# Patient Record
Sex: FEMALE | Race: WHITE | HISPANIC OR LATINO | Employment: OTHER | URBAN - METROPOLITAN AREA
[De-identification: names, ages, dates, MRNs, and addresses within clinical notes are randomized per-mention and may not be internally consistent; named-entity substitution may affect disease eponyms.]

---

## 2017-01-03 ENCOUNTER — GENERIC CONVERSION - ENCOUNTER (OUTPATIENT)
Dept: OTHER | Facility: OTHER | Age: 72
End: 2017-01-03

## 2017-01-05 ENCOUNTER — ALLSCRIPTS OFFICE VISIT (OUTPATIENT)
Dept: OTHER | Facility: OTHER | Age: 72
End: 2017-01-05

## 2017-01-16 ENCOUNTER — GENERIC CONVERSION - ENCOUNTER (OUTPATIENT)
Dept: OTHER | Facility: OTHER | Age: 72
End: 2017-01-16

## 2017-01-26 ENCOUNTER — APPOINTMENT (OUTPATIENT)
Dept: LAB | Facility: CLINIC | Age: 72
End: 2017-01-26
Payer: COMMERCIAL

## 2017-01-26 ENCOUNTER — OFFICE VISIT (OUTPATIENT)
Dept: LAB | Facility: CLINIC | Age: 72
End: 2017-01-26
Payer: COMMERCIAL

## 2017-01-26 ENCOUNTER — APPOINTMENT (OUTPATIENT)
Dept: PREADMISSION TESTING | Facility: HOSPITAL | Age: 72
End: 2017-01-26
Payer: COMMERCIAL

## 2017-01-26 ENCOUNTER — HOSPITAL ENCOUNTER (OUTPATIENT)
Dept: RADIOLOGY | Facility: CLINIC | Age: 72
Discharge: HOME | End: 2017-01-26
Payer: COMMERCIAL

## 2017-01-26 ENCOUNTER — TRANSCRIBE ORDERS (OUTPATIENT)
Dept: LAB | Facility: CLINIC | Age: 72
End: 2017-01-26

## 2017-01-26 VITALS
WEIGHT: 138.01 LBS | HEART RATE: 62 BPM | BODY MASS INDEX: 27.09 KG/M2 | DIASTOLIC BLOOD PRESSURE: 63 MMHG | SYSTOLIC BLOOD PRESSURE: 133 MMHG | TEMPERATURE: 97.6 F | HEIGHT: 60 IN

## 2017-01-26 DIAGNOSIS — C50.212 MALIGNANT NEOPLASM OF UPPER-INNER QUADRANT OF LEFT FEMALE BREAST (HCC): ICD-10-CM

## 2017-01-26 DIAGNOSIS — C50.212 MALIGNANT NEOPLASM OF UPPER-INNER QUADRANT OF LEFT FEMALE BREAST (HCC): Primary | ICD-10-CM

## 2017-01-26 PROBLEM — C50.912 MALIGNANT NEOPLASM OF LEFT FEMALE BREAST (HCC): Status: ACTIVE | Noted: 2017-01-26

## 2017-01-26 LAB
ALBUMIN SERPL BCP-MCNC: 3.6 G/DL (ref 3.5–5)
ALP SERPL-CCNC: 97 U/L (ref 46–116)
ALT SERPL W P-5'-P-CCNC: 30 U/L (ref 12–78)
ANION GAP SERPL CALCULATED.3IONS-SCNC: 6 MMOL/L (ref 4–13)
AST SERPL W P-5'-P-CCNC: 28 U/L (ref 5–45)
ATRIAL RATE: 51 BPM
BASOPHILS # BLD AUTO: 0.02 THOUSANDS/ΜL (ref 0–0.1)
BASOPHILS NFR BLD AUTO: 0 % (ref 0–1)
BILIRUB SERPL-MCNC: 0.5 MG/DL (ref 0.2–1)
BUN SERPL-MCNC: 25 MG/DL (ref 5–25)
CALCIUM SERPL-MCNC: 9.1 MG/DL (ref 8.3–10.1)
CHLORIDE SERPL-SCNC: 106 MMOL/L (ref 100–108)
CO2 SERPL-SCNC: 28 MMOL/L (ref 21–32)
CREAT SERPL-MCNC: 0.72 MG/DL (ref 0.6–1.3)
EOSINOPHIL # BLD AUTO: 0.1 THOUSAND/ΜL (ref 0–0.61)
EOSINOPHIL NFR BLD AUTO: 2 % (ref 0–6)
ERYTHROCYTE [DISTWIDTH] IN BLOOD BY AUTOMATED COUNT: 14.2 % (ref 11.6–15.1)
GFR SERPL CREATININE-BSD FRML MDRD: >60 ML/MIN/1.73SQ M
GLUCOSE SERPL-MCNC: 91 MG/DL (ref 65–140)
HCT VFR BLD AUTO: 40.1 % (ref 34.8–46.1)
HGB BLD-MCNC: 12.8 G/DL (ref 11.5–15.4)
LYMPHOCYTES # BLD AUTO: 1.08 THOUSANDS/ΜL (ref 0.6–4.47)
LYMPHOCYTES NFR BLD AUTO: 19 % (ref 14–44)
MCH RBC QN AUTO: 27.6 PG (ref 26.8–34.3)
MCHC RBC AUTO-ENTMCNC: 31.9 G/DL (ref 31.4–37.4)
MCV RBC AUTO: 87 FL (ref 82–98)
MONOCYTES # BLD AUTO: 0.6 THOUSAND/ΜL (ref 0.17–1.22)
MONOCYTES NFR BLD AUTO: 10 % (ref 4–12)
NEUTROPHILS # BLD AUTO: 4.01 THOUSANDS/ΜL (ref 1.85–7.62)
NEUTS SEG NFR BLD AUTO: 69 % (ref 43–75)
P AXIS: 53 DEGREES
PLATELET # BLD AUTO: 163 THOUSANDS/UL (ref 149–390)
PMV BLD AUTO: 10.4 FL (ref 8.9–12.7)
POTASSIUM SERPL-SCNC: 4.6 MMOL/L (ref 3.5–5.3)
PR INTERVAL: 180 MS
PROT SERPL-MCNC: 7 G/DL (ref 6.4–8.2)
QRS AXIS: 16 DEGREES
QRSD INTERVAL: 108 MS
QT INTERVAL: 464 MS
QTC INTERVAL: 427 MS
RBC # BLD AUTO: 4.63 MILLION/UL (ref 3.81–5.12)
SODIUM SERPL-SCNC: 140 MMOL/L (ref 136–145)
T WAVE AXIS: 195 DEGREES
VENTRICULAR RATE: 51 BPM
WBC # BLD AUTO: 5.81 THOUSAND/UL (ref 4.31–10.16)

## 2017-01-26 PROCEDURE — 36415 COLL VENOUS BLD VENIPUNCTURE: CPT

## 2017-01-26 PROCEDURE — 93005 ELECTROCARDIOGRAM TRACING: CPT

## 2017-01-26 PROCEDURE — 85025 COMPLETE CBC W/AUTO DIFF WBC: CPT

## 2017-01-26 PROCEDURE — 71020 HB CHEST X-RAY 2VW FRONTAL&LATL: CPT

## 2017-01-26 PROCEDURE — 80053 COMPREHEN METABOLIC PANEL: CPT

## 2017-01-26 RX ORDER — MULTIVITAMIN
1 TABLET ORAL DAILY
COMMUNITY
End: 2022-05-24

## 2017-02-08 ENCOUNTER — ANESTHESIA EVENT (OUTPATIENT)
Dept: PERIOP | Facility: HOSPITAL | Age: 72
DRG: 582 | End: 2017-02-08
Payer: COMMERCIAL

## 2017-02-09 ENCOUNTER — HOSPITAL ENCOUNTER (OUTPATIENT)
Dept: NUCLEAR MEDICINE | Facility: HOSPITAL | Age: 72
Discharge: HOME | End: 2017-02-09
Attending: SURGERY
Payer: COMMERCIAL

## 2017-02-09 ENCOUNTER — ANESTHESIA (OUTPATIENT)
Dept: PERIOP | Facility: HOSPITAL | Age: 72
DRG: 582 | End: 2017-02-09
Payer: COMMERCIAL

## 2017-02-09 ENCOUNTER — HOSPITAL ENCOUNTER (INPATIENT)
Facility: HOSPITAL | Age: 72
LOS: 1 days | Discharge: HOME HEALTH | DRG: 582 | End: 2017-02-10
Attending: SURGERY | Admitting: PLASTIC SURGERY
Payer: COMMERCIAL

## 2017-02-09 ENCOUNTER — CONVERSION ENCOUNTER (OUTPATIENT)
Dept: MAMMOGRAPHY | Facility: HOSPITAL | Age: 72
End: 2017-02-09

## 2017-02-09 DIAGNOSIS — C50.211 BILATERAL MALIGNANT NEOPLASM OF UPPER INNER QUADRANT OF BREAST IN FEMALE (HCC): ICD-10-CM

## 2017-02-09 DIAGNOSIS — C50.212 BILATERAL MALIGNANT NEOPLASM OF UPPER INNER QUADRANT OF BREAST IN FEMALE (HCC): ICD-10-CM

## 2017-02-09 DIAGNOSIS — C50.212 MALIGNANT NEOPLASM OF UPPER-INNER QUADRANT OF LEFT FEMALE BREAST (HCC): ICD-10-CM

## 2017-02-09 LAB
ANION GAP SERPL CALCULATED.3IONS-SCNC: 11 MMOL/L (ref 4–13)
BUN SERPL-MCNC: 22 MG/DL (ref 5–25)
CALCIUM SERPL-MCNC: 8.6 MG/DL (ref 8.3–10.1)
CHLORIDE SERPL-SCNC: 102 MMOL/L (ref 100–108)
CO2 SERPL-SCNC: 24 MMOL/L (ref 21–32)
CREAT SERPL-MCNC: 1.03 MG/DL (ref 0.6–1.3)
GFR SERPL CREATININE-BSD FRML MDRD: 52.5 ML/MIN/1.73SQ M
GLUCOSE SERPL-MCNC: 180 MG/DL (ref 65–140)
POTASSIUM SERPL-SCNC: 4.6 MMOL/L (ref 3.5–5.3)
SODIUM SERPL-SCNC: 137 MMOL/L (ref 136–145)

## 2017-02-09 PROCEDURE — 88342 IMHCHEM/IMCYTCHM 1ST ANTB: CPT | Performed by: SURGERY

## 2017-02-09 PROCEDURE — 0HRUXJZ: ICD-10-PCS | Performed by: PLASTIC SURGERY

## 2017-02-09 PROCEDURE — A9270 NON-COVERED ITEM OR SERVICE: HCPCS | Performed by: PLASTIC SURGERY

## 2017-02-09 PROCEDURE — 0HHU0NZ INSERTION OF TISSUE EXPANDER INTO LEFT BREAST, OPEN APPROACH: ICD-10-PCS | Performed by: PLASTIC SURGERY

## 2017-02-09 PROCEDURE — C1781 MESH (IMPLANTABLE): HCPCS | Performed by: SURGERY

## 2017-02-09 PROCEDURE — 88333 PATH CONSLTJ SURG CYTO XM 1: CPT | Performed by: SURGERY

## 2017-02-09 PROCEDURE — A9541 TC99M SULFUR COLLOID: HCPCS

## 2017-02-09 PROCEDURE — 78195 LYMPH SYSTEM IMAGING: CPT

## 2017-02-09 PROCEDURE — C1789 PROSTHESIS, BREAST, IMP: HCPCS | Performed by: SURGERY

## 2017-02-09 PROCEDURE — 07T60ZZ RESECTION OF LEFT AXILLARY LYMPHATIC, OPEN APPROACH: ICD-10-PCS | Performed by: SURGERY

## 2017-02-09 PROCEDURE — 0HTU0ZZ RESECTION OF LEFT BREAST, OPEN APPROACH: ICD-10-PCS | Performed by: SURGERY

## 2017-02-09 PROCEDURE — 80048 BASIC METABOLIC PNL TOTAL CA: CPT | Performed by: PHYSICIAN ASSISTANT

## 2017-02-09 PROCEDURE — 88341 IMHCHEM/IMCYTCHM EA ADD ANTB: CPT | Performed by: SURGERY

## 2017-02-09 PROCEDURE — 88307 TISSUE EXAM BY PATHOLOGIST: CPT | Performed by: SURGERY

## 2017-02-09 DEVICE — TEXTURED, HIGH PROFILE, SUTURE TABS, INTEGRAL INJECTION DOME, 375CC
Type: IMPLANTABLE DEVICE | Site: BREAST | Status: FUNCTIONAL
Brand: ARTOURA BREAST TISSUE EXPANDER

## 2017-02-09 DEVICE — (128 SQ CM) -GRAFT ALLODERM 8 X 16 CM THIN: Type: IMPLANTABLE DEVICE | Site: BREAST | Status: FUNCTIONAL

## 2017-02-09 RX ORDER — PROPOFOL 10 MG/ML
INJECTION, EMULSION INTRAVENOUS AS NEEDED
Status: DISCONTINUED | OUTPATIENT
Start: 2017-02-09 | End: 2017-02-09 | Stop reason: SURG

## 2017-02-09 RX ORDER — LIDOCAINE HYDROCHLORIDE 10 MG/ML
INJECTION, SOLUTION INFILTRATION; PERINEURAL AS NEEDED
Status: DISCONTINUED | OUTPATIENT
Start: 2017-02-09 | End: 2017-02-09 | Stop reason: SURG

## 2017-02-09 RX ORDER — SODIUM CHLORIDE, SODIUM LACTATE, POTASSIUM CHLORIDE, CALCIUM CHLORIDE 600; 310; 30; 20 MG/100ML; MG/100ML; MG/100ML; MG/100ML
125 INJECTION, SOLUTION INTRAVENOUS CONTINUOUS
Status: DISCONTINUED | OUTPATIENT
Start: 2017-02-09 | End: 2017-02-09

## 2017-02-09 RX ORDER — BUPIVACAINE HYDROCHLORIDE AND EPINEPHRINE 2.5; 5 MG/ML; UG/ML
INJECTION, SOLUTION EPIDURAL; INFILTRATION; INTRACAUDAL; PERINEURAL AS NEEDED
Status: DISCONTINUED | OUTPATIENT
Start: 2017-02-09 | End: 2017-02-09 | Stop reason: HOSPADM

## 2017-02-09 RX ORDER — METOCLOPRAMIDE HYDROCHLORIDE 5 MG/ML
10 INJECTION INTRAMUSCULAR; INTRAVENOUS EVERY 6 HOURS PRN
Status: DISCONTINUED | OUTPATIENT
Start: 2017-02-09 | End: 2017-02-09 | Stop reason: HOSPADM

## 2017-02-09 RX ORDER — ROCURONIUM BROMIDE 10 MG/ML
INJECTION, SOLUTION INTRAVENOUS AS NEEDED
Status: DISCONTINUED | OUTPATIENT
Start: 2017-02-09 | End: 2017-02-09 | Stop reason: SURG

## 2017-02-09 RX ORDER — GLYCOPYRROLATE 0.2 MG/ML
INJECTION INTRAMUSCULAR; INTRAVENOUS AS NEEDED
Status: DISCONTINUED | OUTPATIENT
Start: 2017-02-09 | End: 2017-02-09 | Stop reason: SURG

## 2017-02-09 RX ORDER — DOCUSATE SODIUM 100 MG/1
100 CAPSULE, LIQUID FILLED ORAL 2 TIMES DAILY
Status: DISCONTINUED | OUTPATIENT
Start: 2017-02-09 | End: 2017-02-10 | Stop reason: HOSPADM

## 2017-02-09 RX ORDER — LEVOTHYROXINE SODIUM 0.03 MG/1
25 TABLET ORAL DAILY
Status: DISCONTINUED | OUTPATIENT
Start: 2017-02-09 | End: 2017-02-10 | Stop reason: HOSPADM

## 2017-02-09 RX ORDER — ONDANSETRON 2 MG/ML
INJECTION INTRAMUSCULAR; INTRAVENOUS AS NEEDED
Status: DISCONTINUED | OUTPATIENT
Start: 2017-02-09 | End: 2017-02-09 | Stop reason: SURG

## 2017-02-09 RX ORDER — OXYCODONE HYDROCHLORIDE AND ACETAMINOPHEN 5; 325 MG/1; MG/1
2 TABLET ORAL EVERY 4 HOURS PRN
Status: DISCONTINUED | OUTPATIENT
Start: 2017-02-09 | End: 2017-02-10 | Stop reason: HOSPADM

## 2017-02-09 RX ORDER — METHYLENE BLUE 10 MG/ML
INJECTION INTRAVENOUS AS NEEDED
Status: DISCONTINUED | OUTPATIENT
Start: 2017-02-09 | End: 2017-02-09 | Stop reason: HOSPADM

## 2017-02-09 RX ORDER — MORPHINE SULFATE 2 MG/ML
2 INJECTION, SOLUTION INTRAMUSCULAR; INTRAVENOUS
Status: DISCONTINUED | OUTPATIENT
Start: 2017-02-09 | End: 2017-02-10 | Stop reason: HOSPADM

## 2017-02-09 RX ORDER — ONDANSETRON 2 MG/ML
4 INJECTION INTRAMUSCULAR; INTRAVENOUS ONCE AS NEEDED
Status: COMPLETED | OUTPATIENT
Start: 2017-02-09 | End: 2017-02-09

## 2017-02-09 RX ORDER — SODIUM CHLORIDE, SODIUM LACTATE, POTASSIUM CHLORIDE, CALCIUM CHLORIDE 600; 310; 30; 20 MG/100ML; MG/100ML; MG/100ML; MG/100ML
75 INJECTION, SOLUTION INTRAVENOUS CONTINUOUS
Status: DISCONTINUED | OUTPATIENT
Start: 2017-02-09 | End: 2017-02-10 | Stop reason: HOSPADM

## 2017-02-09 RX ORDER — FENTANYL CITRATE 50 UG/ML
INJECTION, SOLUTION INTRAMUSCULAR; INTRAVENOUS AS NEEDED
Status: DISCONTINUED | OUTPATIENT
Start: 2017-02-09 | End: 2017-02-09 | Stop reason: SURG

## 2017-02-09 RX ORDER — DIPHENHYDRAMINE HCL 25 MG
50 TABLET ORAL EVERY 6 HOURS PRN
Status: DISCONTINUED | OUTPATIENT
Start: 2017-02-09 | End: 2017-02-10 | Stop reason: HOSPADM

## 2017-02-09 RX ORDER — ONDANSETRON 2 MG/ML
4 INJECTION INTRAMUSCULAR; INTRAVENOUS EVERY 6 HOURS PRN
Status: DISCONTINUED | OUTPATIENT
Start: 2017-02-09 | End: 2017-02-09 | Stop reason: SDUPTHER

## 2017-02-09 RX ORDER — SODIUM CHLORIDE, SODIUM LACTATE, POTASSIUM CHLORIDE, CALCIUM CHLORIDE 600; 310; 30; 20 MG/100ML; MG/100ML; MG/100ML; MG/100ML
125 INJECTION, SOLUTION INTRAVENOUS CONTINUOUS
Status: DISCONTINUED | OUTPATIENT
Start: 2017-02-09 | End: 2017-02-10 | Stop reason: HOSPADM

## 2017-02-09 RX ORDER — MIDAZOLAM HYDROCHLORIDE 1 MG/ML
INJECTION INTRAMUSCULAR; INTRAVENOUS AS NEEDED
Status: DISCONTINUED | OUTPATIENT
Start: 2017-02-09 | End: 2017-02-09 | Stop reason: SURG

## 2017-02-09 RX ORDER — FENTANYL CITRATE/PF 50 MCG/ML
25 SYRINGE (ML) INJECTION
Status: DISCONTINUED | OUTPATIENT
Start: 2017-02-09 | End: 2017-02-09 | Stop reason: HOSPADM

## 2017-02-09 RX ORDER — ONDANSETRON 2 MG/ML
4 INJECTION INTRAMUSCULAR; INTRAVENOUS EVERY 4 HOURS PRN
Status: DISCONTINUED | OUTPATIENT
Start: 2017-02-09 | End: 2017-02-10 | Stop reason: HOSPADM

## 2017-02-09 RX ORDER — DOCUSATE SODIUM 100 MG/1
100 CAPSULE, LIQUID FILLED ORAL 2 TIMES DAILY
Status: DISCONTINUED | OUTPATIENT
Start: 2017-02-09 | End: 2017-02-09 | Stop reason: SDUPTHER

## 2017-02-09 RX ORDER — OXYCODONE HYDROCHLORIDE AND ACETAMINOPHEN 5; 325 MG/1; MG/1
2 TABLET ORAL EVERY 4 HOURS PRN
Qty: 20 TABLET | Refills: 0 | Status: CANCELLED | OUTPATIENT
Start: 2017-02-09 | End: 2017-02-19

## 2017-02-09 RX ORDER — DOCUSATE SODIUM 100 MG/1
100 CAPSULE, LIQUID FILLED ORAL 2 TIMES DAILY
Qty: 60 CAPSULE | Refills: 0 | Status: CANCELLED | OUTPATIENT
Start: 2017-02-09 | End: 2017-03-11

## 2017-02-09 RX ADMIN — OXYCODONE HYDROCHLORIDE AND ACETAMINOPHEN 2 TABLET: 5; 325 TABLET ORAL at 18:38

## 2017-02-09 RX ADMIN — DEXAMETHASONE SODIUM PHOSPHATE 10 MG: 10 INJECTION INTRAMUSCULAR; INTRAVENOUS at 08:08

## 2017-02-09 RX ADMIN — MIDAZOLAM HYDROCHLORIDE 1 MG: 1 INJECTION, SOLUTION INTRAMUSCULAR; INTRAVENOUS at 08:03

## 2017-02-09 RX ADMIN — ROCURONIUM BROMIDE 40 MG: 10 INJECTION, SOLUTION INTRAVENOUS at 08:08

## 2017-02-09 RX ADMIN — ROCURONIUM BROMIDE 10 MG: 10 INJECTION, SOLUTION INTRAVENOUS at 09:45

## 2017-02-09 RX ADMIN — ROCURONIUM BROMIDE 20 MG: 10 INJECTION, SOLUTION INTRAVENOUS at 09:24

## 2017-02-09 RX ADMIN — ONDANSETRON 4 MG: 2 INJECTION INTRAMUSCULAR; INTRAVENOUS at 10:35

## 2017-02-09 RX ADMIN — GLYCOPYRROLATE 0.2 MG: 0.2 INJECTION INTRAMUSCULAR; INTRAVENOUS at 08:05

## 2017-02-09 RX ADMIN — METOPROLOL TARTRATE 25 MG: 25 TABLET ORAL at 21:04

## 2017-02-09 RX ADMIN — LIDOCAINE HYDROCHLORIDE 50 MG: 10 INJECTION, SOLUTION INFILTRATION; PERINEURAL at 08:08

## 2017-02-09 RX ADMIN — PROPOFOL 20 MG: 10 INJECTION, EMULSION INTRAVENOUS at 11:04

## 2017-02-09 RX ADMIN — OXYCODONE HYDROCHLORIDE AND ACETAMINOPHEN 2 TABLET: 5; 325 TABLET ORAL at 13:32

## 2017-02-09 RX ADMIN — DOCUSATE SODIUM 100 MG: 100 CAPSULE, LIQUID FILLED ORAL at 18:38

## 2017-02-09 RX ADMIN — ROCURONIUM BROMIDE 10 MG: 10 INJECTION, SOLUTION INTRAVENOUS at 09:40

## 2017-02-09 RX ADMIN — GLYCOPYRROLATE 0.2 MG: 0.2 INJECTION INTRAMUSCULAR; INTRAVENOUS at 10:20

## 2017-02-09 RX ADMIN — ENOXAPARIN SODIUM 40 MG: 40 INJECTION SUBCUTANEOUS at 21:04

## 2017-02-09 RX ADMIN — GLYCOPYRROLATE 0.4 MG: 0.2 INJECTION INTRAMUSCULAR; INTRAVENOUS at 11:01

## 2017-02-09 RX ADMIN — PROPOFOL 110 MG: 10 INJECTION, EMULSION INTRAVENOUS at 08:08

## 2017-02-09 RX ADMIN — FENTANYL CITRATE 50 MCG: 50 INJECTION INTRAMUSCULAR; INTRAVENOUS at 09:54

## 2017-02-09 RX ADMIN — CEFAZOLIN SODIUM 1000 MG: 1 SOLUTION INTRAVENOUS at 15:18

## 2017-02-09 RX ADMIN — SODIUM CHLORIDE, SODIUM LACTATE, POTASSIUM CHLORIDE, AND CALCIUM CHLORIDE: .6; .31; .03; .02 INJECTION, SOLUTION INTRAVENOUS at 10:51

## 2017-02-09 RX ADMIN — SODIUM CHLORIDE, SODIUM LACTATE, POTASSIUM CHLORIDE, AND CALCIUM CHLORIDE: .6; .31; .03; .02 INJECTION, SOLUTION INTRAVENOUS at 08:03

## 2017-02-09 RX ADMIN — SODIUM CHLORIDE, SODIUM LACTATE, POTASSIUM CHLORIDE, AND CALCIUM CHLORIDE 125 ML/HR: .6; .31; .03; .02 INJECTION, SOLUTION INTRAVENOUS at 14:57

## 2017-02-09 RX ADMIN — NEOSTIGMINE METHYLSULFATE 3 MG: 1 INJECTION INTRAMUSCULAR; INTRAVENOUS; SUBCUTANEOUS at 11:01

## 2017-02-09 RX ADMIN — ONDANSETRON 4 MG: 2 INJECTION INTRAMUSCULAR; INTRAVENOUS at 12:35

## 2017-02-09 RX ADMIN — CEFAZOLIN SODIUM 1000 MG: 1 SOLUTION INTRAVENOUS at 08:03

## 2017-02-09 RX ADMIN — FENTANYL CITRATE 25 MCG: 50 INJECTION INTRAMUSCULAR; INTRAVENOUS at 11:07

## 2017-02-09 RX ADMIN — MIDAZOLAM HYDROCHLORIDE 1 MG: 1 INJECTION, SOLUTION INTRAMUSCULAR; INTRAVENOUS at 08:05

## 2017-02-09 RX ADMIN — ONDANSETRON 4 MG: 2 INJECTION INTRAMUSCULAR; INTRAVENOUS at 15:38

## 2017-02-09 RX ADMIN — FENTANYL CITRATE 50 MCG: 50 INJECTION INTRAMUSCULAR; INTRAVENOUS at 09:59

## 2017-02-09 RX ADMIN — FENTANYL CITRATE 50 MCG: 50 INJECTION INTRAMUSCULAR; INTRAVENOUS at 08:36

## 2017-02-10 VITALS
OXYGEN SATURATION: 94 % | BODY MASS INDEX: 27.79 KG/M2 | RESPIRATION RATE: 16 BRPM | WEIGHT: 141.54 LBS | SYSTOLIC BLOOD PRESSURE: 116 MMHG | TEMPERATURE: 98 F | HEART RATE: 56 BPM | HEIGHT: 60 IN | DIASTOLIC BLOOD PRESSURE: 56 MMHG

## 2017-02-10 PROCEDURE — A9270 NON-COVERED ITEM OR SERVICE: HCPCS | Performed by: PLASTIC SURGERY

## 2017-02-10 RX ORDER — OXYCODONE HYDROCHLORIDE AND ACETAMINOPHEN 5; 325 MG/1; MG/1
2 TABLET ORAL EVERY 4 HOURS PRN
Qty: 30 TABLET | Refills: 0 | Status: SHIPPED | OUTPATIENT
Start: 2017-02-10 | End: 2017-02-20

## 2017-02-10 RX ADMIN — LEVOTHYROXINE SODIUM 25 MCG: 25 TABLET ORAL at 08:10

## 2017-02-10 RX ADMIN — CEFAZOLIN SODIUM 1000 MG: 1 SOLUTION INTRAVENOUS at 08:09

## 2017-02-10 RX ADMIN — SODIUM CHLORIDE, SODIUM LACTATE, POTASSIUM CHLORIDE, AND CALCIUM CHLORIDE 125 ML/HR: .6; .31; .03; .02 INJECTION, SOLUTION INTRAVENOUS at 01:03

## 2017-02-10 RX ADMIN — METOPROLOL TARTRATE 25 MG: 25 TABLET ORAL at 08:09

## 2017-02-10 RX ADMIN — OXYCODONE HYDROCHLORIDE AND ACETAMINOPHEN 1 TABLET: 5; 325 TABLET ORAL at 05:48

## 2017-02-10 RX ADMIN — CEFAZOLIN SODIUM 1000 MG: 1 SOLUTION INTRAVENOUS at 00:58

## 2017-02-10 RX ADMIN — DOCUSATE SODIUM 100 MG: 100 CAPSULE, LIQUID FILLED ORAL at 08:10

## 2017-02-22 ENCOUNTER — ALLSCRIPTS OFFICE VISIT (OUTPATIENT)
Dept: OTHER | Facility: OTHER | Age: 72
End: 2017-02-22

## 2017-02-23 ENCOUNTER — ALLSCRIPTS OFFICE VISIT (OUTPATIENT)
Dept: OTHER | Facility: OTHER | Age: 72
End: 2017-02-23

## 2017-02-23 ENCOUNTER — APPOINTMENT (OUTPATIENT)
Dept: LAB | Facility: CLINIC | Age: 72
End: 2017-02-23
Payer: COMMERCIAL

## 2017-02-23 DIAGNOSIS — E03.9 HYPOTHYROIDISM: ICD-10-CM

## 2017-02-23 LAB — TSH SERPL DL<=0.05 MIU/L-ACNC: 1.61 UIU/ML (ref 0.36–3.74)

## 2017-02-23 PROCEDURE — 84443 ASSAY THYROID STIM HORMONE: CPT

## 2017-02-23 PROCEDURE — 36415 COLL VENOUS BLD VENIPUNCTURE: CPT

## 2017-02-24 ENCOUNTER — GENERIC CONVERSION - ENCOUNTER (OUTPATIENT)
Dept: OTHER | Facility: OTHER | Age: 72
End: 2017-02-24

## 2017-03-07 LAB — SCAN RESULT: NORMAL

## 2017-03-16 ENCOUNTER — ALLSCRIPTS OFFICE VISIT (OUTPATIENT)
Dept: OTHER | Facility: OTHER | Age: 72
End: 2017-03-16

## 2017-03-16 ENCOUNTER — TRANSCRIBE ORDERS (OUTPATIENT)
Dept: ADMINISTRATIVE | Facility: HOSPITAL | Age: 72
End: 2017-03-16

## 2017-03-16 DIAGNOSIS — C50.212 MALIGNANT NEOPLASM OF UPPER-INNER QUADRANT OF LEFT FEMALE BREAST (HCC): ICD-10-CM

## 2017-03-16 DIAGNOSIS — E28.39 RESISTANT OVARY SYNDROME: Primary | ICD-10-CM

## 2017-04-19 ENCOUNTER — HOSPITAL ENCOUNTER (OUTPATIENT)
Dept: RADIOLOGY | Age: 72
Discharge: HOME | End: 2017-04-19
Payer: COMMERCIAL

## 2017-04-19 DIAGNOSIS — E28.39 OTHER PRIMARY OVARIAN FAILURE: ICD-10-CM

## 2017-04-19 DIAGNOSIS — C50.212 MALIGNANT NEOPLASM OF UPPER-INNER QUADRANT OF LEFT FEMALE BREAST (HCC): ICD-10-CM

## 2017-04-19 PROCEDURE — 77080 DXA BONE DENSITY AXIAL: CPT

## 2017-05-02 ENCOUNTER — GENERIC CONVERSION - ENCOUNTER (OUTPATIENT)
Dept: OTHER | Facility: OTHER | Age: 72
End: 2017-05-02

## 2017-05-10 RX ORDER — OMEPRAZOLE 10 MG/1
10 CAPSULE, DELAYED RELEASE ORAL DAILY
Status: ON HOLD | COMMUNITY
End: 2017-05-18 | Stop reason: ALTCHOICE

## 2017-05-10 RX ORDER — ASPIRIN 81 MG/1
81 TABLET ORAL DAILY
COMMUNITY

## 2017-05-10 RX ORDER — OMEGA-3-ACID ETHYL ESTERS 1 G/1
2 CAPSULE, LIQUID FILLED ORAL 2 TIMES DAILY
COMMUNITY
End: 2017-05-18 | Stop reason: HOSPADM

## 2017-05-18 ENCOUNTER — HOSPITAL ENCOUNTER (OUTPATIENT)
Facility: HOSPITAL | Age: 72
Setting detail: OUTPATIENT SURGERY
Discharge: HOME | End: 2017-05-18
Attending: PLASTIC SURGERY | Admitting: PLASTIC SURGERY
Payer: COMMERCIAL

## 2017-05-18 ENCOUNTER — ANESTHESIA (OUTPATIENT)
Dept: PERIOP | Facility: HOSPITAL | Age: 72
End: 2017-05-18
Payer: COMMERCIAL

## 2017-05-18 ENCOUNTER — ANESTHESIA EVENT (OUTPATIENT)
Dept: PERIOP | Facility: HOSPITAL | Age: 72
End: 2017-05-18
Payer: COMMERCIAL

## 2017-05-18 VITALS
BODY MASS INDEX: 27.29 KG/M2 | RESPIRATION RATE: 18 BRPM | SYSTOLIC BLOOD PRESSURE: 120 MMHG | OXYGEN SATURATION: 94 % | HEART RATE: 58 BPM | TEMPERATURE: 97.9 F | WEIGHT: 139 LBS | DIASTOLIC BLOOD PRESSURE: 58 MMHG | HEIGHT: 60 IN

## 2017-05-18 DIAGNOSIS — Z85.3 PERSONAL HISTORY OF MALIGNANT NEOPLASM OF BREAST: ICD-10-CM

## 2017-05-18 DIAGNOSIS — N65.0 DEFORMITY OF RECONSTRUCTED BREAST: ICD-10-CM

## 2017-05-18 PROCEDURE — 88305 TISSUE EXAM BY PATHOLOGIST: CPT | Performed by: PLASTIC SURGERY

## 2017-05-18 PROCEDURE — C1789 PROSTHESIS, BREAST, IMP: HCPCS | Performed by: PLASTIC SURGERY

## 2017-05-18 DEVICE — LOW HEIGHT, MODERATE PLUS PROFILE SILICONE GEL-FILLED BREAST IMPLANT, 415CC  TEXTURED CONTOUR SILICONE
Type: IMPLANTABLE DEVICE | Site: BREAST | Status: FUNCTIONAL
Brand: MENTOR MEMORYSHAPE BREAST IMPLANT

## 2017-05-18 RX ORDER — ONDANSETRON 2 MG/ML
4 INJECTION INTRAMUSCULAR; INTRAVENOUS EVERY 8 HOURS PRN
Status: DISCONTINUED | OUTPATIENT
Start: 2017-05-18 | End: 2017-05-18 | Stop reason: HOSPADM

## 2017-05-18 RX ORDER — ROCURONIUM BROMIDE 10 MG/ML
INJECTION, SOLUTION INTRAVENOUS AS NEEDED
Status: DISCONTINUED | OUTPATIENT
Start: 2017-05-18 | End: 2017-05-18 | Stop reason: SURG

## 2017-05-18 RX ORDER — BUPIVACAINE HYDROCHLORIDE AND EPINEPHRINE 2.5; 5 MG/ML; UG/ML
INJECTION, SOLUTION EPIDURAL; INFILTRATION; INTRACAUDAL; PERINEURAL AS NEEDED
Status: DISCONTINUED | OUTPATIENT
Start: 2017-05-18 | End: 2017-05-18 | Stop reason: HOSPADM

## 2017-05-18 RX ORDER — SODIUM CHLORIDE, SODIUM LACTATE, POTASSIUM CHLORIDE, CALCIUM CHLORIDE 600; 310; 30; 20 MG/100ML; MG/100ML; MG/100ML; MG/100ML
INJECTION, SOLUTION INTRAVENOUS CONTINUOUS PRN
Status: DISCONTINUED | OUTPATIENT
Start: 2017-05-18 | End: 2017-05-18 | Stop reason: SURG

## 2017-05-18 RX ORDER — ANASTROZOLE 1 MG/1
1 TABLET ORAL DAILY
COMMUNITY
End: 2018-03-15 | Stop reason: SDUPTHER

## 2017-05-18 RX ORDER — SUCCINYLCHOLINE CHLORIDE 20 MG/ML
INJECTION INTRAMUSCULAR; INTRAVENOUS AS NEEDED
Status: DISCONTINUED | OUTPATIENT
Start: 2017-05-18 | End: 2017-05-18 | Stop reason: SURG

## 2017-05-18 RX ORDER — PROPOFOL 10 MG/ML
INJECTION, EMULSION INTRAVENOUS AS NEEDED
Status: DISCONTINUED | OUTPATIENT
Start: 2017-05-18 | End: 2017-05-18 | Stop reason: SURG

## 2017-05-18 RX ORDER — EPHEDRINE SULFATE 50 MG/ML
INJECTION, SOLUTION INTRAVENOUS AS NEEDED
Status: DISCONTINUED | OUTPATIENT
Start: 2017-05-18 | End: 2017-05-18 | Stop reason: SURG

## 2017-05-18 RX ORDER — ONDANSETRON 2 MG/ML
INJECTION INTRAMUSCULAR; INTRAVENOUS AS NEEDED
Status: DISCONTINUED | OUTPATIENT
Start: 2017-05-18 | End: 2017-05-18 | Stop reason: SURG

## 2017-05-18 RX ORDER — GLYCOPYRROLATE 0.2 MG/ML
INJECTION INTRAMUSCULAR; INTRAVENOUS AS NEEDED
Status: DISCONTINUED | OUTPATIENT
Start: 2017-05-18 | End: 2017-05-18 | Stop reason: SURG

## 2017-05-18 RX ORDER — FENTANYL CITRATE 50 UG/ML
INJECTION, SOLUTION INTRAMUSCULAR; INTRAVENOUS AS NEEDED
Status: DISCONTINUED | OUTPATIENT
Start: 2017-05-18 | End: 2017-05-18 | Stop reason: SURG

## 2017-05-18 RX ORDER — OXYCODONE HYDROCHLORIDE AND ACETAMINOPHEN 5; 325 MG/1; MG/1
2 TABLET ORAL EVERY 4 HOURS PRN
Status: DISCONTINUED | OUTPATIENT
Start: 2017-05-18 | End: 2017-05-18 | Stop reason: HOSPADM

## 2017-05-18 RX ORDER — OXYCODONE HYDROCHLORIDE AND ACETAMINOPHEN 5; 325 MG/1; MG/1
1-2 TABLET ORAL EVERY 4 HOURS PRN
Qty: 50 TABLET | Refills: 0 | Status: SHIPPED | OUTPATIENT
Start: 2017-05-18 | End: 2017-05-31 | Stop reason: ALTCHOICE

## 2017-05-18 RX ORDER — FENTANYL CITRATE/PF 50 MCG/ML
25 SYRINGE (ML) INJECTION
Status: DISCONTINUED | OUTPATIENT
Start: 2017-05-18 | End: 2017-05-18 | Stop reason: HOSPADM

## 2017-05-18 RX ORDER — ONDANSETRON 2 MG/ML
4 INJECTION INTRAMUSCULAR; INTRAVENOUS ONCE
Status: DISCONTINUED | OUTPATIENT
Start: 2017-05-18 | End: 2017-05-18 | Stop reason: HOSPADM

## 2017-05-18 RX ORDER — LIDOCAINE HYDROCHLORIDE 10 MG/ML
INJECTION, SOLUTION INFILTRATION; PERINEURAL AS NEEDED
Status: DISCONTINUED | OUTPATIENT
Start: 2017-05-18 | End: 2017-05-18 | Stop reason: SURG

## 2017-05-18 RX ADMIN — GLYCOPYRROLATE 0.2 MG: 0.2 INJECTION INTRAMUSCULAR; INTRAVENOUS at 17:56

## 2017-05-18 RX ADMIN — LIDOCAINE HYDROCHLORIDE 50 MG: 10 INJECTION, SOLUTION INFILTRATION; PERINEURAL at 16:37

## 2017-05-18 RX ADMIN — SODIUM CHLORIDE, SODIUM LACTATE, POTASSIUM CHLORIDE, AND CALCIUM CHLORIDE: .6; .31; .03; .02 INJECTION, SOLUTION INTRAVENOUS at 16:29

## 2017-05-18 RX ADMIN — DEXAMETHASONE SODIUM PHOSPHATE 8 MG: 10 INJECTION INTRAMUSCULAR; INTRAVENOUS at 16:45

## 2017-05-18 RX ADMIN — CEFAZOLIN SODIUM 1000 MG: 1 SOLUTION INTRAVENOUS at 16:34

## 2017-05-18 RX ADMIN — EPHEDRINE SULFATE 10 MG: 50 INJECTION, SOLUTION INTRAMUSCULAR; INTRAVENOUS; SUBCUTANEOUS at 16:54

## 2017-05-18 RX ADMIN — FENTANYL CITRATE 50 MCG: 50 INJECTION INTRAMUSCULAR; INTRAVENOUS at 16:37

## 2017-05-18 RX ADMIN — ONDANSETRON 4 MG: 2 INJECTION INTRAMUSCULAR; INTRAVENOUS at 16:45

## 2017-05-18 RX ADMIN — FENTANYL CITRATE 50 MCG: 50 INJECTION INTRAMUSCULAR; INTRAVENOUS at 17:57

## 2017-05-18 RX ADMIN — SUCCINYLCHOLINE CHLORIDE 100 MG: 20 INJECTION, SOLUTION INTRAMUSCULAR; INTRAVENOUS at 16:37

## 2017-05-18 RX ADMIN — PROPOFOL 150 MG: 10 INJECTION, EMULSION INTRAVENOUS at 16:37

## 2017-05-18 RX ADMIN — ROCURONIUM BROMIDE 5 MG: 10 INJECTION, SOLUTION INTRAVENOUS at 17:17

## 2017-05-18 RX ADMIN — ROCURONIUM BROMIDE 25 MG: 10 INJECTION, SOLUTION INTRAVENOUS at 16:54

## 2017-05-18 RX ADMIN — NEOSTIGMINE METHYLSULFATE 2 MG: 1 INJECTION, SOLUTION INTRAMUSCULAR; INTRAVENOUS; SUBCUTANEOUS at 17:56

## 2017-05-18 RX ADMIN — EPHEDRINE SULFATE 10 MG: 50 INJECTION, SOLUTION INTRAMUSCULAR; INTRAVENOUS; SUBCUTANEOUS at 16:48

## 2017-05-22 ENCOUNTER — ALLSCRIPTS OFFICE VISIT (OUTPATIENT)
Dept: OTHER | Facility: OTHER | Age: 72
End: 2017-05-22

## 2017-05-22 ENCOUNTER — TRANSCRIBE ORDERS (OUTPATIENT)
Dept: ADMINISTRATIVE | Facility: HOSPITAL | Age: 72
End: 2017-05-22

## 2017-05-22 DIAGNOSIS — C50.212 MALIGNANT NEOPLASM OF UPPER-INNER QUADRANT OF LEFT FEMALE BREAST (HCC): Primary | ICD-10-CM

## 2017-05-31 ENCOUNTER — HOSPITAL ENCOUNTER (INPATIENT)
Facility: HOSPITAL | Age: 72
LOS: 4 days | Discharge: HOME | DRG: 600 | End: 2017-06-04
Attending: PLASTIC SURGERY | Admitting: PLASTIC SURGERY
Payer: COMMERCIAL

## 2017-05-31 ENCOUNTER — APPOINTMENT (EMERGENCY)
Dept: CT IMAGING | Facility: HOSPITAL | Age: 72
DRG: 600 | End: 2017-05-31
Payer: COMMERCIAL

## 2017-05-31 DIAGNOSIS — D72.829 LEUKOCYTOSIS: Primary | ICD-10-CM

## 2017-05-31 LAB
ANION GAP SERPL CALCULATED.3IONS-SCNC: 7 MMOL/L (ref 4–13)
ANISOCYTOSIS BLD QL SMEAR: PRESENT
BACTERIA UR QL AUTO: ABNORMAL /HPF
BASOPHILS # BLD AUTO: 0.01 THOUSANDS/ΜL (ref 0–0.1)
BASOPHILS # BLD MANUAL: 0.22 THOUSAND/UL (ref 0–0.1)
BASOPHILS NFR BLD AUTO: 0 % (ref 0–1)
BASOPHILS NFR MAR MANUAL: 1 % (ref 0–1)
BILIRUB UR QL STRIP: NEGATIVE
BUN SERPL-MCNC: 22 MG/DL (ref 5–25)
CALCIUM SERPL-MCNC: 8.8 MG/DL (ref 8.3–10.1)
CHLORIDE SERPL-SCNC: 101 MMOL/L (ref 100–108)
CLARITY UR: CLEAR
CO2 SERPL-SCNC: 26 MMOL/L (ref 21–32)
COLOR UR: YELLOW
CREAT SERPL-MCNC: 0.87 MG/DL (ref 0.6–1.3)
EOSINOPHIL # BLD AUTO: 0.01 THOUSAND/ΜL (ref 0–0.61)
EOSINOPHIL # BLD MANUAL: 0 THOUSAND/UL (ref 0–0.4)
EOSINOPHIL NFR BLD AUTO: 0 % (ref 0–6)
EOSINOPHIL NFR BLD MANUAL: 0 % (ref 0–6)
ERYTHROCYTE [DISTWIDTH] IN BLOOD BY AUTOMATED COUNT: 13.7 % (ref 11.6–15.1)
GFR SERPL CREATININE-BSD FRML MDRD: >60 ML/MIN/1.73SQ M
GLUCOSE SERPL-MCNC: 120 MG/DL (ref 65–140)
GLUCOSE UR STRIP-MCNC: NEGATIVE MG/DL
HCT VFR BLD AUTO: 36.9 % (ref 34.8–46.1)
HGB BLD-MCNC: 12.4 G/DL (ref 11.5–15.4)
HGB UR QL STRIP.AUTO: ABNORMAL
IMM GRANULOCYTES NFR BLD AUTO: 0 % (ref 0–2)
KETONES UR STRIP-MCNC: NEGATIVE MG/DL
LACTATE SERPL-SCNC: 1.4 MMOL/L (ref 0.5–2)
LEUKOCYTE ESTERASE UR QL STRIP: NEGATIVE
LG PLATELETS BLD QL SMEAR: PRESENT
LYMPHOCYTES # BLD AUTO: 0.35 THOUSANDS/ΜL (ref 0.6–4.47)
LYMPHOCYTES # BLD AUTO: 0.64 THOUSAND/UL (ref 0.6–4.47)
LYMPHOCYTES # BLD AUTO: 3 % (ref 14–44)
LYMPHOCYTES NFR BLD AUTO: 2 % (ref 14–44)
MCH RBC QN AUTO: 28.4 PG (ref 26.8–34.3)
MCHC RBC AUTO-ENTMCNC: 33.6 G/DL (ref 31.4–37.4)
MCV RBC AUTO: 85 FL (ref 82–98)
METAMYELOCYTES NFR BLD MANUAL: 1 % (ref 0–1)
MONOCYTES # BLD AUTO: 0.64 THOUSAND/UL (ref 0–1.22)
MONOCYTES # BLD AUTO: 1.28 THOUSAND/ΜL (ref 0.17–1.22)
MONOCYTES NFR BLD AUTO: 6 % (ref 4–12)
MONOCYTES NFR BLD: 3 % (ref 4–12)
NEUTROPHILS # BLD AUTO: 19.9 THOUSANDS/ΜL (ref 1.85–7.62)
NEUTROPHILS # BLD MANUAL: 19.8 THOUSAND/UL (ref 1.85–7.62)
NEUTS BAND NFR BLD MANUAL: 26 % (ref 0–8)
NEUTS SEG NFR BLD AUTO: 66 % (ref 43–75)
NEUTS SEG NFR BLD AUTO: 93 % (ref 43–75)
NITRITE UR QL STRIP: NEGATIVE
NON-SQ EPI CELLS URNS QL MICRO: ABNORMAL /HPF
OVALOCYTES BLD QL SMEAR: PRESENT
PH UR STRIP.AUTO: 5 [PH] (ref 4.5–8)
PLATELET # BLD AUTO: 126 THOUSANDS/UL (ref 149–390)
PLATELET BLD QL SMEAR: ABNORMAL
PMV BLD AUTO: 9.9 FL (ref 8.9–12.7)
POTASSIUM SERPL-SCNC: 3.6 MMOL/L (ref 3.5–5.3)
PROT UR STRIP-MCNC: NEGATIVE MG/DL
RBC # BLD AUTO: 4.36 MILLION/UL (ref 3.81–5.12)
RBC #/AREA URNS AUTO: ABNORMAL /HPF
SODIUM SERPL-SCNC: 134 MMOL/L (ref 136–145)
SP GR UR STRIP.AUTO: 1.02 (ref 1–1.03)
TOTAL CELLS COUNTED SPEC: 100
UROBILINOGEN UR QL STRIP.AUTO: 0.2 E.U./DL
WBC # BLD AUTO: 21.55 THOUSAND/UL (ref 4.31–10.16)
WBC #/AREA URNS AUTO: ABNORMAL /HPF

## 2017-05-31 PROCEDURE — 81001 URINALYSIS AUTO W/SCOPE: CPT

## 2017-05-31 PROCEDURE — 36415 COLL VENOUS BLD VENIPUNCTURE: CPT | Performed by: PHYSICIAN ASSISTANT

## 2017-05-31 PROCEDURE — 81002 URINALYSIS NONAUTO W/O SCOPE: CPT | Performed by: PHYSICIAN ASSISTANT

## 2017-05-31 PROCEDURE — 85007 BL SMEAR W/DIFF WBC COUNT: CPT | Performed by: PHYSICIAN ASSISTANT

## 2017-05-31 PROCEDURE — 85027 COMPLETE CBC AUTOMATED: CPT | Performed by: PHYSICIAN ASSISTANT

## 2017-05-31 PROCEDURE — 99285 EMERGENCY DEPT VISIT HI MDM: CPT

## 2017-05-31 PROCEDURE — 96365 THER/PROPH/DIAG IV INF INIT: CPT

## 2017-05-31 PROCEDURE — A9270 NON-COVERED ITEM OR SERVICE: HCPCS | Performed by: PLASTIC SURGERY

## 2017-05-31 PROCEDURE — 83605 ASSAY OF LACTIC ACID: CPT | Performed by: PHYSICIAN ASSISTANT

## 2017-05-31 PROCEDURE — 71260 CT THORAX DX C+: CPT

## 2017-05-31 PROCEDURE — 80048 BASIC METABOLIC PNL TOTAL CA: CPT | Performed by: PHYSICIAN ASSISTANT

## 2017-05-31 PROCEDURE — 87040 BLOOD CULTURE FOR BACTERIA: CPT | Performed by: PHYSICIAN ASSISTANT

## 2017-05-31 PROCEDURE — 85025 COMPLETE CBC W/AUTO DIFF WBC: CPT | Performed by: PHYSICIAN ASSISTANT

## 2017-05-31 RX ORDER — MORPHINE SULFATE 2 MG/ML
2 INJECTION, SOLUTION INTRAMUSCULAR; INTRAVENOUS
Status: DISCONTINUED | OUTPATIENT
Start: 2017-05-31 | End: 2017-06-04 | Stop reason: HOSPADM

## 2017-05-31 RX ORDER — DIPHENHYDRAMINE HCL 25 MG
50 TABLET ORAL EVERY 6 HOURS PRN
Status: DISCONTINUED | OUTPATIENT
Start: 2017-05-31 | End: 2017-06-04 | Stop reason: HOSPADM

## 2017-05-31 RX ORDER — SODIUM CHLORIDE 9 MG/ML
75 INJECTION, SOLUTION INTRAVENOUS CONTINUOUS
Status: DISCONTINUED | OUTPATIENT
Start: 2017-05-31 | End: 2017-05-31

## 2017-05-31 RX ORDER — DEXTROSE, SODIUM CHLORIDE, AND POTASSIUM CHLORIDE 5; .9; .15 G/100ML; G/100ML; G/100ML
50 INJECTION INTRAVENOUS CONTINUOUS
Status: DISCONTINUED | OUTPATIENT
Start: 2017-05-31 | End: 2017-06-04 | Stop reason: HOSPADM

## 2017-05-31 RX ORDER — ONDANSETRON 2 MG/ML
4 INJECTION INTRAMUSCULAR; INTRAVENOUS EVERY 4 HOURS PRN
Status: DISCONTINUED | OUTPATIENT
Start: 2017-05-31 | End: 2017-06-04 | Stop reason: HOSPADM

## 2017-05-31 RX ORDER — ONDANSETRON 2 MG/ML
4 INJECTION INTRAMUSCULAR; INTRAVENOUS EVERY 6 HOURS PRN
Status: DISCONTINUED | OUTPATIENT
Start: 2017-05-31 | End: 2017-06-04 | Stop reason: HOSPADM

## 2017-05-31 RX ORDER — VANCOMYCIN HYDROCHLORIDE 1 G/200ML
15 INJECTION, SOLUTION INTRAVENOUS EVERY 24 HOURS
Status: DISCONTINUED | OUTPATIENT
Start: 2017-06-01 | End: 2017-06-01

## 2017-05-31 RX ORDER — DOCUSATE SODIUM 100 MG/1
100 CAPSULE, LIQUID FILLED ORAL 2 TIMES DAILY
Status: DISCONTINUED | OUTPATIENT
Start: 2017-05-31 | End: 2017-06-04 | Stop reason: HOSPADM

## 2017-05-31 RX ORDER — ACETAMINOPHEN 325 MG/1
650 TABLET ORAL EVERY 6 HOURS PRN
Status: DISCONTINUED | OUTPATIENT
Start: 2017-05-31 | End: 2017-06-04 | Stop reason: HOSPADM

## 2017-05-31 RX ORDER — VANCOMYCIN HYDROCHLORIDE 1 G/200ML
15 INJECTION, SOLUTION INTRAVENOUS ONCE
Status: COMPLETED | OUTPATIENT
Start: 2017-05-31 | End: 2017-05-31

## 2017-05-31 RX ORDER — OXYCODONE HYDROCHLORIDE AND ACETAMINOPHEN 5; 325 MG/1; MG/1
2 TABLET ORAL EVERY 4 HOURS PRN
Status: DISCONTINUED | OUTPATIENT
Start: 2017-05-31 | End: 2017-06-04 | Stop reason: HOSPADM

## 2017-05-31 RX ORDER — LEVOTHYROXINE SODIUM 0.03 MG/1
25 TABLET ORAL
Status: DISCONTINUED | OUTPATIENT
Start: 2017-06-01 | End: 2017-06-04 | Stop reason: HOSPADM

## 2017-05-31 RX ORDER — ANASTROZOLE 1 MG/1
1 TABLET ORAL DAILY
Status: DISCONTINUED | OUTPATIENT
Start: 2017-05-31 | End: 2017-06-04 | Stop reason: HOSPADM

## 2017-05-31 RX ADMIN — ACETAMINOPHEN 650 MG: 325 TABLET, FILM COATED ORAL at 18:17

## 2017-05-31 RX ADMIN — SODIUM CHLORIDE 1000 ML: 0.9 INJECTION, SOLUTION INTRAVENOUS at 14:24

## 2017-05-31 RX ADMIN — DEXTROSE, SODIUM CHLORIDE, AND POTASSIUM CHLORIDE 50 ML/HR: 5; .9; .15 INJECTION INTRAVENOUS at 18:11

## 2017-05-31 RX ADMIN — DOCUSATE SODIUM 100 MG: 100 CAPSULE, LIQUID FILLED ORAL at 18:12

## 2017-05-31 RX ADMIN — VANCOMYCIN HYDROCHLORIDE 1000 MG: 1 INJECTION, SOLUTION INTRAVENOUS at 14:47

## 2017-05-31 RX ADMIN — IOHEXOL 85 ML: 350 INJECTION, SOLUTION INTRAVENOUS at 15:41

## 2017-05-31 RX ADMIN — ENOXAPARIN SODIUM 40 MG: 40 INJECTION SUBCUTANEOUS at 18:12

## 2017-05-31 RX ADMIN — ANASTROZOLE 1 MG: 1 TABLET, COATED ORAL at 18:28

## 2017-06-01 PROBLEM — N61.0 CELLULITIS OF LEFT BREAST: Status: ACTIVE | Noted: 2017-06-01

## 2017-06-01 LAB
ERYTHROCYTE [DISTWIDTH] IN BLOOD BY AUTOMATED COUNT: 14.2 % (ref 11.6–15.1)
HCT VFR BLD AUTO: 34.8 % (ref 34.8–46.1)
HGB BLD-MCNC: 11.5 G/DL (ref 11.5–15.4)
MCH RBC QN AUTO: 28.3 PG (ref 26.8–34.3)
MCHC RBC AUTO-ENTMCNC: 33 G/DL (ref 31.4–37.4)
MCV RBC AUTO: 86 FL (ref 82–98)
PLATELET # BLD AUTO: 141 THOUSANDS/UL (ref 149–390)
PMV BLD AUTO: 10.8 FL (ref 8.9–12.7)
RBC # BLD AUTO: 4.06 MILLION/UL (ref 3.81–5.12)
WBC # BLD AUTO: 17.72 THOUSAND/UL (ref 4.31–10.16)

## 2017-06-01 PROCEDURE — A9270 NON-COVERED ITEM OR SERVICE: HCPCS | Performed by: PLASTIC SURGERY

## 2017-06-01 PROCEDURE — 85027 COMPLETE CBC AUTOMATED: CPT | Performed by: PLASTIC SURGERY

## 2017-06-01 RX ORDER — VANCOMYCIN HYDROCHLORIDE 1 G/200ML
15 INJECTION, SOLUTION INTRAVENOUS EVERY 12 HOURS
Status: DISCONTINUED | OUTPATIENT
Start: 2017-06-01 | End: 2017-06-03

## 2017-06-01 RX ORDER — MUPIROCIN CALCIUM 20 MG/G
CREAM TOPICAL 3 TIMES DAILY
Status: DISCONTINUED | OUTPATIENT
Start: 2017-06-01 | End: 2017-06-04 | Stop reason: HOSPADM

## 2017-06-01 RX ADMIN — MUPIROCIN 1 APPLICATION: 2 CREAM TOPICAL at 21:08

## 2017-06-01 RX ADMIN — CEFAZOLIN SODIUM 1000 MG: 1 SOLUTION INTRAVENOUS at 11:09

## 2017-06-01 RX ADMIN — DEXTROSE, SODIUM CHLORIDE, AND POTASSIUM CHLORIDE 50 ML/HR: 5; .9; .15 INJECTION INTRAVENOUS at 15:43

## 2017-06-01 RX ADMIN — VANCOMYCIN HYDROCHLORIDE 1000 MG: 1 INJECTION, SOLUTION INTRAVENOUS at 13:23

## 2017-06-01 RX ADMIN — CEFAZOLIN SODIUM 1000 MG: 1 SOLUTION INTRAVENOUS at 19:11

## 2017-06-01 RX ADMIN — ANASTROZOLE 1 MG: 1 TABLET, COATED ORAL at 17:12

## 2017-06-01 RX ADMIN — METOPROLOL TARTRATE 25 MG: 25 TABLET ORAL at 17:12

## 2017-06-01 RX ADMIN — MUPIROCIN: 2 CREAM TOPICAL at 15:42

## 2017-06-01 RX ADMIN — LEVOTHYROXINE SODIUM 25 MCG: 25 TABLET ORAL at 05:13

## 2017-06-01 RX ADMIN — METOPROLOL TARTRATE 25 MG: 25 TABLET ORAL at 09:20

## 2017-06-01 RX ADMIN — ENOXAPARIN SODIUM 40 MG: 40 INJECTION SUBCUTANEOUS at 09:20

## 2017-06-01 RX ADMIN — DOCUSATE SODIUM 100 MG: 100 CAPSULE, LIQUID FILLED ORAL at 17:12

## 2017-06-02 LAB
BASOPHILS # BLD AUTO: 0.01 THOUSANDS/ΜL (ref 0–0.1)
BASOPHILS NFR BLD AUTO: 0 % (ref 0–1)
EOSINOPHIL # BLD AUTO: 0.32 THOUSAND/ΜL (ref 0–0.61)
EOSINOPHIL NFR BLD AUTO: 3 % (ref 0–6)
ERYTHROCYTE [DISTWIDTH] IN BLOOD BY AUTOMATED COUNT: 14.4 % (ref 11.6–15.1)
HCT VFR BLD AUTO: 33.1 % (ref 34.8–46.1)
HGB BLD-MCNC: 10.8 G/DL (ref 11.5–15.4)
LYMPHOCYTES # BLD AUTO: 1.17 THOUSANDS/ΜL (ref 0.6–4.47)
LYMPHOCYTES NFR BLD AUTO: 12 % (ref 14–44)
MCH RBC QN AUTO: 28.3 PG (ref 26.8–34.3)
MCHC RBC AUTO-ENTMCNC: 32.6 G/DL (ref 31.4–37.4)
MCV RBC AUTO: 87 FL (ref 82–98)
MONOCYTES # BLD AUTO: 0.82 THOUSAND/ΜL (ref 0.17–1.22)
MONOCYTES NFR BLD AUTO: 8 % (ref 4–12)
NEUTROPHILS # BLD AUTO: 7.66 THOUSANDS/ΜL (ref 1.85–7.62)
NEUTS SEG NFR BLD AUTO: 77 % (ref 43–75)
NRBC BLD AUTO-RTO: 0 /100 WBCS
PLATELET # BLD AUTO: 124 THOUSANDS/UL (ref 149–390)
PMV BLD AUTO: 10.7 FL (ref 8.9–12.7)
RBC # BLD AUTO: 3.82 MILLION/UL (ref 3.81–5.12)
VANCOMYCIN TROUGH SERPL-MCNC: 13.2 UG/ML (ref 10–20)
WBC # BLD AUTO: 9.98 THOUSAND/UL (ref 4.31–10.16)

## 2017-06-02 PROCEDURE — 85025 COMPLETE CBC W/AUTO DIFF WBC: CPT | Performed by: PLASTIC SURGERY

## 2017-06-02 PROCEDURE — A9270 NON-COVERED ITEM OR SERVICE: HCPCS | Performed by: PLASTIC SURGERY

## 2017-06-02 PROCEDURE — 80202 ASSAY OF VANCOMYCIN: CPT | Performed by: PLASTIC SURGERY

## 2017-06-02 RX ADMIN — METOPROLOL TARTRATE 25 MG: 25 TABLET ORAL at 08:47

## 2017-06-02 RX ADMIN — LEVOTHYROXINE SODIUM 25 MCG: 25 TABLET ORAL at 06:17

## 2017-06-02 RX ADMIN — METOPROLOL TARTRATE 25 MG: 25 TABLET ORAL at 17:32

## 2017-06-02 RX ADMIN — ENOXAPARIN SODIUM 40 MG: 40 INJECTION SUBCUTANEOUS at 08:47

## 2017-06-02 RX ADMIN — CEFAZOLIN SODIUM 1000 MG: 1 SOLUTION INTRAVENOUS at 02:51

## 2017-06-02 RX ADMIN — CEFAZOLIN SODIUM 1000 MG: 1 SOLUTION INTRAVENOUS at 10:51

## 2017-06-02 RX ADMIN — MUPIROCIN 1 APPLICATION: 2 CREAM TOPICAL at 08:51

## 2017-06-02 RX ADMIN — VANCOMYCIN HYDROCHLORIDE 1000 MG: 1 INJECTION, SOLUTION INTRAVENOUS at 12:08

## 2017-06-02 RX ADMIN — VANCOMYCIN HYDROCHLORIDE 1000 MG: 1 INJECTION, SOLUTION INTRAVENOUS at 00:58

## 2017-06-02 RX ADMIN — ANASTROZOLE 1 MG: 1 TABLET, COATED ORAL at 17:31

## 2017-06-02 RX ADMIN — MUPIROCIN: 2 CREAM TOPICAL at 17:04

## 2017-06-02 RX ADMIN — MUPIROCIN: 2 CREAM TOPICAL at 21:39

## 2017-06-03 LAB
BASOPHILS # BLD AUTO: 0.03 THOUSANDS/ΜL (ref 0–0.1)
BASOPHILS NFR BLD AUTO: 1 % (ref 0–1)
EOSINOPHIL # BLD AUTO: 0.37 THOUSAND/ΜL (ref 0–0.61)
EOSINOPHIL NFR BLD AUTO: 6 % (ref 0–6)
ERYTHROCYTE [DISTWIDTH] IN BLOOD BY AUTOMATED COUNT: 14.1 % (ref 11.6–15.1)
HCT VFR BLD AUTO: 33.8 % (ref 34.8–46.1)
HGB BLD-MCNC: 11.2 G/DL (ref 11.5–15.4)
LYMPHOCYTES # BLD AUTO: 1.11 THOUSANDS/ΜL (ref 0.6–4.47)
LYMPHOCYTES NFR BLD AUTO: 17 % (ref 14–44)
MCH RBC QN AUTO: 28.6 PG (ref 26.8–34.3)
MCHC RBC AUTO-ENTMCNC: 33.1 G/DL (ref 31.4–37.4)
MCV RBC AUTO: 86 FL (ref 82–98)
MONOCYTES # BLD AUTO: 0.5 THOUSAND/ΜL (ref 0.17–1.22)
MONOCYTES NFR BLD AUTO: 8 % (ref 4–12)
NEUTROPHILS # BLD AUTO: 4.5 THOUSANDS/ΜL (ref 1.85–7.62)
NEUTS SEG NFR BLD AUTO: 68 % (ref 43–75)
NRBC BLD AUTO-RTO: 0 /100 WBCS
PLATELET # BLD AUTO: 128 THOUSANDS/UL (ref 149–390)
PMV BLD AUTO: 10.3 FL (ref 8.9–12.7)
RBC # BLD AUTO: 3.92 MILLION/UL (ref 3.81–5.12)
WBC # BLD AUTO: 6.51 THOUSAND/UL (ref 4.31–10.16)

## 2017-06-03 PROCEDURE — A9270 NON-COVERED ITEM OR SERVICE: HCPCS | Performed by: PLASTIC SURGERY

## 2017-06-03 PROCEDURE — 85025 COMPLETE CBC W/AUTO DIFF WBC: CPT | Performed by: PLASTIC SURGERY

## 2017-06-03 RX ADMIN — MUPIROCIN: 2 CREAM TOPICAL at 16:44

## 2017-06-03 RX ADMIN — ANASTROZOLE 1 MG: 1 TABLET, COATED ORAL at 17:37

## 2017-06-03 RX ADMIN — LEVOTHYROXINE SODIUM 25 MCG: 25 TABLET ORAL at 05:08

## 2017-06-03 RX ADMIN — DEXTROSE, SODIUM CHLORIDE, AND POTASSIUM CHLORIDE 50 ML/HR: 5; .9; .15 INJECTION INTRAVENOUS at 14:20

## 2017-06-03 RX ADMIN — METOPROLOL TARTRATE 25 MG: 25 TABLET ORAL at 08:48

## 2017-06-03 RX ADMIN — VANCOMYCIN HYDROCHLORIDE 1000 MG: 1 INJECTION, SOLUTION INTRAVENOUS at 12:49

## 2017-06-03 RX ADMIN — METOPROLOL TARTRATE 25 MG: 25 TABLET ORAL at 17:37

## 2017-06-03 RX ADMIN — VANCOMYCIN HYDROCHLORIDE 750 MG: 750 INJECTION, SOLUTION INTRAVENOUS at 22:41

## 2017-06-03 RX ADMIN — MUPIROCIN: 2 CREAM TOPICAL at 08:48

## 2017-06-03 RX ADMIN — ENOXAPARIN SODIUM 40 MG: 40 INJECTION SUBCUTANEOUS at 08:48

## 2017-06-03 RX ADMIN — MUPIROCIN: 2 CREAM TOPICAL at 21:43

## 2017-06-03 RX ADMIN — VANCOMYCIN HYDROCHLORIDE 1000 MG: 1 INJECTION, SOLUTION INTRAVENOUS at 00:31

## 2017-06-04 VITALS
BODY MASS INDEX: 27.43 KG/M2 | SYSTOLIC BLOOD PRESSURE: 159 MMHG | WEIGHT: 140.44 LBS | TEMPERATURE: 97.2 F | DIASTOLIC BLOOD PRESSURE: 75 MMHG | RESPIRATION RATE: 17 BRPM | OXYGEN SATURATION: 97 % | HEART RATE: 72 BPM

## 2017-06-04 LAB
BASOPHILS # BLD AUTO: 0.03 THOUSANDS/ΜL (ref 0–0.1)
BASOPHILS NFR BLD AUTO: 1 % (ref 0–1)
EOSINOPHIL # BLD AUTO: 0.31 THOUSAND/ΜL (ref 0–0.61)
EOSINOPHIL NFR BLD AUTO: 6 % (ref 0–6)
ERYTHROCYTE [DISTWIDTH] IN BLOOD BY AUTOMATED COUNT: 13.9 % (ref 11.6–15.1)
HCT VFR BLD AUTO: 33.6 % (ref 34.8–46.1)
HGB BLD-MCNC: 11 G/DL (ref 11.5–15.4)
LYMPHOCYTES # BLD AUTO: 1.02 THOUSANDS/ΜL (ref 0.6–4.47)
LYMPHOCYTES NFR BLD AUTO: 19 % (ref 14–44)
MCH RBC QN AUTO: 28.1 PG (ref 26.8–34.3)
MCHC RBC AUTO-ENTMCNC: 32.7 G/DL (ref 31.4–37.4)
MCV RBC AUTO: 86 FL (ref 82–98)
MONOCYTES # BLD AUTO: 0.56 THOUSAND/ΜL (ref 0.17–1.22)
MONOCYTES NFR BLD AUTO: 11 % (ref 4–12)
NEUTROPHILS # BLD AUTO: 3.43 THOUSANDS/ΜL (ref 1.85–7.62)
NEUTS SEG NFR BLD AUTO: 63 % (ref 43–75)
NRBC BLD AUTO-RTO: 0 /100 WBCS
PLATELET # BLD AUTO: 155 THOUSANDS/UL (ref 149–390)
PMV BLD AUTO: 10.5 FL (ref 8.9–12.7)
RBC # BLD AUTO: 3.92 MILLION/UL (ref 3.81–5.12)
WBC # BLD AUTO: 5.35 THOUSAND/UL (ref 4.31–10.16)

## 2017-06-04 PROCEDURE — A9270 NON-COVERED ITEM OR SERVICE: HCPCS | Performed by: PLASTIC SURGERY

## 2017-06-04 PROCEDURE — 85025 COMPLETE CBC W/AUTO DIFF WBC: CPT | Performed by: PLASTIC SURGERY

## 2017-06-04 RX ADMIN — ENOXAPARIN SODIUM 40 MG: 40 INJECTION SUBCUTANEOUS at 08:33

## 2017-06-04 RX ADMIN — MUPIROCIN: 2 CREAM TOPICAL at 08:33

## 2017-06-04 RX ADMIN — METOPROLOL TARTRATE 25 MG: 25 TABLET ORAL at 08:33

## 2017-06-04 RX ADMIN — DEXTROSE, SODIUM CHLORIDE, AND POTASSIUM CHLORIDE 50 ML/HR: 5; .9; .15 INJECTION INTRAVENOUS at 12:15

## 2017-06-04 RX ADMIN — VANCOMYCIN HYDROCHLORIDE 750 MG: 750 INJECTION, SOLUTION INTRAVENOUS at 05:47

## 2017-06-04 RX ADMIN — LEVOTHYROXINE SODIUM 25 MCG: 25 TABLET ORAL at 05:46

## 2017-06-04 RX ADMIN — VANCOMYCIN HYDROCHLORIDE 750 MG: 750 INJECTION, SOLUTION INTRAVENOUS at 13:12

## 2017-06-05 LAB
BACTERIA BLD CULT: NORMAL
BACTERIA BLD CULT: NORMAL

## 2017-06-08 ENCOUNTER — ALLSCRIPTS OFFICE VISIT (OUTPATIENT)
Dept: OTHER | Facility: OTHER | Age: 72
End: 2017-06-08

## 2017-08-16 ENCOUNTER — ALLSCRIPTS OFFICE VISIT (OUTPATIENT)
Dept: OTHER | Facility: OTHER | Age: 72
End: 2017-08-16

## 2017-11-24 ENCOUNTER — HOSPITAL ENCOUNTER (OUTPATIENT)
Dept: MAMMOGRAPHY | Facility: CLINIC | Age: 72
Discharge: HOME | End: 2017-11-24
Payer: COMMERCIAL

## 2017-11-24 DIAGNOSIS — C50.212 MALIGNANT NEOPLASM OF UPPER-INNER QUADRANT OF LEFT FEMALE BREAST (HCC): ICD-10-CM

## 2017-11-24 PROCEDURE — G0206 DX MAMMO INCL CAD UNI: HCPCS

## 2017-11-30 ENCOUNTER — TRANSCRIBE ORDERS (OUTPATIENT)
Dept: ADMINISTRATIVE | Facility: HOSPITAL | Age: 72
End: 2017-11-30

## 2017-11-30 ENCOUNTER — ALLSCRIPTS OFFICE VISIT (OUTPATIENT)
Dept: OTHER | Facility: OTHER | Age: 72
End: 2017-11-30

## 2017-11-30 DIAGNOSIS — C50.212 MALIGNANT NEOPLASM OF UPPER-INNER QUADRANT OF LEFT FEMALE BREAST, UNSPECIFIED ESTROGEN RECEPTOR STATUS (HCC): Primary | ICD-10-CM

## 2017-12-05 NOTE — PROGRESS NOTES
Assessment    1  Malignant neoplasm of upper-inner 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 breast (174 2) (C50 212)    Plan  Malignant neoplasm of upper-inner 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 breast    · Follow-up visit in 6 months Evaluation and Treatment  Follow-up  Status: Hold For -  Scheduling,Retrospective By Protocol Authorization  Requested for: 07QZK4511   Ordered; For: Malignant neoplasm of upper-inner 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 breast; Ordered By: Ramiro Hartley Performed:  Due: 93CES9846; Last Updated By: Hali Cornell; 11/29/2017 9:35:41 AM    Discussion/Summary  Discussion Summary:   Based on today's history and physical exam, there is no evidence of local, regional or distant recurrence  We will see the patient back in six months based on the NCCN guidelines  All questions were answered to patient's satisfaction  Chief Complaint  Chief Complaint Free Text Note Form: Six month breast cancer follow up  Right diagnostic mammogram performed 11/24/2017, bi rads 1  Heterogenously dense breasts noted  History of Present Illness  Diagnosis and Staging: December 2016: Left breast invasive lobular carcinoma, stage II a (T2, N0, M0) grade 1,   ER 90%, [place] less than 1%, HER-2/fortino negative  Tumor size 39 mm, lymph nodes 0/1  Margins negative   Treatment History: February 2017: Left mastectomy sentinel lymph node biopsy  Reconstruction, Dr Gail Devlin   Interval History: Reyna Grover presents for 6 month follow-up for her left breast cancer diagnosed in 2016 treated with mastectomy  She's had reconstruction she is on anastrozole  She had a diagnostic mammogram in November which showed no worrisome findings  Review of Systems  Complete Female ROS SurgOnc:   Constitutional: The patient denies new or recent history of general fatigue, no recent weight loss, no change in appetite  Eyes: No complaints of visual problems, no scleral icterus     ENT: no complaints of ear pain, no hoarseness, no difficulty swallowing, no tinnitus and no new masses in head, oral cavity, or neck  Cardiovascular: No complaints of chest pain, no palpitations, no ankle edema  Respiratory: No complaints of shortness of breath, no cough  Gastrointestinal: No complaints of jaundice, no bloody stools, no pale stools  Genitourinary: No complaints of dysuria, no hematuria, no nocturia, no frequent urination, no urethral discharge  Musculoskeletal: No complaints of weakness, paralysis, joint stiffness or arthralgias,  Integumentary: No complaints of rash, no new lesions  Neurological: No complaints of convulsions, no seizures, no dizziness  Hematologic/Lymphatic: No complaints of easy bruising  ROS Reviewed:   ROS reviewed  Active Problems    1  ASCUS favor benign (796 9)   2  Cellulitis (682 9) (L03 90)   3  Female hypogonadism due to aromatase inhibitor therapy (256 39,E933 1)   (W01 12,A39  1X5A)   4  Hypertension (401 9) (I10)   5  Hypertrophic cardiomyopathy (425 18) (I42 2)   6  Hypothyroidism (244 9) (E03 9)   7  Ischemic cardiomyopathy (414 8) (I25 5)   8  Malignant neoplasm of upper-inner 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 breast (174 2) (C50 212)   9  Osteopenia (733 90) (M85 80)   10  Plantar fasciitis (728 71) (M72 2)   11  Prophylactic use of anastrozole (Arimidex) (V07 52) (I65 026)    Past Medical History    1  History of Encounter for routine gynecological examination with Papanicolaou smear of   cervix (V72 31,V76 2) (Z01 419)   2  History of Encounter for screening mammogram for malignant neoplasm of breast   (V76 12) (Z12 31)   3  History of bronchitis (V12 69) (Z87 09)   4  History of Mammogram abnormal (793 80) (R92 8)   5  History of Need for vaccination for DTaP (V06 1) (Z23)   6  History of Need for vaccination with 13-polyvalent pneumococcal conjugate vaccine   (V03 82) (Z23)   7  Normal delivery (650) (O80,Z37 9)   8  History of Screening for human papillomavirus (HPV) (V73 81) (Z11 51)   9   History of Urinary Tract Infection (V13 02)    Surgical History    1  History of Biopsy Breast Percutaneous Needle Core   · right   2  History of Breast Reconstruction With Tissue Expander Right Breast   3  History of Complete Colonoscopy   4  History of Knee Surgery   · bilateral, twice   5  History of [place] Lymph Node Biopsy   6  History of Simple Mastectomy Left Breast  Surgical History Reviewed: The surgical history was reviewed and updated today  Family History  Mother    1  Family history of Diabetes Mellitus (V18 0)   2  Family history of Heart Disease (V17 49)  Father    3  Family history of Heart Disease (V17 49)  Sister    4  Family history of Brain Cancer (V16 8)  Family History Reviewed: The family history was reviewed and updated today  Social History    · Being A Social Drinker   · Marital History - Currently    · Never A Smoker   · Recreational Activities Walking  Social History Reviewed: The social history was reviewed and updated today  Current Meds   1  Anastrozole 1 MG Oral Tablet; take 1 tablet by mouth once daily; Therapy: 69UVQ5625 to (Evaluate:12Mar2018)  Requested for: 13Sep2017; Last   Rx:13Sep2017 Ordered   2  Aspirin 81 MG TABS; TAKE 1 TABLET DAILY; Therapy: (Recorded:34Gzo8843) to Recorded   3  Calcium 600 MG Oral Tablet; take 2 tablet daily; Therapy: (Recorded:08Jun2017) to Recorded   4  Daily Multiple Vitamins TABS; TAKE 1 TABLET DAILY; Therapy: (Recorded:20Hph4025) to Recorded   5  Doxycycline Monohydrate 50 MG Oral Capsule Recorded   6  Fish Oil CAPS; Take 1 tablet daily; Therapy: (Recorded:83Kpk0663) to Recorded   7  Levothyroxine Sodium 25 MCG Oral Tablet; take 1 tablet by mouth once daily; Therapy: 30FQM0304 to (Shadi Pham)  Requested for: 05Sep2017; Last   Rx:05Sep2017 Ordered   8  Metoprolol Succinate ER 25 MG Oral Tablet Extended Release 24 Hour; take 1 tablet in   the am and 1/2 tablet in the pm;   Therapy: 58Hdo5414 to Recorded   9   Vitamin D 1000 UNIT Oral Tablet; TAKE 1 TABLET DAILY; Therapy: (Recorded:32Sns3218) to Recorded    Allergies    1  No Known Drug Allergies    Vitals  Vital Signs    Recorded: 68KMJ8031 02:15PM   Temperature 98 1 F   Heart Rate 68   Respiration 14   Systolic 057   Diastolic 70   Height 4 ft 11 in   Weight 143 lb    BMI Calculated 28 88   BSA Calculated 1 6     Physical Exam    Constitutional: General appearance: The Patient is well-developed, well-nourished female who appears her stated age in no acute distress  She is pleasant and talkative  HEENT: Sclerae are anicteric  Mucous membranes are moist   Neck is supple without adenopathy  No JVD  Chest: The lungs are clear to auscultation  Cardiac: Heart is regular rate  Abdomen: Abdomen is soft, nontender without masses  Extremities: There is no clubbing or cyanosis  There is no edema  Neuro: Grossly nonfocal   Gait is normal     Lymphatic: no evidence of cervical adenopathy bilaterally  no evidence of axillary adenopathy bilaterally  Skin: Warm, anicteric  Additional Exam:  Examination of the left reconstructed breast shows no evidence of any local or regional recurrence  The right breast is entirely normal both the sitting and supine position, specifically no skin changes dominant masses, nipple discharge, or       Health Management  Health Maintenance   COLONOSCOPY; every 10 years; Next Due: 05Wjs2832; Active  Medicare Annual Wellness Visit; every 1 year; Next Due: 29Wnk9622; Overdue    Future Appointments    Date/Time Provider Specialty Site   02/21/2018 01:00 PM IRAM Valerio  Hematology Oncology [facility] [place]     End of Encounter Meds    1  Aspirin 81 MG TABS; TAKE 1 TABLET DAILY; Therapy: (Recorded:83Wjq3715) to Recorded   2  Daily Multiple Vitamins TABS; TAKE 1 TABLET DAILY; Therapy: (Recorded:48Rmq6878) to Recorded   3  Fish Oil CAPS; Take 1 tablet daily; Therapy: (Recorded:69Spw8575) to Recorded    4   Metoprolol Succinate ER 25 MG Oral Tablet Extended Release 24 Hour; take 1 tablet in   the am and 1/2 tablet in the pm;   Therapy: 76Ppg6997 to Recorded    5  Levothyroxine Sodium 25 MCG Oral Tablet; take 1 tablet by mouth once daily; Therapy: 10BJF4070 to (Tessie Cervantes)  Requested for: 69Xzu4201; Last   Rx:83Tfj9547 Ordered    6  Anastrozole 1 MG Oral Tablet; take 1 tablet by mouth once daily; Therapy: 62UXA4839 to (Evaluate:12Mar2018)  Requested for: 64Xge8994; Last   Rx:12Iqt6946 Ordered    7  Calcium 600 MG Oral Tablet; take 2 tablet daily; Therapy: (Recorded:08Jun2017) to Recorded   8  Vitamin D 1000 UNIT Oral Tablet; TAKE 1 TABLET DAILY; Therapy: (Recorded:20Feb2015) to Recorded    9   Doxycycline Monohydrate 50 MG Oral Capsule Recorded    Signatures   Electronically signed by : Shawn Liang MD; Nov 30 2017  3:01PM EST                       (Author)

## 2018-01-02 ENCOUNTER — ALLSCRIPTS OFFICE VISIT (OUTPATIENT)
Dept: OTHER | Facility: OTHER | Age: 73
End: 2018-01-02

## 2018-01-02 DIAGNOSIS — E03.9 HYPOTHYROIDISM: ICD-10-CM

## 2018-01-02 DIAGNOSIS — M79.604 PAIN OF RIGHT LEG: ICD-10-CM

## 2018-01-02 DIAGNOSIS — I10 ESSENTIAL (PRIMARY) HYPERTENSION: ICD-10-CM

## 2018-01-02 DIAGNOSIS — M85.80 OTHER SPECIFIED DISORDERS OF BONE DENSITY AND STRUCTURE, UNSPECIFIED SITE (CODE): ICD-10-CM

## 2018-01-09 ENCOUNTER — GENERIC CONVERSION - ENCOUNTER (OUTPATIENT)
Dept: SURGICAL ONCOLOGY | Facility: CLINIC | Age: 73
End: 2018-01-09

## 2018-01-10 NOTE — MISCELLANEOUS
Assessment   1  Hypertrophic cardiomyopathy (425 18) (I42 2)  2  Hypothyroidism (244 9) (E03 9)  3  Malignant neoplasm of upper-inner 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 breast (174 2) (H26 239)    Discussion/Summary  Discussion Summary:   Hypothyroidism  Patient will check TSH blood work and continue her current dose of levothyroxine    Breast cancer  Patient will follow up with her various specialists for ongoing treatment and monitoring of her breast cancer    Cardiomyopathy  Patient will continue with metoprolol as ordered by her cardiologist  Blood pressure is well-controlled  Chief Complaint  Chief Complaint Free Text Note Form: BRITTANY NOTE: Appointment 2/23/17 @ 9AM  Admitted to [facility] 2/9/17 Malignant Neoplasm of the left breast and D/C 2/10/17 Left Breast mastectomy/reconstruction  Spoke with pt, she is "doing well"  No abd pain, SOB, chest pain  Incision site is clean,dry and intact  Discomfort but no pain  [facility] to follow  History of Present Illness  TCM Communication [facility]: The patient is being contacted for follow-up after hospitalization and 2/10/17 for initial BRITTANY call- previous BRITTANY note was incorrectly invalidated  Hospital records were reviewed  She was hospitalized at [facility]  The date of admission: 2/9/17, date of discharge: 2/10/17  Diagnosis: Left breast mastectomy/reconstruction  She was discharged to home, with home health services, [facility]  Medications reviewed and updated today  She scheduled a follow up appointment  Follow-up appointments with other specialists: Dr Mtathew Clark- plastic surgery, Dr Cinda Strong  Symptoms: fatigue, but no shortness of breath, no chest pain, no incisional pain and no swelling  Counseling was provided to the patient  Topics counseled included instructions for management and importance of compliance with treatment     Communication performed and completed by Galileo Hilario LPN       HPI: I reviewed the patient's BRITTANY note and discharge summary  She has done well after her mastectomy and is scheduled to see physician to have reconstruction surgery  She is also scheduled see Dr Charlie Montalvo to see if she will be receiving chemotherapy  Overall she is feeling well and is trying to walk on a regular basis for exercise  Review of Systems  Complete-Female:   Constitutional: No fever, no chills, feels well, no tiredness, no recent weight gain or weight loss  ENT: no complaints of earache, no loss of hearing, no nose bleeds, no nasal discharge, no sore throat, no hoarseness  Cardiovascular: No complaints of slow heart rate, no fast heart rate, no chest pain, no palpitations, no leg claudication, no lower extremity edema  Respiratory: No complaints of shortness of breath, no wheezing, no cough, no SOB on exertion, no orthopnea, no PND  Gastrointestinal: No complaints of abdominal pain, no constipation, no nausea or vomiting, no diarrhea, no bloody stools  Genitourinary: No complaints of dysuria, no incontinence, no pelvic pain, no dysmenorrhea, no vaginal discharge or bleeding  Musculoskeletal: No complaints of arthralgias, no myalgias, no joint swelling or stiffness, no limb pain or swelling  Integumentary: as noted in HPI  Neurological: No complaints of headache, no confusion, no convulsions, no numbness, no dizziness or fainting, no tingling, no limb weakness, no difficulty walking  Active Problems   1  ASCUS favor benign (796 9)  2  Hypertension (401 9) (I10)  3  Hypertrophic cardiomyopathy (425 18) (I42 2)  4  Hypothyroidism (244 9) (E03 9)  5  Ischemic cardiomyopathy (414 8) (I25 5)  6  Malignant neoplasm of upper-inner 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 breast (174 2) (C50 212)  7  Mammogram abnormal (793 80) (R92 8)  8  Osteopenia (733 90) (M85 80)  9  Plantar fasciitis (728 71) (M72 2)    Past Medical History   1  History of Encounter for routine gynecological examination with Papanicolaou smear of   cervix (V72 31,V76 2) (Z01 419)  2  History of Encounter for screening mammogram for malignant neoplasm of breast   (V76 12) (Z12 31)  3  History of bronchitis (V12 69) (Z87 09)  4  History of Need for vaccination for DTaP (V06 1) (Z23)  5  History of Need for vaccination with 13-polyvalent pneumococcal conjugate vaccine   (V03 82) (Z23)  6  Normal delivery (650) (O80,Z37 9)  7  History of Screening for human papillomavirus (HPV) (V73 81) (Z11 51)  8  History of Urinary Tract Infection (V13 02)    Surgical History   1  History of Biopsy Breast Percutaneous Needle Core  2  History of Complete Colonoscopy  3  History of Knee Surgery  4  History of [place] Lymph Node Biopsy  5  History of Simple Mastectomy Left Breast    Family History  Mother   1  Family history of Diabetes Mellitus (V18 0)  2  Family history of Heart Disease (V17 49)  Father   3  Family history of Heart Disease (V17 49)  Sister   4  Family history of Brain Cancer (V16 8)    Social History    · Being A Social Drinker   · Marital History - Currently    · Never A Smoker   · Recreational Activities Walking  Social History Reviewed: The social history was reviewed and updated today  Current Meds  1  Aspirin 81 MG TABS; TAKE 1 TABLET DAILY; Therapy: (Recorded:45Nss3480) to Recorded  2  Calcium 600 MG Oral Tablet; TAKE 1 TABLET DAILY; Therapy: (Recorded:34Yyn2172) to Recorded  3  Daily Multiple Vitamins TABS; TAKE 1 TABLET DAILY; Therapy: (Recorded:99Xuk0941) to Recorded  4  Doxycycline Monohydrate 50 MG Oral Capsule Recorded  5  Fish Oil CAPS; Take 1 tablet daily; Therapy: (Recorded:12Vcf3041) to Recorded  6  Levothyroxine Sodium 25 MCG Oral Tablet; TAKE ONE (1) TABLET(S) DAILY; Therapy: 78TLQ4850 to (Evaluate:82Rwa3394)  Requested for: 63OLA6626; Last   Rx:44Ewg8543 Ordered  7  Metoprolol Succinate ER 25 MG Oral Tablet Extended Release 24 Hour; take 1 tablet in   the am and 1/2 tablet in the pm;   Therapy: 21Knn3607 to Recorded  8   Vitamin D 1000 UNIT Oral Tablet; TAKE 1 TABLET DAILY; Therapy: (Recorded:88Wzg9374) to Recorded  Medication List Reviewed: The medication list was reviewed and updated today  Allergies   1  No Known Drug Allergies    Physical Exam    Constitutional   General appearance: No acute distress, well appearing and well nourished  Ears, Nose, Mouth, and Throat   External inspection of ears and nose: Normal     Otoscopic examination: Tympanic membranes translucent with normal light reflex  Canals patent without erythema  Oropharynx: Normal with no erythema, edema, exudate or lesions  Pulmonary   Respiratory effort: No increased work of breathing or signs of respiratory distress  Auscultation of lungs: Clear to auscultation  Cardiovascular   Auscultation of heart: Normal rate and rhythm, normal S1 and S2, without murmurs  Abdomen   Abdomen: Non-tender, no masses  Liver and spleen: No hepatomegaly or splenomegaly  Lymphatic   Palpation of lymph nodes in neck: No lymphadenopathy  Musculoskeletal   Gait and station: Normal     Skin   Skin and subcutaneous tissue: Normal without rashes or lesions  Health Management  Health Maintenance   COLONOSCOPY; every 10 years; Next Due: 47Rlc4753; Active  Medicare Annual Wellness Visit; every 1 year; Next Due: 19Nvc2131; Overdue    Future Appointments    Date/Time Provider Specialty Site   03/16/2017 02:40 PM IRAM Gibson   Hematology Oncology [facility]   05/22/2017 11:00 AM Margie Garsia MD Surgical Oncology [facility]     Signatures   Electronically signed by : IRAM Ruiz ; Feb 23 2637 12:34PM EST                       (Author)    Electronically signed by : IRAM Ruiz ; Feb 28 9144  3:33PM EST                       (Author)

## 2018-01-10 NOTE — RESULT NOTES
Message   thyroid test ok cont current dose of levothyroxine     Verified Results  (1) TSH 36QHZ0268 90:55WH Elise Capps    Order Number: [id number]     Test Name Result Flag Reference   TSH 1 610 uIU/mL  0 358-3 740   - Patient Instructions: This bloodwork is non-fasting  Please drink two glasses of water morning of bloodwork  - Patient Instructions: This bloodwork is non-fasting  Please drink two glasses of water morning of bloodwork  Patients undergoing fluorescein dye angiography may retain small amounts of fluorescein in the body for 48-72 hours post procedure  Samples containing fluorescein can produce falsely depressed TSH values  If the patient had this procedure,a specimen should be resubmitted post fluorescein clearance            The recommended reference ranges for TSH during pregnancy are as follows:  First trimester 0 1 to 2 5 uIU/mL  Second trimester  0 2 to 3 0 uIU/mL  Third trimester 0 3 to 3 0 uIU/m

## 2018-01-12 VITALS
TEMPERATURE: 98.1 F | RESPIRATION RATE: 14 BRPM | SYSTOLIC BLOOD PRESSURE: 122 MMHG | DIASTOLIC BLOOD PRESSURE: 70 MMHG | HEIGHT: 59 IN | WEIGHT: 143 LBS | HEART RATE: 68 BPM | BODY MASS INDEX: 28.83 KG/M2

## 2018-01-12 VITALS
HEART RATE: 70 BPM | HEIGHT: 59 IN | RESPIRATION RATE: 18 BRPM | BODY MASS INDEX: 27.42 KG/M2 | SYSTOLIC BLOOD PRESSURE: 132 MMHG | TEMPERATURE: 98 F | DIASTOLIC BLOOD PRESSURE: 70 MMHG | WEIGHT: 136 LBS

## 2018-01-13 VITALS
SYSTOLIC BLOOD PRESSURE: 118 MMHG | TEMPERATURE: 97 F | BODY MASS INDEX: 28.63 KG/M2 | OXYGEN SATURATION: 99 % | RESPIRATION RATE: 18 BRPM | HEIGHT: 59 IN | WEIGHT: 142 LBS | HEART RATE: 68 BPM | DIASTOLIC BLOOD PRESSURE: 70 MMHG

## 2018-01-13 VITALS
HEIGHT: 59 IN | WEIGHT: 140 LBS | BODY MASS INDEX: 28.22 KG/M2 | SYSTOLIC BLOOD PRESSURE: 120 MMHG | HEART RATE: 66 BPM | TEMPERATURE: 96.2 F | DIASTOLIC BLOOD PRESSURE: 68 MMHG

## 2018-01-13 VITALS
HEART RATE: 79 BPM | TEMPERATURE: 97.8 F | HEIGHT: 59 IN | OXYGEN SATURATION: 98 % | SYSTOLIC BLOOD PRESSURE: 124 MMHG | WEIGHT: 140 LBS | RESPIRATION RATE: 16 BRPM | DIASTOLIC BLOOD PRESSURE: 72 MMHG | BODY MASS INDEX: 28.22 KG/M2

## 2018-01-14 VITALS
SYSTOLIC BLOOD PRESSURE: 116 MMHG | DIASTOLIC BLOOD PRESSURE: 60 MMHG | BODY MASS INDEX: 28.02 KG/M2 | HEIGHT: 59 IN | WEIGHT: 139 LBS | HEART RATE: 64 BPM | RESPIRATION RATE: 16 BRPM | TEMPERATURE: 97.8 F

## 2018-01-14 VITALS
DIASTOLIC BLOOD PRESSURE: 62 MMHG | HEART RATE: 58 BPM | TEMPERATURE: 98.1 F | WEIGHT: 141 LBS | HEIGHT: 59 IN | BODY MASS INDEX: 28.43 KG/M2 | RESPIRATION RATE: 16 BRPM | SYSTOLIC BLOOD PRESSURE: 112 MMHG

## 2018-01-14 NOTE — RESULT NOTES
Verified Results  (1) TSH 31VZZ4794 77:12RM Zohaib Blake Order Number: [id number]     Order Number: [id number]        The recommended reference ranges for TSH during pregnancy are as follows:  First trimester 0 1 to 2 5 uIU/mL  Second trimester  0 2 to 3 0 uIU/mL  Third trimester 0 3 to 3 0 uIU/m     Test Name Result Flag Reference   TSH 1 610 uIU/mL  0 358-3 740       Discussion/Summary   thyroid bw was normal

## 2018-01-15 NOTE — RESULT NOTES
Verified Results  (1) CBC/PLT/DIFF 87QDR0090 87:52UZ [employer]    Order Number: [id number]    TW Order Number: [id number]     Test Name Result Flag Reference   WBC COUNT 5 09 Thousand/uL  4 31-10 16   RBC COUNT 4 57 Million/uL  3 81-5 12   HEMOGLOBIN 13 0 g/dL  11 5-15 4   HEMATOCRIT 39 4 %  34 8-46  1   MCV 86 fL  82-98   MCH 28 4 pg  26 8-34 3   MCHC 33 0 g/dL  31 4-37 4   RDW 14 4 %  11 6-15 1   MPV 11 3 fL  8 9-12 7   PLATELET COUNT 181 Thousands/uL  149-390   nRBC AUTOMATED 0 /100 WBCs     NEUTROPHILS RELATIVE PERCENT 61 %  43-75   LYMPHOCYTES RELATIVE PERCENT 27 %  14-44   MONOCYTES RELATIVE PERCENT 10 %  4-12   EOSINOPHILS RELATIVE PERCENT 2 %  0-6   BASOPHILS RELATIVE PERCENT 0 %  0-1   NEUTROPHILS ABSOLUTE COUNT 3 03 Thousands/µL  1 85-7 62   LYMPHOCYTES ABSOLUTE COUNT 1 39 Thousands/µL  0 60-4 47   MONOCYTES ABSOLUTE COUNT 0 52 Thousand/µL  0 17-1 22   EOSINOPHILS ABSOLUTE COUNT 0 12 Thousand/µL  0 00-0 61   BASOPHILS ABSOLUTE COUNT 0 02 Thousands/µL  0 00-0 10     (1) COMPREHENSIVE METABOLIC PANEL 47UOB3187 32:16ZC [employer]    Order Number: [id number]      National Kidney Disease Education Program recommendations are as follows:  GFR calculation is accurate only with a steady state creatinine  Chronic Kidney disease less than 60 ml/min/1 73 sq  meters  Kidney failure less than 15 ml/min/1 73 sq  meters  Test Name Result Flag Reference   GLUCOSE,RANDM 93 mg/dL     If the patient is fasting, the ADA then defines impaired fasting glucose as > 100 mg/dL and diabetes as > or equal to 123 mg/dL     SODIUM 141 mmol/L  136-145   POTASSIUM 4 4 mmol/L  3 5-5 3   CHLORIDE 108 mmol/L  100-108   CARBON DIOXIDE 27 mmol/L  21-32   ANION GAP (CALC) 6 mmol/L  4-13   BLOOD UREA NITROGEN 22 mg/dL  5-25   CREATININE 0 78 mg/dL  0 60-1 30   Standardized to IDMS reference method   CALCIUM 9 0 mg/dL  8 3-10 1   BILI, TOTAL 0 64 mg/dL  0 20-1 00   ALK PHOSPHATAS 99 U/L     ALT (SGPT) 36 U/L  12-78 AST(SGOT) 29 U/L  5-45   ALBUMIN 3 5 g/dL  3 5-5 0   TOTAL PROTEIN 7 4 g/dL  6 4-8 2   eGFR Non-African American      >60 0 ml/min/1 73sq m     (1) LIPID PANEL, FASTING 13QVP3642 06:53YT Shivani Mayo Order Number: [id number]      Triglyceride:         Normal              <150 mg/dl       Borderline High    150-199 mg/dl       High               200-499 mg/dl       Very High          >499 mg/dl  Cholesterol:         Desirable        <200 mg/dl      Borderline High  200-239 mg/dl      High             >239 mg/dl  HDL Cholesterol:        High    >59 mg/dL      Low     <41 mg/dL  LDL CALCULATED:    This screening LDL is a calculated result  It does not have the accuracy of the Direct Measured LDL in the monitoring of patients with hyperlipidemia and/or statin therapy  Direct Measure LDL (ZCN613) must be ordered separately in these patients  Test Name Result Flag Reference   CHOLESTEROL 189 mg/dL     HDL,DIRECT 53 mg/dL  40-60   LDL CHOLESTEROL CALCULATED 117 mg/dL H 0-100   TRIGLYCERIDES 95 mg/dL  <=150     (1) MAGNESIUM 10RYQ3201 03:49OX [employer] Mayo Order Number: [id number]     Test Name Result Flag Reference   MAGNESIUM 2 2 mg/dL  1 6-2 6     (1) TSH 07GGF2358 26:57PE Prudy Dill   TW Order Number: [id number]    Patients undergoing fluorescein dye angiography may retain small amounts of fluorescein in the body for 48-72 hours post procedure  Samples containing fluorescein can produce falsely depressed TSH values  If the patient had this procedure,a specimen should be resubmitted post fluorescein clearance          The recommended reference ranges for TSH during pregnancy are as follows:  First trimester 0 1 to 2 5 uIU/mL  Second trimester  0 2 to 3 0 uIU/mL  Third trimester 0 3 to 3 0 uIU/m     Test Name Result Flag Reference   TSH 4 630 uIU/mL H 0 358-3 740     (1) VITAMIN D 25-HYDROXY 60JUE6503 74:68SB Shivani Mayo Order Number: [id number]     Order Number: [id number] Test Name Result Flag Reference   VIT D 25-HYDROX 32 7 ng/mL  30 0-100 0     (1) CK (CPK) 38SAW7584 13:89FA Jeet Robin Order Number: [id number]     Test Name Result Flag Reference   CK (CPK) 172 U/L     CK MB FRACTION 4 8 ng/mL  0 0-5 0   CK-MB INDEX 2 8 % H 0 0-2 5       Plan  Hypothyroidism    · Levothyroxine Sodium 25 MCG Oral Tablet; TAKE ONE (1) TABLET(S) DAILY    Discussion/Summary   bw shows she is hypothyroid  should start small dose of levothyroxine once daily and repeat bw in 2 mos  I will send to pharm

## 2018-01-15 NOTE — MISCELLANEOUS
Assessment    1  Hypertension (401 9) (I10)   2  Hypothyroidism (244 9) (E03 9)   3  Cellulitis (682 9) (L03 90)    Discussion/Summary  Discussion Summary:   Cellulitis of breast  Patient appears to be fully healed from previous cellulitis of suspected left breast implant  She will continue with antibiotics for 3 weeks as stated  Patient states WBCs were back to normal prior to leaving hospital     Hypertension  Patient's blood pressure stable at this time she will continue her current regimen of medications    Hypothyroidism  Patient's latest TSH level was stable on current dose of levothyroxine  Chief Complaint  Chief Complaint Free Text Note Form: BRITTANY: Pt was admitted to [facility] from 05/31/2017 through 06/04/2017  Dx: Cellulitis of left breast  Spoke with pt who reports she feels much better  Denies breast pain, N/V, fever  Follow up with pcp scheduled for 06/08/2017  History of Present Illness  TCM Communication [facility]: The patient is being contacted for follow-up after hospitalization and 06/05/2017  She was hospitalized at [facility]  The date of admission: 05/31/2017, date of discharge: 06/04/2017  Diagnosis: cellulitis of left breast  She was discharged to home  Medications were not reviewed today  She scheduled a follow up appointment  Counseling was provided to the patient  Topics counseled included importance of compliance with treatment  Communication performed and completed by Ever Vera       HPI: I reviewed BRITTANY note and hospital d/c summary  pt denies any fevers since d/c  She does have a mildly pruritic rash on her lower legs that has gotten better with otc creams  She is still on antibiotics for th enext 3 weeks  Etiology of infection was thought to be newly placed implant of her left breast       Review of Systems  Complete-Female:   Constitutional: No fever, no chills, feels well, no tiredness, no recent weight gain or weight loss     ENT: no complaints of earache, no loss of hearing, no nose bleeds, no nasal discharge, no sore throat, no hoarseness  Cardiovascular: No complaints of slow heart rate, no fast heart rate, no chest pain, no palpitations, no leg claudication, no lower extremity edema  Respiratory: No complaints of shortness of breath, no wheezing, no cough, no SOB on exertion, no orthopnea, no PND  Gastrointestinal: No complaints of abdominal pain, no constipation, no nausea or vomiting, no diarrhea, no bloody stools  Genitourinary: No complaints of dysuria, no incontinence, no pelvic pain, no dysmenorrhea, no vaginal discharge or bleeding  Musculoskeletal: No complaints of arthralgias, no myalgias, no joint swelling or stiffness, no limb pain or swelling  Integumentary: as noted in HPI  Active Problems    1  ASCUS favor benign (796 9)   2  Female hypogonadism due to aromatase inhibitor therapy (256 39,E933 1)   (I55 23,W40  1X5A)   3  Hypertension (401 9) (I10)   4  Hypertrophic cardiomyopathy (425 18) (I42 2)   5  Hypothyroidism (244 9) (E03 9)   6  Ischemic cardiomyopathy (414 8) (I25 5)   7  Malignant neoplasm of upper-inner 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 breast (174 2) (C50 212)   8  Osteopenia (733 90) (M85 80)   9  Plantar fasciitis (728 71) (M72 2)   10  Prophylactic use of anastrozole (Arimidex) (V07 52) (L51 338)    Past Medical History    1  History of Encounter for routine gynecological examination with Papanicolaou smear of   cervix (V72 31,V76 2) (Z01 419)   2  History of Encounter for screening mammogram for malignant neoplasm of breast   (V76 12) (Z12 31)   3  History of bronchitis (V12 69) (Z87 09)   4  History of Mammogram abnormal (793 80) (R92 8)   5  History of Need for vaccination for DTaP (V06 1) (Z23)   6  History of Need for vaccination with 13-polyvalent pneumococcal conjugate vaccine   (V03 82) (Z23)   7  Normal delivery (650) (O80,Z37 9)   8  History of Screening for human papillomavirus (HPV) (V73 81) (Z11 51)   9  History of Urinary Tract Infection (V13 02)    Surgical History    1  History of Biopsy Breast Percutaneous Needle Core   2  History of Breast Reconstruction With Tissue Expander Right Breast   3  History of Complete Colonoscopy   4  History of Knee Surgery   5  History of [place] Lymph Node Biopsy   6  History of Simple Mastectomy Left Breast    Family History  Mother    1  Family history of Diabetes Mellitus (V18 0)   2  Family history of Heart Disease (V17 49)  Father    3  Family history of Heart Disease (V17 49)  Sister    4  Family history of Brain Cancer (V16 8)    Social History    · Being A Social Drinker   · Marital History - Currently    · Never A Smoker   · Recreational Activities Walking  Social History Reviewed: The social history was reviewed and updated today  Current Meds   1  Anastrozole 1 MG Oral Tablet; TAKE 1 TABLET DAILY; Therapy: 62PQX2606 to (Evaluate:60Uxo9941)  Requested for: 33HCB5776; Last   Rx:16Mar2017 Ordered   2  Aspirin 81 MG TABS; TAKE 1 TABLET DAILY; Therapy: (Recorded:36Zbm7927) to Recorded   3  Calcium 600 MG Oral Tablet; take 2 tablet daily; Therapy: (Recorded:08Jun2017) to Recorded   4  Daily Multiple Vitamins TABS; TAKE 1 TABLET DAILY; Therapy: (Recorded:96Xcn3846) to Recorded   5  Doxycycline Monohydrate 50 MG Oral Capsule Recorded   6  Fish Oil CAPS; Take 1 tablet daily; Therapy: (Recorded:86Fdc4417) to Recorded   7  Levothyroxine Sodium 25 MCG Oral Tablet; TAKE ONE (1) TABLET(S) DAILY; Therapy: 96EAT7530 to (Evaluate:76Oaa4810)  Requested for: 14MYK4205; Last   Rx:19Czr3173 Ordered   8  Metoprolol Succinate ER 25 MG Oral Tablet Extended Release 24 Hour; take 1 tablet in   the am and 1/2 tablet in the pm;   Therapy: 55Rgo5225 to Recorded   9  Vitamin D 1000 UNIT Oral Tablet; TAKE 1 TABLET DAILY; Therapy: (Recorded:93Ret9551) to Recorded  Medication List Reviewed: The medication list was reviewed and updated today  Allergies    1   No Known Drug Allergies    Vitals  Signs   Recorded: 95SXC9674 08:55AM   Temperature: 98 8 F  Heart Rate: 60  Respiration: 16  Systolic: 135, RUE  Diastolic: 70, RUE  Height: 4 ft 11 in  Weight: 139 lb 2 oz  BMI Calculated: 28 1  BSA Calculated: 1 58    Physical Exam    Constitutional   General appearance: No acute distress, well appearing and well nourished  Ears, Nose, Mouth, and Throat   External inspection of ears and nose: Normal     Otoscopic examination: Tympanic membranes translucent with normal light reflex  Canals patent without erythema  Oropharynx: Normal with no erythema, edema, exudate or lesions  Pulmonary   Respiratory effort: No increased work of breathing or signs of respiratory distress  Auscultation of lungs: Clear to auscultation  Cardiovascular   Auscultation of heart: Normal rate and rhythm, normal S1 and S2, without murmurs  Examination of extremities for edema and/or varicosities: Normal     Lymphatic   Palpation of lymph nodes in neck: No lymphadenopathy  Skin   Skin and subcutaneous tissue: Abnormal   Patient condition brown confluent rash on bilateral tibial areas  Her legs are nontender to palpation  Negative Homans sign  There is no discharge noted  Health Management  Health Maintenance   COLONOSCOPY; every 10 years; Next Due: 20Oyy1600; Active  Medicare Annual Wellness Visit; every 1 year; Next Due: 44Jxq8256; Overdue    Future Appointments    Date/Time Provider Specialty Site   07/12/2017 02:20 PM IRAM Adames   Hematology Oncology [facility] [place]   11/30/2017 02:15 PM Adrián Grove MD Surgical Oncology [facility]     Signatures   Electronically signed by : IRAM Lopez ; Miguel  [phone]  7:33PM EST                       (Author)

## 2018-01-16 NOTE — PROGRESS NOTES
Discussion/Summary  Social Work-Discussion Summary [facility]: Patient is being seen for a distress screenning assessment  Received and reviewed Pt's Distress Thermometer completed by Pt on 2/22/2107 in Dr Chip Pro office  Pt rated her distress at a 5/10 and listed fears and worry as emotional problems  Based upon Pt's distress score, I called her to provide follow up  Pt stated she is, "Doing good and not stressing as much" at this time  Oriented Pt to Cancer Counselor role  Pt declined emotionally supportive counseling stating she does not feel the need for it at this time  Provided contact information and let Pt know if need arises, Cancer Counselor is available to provide counseling and intervention         Signatures   Electronically signed by : Michael Melgar, [address]; Feb 24 2017 12:40PM EST                       (Author)

## 2018-01-17 NOTE — MISCELLANEOUS
Message  Called patient to review MRI results  Informed patient there is a large 4cm mass in the left breast with an additional suspicious area in the far posterior breast  Dr Nahomi Hernandez is recommending a mastectomy  The patient would like to keep follow up appointment scheduled with Dr Nahomi Hernandez on 1/5 to discuss  Her daughter, who is a physician, will be coming with her to appointment  She is unsure if she wants to pursue reconstructive surgery at this time      Active Problems    1  ASCUS favor benign (796 9)   2  Hypertension (401 9) (I10)   3  Hypertrophic cardiomyopathy (425 18) (I42 2)   4  Hypothyroidism (244 9) (E03 9)   5  Ischemic cardiomyopathy (414 8) (I25 5)   6  Malignant neoplasm of upper-inner 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 breast (174 2) (C50 212)   7  Mammogram abnormal (793 80) (R92 8)   8  Osteopenia (733 90) (M85 80)   9  Plantar fasciitis (728 71) (M72 2)    Current Meds   1  Aspirin 81 MG TABS; TAKE 1 TABLET DAILY; Therapy: (Recorded:48Exa8624) to Recorded   2  Calcium 600 MG Oral Tablet; TAKE 1 TABLET DAILY; Therapy: (Recorded:39Rnf1985) to Recorded   3  Daily Multiple Vitamins TABS; TAKE 1 TABLET DAILY; Therapy: (Recorded:04Xiw6347) to Recorded   4  Fish Oil CAPS; Take 1 tablet daily; Therapy: (Recorded:26Nrw1576) to Recorded   5  Levothyroxine Sodium 25 MCG Oral Tablet; TAKE ONE (1) TABLET(S) DAILY; Therapy: 27TVN3676 to (Evaluate:44Vii2256)  Requested for: 01Sep2016; Last   Rx:55Bgo8820 Ordered   6  Metoprolol Succinate ER 25 MG Oral Tablet Extended Release 24 Hour; take 1 tablet in   the am and 1/2 tablet in the pm;   Therapy: 72Fwk2383 to Recorded   7  Vitamin D 1000 UNIT Oral Tablet; TAKE 1 TABLET DAILY; Therapy: (Recorded:49Tbg1494) to Recorded    Allergies    1   No Known Drug Allergies    Signatures   Electronically signed by : Hazel Cuevas MD; Jan 5 2017  7:39AM EST

## 2018-01-18 ENCOUNTER — TRANSCRIBE ORDERS (OUTPATIENT)
Dept: RADIOLOGY | Facility: HOSPITAL | Age: 73
End: 2018-01-18

## 2018-01-18 ENCOUNTER — HOSPITAL ENCOUNTER (OUTPATIENT)
Dept: RADIOLOGY | Facility: HOSPITAL | Age: 73
Discharge: HOME | End: 2018-01-18
Payer: COMMERCIAL

## 2018-01-18 DIAGNOSIS — M85.80 OTHER SPECIFIED DISORDERS OF BONE DENSITY AND STRUCTURE, UNSPECIFIED SITE (CODE): ICD-10-CM

## 2018-01-18 DIAGNOSIS — M79.604 PAIN OF RIGHT LEG: ICD-10-CM

## 2018-01-18 PROCEDURE — 73552 X-RAY EXAM OF FEMUR 2/>: CPT

## 2018-01-19 ENCOUNTER — GENERIC CONVERSION - ENCOUNTER (OUTPATIENT)
Dept: OTHER | Facility: OTHER | Age: 73
End: 2018-01-19

## 2018-01-19 ENCOUNTER — APPOINTMENT (OUTPATIENT)
Dept: LAB | Facility: CLINIC | Age: 73
End: 2018-01-19
Payer: COMMERCIAL

## 2018-01-19 ENCOUNTER — TRANSCRIBE ORDERS (OUTPATIENT)
Dept: LAB | Facility: CLINIC | Age: 73
End: 2018-01-19

## 2018-01-19 DIAGNOSIS — Z13.1 SCREENING FOR DIABETES MELLITUS: ICD-10-CM

## 2018-01-19 DIAGNOSIS — I42.1 IHSS (IDIOPATHIC HYPERTROPHIC SUBAORTIC STENOSIS) (HCC): ICD-10-CM

## 2018-01-19 DIAGNOSIS — E03.9 HYPOTHYROIDISM: ICD-10-CM

## 2018-01-19 DIAGNOSIS — E78.5 HYPERLIPIDEMIA, UNSPECIFIED HYPERLIPIDEMIA TYPE: ICD-10-CM

## 2018-01-19 DIAGNOSIS — M79.604 PAIN OF RIGHT LEG: ICD-10-CM

## 2018-01-19 DIAGNOSIS — R94.31 ABNORMAL EKG: ICD-10-CM

## 2018-01-19 DIAGNOSIS — R94.31 ABNORMAL EKG: Primary | ICD-10-CM

## 2018-01-19 DIAGNOSIS — I10 ESSENTIAL (PRIMARY) HYPERTENSION: ICD-10-CM

## 2018-01-19 LAB
25(OH)D3 SERPL-MCNC: 46.6 NG/ML (ref 30–100)
ALBUMIN SERPL BCP-MCNC: 3.8 G/DL (ref 3.5–5)
ALP SERPL-CCNC: 80 U/L (ref 46–116)
ALT SERPL W P-5'-P-CCNC: 26 U/L (ref 12–78)
ANION GAP SERPL CALCULATED.3IONS-SCNC: 7 MMOL/L (ref 4–13)
AST SERPL W P-5'-P-CCNC: 25 U/L (ref 5–45)
BASOPHILS # BLD AUTO: 0.04 THOUSANDS/ΜL (ref 0–0.1)
BASOPHILS NFR BLD AUTO: 1 % (ref 0–1)
BILIRUB SERPL-MCNC: 0.59 MG/DL (ref 0.2–1)
BILIRUB UR QL STRIP: NEGATIVE
BUN SERPL-MCNC: 22 MG/DL (ref 5–25)
CALCIUM SERPL-MCNC: 9 MG/DL (ref 8.3–10.1)
CHLORIDE SERPL-SCNC: 108 MMOL/L (ref 100–108)
CHOLEST SERPL-MCNC: 177 MG/DL (ref 50–200)
CK SERPL-CCNC: 128 U/L (ref 26–192)
CLARITY UR: CLEAR
CO2 SERPL-SCNC: 25 MMOL/L (ref 21–32)
COLOR UR: YELLOW
CREAT SERPL-MCNC: 0.72 MG/DL (ref 0.6–1.3)
EOSINOPHIL # BLD AUTO: 0.16 THOUSAND/ΜL (ref 0–0.61)
EOSINOPHIL NFR BLD AUTO: 3 % (ref 0–6)
ERYTHROCYTE [DISTWIDTH] IN BLOOD BY AUTOMATED COUNT: 14.3 % (ref 11.6–15.1)
EST. AVERAGE GLUCOSE BLD GHB EST-MCNC: 120 MG/DL
GFR SERPL CREATININE-BSD FRML MDRD: 83 ML/MIN/1.73SQ M
GLUCOSE P FAST SERPL-MCNC: 94 MG/DL (ref 65–99)
GLUCOSE UR STRIP-MCNC: NEGATIVE MG/DL
HBA1C MFR BLD: 5.8 % (ref 4.2–6.3)
HCT VFR BLD AUTO: 39.6 % (ref 34.8–46.1)
HDLC SERPL-MCNC: 48 MG/DL (ref 40–60)
HGB BLD-MCNC: 12.8 G/DL (ref 11.5–15.4)
HGB UR QL STRIP.AUTO: NEGATIVE
KETONES UR STRIP-MCNC: NEGATIVE MG/DL
LDLC SERPL CALC-MCNC: 101 MG/DL (ref 0–100)
LEUKOCYTE ESTERASE UR QL STRIP: NEGATIVE
LYMPHOCYTES # BLD AUTO: 1.45 THOUSANDS/ΜL (ref 0.6–4.47)
LYMPHOCYTES NFR BLD AUTO: 23 % (ref 14–44)
MAGNESIUM SERPL-MCNC: 2.2 MG/DL (ref 1.6–2.6)
MCH RBC QN AUTO: 27.5 PG (ref 26.8–34.3)
MCHC RBC AUTO-ENTMCNC: 32.3 G/DL (ref 31.4–37.4)
MCV RBC AUTO: 85 FL (ref 82–98)
MONOCYTES # BLD AUTO: 0.59 THOUSAND/ΜL (ref 0.17–1.22)
MONOCYTES NFR BLD AUTO: 10 % (ref 4–12)
NEUTROPHILS # BLD AUTO: 3.96 THOUSANDS/ΜL (ref 1.85–7.62)
NEUTS SEG NFR BLD AUTO: 63 % (ref 43–75)
NITRITE UR QL STRIP: NEGATIVE
NRBC BLD AUTO-RTO: 0 /100 WBCS
NT-PROBNP SERPL-MCNC: 1079 PG/ML
PH UR STRIP.AUTO: 6.5 [PH] (ref 4.5–8)
PLATELET # BLD AUTO: 162 THOUSANDS/UL (ref 149–390)
PMV BLD AUTO: 11 FL (ref 8.9–12.7)
POTASSIUM SERPL-SCNC: 4.1 MMOL/L (ref 3.5–5.3)
PROT SERPL-MCNC: 7.4 G/DL (ref 6.4–8.2)
PROT UR STRIP-MCNC: NEGATIVE MG/DL
RBC # BLD AUTO: 4.65 MILLION/UL (ref 3.81–5.12)
SODIUM SERPL-SCNC: 140 MMOL/L (ref 136–145)
SP GR UR STRIP.AUTO: 1.02 (ref 1–1.03)
TRIGL SERPL-MCNC: 138 MG/DL
TSH SERPL DL<=0.05 MIU/L-ACNC: 3.96 UIU/ML (ref 0.36–3.74)
UROBILINOGEN UR QL STRIP.AUTO: 0.2 E.U./DL
WBC # BLD AUTO: 6.22 THOUSAND/UL (ref 4.31–10.16)

## 2018-01-19 PROCEDURE — 80061 LIPID PANEL: CPT

## 2018-01-19 PROCEDURE — 83880 ASSAY OF NATRIURETIC PEPTIDE: CPT

## 2018-01-19 PROCEDURE — 83735 ASSAY OF MAGNESIUM: CPT

## 2018-01-19 PROCEDURE — 80053 COMPREHEN METABOLIC PANEL: CPT

## 2018-01-19 PROCEDURE — 83036 HEMOGLOBIN GLYCOSYLATED A1C: CPT

## 2018-01-19 PROCEDURE — 85025 COMPLETE CBC W/AUTO DIFF WBC: CPT

## 2018-01-19 PROCEDURE — 36415 COLL VENOUS BLD VENIPUNCTURE: CPT

## 2018-01-19 PROCEDURE — 84443 ASSAY THYROID STIM HORMONE: CPT

## 2018-01-19 PROCEDURE — 82306 VITAMIN D 25 HYDROXY: CPT

## 2018-01-19 PROCEDURE — 81003 URINALYSIS AUTO W/O SCOPE: CPT

## 2018-01-19 PROCEDURE — 82550 ASSAY OF CK (CPK): CPT

## 2018-01-22 ENCOUNTER — GENERIC CONVERSION - ENCOUNTER (OUTPATIENT)
Dept: OTHER | Facility: OTHER | Age: 73
End: 2018-01-22

## 2018-01-22 VITALS
HEART RATE: 60 BPM | SYSTOLIC BLOOD PRESSURE: 112 MMHG | BODY MASS INDEX: 28.05 KG/M2 | RESPIRATION RATE: 16 BRPM | WEIGHT: 139.13 LBS | TEMPERATURE: 98.8 F | DIASTOLIC BLOOD PRESSURE: 70 MMHG | HEIGHT: 59 IN

## 2018-01-22 VITALS
BODY MASS INDEX: 28.22 KG/M2 | SYSTOLIC BLOOD PRESSURE: 120 MMHG | WEIGHT: 140 LBS | HEART RATE: 60 BPM | TEMPERATURE: 98.2 F | RESPIRATION RATE: 16 BRPM | DIASTOLIC BLOOD PRESSURE: 70 MMHG | HEIGHT: 59 IN

## 2018-01-23 NOTE — RESULT NOTES
Discussion/Summary   xray of leg was normal     Verified Results  * XR FEMUR 2 VIEW RIGHT 30MDY7795 61:93VI Domingo Neal Order Number: [id number]     Test Name Result Flag Reference   XR FEMUR 2 VW RIGHT (Report)     RIGHT FEMUR     INDICATION: Right femur pain  COMPARISON: None     VIEWS: AP and lateral;     IMAGES: 4     FINDINGS:     There is no acute fracture or dislocation  Mild degenerative change noted  No lytic or blastic lesions are seen  Soft tissues are unremarkable  IMPRESSION:     No acute osseous abnormality         Workstation performed: [id number]     Signed by:   Hans Mata MD   1/19/18

## 2018-01-23 NOTE — PROGRESS NOTES
Assessment    1  Hypertension (401 9) (I10)   2  Hypothyroidism (244 9) (E03 9)   3  Pain of right lower extremity (729 5) (M79 604)   4  Encounter for preventive health examination (V70 0) (Z00 00)    Plan  Health Maintenance    · COLONOSCOPY ; every 10 years; Next 47Lkz1849; Status:Active  Hypertension, Pain of right lower extremity    · (1) LIPID PANEL, FASTING; Status:Active; Requested UTT:91AAC4353;   Hypothyroidism, Pain of right lower extremity    · (1) TSH; Status:Active; Requested [id number];   Osteopenia, Pain of right lower extremity    · * XR FEMUR 2 VIEW RIGHT; Status:Active; Requested YKO:85JXN2258;   Pain of right lower extremity    · (1) CBC/PLT/DIFF; Status:Active; Requested UFR:62ZZP4859;    · (1) COMPREHENSIVE METABOLIC PANEL; Status:Active; Requested XVA:58FPU1284; Discussion/Summary  Impression: Initial Annual Wellness Visit  Immunizations: influenza vaccine is up to date this year, influenza vaccination is recommended annually, the lifetime pneumococcal vaccine has been completed, Patient received Prevnar 13 today and the patient declines the Zostavax vaccine  Advance Directive Planning: complete and up to date  Patient Discussion: plan discussed with the patient, follow-up visit needed in one year  Chief Complaint  AWV      History of Present Illness  AWV       The patient is being seen for the initial annual wellness visit  Medicare Screening and Risk Factors   Hospitalizations: no previous hospitalizations  Medicare Screening Tests Risk Questions   Abdominal aortic aneurysm risk assessment: none indicated  Osteoporosis risk assessment: , female gender and over 48years of age  Drug and Alcohol Use: The patient has never smoked cigarettes and has never used smokeless tobacco  The patient reports occasional alcohol use  Alcohol concern:   The patient has no concerns about alcohol abuse  She has never used illicit drugs     Diet and Physical Activity: Current diet includes well balanced meals, 2 servings of fruit per day, 2 servings of vegetables per day, 1 servings of meat per day, 1 servings of dairy products per day and 1 cups of coffee per day  She exercises 5 times per week  Exercise: walking  Mood Disorder and Cognitive Impairment Screening: PHQ-9 Depression Scale   Over the past 2 weeks, how often have you been bothered by the following problems? 1 ) Little interest or pleasure in doing things? Not at all    2 ) Feeling down, depressed or hopeless? Several days  3 ) Trouble falling asleep or sleeping too much? Several days  4 ) Feeling tired or having little energy? Several days  5 ) Poor appetite or overeating? Not at all    6 ) Feeling bad about yourself, or that you are a failure, or have let yourself or your family down? Not at all    7 ) Trouble concentrating on things, such as reading a newspaper or watching television? Not at all    8 ) Moving or speaking so slowly that other people could have noticed, or the opposite, moving or speaking faster than usual? Not at all  TOTAL SCORE: 3    How difficult have these problems made it for you to do your work, take care of things at home, or get along with people? Not at all  Functional Ability/Level of Safety: Hearing is slightly decreased, slightly decreased in the right ear and slightly decreased in the left ear  She does not use a hearing aid  The patient is currently able to do activities of daily living without limitations, able to do instrumental activities of daily living without limitations, able to participate in social activities without limitations and able to drive without limitations  Activities of daily living details: does not need help using the phone, no transportation help needed, does not need help shopping, no meal preparation help needed, does not need help doing housework, does not need help doing laundry, does not need help managing medications and does not need help managing money  Fall risk factors: The patient fell 0 times in the past 12 months  Home safety risk factors:  no unfamiliar surroundings, no loose rugs, no poor household lighting, no uneven floors, no household clutter, grab bars in the bathroom and handrails on the stairs  Advance Directives: Advance directives: living will and durable power of  for health care directives  Co-Managers and Medical Equipment/Suppliers: See Patient Care Team       Falls Risk: The patient fell 0 times in the past 12 months  The patient currently has no urinary incontinence symptoms  Patient Care Team    Care Team Member Role Specialty Office Number   Dharmesh Guzman MD Referring Family Medicine [phone]   Adella Cranker MD Specialist Surgical Oncology [phone]   Promise Figueroa MD Specialist Cardiology [phone]   Huma CALVERT  Specialist Hematology Oncology [phone]     Review of Systems    Constitutional: negative  Eyes: negative  ENT: negative  Cardiovascular: negative  Respiratory: Intermittent coughing  Gastrointestinal: negative  Genitourinary: negative  Musculoskeletal: negative  Integumentary and Breasts: negative  Neurological: negative  Active Problems    1  ASCUS favor benign (796 9)   2  Cellulitis (682 9) (L03 90)   3  Female hypogonadism due to aromatase inhibitor therapy (256 39,E933 1)   (Y99 68,N18  1X5A)   4  Hypertension (401 9) (I10)   5  Hypertrophic cardiomyopathy (425 18) (I42 2)   6  Hypothyroidism (244 9) (E03 9)   7  Ischemic cardiomyopathy (414 8) (I25 5)   8  Malignant neoplasm of upper-inner 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 breast (174 2) (C50 212)   9  Osteopenia (733 90) (M85 80)   10  Plantar fasciitis (728 71) (M72 2)   11  Prophylactic use of anastrozole (Arimidex) (V07 52) (R43 584)    Past Medical History    1  History of Encounter for routine gynecological examination with Papanicolaou smear of   cervix (V72 31,V76 2) (Z01 419)   2   History of Encounter for screening mammogram for malignant neoplasm of breast   (V76 12) (Z12 31)   3  History of bronchitis (V12 69) (Z87 09)   4  History of Mammogram abnormal (793 80) (R92 8)   5  History of Need for vaccination for DTaP (V06 1) (Z23)   6  History of Need for vaccination with 13-polyvalent pneumococcal conjugate vaccine   (V03 82) (Z23)   7  Normal delivery (650) (O80,Z37 9)   8  History of Screening for human papillomavirus (HPV) (V73 81) (Z11 51)   9  History of Urinary Tract Infection (V13 02)    Surgical History    1  History of Biopsy Breast Percutaneous Needle Core   2  History of Breast Reconstruction With Tissue Expander Right Breast   3  History of Complete Colonoscopy   4  History of Knee Surgery   5  History of [place] Lymph Node Biopsy   6  History of Simple Mastectomy Left Breast    Family History  Mother    1  Family history of Diabetes Mellitus (V18 0)   2  Family history of Heart Disease (V17 49)  Father    3  Family history of Heart Disease (V17 49)  Sister    4  Family history of Brain Cancer (V16 8)    Social History    · Being A Social Drinker   · Marital History - Currently    · Never A Smoker   · Recreational Activities Walking  The social history was reviewed and updated today  Current Meds   1  Anastrozole 1 MG Oral Tablet; take 1 tablet by mouth once daily; Therapy: 57MCJ3315 to (Evaluate:12Mar2018)  Requested for: 13Sep2017; Last   Rx:13Sep2017 Ordered   2  Aspirin 81 MG TABS; TAKE 1 TABLET DAILY; Therapy: (Recorded:73Noy6636) to Recorded   3  Calcium 600 MG Oral Tablet; take 2 tablet daily; Therapy: (Recorded:08Jun2017) to Recorded   4  Daily Multiple Vitamins TABS; TAKE 1 TABLET DAILY; Therapy: (Recorded:68Fvo2613) to Recorded   5  Fish Oil CAPS; Take 1 tablet daily; Therapy: (Recorded:06Djn4474) to Recorded   6  Levothyroxine Sodium 25 MCG Oral Tablet; take 1 tablet by mouth once daily;    Therapy: 24VWQ1085 to (Meghann Mullen)  Requested for: 17ORZ0429; Last   Rx:90Nto7175 Ordered   7  Metoprolol Succinate ER 25 MG Oral Tablet Extended Release 24 Hour; take 1 tablet in   the am and 1/2 tablet in the pm;   Therapy: 66Sye0661 to Recorded   8  Vitamin D 1000 UNIT Oral Tablet; TAKE 1 TABLET DAILY; Therapy: (Recorded:67Qmr5271) to Recorded    The medication list was reviewed and updated today  Allergies    1  No Known Drug Allergies    Immunizations  Influenza --- Shobha Dylan: 30-Oct-2003  (58y); Josephine Brought: 79-MXN-7305  (61y); Daisy Bon: 51-SVZ-4529   (63y); Series4: 07-Oct-2011  (83F); Series5: 26-Oct-2012  (56O); Series6: 15-Oct-2013  (68y); Series7: Oct 2014  (69y); Series8: 09-Oct-2016  (71y)   PCV --- Lorella Dylan: 22-Feb-2016  (70y)   Tdap --- Lorella Dylan: 22-Feb-2016  (70y)     Vitals  Signs   Recorded: 57OKY6208 11:43AM   Temperature: 98 2 F  Heart Rate: 60  Respiration: 16  Systolic: 871  Diastolic: 70  Height: 4 ft 11 in  Weight: 140 lb   BMI Calculated: 28 28  BSA Calculated: 1 58    Results/Data  PHQ-9 Adult Depression Screening 02Jan2018 11:51AM User, s     Test Name Result Flag Reference   PHQ-9 Adult Depression Score 3     Over the last two weeks, how often have you been bothered by any of the following problems? Little interest or pleasure in doing things: Not at all - 0  Feeling down, depressed, or hopeless: Several days - 1  Trouble falling or staying asleep, or sleeping too much: Several days - 1  Feeling tired or having little energy: Several days - 1  Poor appetite or over eating: Not at all - 0  Feeling bad about yourself - or that you are a failure or have let yourself or your family down: Not at all - 0  Trouble concentrating on things, such as reading the newspaper or watching television: Not at all - 0  Moving or speaking so slowly that other people could have noticed   Or the opposite -  being so fidgety or restless that you have been moving around a lot more than usual: Not at all - 0  Thoughts that you would be better off dead, or of hurting yourself in some way: Not at all - 0   PHQ-9 Adult Depression Screening Negative     PHQ-9 Difficulty Level Not difficult at all     PHQ-9 Severity Minimal Depression       Falls Risk Assessment (Dx Z13 89 Screen for Neurologic Disorder) 61CCS0411 11:50AM User, Ahs     Test Name Result Flag Reference   Falls Risk      No falls in the past year     Health Maintenance Flow Sheet 63DTN0556 12:00AM      Test Name Result Flag Reference   Fall Risk Assessment 39CTG7308     Urinary Incontinence Assessment 29Owx3889     Depression Scale 01/02/2018         Health Management  Health Maintenance   COLONOSCOPY; every 10 years; Next Due: 89Wnb1176; Overdue  Medicare Annual Wellness Visit; every 1 year; Next Due: 61Wis0978; Overdue    Future Appointments    Date/Time Provider Specialty Site   02/21/2018 01:00 PM RIAM Calloway   Hematology Oncology [facility] [place]   06/07/2018 01:00 PM Chiquis Jesus MD Surgical Oncology [facility]     Signatures   Electronically signed by : IRAM Dillon ; Jan 2 9266 12:16PM EST                       (Author)

## 2018-01-24 NOTE — RESULT NOTES
Discussion/Summary   bw shows thyroid still low  I will increase levothyroxine to 50mcg daily and recheck bw in 3 mos  I will print out     Verified Results  (1) COMPREHENSIVE METABOLIC PANEL [id number] 12:58NG Page Motiga     Test Name Result Flag Reference   SODIUM 140 mmol/L  136-145   POTASSIUM 4 1 mmol/L  3 5-5 3   CHLORIDE 108 mmol/L  100-108   CARBON DIOXIDE 25 mmol/L  21-32   ANION GAP (CALC) 7 mmol/L  4-13   BLOOD UREA NITROGEN 22 mg/dL  5-25   CREATININE 0 72 mg/dL  0 60-1 30   Standardized to IDMS reference method   CALCIUM 9 0 mg/dL  8 3-10 1   BILI, TOTAL 0 59 mg/dL  0 20-1 00   ALK PHOSPHATAS 80 U/L     ALT (SGPT) 26 U/L  12-78   Specimen collection should occur prior to Sulfasalazine and/or Sulfapyridine administration due to the potential for falsely depressed results  AST(SGOT) 25 U/L  5-45   Specimen collection should occur prior to Sulfasalazine administration due to the potential for falsely depressed results  ALBUMIN 3 8 g/dL  3 5-5 0   TOTAL PROTEIN 7 4 g/dL  6 4-8 2   eGFR 83 ml/min/1 73sq m     National Kidney Disease Education Program recommendations are as follows:  GFR calculation is accurate only with a steady state creatinine  Chronic Kidney disease less than 60 ml/min/1 73 sq  meters  Kidney failure less than 15 ml/min/1 73 sq  meters  GLUCOSE FASTING 94 mg/dL  65-99   Specimen collection should occur prior to Sulfasalazine administration due to the potential for falsely depressed results  Specimen collection should occur prior to Sulfapyridine administration due to the potential for falsely elevated results  (1) CBC/PLT/DIFF [id number] 21:59HB Getyoo     Test Name Result Flag Reference   WBC COUNT 6 22 Thousand/uL  4 31-10 16   RBC COUNT 4 65 Million/uL  3 81-5 12   HEMOGLOBIN 12 8 g/dL  11 5-15 4   HEMATOCRIT 39 6 %  34 8-46  1   MCV 85 fL  82-98   MCH 27 5 pg  26 8-34 3   MCHC 32 3 g/dL  31 4-37 4   RDW 14 3 %  11 6-15 1   MPV 11 0 fL  8 9-12 7   PLATELET COUNT 162 Thousands/uL  149-390   nRBC AUTOMATED 0 /100 WBCs     NEUTROPHILS RELATIVE PERCENT 63 %  43-75   LYMPHOCYTES RELATIVE PERCENT 23 %  14-44   MONOCYTES RELATIVE PERCENT 10 %  4-12   EOSINOPHILS RELATIVE PERCENT 3 %  0-6   BASOPHILS RELATIVE PERCENT 1 %  0-1   NEUTROPHILS ABSOLUTE COUNT 3 96 Thousands/? ??L  1 85-7 62   LYMPHOCYTES ABSOLUTE COUNT 1 45 Thousands/? ??L  0 60-4 47   MONOCYTES ABSOLUTE COUNT 0 59 Thousand/? ??L  0 17-1 22   EOSINOPHILS ABSOLUTE COUNT 0 16 Thousand/? ??L  0 00-0 61   BASOPHILS ABSOLUTE COUNT 0 04 Thousands/? ??L  0 00-0 10     (1) TSH [id number] 96:76NH Mckinley Guerrier     Test Name Result Flag Reference   TSH 3 960 uIU/mL H 0 358-3 740   Patients undergoing fluorescein dye angiography may retain small amounts of fluorescein in the body for 48-72 hours post procedure  Samples containing fluorescein can produce falsely depressed TSH values  If the patient had this procedure,a specimen should be resubmitted post fluorescein clearance            The recommended reference ranges for TSH during pregnancy are as follows:  First trimester 0 1 to 2 5 uIU/mL  Second trimester  0 2 to 3 0 uIU/mL  Third trimester 0 3 to 3 0 uIU/m       Plan  Hypothyroidism    · From  Levothyroxine Sodium 25 MCG Oral Tablet take 1 tablet by mouth once  daily To Levothyroxine Sodium 50 MCG Oral Tablet take 1 tablet by mouth once daily

## 2018-02-21 ENCOUNTER — OFFICE VISIT (OUTPATIENT)
Dept: HEMATOLOGY ONCOLOGY | Facility: CLINIC | Age: 73
End: 2018-02-21
Payer: COMMERCIAL

## 2018-02-21 VITALS
WEIGHT: 140 LBS | TEMPERATURE: 96.7 F | BODY MASS INDEX: 28.22 KG/M2 | SYSTOLIC BLOOD PRESSURE: 130 MMHG | RESPIRATION RATE: 16 BRPM | HEART RATE: 52 BPM | HEIGHT: 59 IN | OXYGEN SATURATION: 98 % | DIASTOLIC BLOOD PRESSURE: 80 MMHG

## 2018-02-21 DIAGNOSIS — Z17.0 MALIGNANT NEOPLASM OF LEFT BREAST IN FEMALE, ESTROGEN RECEPTOR POSITIVE, UNSPECIFIED SITE OF BREAST (HCC): Primary | ICD-10-CM

## 2018-02-21 DIAGNOSIS — C50.912 MALIGNANT NEOPLASM OF LEFT BREAST IN FEMALE, ESTROGEN RECEPTOR POSITIVE, UNSPECIFIED SITE OF BREAST (HCC): Primary | ICD-10-CM

## 2018-02-21 PROCEDURE — 99214 OFFICE O/P EST MOD 30 MIN: CPT | Performed by: INTERNAL MEDICINE

## 2018-02-21 NOTE — PROGRESS NOTES
Hematology / Oncology Outpatient Follow Up Note    Kuldip Gunn 74 y o  female :1945 SQJ:[id number]         Date:  2018    Assessment / Plan:  A 74year old postmenopausal woman with stage IIA left breast cancer, invasive lobular histology, grade 1, ER strongly positive, MI negative, HER-2 negative disease  She underwent mastectomy with sentinel lymph node biopsy, resulting in JENIFER  Her tumor was found to be low risk, based on the MammaPrint  Since 2017, she has been on adjuvant hormonal therapy with anastrozole with minimal side effects  She has no evidence of recurrent disease, based on her symptomatology and physical examinations  I recommended her to continue with anastrozole 1 mg once a day  I will see her again in 6 months for routine follow-up  She is in agreement with my recommendations  Subjective:     HPI:  A 66year old postmenopausal woman who has history of hypertrophic cardiomyopathy  She recently noticed a lump in the left breast  She did not have breast pain or abnormal discharge from the nipple  She brought this to medical attention  She was found to have radiographic abnormality in her left breast  Therefore, she underwent left breast biopsy in 2016, which showed invasive lobular carcinoma, grade 1  This was ER 90-95% positive, MI negative, HER-2 negative disease  She subsequently underwent left mastectomy with sentinel lymph node biopsy by Dr Callie Austin in 2017  Mastectomy specimen showed 3 9 cm of invasive lobular carcinoma, grade 1  There is no evidence of lymphovascular invasion  One sentinel lymph node was negative for metastatic disease  She had immediate reconstruction with tissue expander  She presented today with her daughter to discuss adjuvant treatment options  She feels well  She has no fever, chills or night sweats  She denied any pain, except minor skin tightness in the left reconstructed breast  She has no recent weight loss   Her performance status is normal  She has no family history of breast or ovarian cancer  Interval History:  A 49-year-old postmenopausal woman with stage IIA left breast cancer with invasive lobular histology, grade 1, ER strongly positive, AK negative, HER-2 negative disease  She underwent mastectomy with sentinel lymph node biopsy, resulting in JENIFER  Her tumor was found to be low risk, based on MammaPrint  Therefore, adjuvant chemotherapy was not indicated  She has been on adjuvant hormonal therapy with anastrozole since March 2017  She presented today for follow-up  She has osteoporosis  However, she does not wish to be on denosumab injection  The she does exercise daily  She take calcium and vitamin-D  She is tolerating anastrozole very well with some hot flashes  She has very minimal musculoskeletal symptoms  She denied any pain  Her weight is stable  She has no complaint of bone pain  She has no respiratory symptoms  Her performance status is normal       Objective:     Primary Diagnosis:    Left breast cancer, stage IIA (pT2, pN0,M0) invasive lobular histology, grade 1, ER strongly positive, AK negative, HER-2 negative disease  MammaPrint low risk  Diagnosed in February 2017  Cancer Staging:  No matching staging information was found for the patient  Previous Hematologic/ Oncologic Treatment:         Current Hematologic/ Oncologic Treatment:      Adjuvant hormonal therapy with anastrozole one once a day since March 2017  Disease Status:     JENIFER status post mastectomy with sentinel lymph node biopsy  Test Results:    Pathology:    3 9 cm of invasive lobular carcinoma, grade 1  No evidence of lymphovascular invasion  One sentinel lymph node was negative for metastatic disease  ER 90-95% positive, AK negative, HER-2 negative disease  MammaPrint low risk  Stage IIA (pT2, pN0,M0)  Radiology:    Chest x-ray was negative for pulmonary disease    DEXA scan in April 2017 showed T score -2 5, consistent with osteoporosis  Mammography on the right in November 2017 was benign  BI-RADS 1  Laboratory:    See below    Physical Exam:      General Appearance:    Alert, oriented        Eyes:    PERRL   Ears:    Normal external ear canals, both ears   Nose:   Nares normal, septum midline   Throat:   Mucosa moist  Pharynx without injection  Neck:   Supple       Lungs:     Clear to auscultation bilaterally   Chest Wall:    No tenderness or deformity    Heart:    Regular rate and rhythm       Abdomen:     Soft, non-tender, bowel sounds +, no organomegaly           Extremities:   Extremities no cyanosis or edema       Skin:   no rash or icterus  Lymph nodes:   Cervical, supraclavicular, and axillary nodes normal   Neurologic:   CNII-XII intact, normal strength, sensation and reflexes     Throughout          Breast exam:   status post left mastectomy with reconstruction  No palpable abnormality on reconstructed breast  Right breast exam is negative  ROS: Review of Systems   Constitutional:        Her hot flashes   All other systems reviewed and are negative  Imaging: No results found  Labs:   Lab Results   Component Value Date    WBC 6 22 01/19/2018    HGB 12 8 01/19/2018    HCT 39 6 01/19/2018    MCV 85 01/19/2018     01/19/2018     Lab Results   Component Value Date     01/19/2018    K 4 1 01/19/2018     01/19/2018    CO2 25 01/19/2018    ANIONGAP 7 01/19/2018    BUN 22 01/19/2018    CREATININE 0 72 01/19/2018    GLUCOSE 120 05/31/2017    GLUF 94 01/19/2018    CALCIUM 9 0 01/19/2018    AST 25 01/19/2018    ALT 26 01/19/2018    ALKPHOS 80 01/19/2018    PROT 7 4 01/19/2018    BILITOT 0 59 01/19/2018    EGFR 83 01/19/2018       Current Medications: Reviewed  Allergies: Reviewed  PMH/FH/SH:  Reviewed      Vital Sign:    Body surface area is 1 58 meters squared      Wt Readings from Last 3 Encounters:   02/21/18 63 5 kg (140 lb)   01/02/18 63 5 kg (140 lb) 11/30/17 64 9 kg (143 lb)        Temp Readings from Last 3 Encounters:   02/21/18 (!) 96 7 °F (35 9 °C) (Tympanic)   01/02/18 98 2 °F (36 8 °C)   11/30/17 98 1 °F (36 7 °C)        BP Readings from Last 3 Encounters:   02/21/18 130/80   01/02/18 120/70   11/30/17 122/70         Pulse Readings from Last 3 Encounters:   02/21/18 (!) 52   01/02/18 60   11/30/17 68     @LASTSAO2(3)@

## 2018-03-15 DIAGNOSIS — Z17.0 MALIGNANT NEOPLASM OF BREAST IN FEMALE, ESTROGEN RECEPTOR POSITIVE, UNSPECIFIED LATERALITY, UNSPECIFIED SITE OF BREAST (HCC): Primary | ICD-10-CM

## 2018-03-15 DIAGNOSIS — C50.919 MALIGNANT NEOPLASM OF BREAST IN FEMALE, ESTROGEN RECEPTOR POSITIVE, UNSPECIFIED LATERALITY, UNSPECIFIED SITE OF BREAST (HCC): Primary | ICD-10-CM

## 2018-03-15 RX ORDER — ANASTROZOLE 1 MG/1
TABLET ORAL
Qty: 90 TABLET | Refills: 1 | Status: SHIPPED | OUTPATIENT
Start: 2018-03-15 | End: 2018-08-21 | Stop reason: SDUPTHER

## 2018-04-22 DIAGNOSIS — E03.9 HYPOTHYROIDISM: ICD-10-CM

## 2018-05-08 ENCOUNTER — APPOINTMENT (OUTPATIENT)
Dept: LAB | Facility: CLINIC | Age: 73
End: 2018-05-08
Payer: COMMERCIAL

## 2018-05-08 ENCOUNTER — TELEPHONE (OUTPATIENT)
Dept: FAMILY MEDICINE CLINIC | Facility: CLINIC | Age: 73
End: 2018-05-08

## 2018-05-08 DIAGNOSIS — E03.9 HYPOTHYROIDISM: ICD-10-CM

## 2018-05-08 LAB — TSH SERPL DL<=0.05 MIU/L-ACNC: 0.87 UIU/ML (ref 0.36–3.74)

## 2018-05-08 PROCEDURE — 84443 ASSAY THYROID STIM HORMONE: CPT

## 2018-05-08 PROCEDURE — 36415 COLL VENOUS BLD VENIPUNCTURE: CPT

## 2018-05-08 NOTE — TELEPHONE ENCOUNTER
----- Message from Zahira Kelly MD sent at 6/7/9374  7:48 PM EDT -----  Thyroid blood work was normal for patient

## 2018-05-25 PROBLEM — C50.212 MALIGNANT NEOPLASM OF UPPER-INNER QUADRANT OF LEFT FEMALE BREAST (HCC): Status: ACTIVE | Noted: 2017-01-26

## 2018-05-25 PROBLEM — T45.1X5A FEMALE HYPOGONADISM DUE TO AROMATASE INHIBITOR THERAPY: Status: ACTIVE | Noted: 2017-03-16

## 2018-05-25 PROBLEM — L03.90 CELLULITIS: Status: ACTIVE | Noted: 2017-06-08

## 2018-05-25 PROBLEM — N61.0 CELLULITIS OF LEFT BREAST: Status: RESOLVED | Noted: 2017-06-01 | Resolved: 2018-05-25

## 2018-05-25 PROBLEM — E28.39 FEMALE HYPOGONADISM DUE TO AROMATASE INHIBITOR THERAPY: Status: ACTIVE | Noted: 2017-03-16

## 2018-05-25 PROBLEM — M79.604 PAIN OF RIGHT LOWER EXTREMITY: Status: ACTIVE | Noted: 2018-01-02

## 2018-05-29 ENCOUNTER — HOSPITAL ENCOUNTER (OUTPATIENT)
Dept: ULTRASOUND IMAGING | Facility: CLINIC | Age: 73
Discharge: HOME | End: 2018-05-29

## 2018-05-29 ENCOUNTER — HOSPITAL ENCOUNTER (OUTPATIENT)
Dept: MAMMOGRAPHY | Facility: CLINIC | Age: 73
Discharge: HOME | End: 2018-05-29
Payer: COMMERCIAL

## 2018-05-29 DIAGNOSIS — C50.212 MALIGNANT NEOPLASM OF UPPER-INNER QUADRANT OF LEFT FEMALE BREAST, UNSPECIFIED ESTROGEN RECEPTOR STATUS (HCC): ICD-10-CM

## 2018-05-29 DIAGNOSIS — R92.8 ABNORMAL FINDINGS ON DIAGNOSTIC IMAGING OF BREAST: ICD-10-CM

## 2018-05-29 PROCEDURE — 77065 DX MAMMO INCL CAD UNI: CPT

## 2018-05-29 PROCEDURE — 76642 ULTRASOUND BREAST LIMITED: CPT

## 2018-06-01 PROBLEM — L03.90 CELLULITIS: Status: RESOLVED | Noted: 2017-06-08 | Resolved: 2018-06-01

## 2018-06-01 PROBLEM — M79.604 PAIN OF RIGHT LOWER EXTREMITY: Status: RESOLVED | Noted: 2018-01-02 | Resolved: 2018-06-01

## 2018-06-01 PROBLEM — G56.00 CARPAL TUNNEL SYNDROME: Status: ACTIVE | Noted: 2018-06-01

## 2018-06-01 PROBLEM — M24.849 LOCKING FINGER JOINT: Status: ACTIVE | Noted: 2018-06-01

## 2018-06-01 PROBLEM — R20.0 NUMBNESS OF HAND: Status: ACTIVE | Noted: 2018-06-01

## 2018-06-01 PROBLEM — M65.30 ACQUIRED TRIGGER FINGER: Status: ACTIVE | Noted: 2018-06-01

## 2018-06-15 PROBLEM — Z79.811 USE OF ANASTROZOLE (ARIMIDEX): Status: ACTIVE | Noted: 2018-06-15

## 2018-06-20 ENCOUNTER — OFFICE VISIT (OUTPATIENT)
Dept: SURGICAL ONCOLOGY | Facility: CLINIC | Age: 73
End: 2018-06-20
Payer: COMMERCIAL

## 2018-06-20 VITALS
SYSTOLIC BLOOD PRESSURE: 112 MMHG | BODY MASS INDEX: 27.01 KG/M2 | RESPIRATION RATE: 18 BRPM | WEIGHT: 134 LBS | DIASTOLIC BLOOD PRESSURE: 62 MMHG | HEART RATE: 62 BPM | TEMPERATURE: 97.4 F | HEIGHT: 59 IN

## 2018-06-20 DIAGNOSIS — Z17.0 MALIGNANT NEOPLASM OF UPPER-INNER QUADRANT OF LEFT BREAST IN FEMALE, ESTROGEN RECEPTOR POSITIVE (HCC): Primary | ICD-10-CM

## 2018-06-20 DIAGNOSIS — Z79.811 USE OF ANASTROZOLE (ARIMIDEX): ICD-10-CM

## 2018-06-20 DIAGNOSIS — C50.212 MALIGNANT NEOPLASM OF UPPER-INNER QUADRANT OF LEFT BREAST IN FEMALE, ESTROGEN RECEPTOR POSITIVE (HCC): Primary | ICD-10-CM

## 2018-06-20 PROCEDURE — 99214 OFFICE O/P EST MOD 30 MIN: CPT | Performed by: SURGERY

## 2018-06-20 RX ORDER — CHLORAL HYDRATE 500 MG
1000 CAPSULE ORAL DAILY
COMMUNITY

## 2018-06-20 NOTE — PROGRESS NOTES
Surgical Oncology Follow Up       [address] [facility],[address] [facility]  [facility] [place] [address] [address]  1945  [id number]  [address] [facility],[address] [place] [address]  [facility] [place] [id number]    Chief Complaint   Patient presents with    Breast Cancer     6 month follow up    Follow-up          Assessment & Plan:   The patient presents for six-month follow-up visit  She has no complaints referable to her reconstructed breast or her contralateral right breast   She has a mammogram from May of this year of the right breast which showed no worrisome findings  She has a normal clinical breast exam   We will see her back in 6 months  The patient is agreeable to seeing Chanetlle Torres at her next visit  Cancer History:        Malignant neoplasm of upper-inner 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 breast ([address] )    2/9/2017 Surgery     Left mastectomy with SLN biopsy  Invasive lobular carcinoma  Grade 1  3 9 cm  0/1 lymph nodes  ER 90  [place] <1  Her 2 negative  Stage IIA    Immediate reconstruction with Dr Bisi Bernal         3/3/2017 Genomic Testing     Mammaprint low risk         3/2017 -  Hormone Therapy     Anastrozole 1 mg daily  Dr Chang Gomes              Interval History:   See above    Review of Systems:   Review of Systems   Constitutional: Negative for activity change, appetite change and fatigue  HENT: Negative  Eyes: Negative  Respiratory: Negative for cough, shortness of breath and wheezing  Cardiovascular: Negative for chest pain and leg swelling  Gastrointestinal: Negative  Endocrine: Negative  Genitourinary: Negative  Musculoskeletal:        No new changes or complaints of bone pain   Skin: Negative  Allergic/Immunologic: Negative  Neurological: Negative  Hematological: Negative  Psychiatric/Behavioral: Negative          Past Medical History     Patient Active Problem List   Diagnosis  Malignant neoplasm of upper-inner 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 breast ([facility]    Hypertension    Hypertrophic cardiomyopathy ([facility] [facility] )    Hypothyroidism    Ischemic cardiomyopathy    Osteopenia    Plantar fasciitis    Acquired trigger finger    Carpal tunnel syndrome    Locking finger joint    Numbness of hand    Use of anastrozole (Arimidex)     Past Medical History:   Diagnosis Date    Hypertrophic cardiomyopathy ([facility] [address] [facility] )     Hypothyroidism     Ischemic cardiomyopathy     Malignant neoplasm of left female breast ([facility] [address] )     stage IIA    Osteopenia     Trigger finger of all digits of right hand      Past Surgical History:   Procedure Laterality Date    BREAST BIOPSY Right     BREAST RECONSTRUCTION Left 2/9/2017    Procedure: BREAST RECONSTRUCTION WITH TISSUE EXPANDERS AND ALLODERM ;  Surgeon: Liborio Sanders MD;  [facility];  Service:     COLONOSCOPY  2009    KNEE ARTHROSCOPY Bilateral     NJ BIOPSY/EXCISION, LYMPH NODE(S) Left 2/9/2017    Procedure: LYMPHOSCINTIGRAPHY; INTRAOPERATIVE LYMPHATIC MAPPING; SENTINEL LYMPH NODE BIOPSY (INJECT AT 0700 BY DR ALBA); Surgeon: Joshua Barr MD;  [facility];  Service: Surgical Oncology    NJ DELAY BREAST PROS AFTER BREAST SURG Left 5/18/2017    Procedure: BREAST EXPANDER/IMPLANT EXCHANGE; CAPSULECTOMY ;  Surgeon: Liborio Sanders MD;  [facility];  Service: Plastics    NJ MASTECTOMY, SIMPLE, COMPLETE Left 2/9/2017    Procedure: BREAST MASTECTOMY; FROZEN SECTION ;  Surgeon: Joshua Barr MD;  [facility];  Service: Surgical Oncology    REFRACTIVE SURGERY Bilateral      Family History   Problem Relation Age of Onset    Heart disease Mother     Heart disease Father     Brain cancer Sister     Heart disease Brother     No Known Problems Brother      Social History     Social History    Marital status: /Civil Union     Spouse name: N/A    Number of children: N/A    Years of education: N/A     Occupational History    Not on file  Social History Main Topics    Smoking status: Never Smoker    Smokeless tobacco: Not on file    Alcohol use No    Drug use: No    Sexual activity: Not on file     Other Topics Concern    Not on file     Social History Narrative    No narrative on file       Current Outpatient Prescriptions:     anastrozole (ARIMIDEX) 1 mg tablet, TAKE 1 TABLET DAILY  , Disp: 90 tablet, Rfl: 1    aspirin (ECOTRIN LOW STRENGTH) 81 mg EC tablet, Take 81 mg by mouth daily, Disp: , Rfl:     calcium citrate-vitamin D (CITRACAL+D) 315-200 MG-UNIT per tablet, Take 1 tablet by mouth 2 (two) times a day, Disp: , Rfl:     levothyroxine 50 mcg tablet, Take 50 mcg by mouth daily  , Disp: , Rfl:     metoprolol tartrate (LOPRESSOR) 25 mg tablet, Take 25 mg by mouth 2 (two) times a day 1 pill in am/ half pill in pm, Disp: , Rfl:     Multiple Vitamin (MULTIVITAMIN) tablet, Take 1 tablet by mouth daily, Disp: , Rfl:     Omega-3 Fatty Acids (FISH OIL) 1,000 mg, Take 1,000 mg by mouth daily, Disp: , Rfl:   No Known Allergies    Physical Exam:     Vitals:    06/20/18 1506   BP: 112/62   Pulse: 62   Resp: 18   Temp: (!) 97 4 °F (36 3 °C)     Physical Exam   Constitutional: She is oriented to person, place, and time  She appears well-developed and well-nourished  HENT:   Head: Normocephalic and atraumatic  Mouth/Throat: Oropharynx is clear and moist    Eyes: EOM are normal  Pupils are equal, round, and reactive to light  Neck: Normal range of motion  Neck supple  No JVD present  No tracheal deviation present  No thyromegaly present  Cardiovascular: Normal rate, regular rhythm, normal heart sounds and intact distal pulses  Exam reveals no gallop and no friction rub  No murmur heard  Pulmonary/Chest: Effort normal and breath sounds normal  No respiratory distress  She has no wheezes  She has no rales  Examination of the left reconstructed breast shows no sign of local regional recurrence    She sees Dr Justin Kasper in approximately 2 years   Examination of the right breast in both the sitting and supine position demonstrate no nipple discharge dominant masses skin changes or axillary adenopathy  Abdominal: Soft  She exhibits no distension and no mass  There is no hepatomegaly  There is no tenderness  There is no rebound and no guarding  Musculoskeletal: Normal range of motion  She exhibits no edema or tenderness  Lymphadenopathy:     She has no cervical adenopathy  Neurological: She is alert and oriented to person, place, and time  No cranial nerve deficit  Skin: Skin is warm and dry  No rash noted  No erythema  Psychiatric: She has a normal mood and affect  Her behavior is normal    Vitals reviewed  Results:   I reviewed her mammogram I concur with the result  Advance Care Planning/Advance Directives:  I discussed the disease status, treatment plans and follow-up with the patient

## 2018-06-20 NOTE — LETTER
June 20, 3297     [address] [place] [address]    Patient: Ashley Hester   YOB: 1945   Date of Visit: 6/20/2018       Dear Dr Fariba Perez: Thank you for referring Jaycobrodger Horvath to me for evaluation  Below are my notes for this consultation  If you have questions, please do not hesitate to call me  I look forward to following your patient along with you  Sincerely,        Tyler Cloud MD        CC: No Recipients  Tyler Cloud MD  6/20/2018  3:26 PM  Sign at close encounter     Surgical Oncology Follow Up       [address] [place] [address] [address]  1945  [id number]  [address] [place] [address],[address]  [facility] [place] [address]    Chief Complaint   Patient presents with    Breast Cancer     6 month follow up    Follow-up          Assessment & Plan:   The patient presents for six-month follow-up visit  She has no complaints referable to her reconstructed breast or her contralateral right breast   She has a mammogram from May of this year of the right breast which showed no worrisome findings  She has a normal clinical breast exam   We will see her back in 6 months  The patient is agreeable to seeing Ova Milks at her next visit  Cancer History:        Malignant neoplasm of upper-inner 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 breast ([address] )    2/9/2017 Surgery     Left mastectomy with SLN biopsy  Invasive lobular carcinoma  Grade 1  3 9 cm  0/1 lymph nodes  ER 90  [place] <1  Her 2 negative  Stage IIA    Immediate reconstruction with Dr Meme Sow         3/3/2017 Genomic Testing     Mammaprint low risk         3/2017 -  Hormone Therapy     Anastrozole 1 mg daily  Dr Norma Garcia              Interval History:   See above    Review of Systems:   Review of Systems   Constitutional: Negative for activity change, appetite change and fatigue     HENT: Negative  Eyes: Negative  Respiratory: Negative for cough, shortness of breath and wheezing  Cardiovascular: Negative for chest pain and leg swelling  Gastrointestinal: Negative  Endocrine: Negative  Genitourinary: Negative  Musculoskeletal:        No new changes or complaints of bone pain   Skin: Negative  Allergic/Immunologic: Negative  Neurological: Negative  Hematological: Negative  Psychiatric/Behavioral: Negative  Past Medical History     Patient Active Problem List   Diagnosis    Malignant neoplasm of upper-inner 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 [facility] ([facility]    Hypertension    Hypertrophic cardiomyopathy [facility]    Hypothyroidism    Ischemic cardiomyopathy    Osteopenia    Plantar fasciitis    Acquired trigger finger    Carpal tunnel syndrome    Locking finger joint    Numbness of hand    Use of anastrozole (Arimidex)     Past Medical History:   Diagnosis Date    Hypertrophic cardiomyopathy ([facility] [facility]     Hypothyroidism     Ischemic cardiomyopathy     Malignant neoplasm of left female breast ([facility] )     stage IIA    Osteopenia     Trigger finger of all digits of right hand      Past Surgical History:   Procedure Laterality Date    BREAST BIOPSY Right     BREAST RECONSTRUCTION Left 2/9/2017    Procedure: BREAST RECONSTRUCTION WITH TISSUE EXPANDERS AND ALLODERM ;  Surgeon: Eli Arrieta MD;  [facility];  Service:     COLONOSCOPY  2009    KNEE ARTHROSCOPY Bilateral     NH BIOPSY/EXCISION, LYMPH NODE(S) Left 2/9/2017    Procedure: LYMPHOSCINTIGRAPHY; INTRAOPERATIVE LYMPHATIC MAPPING; SENTINEL LYMPH NODE BIOPSY (INJECT AT 0700 BY DR ALBA);   Surgeon: Savi Starks MD;  [facility];  Service: Surgical Oncology    NH DELAY BREAST PROS AFTER BREAST SURG Left 5/18/2017    Procedure: BREAST EXPANDER/IMPLANT EXCHANGE; CAPSULECTOMY ;  Surgeon: Eli Arrieta MD;  [facility];  Service: Plastics    NH MASTECTOMY, SIMPLE, COMPLETE Left 2/9/2017    Procedure: BREAST MASTECTOMY; FROZEN SECTION ;  Surgeon: Edy Melton MD;  [facility];  Service: Surgical Oncology    REFRACTIVE SURGERY Bilateral      Family History   Problem Relation Age of Onset    Heart disease Mother     Heart disease Father     Brain cancer Sister     Heart disease Brother     No Known Problems Brother      Social History     Social History    Marital status: /Civil Union     Spouse name: N/A    Number of children: N/A    Years of education: N/A     Occupational History    Not on file  Social History Main Topics    Smoking status: Never Smoker    Smokeless tobacco: Not on file    Alcohol use No    Drug use: No    Sexual activity: Not on file     Other Topics Concern    Not on file     Social History Narrative    No narrative on file       Current Outpatient Prescriptions:     anastrozole (ARIMIDEX) 1 mg tablet, TAKE 1 TABLET DAILY  , Disp: 90 tablet, Rfl: 1    aspirin (ECOTRIN LOW STRENGTH) 81 mg EC tablet, Take 81 mg by mouth daily, Disp: , Rfl:     calcium citrate-vitamin D (CITRACAL+D) 315-200 MG-UNIT per tablet, Take 1 tablet by mouth 2 (two) times a day, Disp: , Rfl:     levothyroxine 50 mcg tablet, Take 50 mcg by mouth daily  , Disp: , Rfl:     metoprolol tartrate (LOPRESSOR) 25 mg tablet, Take 25 mg by mouth 2 (two) times a day 1 pill in am/ half pill in pm, Disp: , Rfl:     Multiple Vitamin (MULTIVITAMIN) tablet, Take 1 tablet by mouth daily, Disp: , Rfl:     Omega-3 Fatty Acids (FISH OIL) 1,000 mg, Take 1,000 mg by mouth daily, Disp: , Rfl:   No Known Allergies    Physical Exam:     Vitals:    06/20/18 1506   BP: 112/62   Pulse: 62   Resp: 18   Temp: (!) 97 4 °F (36 3 °C)     Physical Exam   Constitutional: She is oriented to person, place, and time  She appears well-developed and well-nourished  HENT:   Head: Normocephalic and atraumatic     Mouth/Throat: Oropharynx is clear and moist    Eyes: EOM are normal  Pupils are equal, round, and reactive to light    Neck: Normal range of motion  Neck supple  No JVD present  No tracheal deviation present  No thyromegaly present  Cardiovascular: Normal rate, regular rhythm, normal heart sounds and intact distal pulses  Exam reveals no gallop and no friction rub  No murmur heard  Pulmonary/Chest: Effort normal and breath sounds normal  No respiratory distress  She has no wheezes  She has no rales  Examination of the left reconstructed breast shows no sign of local regional recurrence  She sees Dr Justin Kasper in approximately 2 years  Examination of the right breast in both the sitting and supine position demonstrate no nipple discharge dominant masses skin changes or axillary adenopathy  Abdominal: Soft  She exhibits no distension and no mass  There is no hepatomegaly  There is no tenderness  There is no rebound and no guarding  Musculoskeletal: Normal range of motion  She exhibits no edema or tenderness  Lymphadenopathy:     She has no cervical adenopathy  Neurological: She is alert and oriented to person, place, and time  No cranial nerve deficit  Skin: Skin is warm and dry  No rash noted  No erythema  Psychiatric: She has a normal mood and affect  Her behavior is normal    Vitals reviewed  Results:   I reviewed her mammogram I concur with the result  Advance Care Planning/Advance Directives:  I discussed the disease status, treatment plans and follow-up with the patient

## 2018-08-01 DIAGNOSIS — E03.9 HYPOTHYROIDISM, UNSPECIFIED TYPE: Primary | ICD-10-CM

## 2018-08-02 RX ORDER — LEVOTHYROXINE SODIUM 0.05 MG/1
TABLET ORAL
Qty: 30 TABLET | Refills: 5 | Status: SHIPPED | OUTPATIENT
Start: 2018-08-02 | End: 2019-02-04 | Stop reason: SDUPTHER

## 2018-08-21 ENCOUNTER — OFFICE VISIT (OUTPATIENT)
Dept: HEMATOLOGY ONCOLOGY | Facility: CLINIC | Age: 73
End: 2018-08-21
Payer: COMMERCIAL

## 2018-08-21 VITALS
DIASTOLIC BLOOD PRESSURE: 68 MMHG | HEIGHT: 59 IN | BODY MASS INDEX: 26.21 KG/M2 | WEIGHT: 130 LBS | SYSTOLIC BLOOD PRESSURE: 118 MMHG | RESPIRATION RATE: 18 BRPM | OXYGEN SATURATION: 98 % | HEART RATE: 66 BPM | TEMPERATURE: 98.1 F

## 2018-08-21 DIAGNOSIS — Z17.0 MALIGNANT NEOPLASM OF UPPER-INNER QUADRANT OF LEFT BREAST IN FEMALE, ESTROGEN RECEPTOR POSITIVE (HCC): Primary | ICD-10-CM

## 2018-08-21 DIAGNOSIS — C50.919 MALIGNANT NEOPLASM OF BREAST IN FEMALE, ESTROGEN RECEPTOR POSITIVE, UNSPECIFIED LATERALITY, UNSPECIFIED SITE OF BREAST (HCC): ICD-10-CM

## 2018-08-21 DIAGNOSIS — Z17.0 MALIGNANT NEOPLASM OF BREAST IN FEMALE, ESTROGEN RECEPTOR POSITIVE, UNSPECIFIED LATERALITY, UNSPECIFIED SITE OF BREAST (HCC): ICD-10-CM

## 2018-08-21 DIAGNOSIS — C50.212 MALIGNANT NEOPLASM OF UPPER-INNER QUADRANT OF LEFT BREAST IN FEMALE, ESTROGEN RECEPTOR POSITIVE (HCC): Primary | ICD-10-CM

## 2018-08-21 PROCEDURE — 4040F PNEUMOC VAC/ADMIN/RCVD: CPT | Performed by: INTERNAL MEDICINE

## 2018-08-21 PROCEDURE — 99214 OFFICE O/P EST MOD 30 MIN: CPT | Performed by: INTERNAL MEDICINE

## 2018-08-21 RX ORDER — ANASTROZOLE 1 MG/1
1 TABLET ORAL DAILY
Qty: 90 TABLET | Refills: 3 | Status: SHIPPED | OUTPATIENT
Start: 2018-08-21 | End: 2019-08-21 | Stop reason: SDUPTHER

## 2018-08-21 NOTE — PROGRESS NOTES
Hematology / Oncology Outpatient Follow Up Note    Melody Newman 74 y o  female :1945 LGN:[id number]         Date:  2018    Assessment / Plan:  A 74year old postmenopausal woman with stage IIA left breast cancer, invasive lobular histology, grade 1, ER strongly positive, WA negative, HER-2 negative disease  She underwent mastectomy with sentinel lymph node biopsy, resulting in JENIFER  Her tumor was found to be low risk, based on the MammaPrint  Since 2017, she has been on adjuvant hormonal therapy with anastrozole with minimal side effects  clinically, she has no evidence recurrent disease  I recommended her to continue with anastrozole 1 mg once a day  She is in agreement with my recommendation  I will see her again in a year for routine follow-up         Subjective:      HPI:  A 66year old postmenopausal woman who has history of hypertrophic cardiomyopathy  She recently noticed a lump in the left breast  She did not have breast pain or abnormal discharge from the nipple  She brought this to medical attention  She was found to have radiographic abnormality in her left breast  Therefore, she underwent left breast biopsy in 2016, which showed invasive lobular carcinoma, grade 1  This was ER 90-95% positive, WA negative, HER-2 negative disease  She subsequently underwent left mastectomy with sentinel lymph node biopsy by Dr Severa Liberty in 2017  Mastectomy specimen showed 3 9 cm of invasive lobular carcinoma, grade 1  There is no evidence of lymphovascular invasion  One sentinel lymph node was negative for metastatic disease  She had immediate reconstruction with tissue expander  She presented today with her daughter to discuss adjuvant treatment options  She feels well  She has no fever, chills or night sweats  She denied any pain, except minor skin tightness in the left reconstructed breast  She has no recent weight loss   Her performance status is normal  She has no family history of breast or ovarian cancer             Interval History:  A 74year-old postmenopausal woman with stage IIA left breast cancer with invasive lobular histology, grade 1, ER strongly positive, OH negative, HER-2 negative disease  She underwent mastectomy with sentinel lymph node biopsy, resulting in JENIFER  Her tumor was found to be low risk, based on MammaPrint  Therefore, adjuvant chemotherapy was not indicated  She has been on adjuvant hormonal therapy with anastrozole since March 2017  She presented today for follow-up  She has osteoporosis  However, she does not wish to be on denosumab injection  The she does exercise daily  She take calcium and vitamin-D    she continued to do well  However, she has mild hot flashes and mild musculoskeletal symptoms  She denied bone pain  She has no respiratory symptoms  Her weight is stable  Her performance status is normal        Objective:      Primary Diagnosis:     Left breast cancer, stage IIA (pT2, pN0,M0) invasive lobular histology, grade 1, ER strongly positive, OH negative, HER-2 negative disease  MammaPrint low risk  Diagnosed in February 2017       Cancer Staging:  No matching staging information was found for the patient         Previous Hematologic/ Oncologic Treatment:            Current Hematologic/ Oncologic Treatment:       Adjuvant hormonal therapy with anastrozole one once a day since March 2017       Disease Status:      JENIFER status post mastectomy with sentinel lymph node biopsy      Test Results:     Pathology:     3 9 cm of invasive lobular carcinoma, grade 1  No evidence of lymphovascular invasion  One sentinel lymph node was negative for metastatic disease  ER 90-95% positive, OH negative, HER-2 negative disease  MammaPrint low risk  Stage IIA (pT2, pN0,M0)     Radiology:     Chest x-ray was negative for pulmonary disease  DEXA scan in April 2017 showed T score -2 5, consistent with osteoporosis  Mammography in May 2018 was benign  BI-RADS 2      Laboratory:     See below     Physical Exam:        General Appearance:    Alert, oriented          Eyes:    PERRL   Ears:    Normal external ear canals, both ears   Nose:   Nares normal, septum midline   Throat:   Mucosa moist  Pharynx without injection  Neck:   Supple         Lungs:     Clear to auscultation bilaterally   Chest Wall:    No tenderness or deformity    Heart:    Regular rate and rhythm         Abdomen:     Soft, non-tender, bowel sounds +, no organomegaly               Extremities:   Extremities no cyanosis or edema         Skin:   no rash or icterus  Lymph nodes:   Cervical, supraclavicular, and axillary nodes normal   Neurologic:   CNII-XII intact, normal strength, sensation and reflexes     Throughout             Breast exam:   status post left mastectomy with reconstruction  No palpable abnormality on reconstructed breast  Right breast exam is negative  ROS: Review of Systems   Constitutional:        Hot flashes   Musculoskeletal:        Musculoskeletal symptoms   All other systems reviewed and are negative  Imaging: No results found  Labs:   Lab Results   Component Value Date    WBC 6 22 01/19/2018    HGB 12 8 01/19/2018    HCT 39 6 01/19/2018    MCV 85 01/19/2018     01/19/2018     Lab Results   Component Value Date     01/19/2018    K 4 1 01/19/2018     01/19/2018    CO2 25 01/19/2018    ANIONGAP 7 01/19/2018    BUN 22 01/19/2018    CREATININE 0 72 01/19/2018    GLUCOSE 120 05/31/2017    GLUF 94 01/19/2018    CALCIUM 9 0 01/19/2018    AST 25 01/19/2018    ALT 26 01/19/2018    ALKPHOS 80 01/19/2018    PROT 7 4 01/19/2018    BILITOT 0 59 01/19/2018    EGFR 83 01/19/2018       Current Medications: Reviewed  Allergies: Reviewed  PMH/FH/SH:  Reviewed      Vital Sign:    Body surface area is 1 54 meters squared      Wt Readings from Last 3 Encounters:   08/21/18 59 kg (130 lb)   06/20/18 60 8 kg (134 lb)   02/21/18 63 5 kg (140 lb) Temp Readings from Last 3 Encounters:   08/21/18 98 1 °F (36 7 °C) (Tympanic)   06/20/18 (!) 97 4 °F (36 3 °C)   02/21/18 (!) 96 7 °F (35 9 °C) (Tympanic)        BP Readings from Last 3 Encounters:   08/21/18 118/68   06/20/18 112/62   02/21/18 130/80         Pulse Readings from Last 3 Encounters:   08/21/18 66   06/20/18 62   02/21/18 (!) 52     @LASTSAO2(3)@

## 2018-11-05 ENCOUNTER — APPOINTMENT (OUTPATIENT)
Dept: LAB | Facility: CLINIC | Age: 73
End: 2018-11-05
Payer: COMMERCIAL

## 2018-11-05 ENCOUNTER — TRANSCRIBE ORDERS (OUTPATIENT)
Dept: LAB | Facility: CLINIC | Age: 73
End: 2018-11-05

## 2018-11-05 DIAGNOSIS — G47.00 INSOMNIA, UNSPECIFIED TYPE: Primary | ICD-10-CM

## 2018-11-05 DIAGNOSIS — G47.00 INSOMNIA, UNSPECIFIED TYPE: ICD-10-CM

## 2018-11-05 LAB
ALBUMIN SERPL BCP-MCNC: 3.7 G/DL (ref 3.5–5)
ALP SERPL-CCNC: 82 U/L (ref 46–116)
ALT SERPL W P-5'-P-CCNC: 30 U/L (ref 12–78)
ANION GAP SERPL CALCULATED.3IONS-SCNC: 4 MMOL/L (ref 4–13)
AST SERPL W P-5'-P-CCNC: 28 U/L (ref 5–45)
BASOPHILS # BLD AUTO: 0.03 THOUSANDS/ΜL (ref 0–0.1)
BASOPHILS NFR BLD AUTO: 1 % (ref 0–1)
BILIRUB SERPL-MCNC: 0.7 MG/DL (ref 0.2–1)
BUN SERPL-MCNC: 22 MG/DL (ref 5–25)
CALCIUM SERPL-MCNC: 9.3 MG/DL (ref 8.3–10.1)
CHLORIDE SERPL-SCNC: 105 MMOL/L (ref 100–108)
CHOLEST SERPL-MCNC: 182 MG/DL (ref 50–200)
CO2 SERPL-SCNC: 27 MMOL/L (ref 21–32)
CREAT SERPL-MCNC: 0.83 MG/DL (ref 0.6–1.3)
EOSINOPHIL # BLD AUTO: 0.07 THOUSAND/ΜL (ref 0–0.61)
EOSINOPHIL NFR BLD AUTO: 1 % (ref 0–6)
ERYTHROCYTE [DISTWIDTH] IN BLOOD BY AUTOMATED COUNT: 13.2 % (ref 11.6–15.1)
GFR SERPL CREATININE-BSD FRML MDRD: 69 ML/MIN/1.73SQ M
GLUCOSE P FAST SERPL-MCNC: 87 MG/DL (ref 65–99)
HCT VFR BLD AUTO: 40.3 % (ref 34.8–46.1)
HDLC SERPL-MCNC: 52 MG/DL (ref 40–60)
HGB BLD-MCNC: 12.8 G/DL (ref 11.5–15.4)
IMM GRANULOCYTES # BLD AUTO: 0.01 THOUSAND/UL (ref 0–0.2)
IMM GRANULOCYTES NFR BLD AUTO: 0 % (ref 0–2)
LDLC SERPL CALC-MCNC: 110 MG/DL (ref 0–100)
LYMPHOCYTES # BLD AUTO: 1.04 THOUSANDS/ΜL (ref 0.6–4.47)
LYMPHOCYTES NFR BLD AUTO: 17 % (ref 14–44)
MAGNESIUM SERPL-MCNC: 2 MG/DL (ref 1.6–2.6)
MCH RBC QN AUTO: 27.8 PG (ref 26.8–34.3)
MCHC RBC AUTO-ENTMCNC: 31.8 G/DL (ref 31.4–37.4)
MCV RBC AUTO: 88 FL (ref 82–98)
MONOCYTES # BLD AUTO: 0.52 THOUSAND/ΜL (ref 0.17–1.22)
MONOCYTES NFR BLD AUTO: 9 % (ref 4–12)
NEUTROPHILS # BLD AUTO: 4.46 THOUSANDS/ΜL (ref 1.85–7.62)
NEUTS SEG NFR BLD AUTO: 72 % (ref 43–75)
NONHDLC SERPL-MCNC: 130 MG/DL
NRBC BLD AUTO-RTO: 0 /100 WBCS
PLATELET # BLD AUTO: 165 THOUSANDS/UL (ref 149–390)
PMV BLD AUTO: 11.3 FL (ref 8.9–12.7)
POTASSIUM SERPL-SCNC: 4.3 MMOL/L (ref 3.5–5.3)
PROT SERPL-MCNC: 7.1 G/DL (ref 6.4–8.2)
RBC # BLD AUTO: 4.6 MILLION/UL (ref 3.81–5.12)
SODIUM SERPL-SCNC: 136 MMOL/L (ref 136–145)
TRIGL SERPL-MCNC: 100 MG/DL
TSH SERPL DL<=0.05 MIU/L-ACNC: 0.77 UIU/ML (ref 0.36–3.74)
WBC # BLD AUTO: 6.13 THOUSAND/UL (ref 4.31–10.16)

## 2018-11-05 PROCEDURE — 36415 COLL VENOUS BLD VENIPUNCTURE: CPT

## 2018-11-05 PROCEDURE — 83735 ASSAY OF MAGNESIUM: CPT

## 2018-11-05 PROCEDURE — 86618 LYME DISEASE ANTIBODY: CPT

## 2018-11-05 PROCEDURE — 80061 LIPID PANEL: CPT

## 2018-11-05 PROCEDURE — 85025 COMPLETE CBC W/AUTO DIFF WBC: CPT

## 2018-11-05 PROCEDURE — 80053 COMPREHEN METABOLIC PANEL: CPT

## 2018-11-05 PROCEDURE — 84443 ASSAY THYROID STIM HORMONE: CPT

## 2018-11-06 LAB
B BURGDOR IGG SER IA-ACNC: 0.43
B BURGDOR IGM SER IA-ACNC: 0.47

## 2018-12-18 ENCOUNTER — OFFICE VISIT (OUTPATIENT)
Dept: SURGICAL ONCOLOGY | Facility: CLINIC | Age: 73
End: 2018-12-18
Payer: COMMERCIAL

## 2018-12-18 VITALS
WEIGHT: 133 LBS | HEART RATE: 60 BPM | BODY MASS INDEX: 26.81 KG/M2 | SYSTOLIC BLOOD PRESSURE: 116 MMHG | HEIGHT: 59 IN | DIASTOLIC BLOOD PRESSURE: 80 MMHG | RESPIRATION RATE: 16 BRPM | TEMPERATURE: 97.8 F

## 2018-12-18 DIAGNOSIS — C50.212 MALIGNANT NEOPLASM OF UPPER-INNER QUADRANT OF LEFT BREAST IN FEMALE, ESTROGEN RECEPTOR POSITIVE (HCC): Primary | ICD-10-CM

## 2018-12-18 DIAGNOSIS — Z17.0 MALIGNANT NEOPLASM OF UPPER-INNER QUADRANT OF LEFT BREAST IN FEMALE, ESTROGEN RECEPTOR POSITIVE (HCC): Primary | ICD-10-CM

## 2018-12-18 PROCEDURE — 99213 OFFICE O/P EST LOW 20 MIN: CPT | Performed by: NURSE PRACTITIONER

## 2018-12-18 NOTE — PROGRESS NOTES
Surgical Oncology Follow Up       [address]  [facility]  [facility] [address]  1945  [id number]  [address] [facility]  [facility] [id number]    Chief Complaint   Patient presents with    Follow-up     6 month follow up    Breast Cancer       Assessment/Plan:  1  Malignant neoplasm of upper-inner quadrant of left breast in female, estrogen receptor positive ([address] )    - Mammo diagnostic right w 3d & cad; Future  - 6 mo f/u visit    Discussion/Summary:  Patient is a 26-year-old female that presents today for a six-month follow-up visit for left breast cancer diagnosed in 2017  Her pathology revealed invasive lobular carcinoma, ER 90%, MD negative, her 2-  She underwent a left mastectomy and sentinel node biopsy by Dr Fili Ferreira and immediate reconstruction by Dr Maria R Gee   Her tumor was low risk on Mammaprint  She is taking anastrozole  She does report an increase in her arthralgias since taking this  She had a right mammogram and u/s performed on May 29, 2018 which was BI-RADS 2  There was an asymmetry noted on the MLO view however there was no correlate noted on ultrasound  She has no new complaints today  Denies headaches, back pain, bone pain, cough, shortness breath, abdominal pain  She notices no changes on self-exam   There are no worrisome findings on today's exam   I will order a right 3D diagnostic mammogram for May 2019 and we will see the patient back in 6 months or sooner if the need arises  She was instructed to call with any new concerns or symptoms  All of her questions were answered      History of Present Illness:        Malignant neoplasm of upper-inner 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 breast ([facility] )    2/9/2017 Surgery     Left mastectomy with SLN biopsy  Invasive lobular carcinoma  Grade 1  3 9 cm  0/1 lymph nodes  ER 90  MD <1  Her 2 negative  Stage IIA    Immediate reconstruction with Dr Mikayla Dillon         3/3/2017 Genomic Testing     Mammaprint low risk         3/2017 -  Hormone Therapy     Anastrozole 1 mg daily  Dr Valarie Alvarenga             -Interval History:  Patient presents today for six-month follow-up visit for left breast cancer diagnosed in 2017  She has no new complaints today  Review of Systems:  Review of Systems   Constitutional: Negative for activity change, appetite change, chills, fatigue, fever and unexpected weight change  HENT: Negative for trouble swallowing  Eyes: Negative for pain, redness and visual disturbance  Respiratory: Negative for cough, shortness of breath and wheezing  Cardiovascular: Negative for chest pain, palpitations and leg swelling  Gastrointestinal: Negative for abdominal pain, constipation, diarrhea, nausea and vomiting  Endocrine: Negative for cold intolerance and heat intolerance  Musculoskeletal: Positive for arthralgias  Negative for back pain, gait problem and myalgias  Skin: Negative for color change and rash  Neurological: Negative for dizziness, syncope, light-headedness, numbness and headaches  Hematological: Negative for adenopathy  Psychiatric/Behavioral: Negative for agitation and confusion  All other systems reviewed and are negative        Patient Active Problem List   Diagnosis    Malignant neoplasm of upper-inner 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 breast ([facility] [address] )    Hypertension    Hypertrophic cardiomyopathy ([facility] )    Hypothyroidism    Ischemic cardiomyopathy    Osteopenia    Plantar fasciitis    Acquired trigger finger    Carpal tunnel syndrome    Locking finger joint    Numbness of hand    Use of anastrozole (Arimidex)     Past Medical History:   Diagnosis Date    Hypertrophic cardiomyopathy ([facility] [address] )     Hypothyroidism     Ischemic cardiomyopathy     Malignant neoplasm of left female breast ([facility] [address] )     stage IIA    Osteopenia     Trigger finger of all digits of right hand      Past Surgical History:   Procedure Laterality Date    BREAST BIOPSY Right     BREAST RECONSTRUCTION Left 2/9/2017    Procedure: BREAST RECONSTRUCTION WITH TISSUE EXPANDERS AND ALLODERM ;  Surgeon: Rahat Jerez MD;  [facility];  Service:     COLONOSCOPY  2009    KNEE ARTHROSCOPY Bilateral     CO BIOPSY/EXCISION, LYMPH NODE(S) Left 2/9/2017    Procedure: LYMPHOSCINTIGRAPHY; INTRAOPERATIVE LYMPHATIC MAPPING; SENTINEL LYMPH NODE BIOPSY (INJECT AT 0700 BY DR ALBA); Surgeon: Felisha Loomis MD;  [facility];  Service: Surgical Oncology    CO DELAY BREAST PROS AFTER BREAST SURG Left 5/18/2017    Procedure: BREAST EXPANDER/IMPLANT EXCHANGE; CAPSULECTOMY ;  Surgeon: Rahat Jerez MD;  [facility];  Service: Plastics    CO MASTECTOMY, SIMPLE, COMPLETE Left 2/9/2017    Procedure: BREAST MASTECTOMY; FROZEN SECTION ;  Surgeon: Felisha Loomis MD;  [facility];  Service: Surgical Oncology    REFRACTIVE SURGERY Bilateral     US GUIDANCE BREAST BIOPSY LEFT EACH ADDITIONAL Left 12/1/2016    US GUIDED BREAST BIOPSY LEFT COMPLETE Left 12/1/2016     Family History   Problem Relation Age of Onset    Heart disease Mother     Heart disease Father     Brain cancer Sister     Heart disease Brother     No Known Problems Brother      Social History     Social History    Marital status: /Civil Union     Spouse name: N/A    Number of children: N/A    Years of education: N/A     Occupational History    Not on file       Social History Main Topics    Smoking status: Never Smoker    Smokeless tobacco: Never Used    Alcohol use No    Drug use: No    Sexual activity: Not on file     Other Topics Concern    Not on file     Social History Narrative    No narrative on file       Current Outpatient Prescriptions:     anastrozole (ARIMIDEX) 1 mg tablet, Take 1 tablet (1 mg total) by mouth daily, Disp: 90 tablet, Rfl: 3    aspirin (ECOTRIN LOW STRENGTH) 81 mg EC tablet, Take 81 mg by mouth daily, Disp: , Rfl:     calcium citrate-vitamin D (CITRACAL+D) 315-200 MG-UNIT per tablet, Take 1 tablet by mouth 2 (two) times a day, Disp: , Rfl:     levothyroxine 50 mcg tablet, take 1 tablet by mouth once daily, Disp: 30 tablet, Rfl: 5    metoprolol tartrate (LOPRESSOR) 25 mg tablet, Take 25 mg by mouth 2 (two) times a day 1 pill in am/ half pill in pm, Disp: , Rfl:     Multiple Vitamin (MULTIVITAMIN) tablet, Take 1 tablet by mouth daily, Disp: , Rfl:     Omega-3 Fatty Acids (FISH OIL) 1,000 mg, Take 1,000 mg by mouth daily, Disp: , Rfl:   No Known Allergies  Vitals:    12/18/18 1128   BP: 116/80   Pulse: 60   Resp: 16   Temp: 97 8 °F (36 6 °C)       Physical Exam   Constitutional: She is oriented to person, place, and time  Vital signs are normal  She appears well-developed and well-nourished  No distress  HENT:   Head: Normocephalic and atraumatic  Neck: Normal range of motion  Cardiovascular: Normal rate, regular rhythm and normal heart sounds  Pulmonary/Chest: Effort normal and breath sounds normal    Left reconstructed breast without masses or skin nodules  Implant present  Right breast examined in the sitting and supine position  There are no masses, skin nodules, nipple changes or nipple discharge  There is no bilateral supraclavicular or axillary lymphadenopathy noted  Abdominal: Soft  Normal appearance  She exhibits no mass  There is no hepatosplenomegaly  There is no tenderness  Musculoskeletal: Normal range of motion  Lymphadenopathy:     She has no axillary adenopathy  Right: No supraclavicular adenopathy present  Left: No supraclavicular adenopathy present  Neurological: She is alert and oriented to person, place, and time  Skin: Skin is warm, dry and intact  No rash noted  She is not diaphoretic  Psychiatric: She has a normal mood and affect  Her speech is normal    Vitals reviewed        Advance Care Planning/Advance Directives:  Discussed disease status, cancer treatment plans and/or cancer treatment goals with the patient

## 2019-01-28 DIAGNOSIS — E03.9 HYPOTHYROIDISM, UNSPECIFIED TYPE: ICD-10-CM

## 2019-01-28 RX ORDER — LEVOTHYROXINE SODIUM 0.05 MG/1
TABLET ORAL
Qty: 30 TABLET | Refills: 5 | OUTPATIENT
Start: 2019-01-28

## 2019-02-04 ENCOUNTER — OFFICE VISIT (OUTPATIENT)
Dept: FAMILY MEDICINE CLINIC | Facility: CLINIC | Age: 74
End: 2019-02-04
Payer: COMMERCIAL

## 2019-02-04 VITALS
DIASTOLIC BLOOD PRESSURE: 62 MMHG | WEIGHT: 133.4 LBS | SYSTOLIC BLOOD PRESSURE: 112 MMHG | HEIGHT: 59 IN | TEMPERATURE: 97.1 F | HEART RATE: 72 BPM | BODY MASS INDEX: 26.89 KG/M2 | RESPIRATION RATE: 14 BRPM

## 2019-02-04 DIAGNOSIS — E03.9 HYPOTHYROIDISM, UNSPECIFIED TYPE: ICD-10-CM

## 2019-02-04 DIAGNOSIS — I10 ESSENTIAL HYPERTENSION: Primary | ICD-10-CM

## 2019-02-04 PROCEDURE — 3078F DIAST BP <80 MM HG: CPT | Performed by: FAMILY MEDICINE

## 2019-02-04 PROCEDURE — 3074F SYST BP LT 130 MM HG: CPT | Performed by: FAMILY MEDICINE

## 2019-02-04 PROCEDURE — 3725F SCREEN DEPRESSION PERFORMED: CPT | Performed by: FAMILY MEDICINE

## 2019-02-04 PROCEDURE — 1036F TOBACCO NON-USER: CPT | Performed by: FAMILY MEDICINE

## 2019-02-04 PROCEDURE — 1160F RVW MEDS BY RX/DR IN RCRD: CPT | Performed by: FAMILY MEDICINE

## 2019-02-04 PROCEDURE — 1101F PT FALLS ASSESS-DOCD LE1/YR: CPT | Performed by: FAMILY MEDICINE

## 2019-02-04 PROCEDURE — 99213 OFFICE O/P EST LOW 20 MIN: CPT | Performed by: FAMILY MEDICINE

## 2019-02-04 RX ORDER — LEVOTHYROXINE SODIUM 0.05 MG/1
50 TABLET ORAL DAILY
Qty: 90 TABLET | Refills: 3 | Status: SHIPPED | OUTPATIENT
Start: 2019-02-04 | End: 2020-01-23

## 2019-02-04 NOTE — PROGRESS NOTES
FAMILY PRACTICE OFFICE VISIT       NAME: Quyen Singh  AGE: 74 y o  SEX: female       : 1945        MRN: [id number]    DATE: 2019  TIME: 5:35 PM    Assessment and Plan     Problem List Items Addressed This Visit     Hypertension - Primary     Hypertension  Patient blood pressure is stable at this time she will continue with current dose of metoprolol         Hypothyroidism     Hypothyroidism  Patient's TSH is stable and she will continue with current dose of levothyroxine         Relevant Medications    levothyroxine 50 mcg tablet            There are no Patient Instructions on file for this visit  Chief Complaint     Chief Complaint   Patient presents with    Follow-up    Medication Refill       History of Present Illness     Patient denies any recent illness  Her  passed away a months ago  She is compliant with taking her medications  I reviewed her most recent blood test results from 2018  She still sees her cardiologist once a year  She also still sees her breast surgeon Dr Oly Leslie for monitoring of her history of breast cancer         Review of Systems   Review of Systems   Constitutional: Negative  HENT: Negative  Respiratory: Negative  Cardiovascular: Negative  Gastrointestinal: Negative  Genitourinary: Negative  Musculoskeletal: Negative  Neurological: Negative  Psychiatric/Behavioral: Negative          Active Problem List     Patient Active Problem List   Diagnosis    Malignant neoplasm of upper-inner 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 breast ([address] )    Hypertension    Hypertrophic cardiomyopathy ([address] )    Hypothyroidism    Ischemic cardiomyopathy    Osteopenia    Plantar fasciitis    Acquired trigger finger    Carpal tunnel syndrome    Locking finger joint    Numbness of hand    Use of anastrozole (Arimidex)       Past Medical History:  Past Medical History:   Diagnosis Date    Hypertrophic cardiomyopathy ([address] )     Hypothyroidism     Ischemic cardiomyopathy     Malignant neoplasm of left female breast ([facility] [address] )     stage IIA    Osteopenia     Trigger finger of all digits of right hand        Past Surgical History:  Past Surgical History:   Procedure Laterality Date    BREAST BIOPSY Right     BREAST RECONSTRUCTION Left 2/9/2017    Procedure: BREAST RECONSTRUCTION WITH TISSUE EXPANDERS AND ALLODERM ;  Surgeon: Noel Chang MD;  [facility];  Service:     COLONOSCOPY  2009    KNEE ARTHROSCOPY Bilateral     PA BIOPSY/EXCISION, LYMPH NODE(S) Left 2/9/2017    Procedure: LYMPHOSCINTIGRAPHY; INTRAOPERATIVE LYMPHATIC MAPPING; SENTINEL LYMPH NODE BIOPSY (INJECT AT 0700 BY DR ALBA); Surgeon: Israel Burris MD;  [facility];  Service: Surgical Oncology    PA DELAY BREAST PROS AFTER BREAST SURG Left 5/18/2017    Procedure: BREAST EXPANDER/IMPLANT EXCHANGE; CAPSULECTOMY ;  Surgeon: Noel Chang MD;  [facility];  Service: Plastics    PA MASTECTOMY, SIMPLE, COMPLETE Left 2/9/2017    Procedure: BREAST MASTECTOMY; FROZEN SECTION ;  Surgeon: Israel Burris MD;  [facility];  Service: Surgical Oncology    REFRACTIVE SURGERY Bilateral     US GUIDANCE BREAST BIOPSY LEFT EACH ADDITIONAL Left 12/1/2016    US GUIDED BREAST BIOPSY LEFT COMPLETE Left 12/1/2016       Family History:  Family History   Problem Relation Age of Onset    Heart disease Mother     Heart disease Father     Brain cancer Sister     Heart disease Brother     No Known Problems Brother        Social History:  Social History     Social History    Marital status: /Civil Union     Spouse name: N/A    Number of children: N/A    Years of education: N/A     Occupational History    Not on file       Social History Main Topics    Smoking status: Never Smoker    Smokeless tobacco: Never Used    Alcohol use No    Drug use: No    Sexual activity: Not on file     Other Topics Concern    Not on file     Social History Narrative    No narrative on file Objective     Vitals:    02/04/19 1619   BP: 112/62   Pulse: 72   Resp: 14   Temp: (!) 97 1 °F (36 2 °C)     Wt Readings from Last 3 Encounters:   02/04/19 60 5 kg (133 lb 6 4 oz)   12/18/18 60 3 kg (133 lb)   08/21/18 59 kg (130 lb)       Physical Exam   Constitutional: She is oriented to person, place, and time  No distress  HENT:   Mouth/Throat: Oropharynx is clear and moist  No oropharyngeal exudate  Tympanic membranes within normal limits bilaterally   Neck:   No carotid bruit   Cardiovascular:   Regular rate and rhythm with no murmurs   Pulmonary/Chest:   Lungs are clear to auscultation without wheezes,rales, or rhonchi   Musculoskeletal: She exhibits no edema  Lymphadenopathy:     She has no cervical adenopathy  Neurological: She is alert and oriented to person, place, and time  Psychiatric: She has a normal mood and affect   Her behavior is normal  Judgment and thought content normal        Pertinent Laboratory/Diagnostic Studies:  Lab Results   Component Value Date    GLUCOSE 105 02/21/2015    BUN 22 11/05/2018    CREATININE 0 83 11/05/2018    CALCIUM 9 3 11/05/2018     02/21/2015    K 4 3 11/05/2018    CO2 27 11/05/2018     11/05/2018     Lab Results   Component Value Date    ALT 30 11/05/2018    AST 28 11/05/2018    ALKPHOS 82 11/05/2018    BILITOT 0 47 02/21/2015       Lab Results   Component Value Date    WBC 6 13 11/05/2018    HGB 12 8 11/05/2018    HCT 40 3 11/05/2018    MCV 88 11/05/2018     11/05/2018       No results found for: TSH    Lab Results   Component Value Date    CHOL 178 02/21/2015     Lab Results   Component Value Date    TRIG 100 11/05/2018     Lab Results   Component Value Date    HDL 52 11/05/2018     Lab Results   Component Value Date    LDLCALC 110 (H) 11/05/2018     Lab Results   Component Value Date    HGBA1C 5 8 01/19/2018       Results for orders placed or performed in visit on 11/05/18   CBC and differential   Result Value Ref Range    WBC 6 13 4 31 - 10 16 Thousand/uL    RBC 4 60 3 81 - 5 12 Million/uL    Hemoglobin 12 8 11 5 - 15 4 g/dL    Hematocrit 40 3 34 8 - 46 1 %    MCV 88 82 - 98 fL    MCH 27 8 26 8 - 34 3 pg    MCHC 31 8 31 4 - 37 4 g/dL    RDW 13 2 11 6 - 15 1 %    MPV 11 3 8 9 - 12 7 fL    Platelets 733 718 - 486 Thousands/uL    nRBC 0 /100 WBCs    Neutrophils Relative 72 43 - 75 %    Immat GRANS % 0 0 - 2 %    Lymphocytes Relative 17 14 - 44 %    Monocytes Relative 9 4 - 12 %    Eosinophils Relative 1 0 - 6 %    Basophils Relative 1 0 - 1 %    Neutrophils Absolute 4 46 1 85 - 7 62 Thousands/µL    Immature Grans Absolute 0 01 0 00 - 0 20 Thousand/uL    Lymphocytes Absolute 1 04 0 60 - 4 47 Thousands/µL    Monocytes Absolute 0 52 0 17 - 1 22 Thousand/µL    Eosinophils Absolute 0 07 0 00 - 0 61 Thousand/µL    Basophils Absolute 0 03 0 00 - 0 10 Thousands/µL   Comprehensive metabolic panel   Result Value Ref Range    Sodium 136 136 - 145 mmol/L    Potassium 4 3 3 5 - 5 3 mmol/L    Chloride 105 100 - 108 mmol/L    CO2 27 21 - 32 mmol/L    ANION GAP 4 4 - 13 mmol/L    BUN 22 5 - 25 mg/dL    Creatinine 0 83 0 60 - 1 30 mg/dL    Glucose, Fasting 87 65 - 99 mg/dL    Calcium 9 3 8 3 - 10 1 mg/dL    AST 28 5 - 45 U/L    ALT 30 12 - 78 U/L    Alkaline Phosphatase 82 46 - 116 U/L    Total Protein 7 1 6 4 - 8 2 g/dL    Albumin 3 7 3 5 - 5 0 g/dL    Total Bilirubin 0 70 0 20 - 1 00 mg/dL    eGFR 69 ml/min/1 73sq m   Lipid panel   Result Value Ref Range    Cholesterol 182 50 - 200 mg/dL    Triglycerides 100 <=150 mg/dL    HDL, Direct 52 40 - 60 mg/dL    LDL Calculated 110 (H) 0 - 100 mg/dL    Non-HDL-Chol (CHOL-HDL) 130 mg/dl   Magnesium   Result Value Ref Range    Magnesium 2 0 1 6 - 2 6 mg/dL   Lyme Antibody Profile with reflex to WB   Result Value Ref Range    LYME AB IGG 0 43 0 00 - 0 79    LYME AB IGM 0 47 0 00 - 0 79   TSH, 3rd generation   Result Value Ref Range    TSH 3RD GENERATON 0 774 0 358 - 3 740 uIU/mL       No orders of the defined types were placed in this encounter  ALLERGIES:  No Known Allergies    Current Medications     Current Outpatient Prescriptions   Medication Sig Dispense Refill    anastrozole (ARIMIDEX) 1 mg tablet Take 1 tablet (1 mg total) by mouth daily 90 tablet 3    aspirin (ECOTRIN LOW STRENGTH) 81 mg EC tablet Take 81 mg by mouth daily      calcium citrate-vitamin D (CITRACAL+D) 315-200 MG-UNIT per tablet Take 1 tablet by mouth 2 (two) times a day      levothyroxine 50 mcg tablet Take 1 tablet (50 mcg total) by mouth daily 90 tablet 3    metoprolol tartrate (LOPRESSOR) 25 mg tablet Take 25 mg by mouth 2 (two) times a day 1 pill in am/ half pill in pm      Multiple Vitamin (MULTIVITAMIN) tablet Take 1 tablet by mouth daily      Omega-3 Fatty Acids (FISH OIL) 1,000 mg Take 1,000 mg by mouth daily       No current facility-administered medications for this visit            Health Maintenance     Health Maintenance   Topic Date Due    Medicare Annual Wellness Visit (AWV)  1945    [facility] PLAN OF CARE  1945    CRC Screening: Colonoscopy  08/26/2019    Fall Risk  02/04/2020    Depression Screening PHQ  02/04/2020    Urinary Incontinence Screening  02/04/2020    DTaP,Tdap,and Td Vaccines (2 - Td) 02/22/2026    INFLUENZA VACCINE  Completed    Pneumococcal PPSV23/PCV13 65+ Years / High and Highest Risk  Completed     Immunization History   Administered Date(s) Administered    Influenza 09/03/2014, 09/28/2015, 10/09/2016, 09/13/2017, 11/08/2018    Influenza Split High Dose Preservative Free IM 10/09/2016    Influenza TIV (IM) 10/30/2003, 11/13/2006, 11/24/2008, 10/07/2011, 10/26/2012, 10/15/2013, 10/01/2014    Pneumococcal Conjugate 13-Valent 02/22/2016    Pneumococcal Polysaccharide PPV23 01/02/2018    Tdap 89/43/1452       Radha Bowden MD

## 2019-02-04 NOTE — ASSESSMENT & PLAN NOTE
Hypertension    Patient blood pressure is stable at this time she will continue with current dose of metoprolol

## 2019-05-14 ENCOUNTER — TRANSCRIBE ORDERS (OUTPATIENT)
Dept: LAB | Facility: CLINIC | Age: 74
End: 2019-05-14

## 2019-05-14 ENCOUNTER — APPOINTMENT (OUTPATIENT)
Dept: LAB | Facility: CLINIC | Age: 74
End: 2019-05-14
Payer: COMMERCIAL

## 2019-05-14 DIAGNOSIS — Z79.899 ENCOUNTER FOR LONG-TERM (CURRENT) USE OF MEDICATIONS: ICD-10-CM

## 2019-05-14 DIAGNOSIS — I42.9 CARDIOMYOPATHY, UNSPECIFIED TYPE (HCC): ICD-10-CM

## 2019-05-14 DIAGNOSIS — I42.9 CARDIOMYOPATHY, UNSPECIFIED TYPE (HCC): Primary | ICD-10-CM

## 2019-05-14 LAB
ANION GAP SERPL CALCULATED.3IONS-SCNC: 2 MMOL/L (ref 4–13)
BUN SERPL-MCNC: 22 MG/DL (ref 5–25)
CALCIUM SERPL-MCNC: 9.1 MG/DL (ref 8.3–10.1)
CHLORIDE SERPL-SCNC: 107 MMOL/L (ref 100–108)
CO2 SERPL-SCNC: 30 MMOL/L (ref 21–32)
CREAT SERPL-MCNC: 0.82 MG/DL (ref 0.6–1.3)
GFR SERPL CREATININE-BSD FRML MDRD: 70 ML/MIN/1.73SQ M
GLUCOSE SERPL-MCNC: 103 MG/DL (ref 65–140)
POTASSIUM SERPL-SCNC: 4.4 MMOL/L (ref 3.5–5.3)
SODIUM SERPL-SCNC: 139 MMOL/L (ref 136–145)

## 2019-05-14 PROCEDURE — 36415 COLL VENOUS BLD VENIPUNCTURE: CPT

## 2019-05-14 PROCEDURE — 80048 BASIC METABOLIC PNL TOTAL CA: CPT

## 2019-06-03 ENCOUNTER — HOSPITAL ENCOUNTER (OUTPATIENT)
Dept: MAMMOGRAPHY | Facility: CLINIC | Age: 74
Discharge: HOME | End: 2019-06-03
Payer: COMMERCIAL

## 2019-06-03 VITALS — HEIGHT: 59 IN | WEIGHT: 133 LBS | BODY MASS INDEX: 26.81 KG/M2

## 2019-06-03 DIAGNOSIS — C50.212 MALIGNANT NEOPLASM OF UPPER-INNER QUADRANT OF LEFT BREAST IN FEMALE, ESTROGEN RECEPTOR POSITIVE (HCC): ICD-10-CM

## 2019-06-03 DIAGNOSIS — Z17.0 MALIGNANT NEOPLASM OF UPPER-INNER QUADRANT OF LEFT BREAST IN FEMALE, ESTROGEN RECEPTOR POSITIVE (HCC): ICD-10-CM

## 2019-06-03 PROCEDURE — G0279 TOMOSYNTHESIS, MAMMO: HCPCS

## 2019-06-03 PROCEDURE — 77065 DX MAMMO INCL CAD UNI: CPT

## 2019-06-11 ENCOUNTER — OFFICE VISIT (OUTPATIENT)
Dept: SURGICAL ONCOLOGY | Facility: CLINIC | Age: 74
End: 2019-06-11
Payer: COMMERCIAL

## 2019-06-11 VITALS
BODY MASS INDEX: 26 KG/M2 | TEMPERATURE: 97.6 F | RESPIRATION RATE: 16 BRPM | HEIGHT: 59 IN | HEART RATE: 60 BPM | SYSTOLIC BLOOD PRESSURE: 120 MMHG | WEIGHT: 129 LBS | DIASTOLIC BLOOD PRESSURE: 70 MMHG

## 2019-06-11 DIAGNOSIS — Z79.811 USE OF ANASTROZOLE (ARIMIDEX): ICD-10-CM

## 2019-06-11 DIAGNOSIS — C50.212 MALIGNANT NEOPLASM OF UPPER-INNER QUADRANT OF LEFT BREAST IN FEMALE, ESTROGEN RECEPTOR POSITIVE (HCC): Primary | ICD-10-CM

## 2019-06-11 DIAGNOSIS — Z17.0 MALIGNANT NEOPLASM OF UPPER-INNER QUADRANT OF LEFT BREAST IN FEMALE, ESTROGEN RECEPTOR POSITIVE (HCC): Primary | ICD-10-CM

## 2019-06-11 PROCEDURE — 99214 OFFICE O/P EST MOD 30 MIN: CPT | Performed by: NURSE PRACTITIONER

## 2019-06-11 RX ORDER — CANDESARTAN 4 MG/1
TABLET ORAL
Refills: 0 | COMMUNITY
Start: 2019-05-20

## 2019-08-21 ENCOUNTER — OFFICE VISIT (OUTPATIENT)
Dept: HEMATOLOGY ONCOLOGY | Facility: CLINIC | Age: 74
End: 2019-08-21
Payer: COMMERCIAL

## 2019-08-21 VITALS
RESPIRATION RATE: 18 BRPM | BODY MASS INDEX: 27.21 KG/M2 | HEIGHT: 59 IN | HEART RATE: 55 BPM | WEIGHT: 135 LBS | OXYGEN SATURATION: 96 % | TEMPERATURE: 96.1 F | SYSTOLIC BLOOD PRESSURE: 112 MMHG | DIASTOLIC BLOOD PRESSURE: 58 MMHG

## 2019-08-21 DIAGNOSIS — Z17.0 MALIGNANT NEOPLASM OF BREAST IN FEMALE, ESTROGEN RECEPTOR POSITIVE, UNSPECIFIED LATERALITY, UNSPECIFIED SITE OF BREAST (HCC): ICD-10-CM

## 2019-08-21 DIAGNOSIS — C50.919 MALIGNANT NEOPLASM OF BREAST IN FEMALE, ESTROGEN RECEPTOR POSITIVE, UNSPECIFIED LATERALITY, UNSPECIFIED SITE OF BREAST (HCC): ICD-10-CM

## 2019-08-21 DIAGNOSIS — C50.212 MALIGNANT NEOPLASM OF UPPER-INNER QUADRANT OF LEFT BREAST IN FEMALE, ESTROGEN RECEPTOR POSITIVE (HCC): Primary | ICD-10-CM

## 2019-08-21 DIAGNOSIS — Z17.0 MALIGNANT NEOPLASM OF UPPER-INNER QUADRANT OF LEFT BREAST IN FEMALE, ESTROGEN RECEPTOR POSITIVE (HCC): Primary | ICD-10-CM

## 2019-08-21 PROCEDURE — 99214 OFFICE O/P EST MOD 30 MIN: CPT | Performed by: INTERNAL MEDICINE

## 2019-08-21 RX ORDER — METOPROLOL SUCCINATE 25 MG/1
25 TABLET, EXTENDED RELEASE ORAL EVERY MORNING
Refills: 0 | COMMUNITY
Start: 2019-06-12 | End: 2021-03-09 | Stop reason: HOSPADM

## 2019-08-21 RX ORDER — OFLOXACIN 3 MG/ML
SOLUTION/ DROPS OPHTHALMIC
Refills: 0 | COMMUNITY
Start: 2019-07-03 | End: 2022-05-24

## 2019-08-21 RX ORDER — ANASTROZOLE 1 MG/1
1 TABLET ORAL DAILY
Qty: 90 TABLET | Refills: 3 | Status: SHIPPED | OUTPATIENT
Start: 2019-08-21 | End: 2020-08-25 | Stop reason: SDUPTHER

## 2019-08-21 RX ORDER — ACETAZOLAMIDE 125 MG/1
TABLET ORAL
Refills: 0 | COMMUNITY
Start: 2019-07-03 | End: 2021-03-09 | Stop reason: HOSPADM

## 2019-08-21 RX ORDER — PREDNISONE 1 MG/1
TABLET ORAL
Refills: 0 | COMMUNITY
Start: 2019-07-31 | End: 2019-10-11 | Stop reason: ALTCHOICE

## 2019-08-21 RX ORDER — PREDNISOLONE ACETATE 10 MG/ML
SUSPENSION/ DROPS OPHTHALMIC
Refills: 0 | COMMUNITY
Start: 2019-08-14 | End: 2022-05-24

## 2019-08-21 RX ORDER — KETOROLAC TROMETHAMINE 5 MG/ML
SOLUTION OPHTHALMIC
Refills: 0 | COMMUNITY
Start: 2019-07-03 | End: 2022-05-24

## 2019-08-21 NOTE — PROGRESS NOTES
Hematology / Oncology Outpatient Follow Up Note    Luzmartshelby Noss 74 y o  female :1945 ZER:[id number]         Date:  2019    Assessment / Plan:  A 77 year old postmenopausal woman with stage IIA left breast cancer, invasive lobular histology, grade 1, ER strongly positive, IN negative, HER-2 negative disease  She underwent mastectomy with sentinel lymph node biopsy, resulting in JENIFER  Her tumor was found to be low risk, based on the MammaPrint  Since 2017, she has been on adjuvant hormonal therapy with anastrozole with minimal side effects  She has no evidence recurrent disease, based on her symptomatology and physical examinations  I recommended her to continue with anastrozole 1 mg once a day  She is in agreement with my recommendation  I will see her again in a year for routine follow-up         Subjective:      HPI: [de-identified] 66year old postmenopausal woman who has history of hypertrophic cardiomyopathy  She recently noticed a lump in the left breast  She did not have breast pain or abnormal discharge from the nipple  She brought this to medical attention  She was found to have radiographic abnormality in her left breast  Therefore, she underwent left breast biopsy in 2016, which showed invasive lobular carcinoma, grade 1  This was ER 90-95% positive, IN negative, HER-2 negative disease  She subsequently underwent left mastectomy with sentinel lymph node biopsy by Dr Janis Mason in 2017  Mastectomy specimen showed 3 9 cm of invasive lobular carcinoma, grade 1  There is no evidence of lymphovascular invasion  One sentinel lymph node was negative for metastatic disease  She had immediate reconstruction with tissue expander  She presented today with her daughter to discuss adjuvant treatment options  She feels well  She has no fever, chills or night sweats  She denied any pain, except minor skin tightness in the left reconstructed breast  She has no recent weight loss   Her performance status is normal  She has no family history of breast or ovarian cancer             Interval History:  A 68-year-old postmenopausal woman with stage IIA left breast cancer with invasive lobular histology, grade 1, ER strongly positive, CA negative, HER-2 negative disease  She underwent mastectomy with sentinel lymph node biopsy, resulting in JENIFER  Her tumor was found to be low risk, based on MammaPrint  Therefore, adjuvant chemotherapy was not indicated  She has been on adjuvant hormonal therapy with anastrozole since March 2017  She has osteoporosis  Luis Fletcher, she does not wish to be on denosumab injection   The she does exercise daily   She take calcium and vitamin-D  She presents today for routine follow-up  She has no new complaint  She denied hot flashes  She has very stable mild musculoskeletal symptoms  Her weight is stable  She is up to date for colonoscopy  Her recent mammography was negative  She denied respiratory symptoms such as cough, sputum production or shortness of breast   She is a lifetime never smoker  Her performance status is normal         Objective:      Primary Diagnosis:     Left breast cancer, stage IIA (pT2, pN0,M0) invasive lobular histology, grade 1, ER strongly positive, CA negative, HER-2 negative disease  MammaPrint low risk  Diagnosed in February 2017       Cancer Staging:  No matching staging information was found for the patient         Previous Hematologic/ Oncologic Treatment:            Current Hematologic/ Oncologic Treatment:       Adjuvant hormonal therapy with anastrozole one once a day since March 2017       Disease Status:      JENIFER status post mastectomy with sentinel lymph node biopsy      Test Results:     Pathology:     3 9 cm of invasive lobular carcinoma, grade 1  No evidence of lymphovascular invasion  One sentinel lymph node was negative for metastatic disease  ER 90-95% positive, CA negative, HER-2 negative disease  MammaPrint low risk  Stage IIA (pT2, pN0,M0)     Radiology:     DEXA scan in April 2017 showed T score -2 5, consistent with osteoporosis  Mammography in June 2019 was benign  BI-RADS 2      Laboratory:     See below     Physical Exam:        General Appearance:    Alert, oriented          Eyes:    PERRL   Ears:    Normal external ear canals, both ears   Nose:   Nares normal, septum midline   Throat:   Mucosa moist  Pharynx without injection  Neck:   Supple         Lungs:     Clear to auscultation bilaterally   Chest Wall:    No tenderness or deformity    Heart:    Regular rate and rhythm         Abdomen:     Soft, non-tender, bowel sounds +, no organomegaly               Extremities:   Extremities no cyanosis or edema         Skin:   no rash or icterus  Lymph nodes:   Cervical, supraclavicular, and axillary nodes normal   Neurologic:   CNII-XII intact, normal strength, sensation and reflexes     Throughout             Breast exam:   status post left mastectomy with reconstruction  No palpable abnormality on reconstructed breast  Right breast exam is negative  ROS: Review of Systems   Musculoskeletal:        Musculoskeletal symptoms   All other systems reviewed and are negative  Imaging: No results found  Labs:   Lab Results   Component Value Date    WBC 6 13 11/05/2018    HGB 12 8 11/05/2018    HCT 40 3 11/05/2018    MCV 88 11/05/2018     11/05/2018     Lab Results   Component Value Date     02/21/2015    K 4 4 05/14/2019     05/14/2019    CO2 30 05/14/2019    ANIONGAP 6 02/21/2015    BUN 22 05/14/2019    CREATININE 0 82 05/14/2019    GLUCOSE 105 02/21/2015    GLUF 87 11/05/2018    CALCIUM 9 1 05/14/2019    AST 28 11/05/2018    ALT 30 11/05/2018    ALKPHOS 82 11/05/2018    PROT 7 2 02/21/2015    BILITOT 0 47 02/21/2015    EGFR 70 05/14/2019         Current Medications: Reviewed  Allergies: Reviewed  PMH/FH/SH:  Reviewed      Vital Sign:    Body surface area is 1 56 meters squared      Wt Readings from Last 3 Encounters:   08/21/19 61 2 kg (135 lb)   06/11/19 58 5 kg (129 lb)   06/03/19 60 3 kg (133 lb)        Temp Readings from Last 3 Encounters:   08/21/19 (!) 96 1 °F (35 6 °C) (Tympanic Core)   06/11/19 97 6 °F (36 4 °C) (Oral)   02/04/19 (!) 97 1 °F (36 2 °C) (Tympanic)        BP Readings from Last 3 Encounters:   08/21/19 112/58   06/11/19 120/70   02/04/19 112/62         Pulse Readings from Last 3 Encounters:   08/21/19 55   06/11/19 60   02/04/19 72     @LASTSAO2(3)@

## 2019-10-11 ENCOUNTER — TRANSCRIBE ORDERS (OUTPATIENT)
Dept: RADIOLOGY | Facility: HOSPITAL | Age: 74
End: 2019-10-11

## 2019-10-11 ENCOUNTER — HOSPITAL ENCOUNTER (OUTPATIENT)
Dept: RADIOLOGY | Facility: HOSPITAL | Age: 74
Discharge: HOME | End: 2019-10-11
Payer: COMMERCIAL

## 2019-10-11 ENCOUNTER — OFFICE VISIT (OUTPATIENT)
Dept: FAMILY MEDICINE CLINIC | Facility: CLINIC | Age: 74
End: 2019-10-11
Payer: COMMERCIAL

## 2019-10-11 VITALS
OXYGEN SATURATION: 97 % | HEIGHT: 59 IN | HEART RATE: 72 BPM | TEMPERATURE: 97 F | WEIGHT: 136.2 LBS | RESPIRATION RATE: 18 BRPM | DIASTOLIC BLOOD PRESSURE: 80 MMHG | BODY MASS INDEX: 27.46 KG/M2 | SYSTOLIC BLOOD PRESSURE: 124 MMHG

## 2019-10-11 DIAGNOSIS — Z23 FLU VACCINE NEED: Primary | ICD-10-CM

## 2019-10-11 DIAGNOSIS — M25.561 ACUTE PAIN OF RIGHT KNEE: ICD-10-CM

## 2019-10-11 PROCEDURE — 99213 OFFICE O/P EST LOW 20 MIN: CPT | Performed by: FAMILY MEDICINE

## 2019-10-11 PROCEDURE — G0008 ADMIN INFLUENZA VIRUS VAC: HCPCS

## 2019-10-11 PROCEDURE — 73562 X-RAY EXAM OF KNEE 3: CPT

## 2019-10-11 PROCEDURE — 90662 IIV NO PRSV INCREASED AG IM: CPT

## 2019-10-11 RX ORDER — NAPROXEN 500 MG/1
500 TABLET ORAL 2 TIMES DAILY WITH MEALS
Qty: 30 TABLET | Refills: 0 | Status: SHIPPED | OUTPATIENT
Start: 2019-10-11 | End: 2021-03-09 | Stop reason: HOSPADM

## 2019-10-11 NOTE — ASSESSMENT & PLAN NOTE
Knee pain  Patient was advised to discontinue ibuprofen and use naproxen 500 mg p o  B i d  With food  She will continue with ice and/or heat treatments to the affected area  She was given a prescription for x-ray of right knee for further evaluation  If x-ray shows arthritic changes we may consider cortisone injection as further treatment

## 2019-10-11 NOTE — PROGRESS NOTES
FAMILY PRACTICE OFFICE VISIT       NAME: Phong Blackman  AGE: 66 y o  SEX: female       : 1945        MRN: [id number]    DATE: 10/11/2019  TIME: 12:01 PM    Assessment and Plan     Problem List Items Addressed This Visit        Other    Acute pain of right knee - Primary     Knee pain  Patient was advised to discontinue ibuprofen and use naproxen 500 mg p o  B i d  With food  She will continue with ice and/or heat treatments to the affected area  She was given a prescription for x-ray of right knee for further evaluation  If x-ray shows arthritic changes we may consider cortisone injection as further treatment  Relevant Medications    naproxen (NAPROSYN) 500 mg tablet    Other Relevant Orders    XR knee 4+ vw right injury              Chief Complaint     Chief Complaint   Patient presents with    Knee Pain     right knee- No injury       History of Present Illness     Patient states she has had intermittent knee pains several years  She had arthroscopic knee surgery 40 years ago  Prior to most recent injury she had been able to go for walks in her neighborhood  Approximately 5 days ago patient was getting out of her shower when she planted her right leg on the floor and felt sudden onset of medial knee pain  Initially she had minimal swelling but had been using ice and heat compresses on her knee  She had been taking in 1 Advil 2 to 3 times a day  She also had been using an old elastic knee brace for support  Review of Systems   Review of Systems   Constitutional: Negative  Musculoskeletal: Positive for arthralgias and gait problem  Skin: Negative          Active Problem List     Patient Active Problem List   Diagnosis    Malignant neoplasm of upper-inner 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 breast ([address] )    Hypertension    Hypertrophic cardiomyopathy ([address] )    Hypothyroidism    Ischemic cardiomyopathy    Osteopenia    Plantar fasciitis    Acquired trigger finger    Carpal tunnel syndrome    Locking finger joint    Numbness of hand    Use of anastrozole (Arimidex)    Acute pain of right knee       Past Medical History:  Past Medical History:   Diagnosis Date    [facility]pert[facility] [address]     Hypothyroidism     Ischemic cardiomyopathy     Malignant neoplasm of left female breast ([facility] [address] ) 02/09/2017    stage IIA    Osteopenia     Trigger finger of all digits of right hand        Past Surgical History:  Past Surgical History:   Procedure Laterality Date    BREAST BIOPSY Right     BREAST RECONSTRUCTION Left 2/9/2017    Procedure: BREAST RECONSTRUCTION WITH TISSUE EXPANDERS AND ALLODERM ;  Surgeon: Freeman Vera MD;  [facility];  Service:     COLONOSCOPY  2009    KNEE ARTHROSCOPY Bilateral     MASTECTOMY Left 02/09/2017    CT BIOPSY/EXCISION, LYMPH NODE(S) Left 2/9/2017    Procedure: LYMPHOSCINTIGRAPHY; INTRAOPERATIVE LYMPHATIC MAPPING; SENTINEL LYMPH NODE BIOPSY (INJECT AT 0700 BY DR ALBA); Surgeon: Aria Shin MD;  [facility];  Service: Surgical Oncology    CT DELAY BREAST PROS AFTER BREAST SURG Left 5/18/2017    Procedure: BREAST EXPANDER/IMPLANT EXCHANGE; CAPSULECTOMY ;  Surgeon: Freeman Vera MD;  [facility];  Service: Plastics    CT MASTECTOMY, SIMPLE, COMPLETE Left 2/9/2017    Procedure: BREAST MASTECTOMY; FROZEN SECTION ;  Surgeon: Aria Shin MD;  [facility];  Service: Surgical Oncology    REFRACTIVE SURGERY Bilateral     US GUIDANCE BREAST BIOPSY LEFT EACH ADDITIONAL Left 12/1/2016    US GUIDED BREAST BIOPSY LEFT COMPLETE Left 12/1/2016       Family History:  Family History   Problem Relation Age of Onset    Heart disease Mother     Heart disease Father     Brain cancer Sister     Heart disease Brother     No Known Problems Brother     No Known Problems Daughter     No Known Problems Daughter        Social History:  Social History     Socioeconomic History    Marital status:       Spouse name: Not on file    Number of children: Not on file    Years of education: Not on file    Highest education level: Not on file   Occupational History    Not on file   Social Needs    Financial resource strain: Not on file    Food insecurity:     Worry: Not on file     Inability: Not on file    Transportation needs:     Medical: Not on file     Non-medical: Not on file   Tobacco Use    Smoking status: Never Smoker    Smokeless tobacco: Never Used   Substance and Sexual Activity    Alcohol use: No     Alcohol/week: 1 0 standard drinks     Types: 1 Glasses of wine per week    Drug use: No    Sexual activity: Not on file   Lifestyle    Physical activity:     Days per week: Not on file     Minutes per session: Not on file    Stress: Not on file   Relationships    Social connections:     Talks on phone: Not on file     Gets together: Not on file     Attends Yazidism service: Not on file     Active member of club or organization: Not on file     Attends meetings of clubs or organizations: Not on file     Relationship status: Not on file    Intimate partner violence:     Fear of current or ex partner: Not on file     Emotionally abused: Not on file     Physically abused: Not on file     Forced sexual activity: Not on file   Other Topics Concern    Not on file   Social History Narrative    Not on file       Objective     Vitals:    10/11/19 1102   BP: 124/80   Pulse: 72   Resp: 18   Temp: (!) 97 °F (36 1 °C)   SpO2: 97%     Wt Readings from Last 3 Encounters:   10/11/19 61 8 kg (136 lb 3 2 oz)   08/21/19 61 2 kg (135 lb)   06/11/19 58 5 kg (129 lb)       Physical Exam   Constitutional: No distress  Musculoskeletal: She exhibits tenderness  She exhibits no edema  Patient ambulating without assistive device  She has very minimal tenderness along medial joint space  Negative Lachman's or Ananda's testing  Muscle strength 5/5  Normal range of motion         Pertinent Laboratory/Diagnostic Studies:  Lab Results   Component Value Date    GLUCOSE 105 02/21/2015    BUN 22 05/14/2019    CREATININE 0 82 05/14/2019    CALCIUM 9 1 05/14/2019     02/21/2015    K 4 4 05/14/2019    CO2 30 05/14/2019     05/14/2019     Lab Results   Component Value Date    ALT 30 11/05/2018    AST 28 11/05/2018    ALKPHOS 82 11/05/2018    BILITOT 0 47 02/21/2015       Lab Results   Component Value Date    WBC 6 13 11/05/2018    HGB 12 8 11/05/2018    HCT 40 3 11/05/2018    MCV 88 11/05/2018     11/05/2018       No results found for: TSH    Lab Results   Component Value Date    CHOL 178 02/21/2015     Lab Results   Component Value Date    TRIG 100 11/05/2018     Lab Results   Component Value Date    HDL 52 11/05/2018     Lab Results   Component Value Date    LDLCALC 110 (H) 11/05/2018     Lab Results   Component Value Date    HGBA1C 5 8 01/19/2018       Results for orders placed or performed in visit on 62/67/94   Basic metabolic panel   Result Value Ref Range    Sodium 139 136 - 145 mmol/L    Potassium 4 4 3 5 - 5 3 mmol/L    Chloride 107 100 - 108 mmol/L    CO2 30 21 - 32 mmol/L    ANION GAP 2 (L) 4 - 13 mmol/L    BUN 22 5 - 25 mg/dL    Creatinine 0 82 0 60 - 1 30 mg/dL    Glucose 103 65 - 140 mg/dL    Calcium 9 1 8 3 - 10 1 mg/dL    eGFR 70 ml/min/1 73sq m       Orders Placed This Encounter   Procedures    XR knee 4+ vw right injury       ALLERGIES:  No Known Allergies    Current Medications     Current Outpatient Medications   Medication Sig Dispense Refill    acetaZOLAMIDE (DIAMOX) 125 mg tablet   0    anastrozole (ARIMIDEX) 1 mg tablet Take 1 tablet (1 mg total) by mouth daily 90 tablet 3    aspirin (ECOTRIN LOW STRENGTH) 81 mg EC tablet Take 81 mg by mouth daily      calcium citrate-vitamin D (CITRACAL+D) 315-200 MG-UNIT per tablet Take 1 tablet by mouth 2 (two) times a day      candesartan (ATACAND) 4 mg tablet   0    ketorolac (ACULAR) 0 5 % ophthalmic solution   0    levothyroxine 50 mcg tablet Take 1 tablet (50 mcg total) by mouth daily 90 tablet 3    metoprolol succinate (TOPROL-XL) 25 mg 24 hr tablet Take 25 mg by mouth every morning  0    metoprolol tartrate (LOPRESSOR) 25 mg tablet Take 25 mg by mouth 2 (two) times a day 1 pill in am/ half pill in pm      Multiple Vitamin (MULTIVITAMIN) tablet Take 1 tablet by mouth daily      ofloxacin (OCUFLOX) 0 3 % ophthalmic solution   0    Omega-3 Fatty Acids (FISH OIL) 1,000 mg Take 1,000 mg by mouth daily      prednisoLONE acetate (PRED FORTE) 1 % ophthalmic suspension   0    naproxen (NAPROSYN) 500 mg tablet Take 1 tablet (500 mg total) by mouth 2 (two) times a day with meals 30 tablet 0     No current facility-administered medications for this visit            Health Maintenance     Health Maintenance   Topic Date Due    Medicare Annual Wellness Visit (AWV)  1945    SLP PLAN OF CARE  1945    BMI: Followup Plan  09/27/1961    INFLUENZA VACCINE  07/01/2019    Fall Risk  02/04/2020    Depression Screening PHQ  02/04/2020    Urinary Incontinence Screening  02/04/2020    BMI: Adult  10/11/2020    DTaP,Tdap,and Td Vaccines (2 - Td) 02/22/2026    CRC Screening: Colonoscopy  09/09/2029    Pneumococcal Vaccine: 65+ Years  Completed    Pneumococcal Vaccine: Pediatrics (0 to 5 Years) and At-Risk Patients (6 to 59 Years)  Aged Out    HEPATITIS B VACCINES  Aged [employer] History   Administered Date(s) Administered    INFLUENZA 09/03/2014, 09/28/2015, 10/09/2016, 09/13/2017, 11/08/2018    Influenza Split High Dose Preservative Free IM 10/09/2016    Influenza TIV (IM) 10/30/2003, 11/13/2006, 11/24/2008, 10/07/2011, 10/26/2012, 10/15/2013, 10/01/2014    Pneumococcal Conjugate 13-Valent 02/22/2016    Pneumococcal Polysaccharide PPV23 01/02/2018    Tdap 67/72/8341       Dominique Gray MD

## 2019-10-17 ENCOUNTER — TELEPHONE (OUTPATIENT)
Dept: FAMILY MEDICINE CLINIC | Facility: CLINIC | Age: 74
End: 2019-10-17

## 2019-10-17 NOTE — TELEPHONE ENCOUNTER
----- Message from Dominique Gray MD sent at 51/06/4810  7:05 AM EDT -----  X-ray shows moderate amount of arthritis    Patient may consider cortisone injection in the office as we discussed

## 2020-01-23 DIAGNOSIS — E03.9 HYPOTHYROIDISM, UNSPECIFIED TYPE: ICD-10-CM

## 2020-01-23 RX ORDER — LEVOTHYROXINE SODIUM 0.05 MG/1
TABLET ORAL
Qty: 90 TABLET | Refills: 1 | Status: SHIPPED | OUTPATIENT
Start: 2020-01-23 | End: 2020-04-30

## 2020-02-19 ENCOUNTER — OFFICE VISIT (OUTPATIENT)
Dept: SURGICAL ONCOLOGY | Facility: CLINIC | Age: 75
End: 2020-02-19
Payer: COMMERCIAL

## 2020-02-19 VITALS
DIASTOLIC BLOOD PRESSURE: 60 MMHG | TEMPERATURE: 98.7 F | WEIGHT: 139 LBS | BODY MASS INDEX: 28.02 KG/M2 | HEIGHT: 59 IN | RESPIRATION RATE: 16 BRPM | HEART RATE: 56 BPM | SYSTOLIC BLOOD PRESSURE: 114 MMHG

## 2020-02-19 DIAGNOSIS — Z17.0 MALIGNANT NEOPLASM OF UPPER-INNER QUADRANT OF LEFT BREAST IN FEMALE, ESTROGEN RECEPTOR POSITIVE (HCC): Primary | ICD-10-CM

## 2020-02-19 DIAGNOSIS — Z79.811 USE OF ANASTROZOLE (ARIMIDEX): ICD-10-CM

## 2020-02-19 DIAGNOSIS — C50.212 MALIGNANT NEOPLASM OF UPPER-INNER QUADRANT OF LEFT BREAST IN FEMALE, ESTROGEN RECEPTOR POSITIVE (HCC): Primary | ICD-10-CM

## 2020-02-19 PROCEDURE — 3008F BODY MASS INDEX DOCD: CPT | Performed by: NURSE PRACTITIONER

## 2020-02-19 PROCEDURE — 99214 OFFICE O/P EST MOD 30 MIN: CPT | Performed by: NURSE PRACTITIONER

## 2020-02-19 PROCEDURE — 4040F PNEUMOC VAC/ADMIN/RCVD: CPT | Performed by: NURSE PRACTITIONER

## 2020-02-19 PROCEDURE — 3078F DIAST BP <80 MM HG: CPT | Performed by: NURSE PRACTITIONER

## 2020-02-19 PROCEDURE — 1036F TOBACCO NON-USER: CPT | Performed by: NURSE PRACTITIONER

## 2020-02-19 PROCEDURE — 1160F RVW MEDS BY RX/DR IN RCRD: CPT | Performed by: NURSE PRACTITIONER

## 2020-02-19 PROCEDURE — 3074F SYST BP LT 130 MM HG: CPT | Performed by: NURSE PRACTITIONER

## 2020-02-19 NOTE — PROGRESS NOTES
Surgical Oncology Follow Up       [address]  [facility]  [address] [address]    Jessica Powell  1945  [id number]  [address] [facility]  [facility] [place]  [address]  [facility] [address] [place] [address]    Chief Complaint   Patient presents with    Breast Cancer     follow up       Assessment/Plan:  1  Malignant neoplasm of upper-inner quadrant of left breast in female, estrogen receptor positive ([address] )  - Mammo diagnostic right w 3d & cad; Future    2  Use of anastrozole (Arimidex)  - Continue use per medical oncology      Discussion/Summary: Patient is a 73-year-old female that presents today for a six-month follow-up visit for left breast cancer diagnosed in 2017  Her pathology revealed invasive lobular carcinoma, ER 90%, MT negative, her 2-   She underwent a left mastectomy and sentinel node biopsy by Dr Ellyn Kocher and immediate reconstruction by Dr Douglas Maradiaga tumor was low risk on Mammaprint   She is taking anastrozole  She had a right 3d diagnostic mammogram on 6/3/19 which was BIRADS 2, category 3 density  She has no new complaints today and there are no concerns on today's exam   I will order a right screening mammogram for June and we will plan to see her back in 6 months or sooner if the need arises  She was instructed to call with any new concerns or symptoms prior to that time  All of her questions were answered today      History of Present Illness:        Malignant neoplasm of upper-inner 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 breast ([facility] [address] )    2/9/2017 Surgery     Left mastectomy with SLN biopsy  Invasive lobular carcinoma  Grade 1  3 9 cm  0/1 lymph nodes  ER 90  MT <1  Her 2 negative  Stage IIA    Immediate reconstruction with Dr Amanuel Barfield      3/3/2017 Genomic Testing     Mammaprint low risk      3/2017 -  Hormone Therapy     Anastrozole 1 mg daily  Dr Keith Neal          -Interval History:  Patient presents today for a six-month follow-up visit for left breast cancer diagnosed in 2017  She notices no changes on self-exam   Reports chronic arthralgias but denies headaches, back pain, bone pain, cough, shortness breath, abdominal pain  Review of Systems:  Review of Systems   Constitutional: Negative for activity change, appetite change, chills, fatigue, fever and unexpected weight change  HENT: Negative for trouble swallowing  Eyes: Negative for pain, redness and visual disturbance  Respiratory: Negative for cough, shortness of breath and wheezing  Cardiovascular: Negative for chest pain, palpitations and leg swelling  Gastrointestinal: Negative for abdominal pain, constipation, diarrhea, nausea and vomiting  Endocrine: Negative for cold intolerance and heat intolerance  Musculoskeletal: Positive for arthralgias  Negative for back pain, gait problem and myalgias  Skin: Negative for color change and rash  Neurological: Negative for dizziness, syncope, light-headedness, numbness and headaches  Hematological: Negative for adenopathy  Psychiatric/Behavioral: Negative for agitation and confusion  All other systems reviewed and are negative        Patient Active Problem List   Diagnosis    Malignant neoplasm of upper-inner 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 breast ([facility] [address] )    Hypertension    Hypertrophic cardiomyopathy ([facility] [address] )    Hypothyroidism    Ischemic cardiomyopathy    Osteopenia    Plantar fasciitis    Acquired trigger finger    Carpal tunnel syndrome    Locking finger joint    Numbness of hand    Use of anastrozole (Arimidex)    Acute pain of right knee     Past Medical History:   Diagnosis Date    Hypertrophic cardiomyopathy ([facility] [address] )     Hypothyroidism     Ischemic cardiomyopathy     [facility] 02/09/2017    stage IIA    Osteopenia     Trigger finger of all digits of right hand      Past Surgical History:   Procedure Laterality Date    BREAST BIOPSY Right     BREAST RECONSTRUCTION Left 2/9/2017    Procedure: BREAST RECONSTRUCTION WITH TISSUE EXPANDERS AND ALLODERM ;  Surgeon: Eun Howard MD;  [facility];  Service:     COLONOSCOPY  2009    KNEE ARTHROSCOPY Bilateral     MASTECTOMY Left 02/09/2017    MS BIOPSY/EXCISION, LYMPH NODE(S) Left 2/9/2017    Procedure: LYMPHOSCINTIGRAPHY; INTRAOPERATIVE LYMPHATIC MAPPING; SENTINEL LYMPH NODE BIOPSY (INJECT AT 0700 BY DR ALBA); Surgeon: Spencer Ivey MD;  [facility];  Service: Surgical Oncology    MS DELAY BREAST PROS AFTER BREAST SURG Left 5/18/2017    Procedure: BREAST EXPANDER/IMPLANT EXCHANGE; CAPSULECTOMY ;  Surgeon: Eun Howard MD;  [facility];  Service: Plastics    MS MASTECTOMY, SIMPLE, COMPLETE Left 2/9/2017    Procedure: BREAST MASTECTOMY; FROZEN SECTION ;  Surgeon: Spencer Ivey MD;  [facility];  Service: Surgical Oncology    REFRACTIVE SURGERY Bilateral     US GUIDANCE BREAST BIOPSY LEFT EACH ADDITIONAL Left 12/1/2016    US GUIDED BREAST BIOPSY LEFT COMPLETE Left 12/1/2016     Family History   Problem Relation Age of Onset    Heart disease Mother     Heart disease Father     Brain cancer Sister     Heart disease Brother     No Known Problems Brother     No Known Problems Daughter     No Known Problems Daughter      Social History     Socioeconomic History    Marital status:       Spouse name: Not on file    Number of children: Not on file    Years of education: Not on file    Highest education level: Not on file   Occupational History    Not on file   Social Needs    Financial resource strain: Not on file    Food insecurity:     Worry: Not on file     Inability: Not on file    Transportation needs:     Medical: Not on file     Non-medical: Not on file   Tobacco Use    Smoking status: Never Smoker    Smokeless tobacco: Never Used   Substance and Sexual Activity    Alcohol use: No     Alcohol/week: 1 0 standard drinks     Types: 1 Glasses of wine per week    Drug use: No    Sexual activity: Not on file   Lifestyle    Physical activity:     Days per week: Not on file     Minutes per session: Not on file    Stress: Not on file   Relationships    Social connections:     Talks on phone: Not on file     Gets together: Not on file     Attends Anabaptist service: Not on file     Active member of club or organization: Not on file     Attends meetings of clubs or organizations: Not on file     Relationship status: Not on file    Intimate partner violence:     Fear of current or ex partner: Not on file     Emotionally abused: Not on file     Physically abused: Not on file     Forced sexual activity: Not on file   Other Topics Concern    Not on file   Social History Narrative    Not on file       Current Outpatient Medications:     anastrozole (ARIMIDEX) 1 mg tablet, Take 1 tablet (1 mg total) by mouth daily, Disp: 90 tablet, Rfl: 3    calcium citrate-vitamin D (CITRACAL+D) 315-200 MG-UNIT per tablet, Take 1 tablet by mouth 2 (two) times a day, Disp: , Rfl:     candesartan (ATACAND) 4 mg tablet, , Disp: , Rfl: 0    levothyroxine 50 mcg tablet, TAKE 1 TABLET BY MOUTH DAILY, Disp: 90 tablet, Rfl: 1    metoprolol succinate (TOPROL-XL) 25 mg 24 hr tablet, Take 25 mg by mouth every morning, Disp: , Rfl: 0    metoprolol tartrate (LOPRESSOR) 25 mg tablet, Take 25 mg by mouth 2 (two) times a day 1 pill in am/ half pill in pm, Disp: , Rfl:     Multiple Vitamin (MULTIVITAMIN) tablet, Take 1 tablet by mouth daily, Disp: , Rfl:     acetaZOLAMIDE (DIAMOX) 125 mg tablet, , Disp: , Rfl: 0    aspirin (ECOTRIN LOW STRENGTH) 81 mg EC tablet, Take 81 mg by mouth daily, Disp: , Rfl:     ketorolac (ACULAR) 0 5 % ophthalmic solution, , Disp: , Rfl: 0    naproxen (NAPROSYN) 500 mg tablet, Take 1 tablet (500 mg total) by mouth 2 (two) times a day with meals, Disp: 30 tablet, Rfl: 0    ofloxacin (OCUFLOX) 0 3 % ophthalmic solution, , Disp: , Rfl: 0    Omega-3 Fatty Acids (FISH OIL) 1,000 mg, Take 1,000 mg by mouth daily, Disp: , Rfl:     prednisoLONE acetate (PRED FORTE) 1 % ophthalmic suspension, , Disp: , Rfl: 0  No Known Allergies  Vitals:    02/19/20 1326   BP: 114/60   Pulse: 56   Resp: 16   Temp: 98 7 °F (37 1 °C)       Physical Exam   Constitutional: She is oriented to person, place, and time  Vital signs are normal  She appears well-developed and well-nourished  No distress  HENT:   Head: Normocephalic and atraumatic  Neck: Normal range of motion  Cardiovascular: Normal rate, regular rhythm and normal heart sounds  Pulmonary/Chest: Effort normal and breath sounds normal    Left reconstructed breast without masses or skin nodules  Implant present  Right breast examined in the sitting and supine position  There are no masses, skin nodules, nipple changes or nipple discharge  There is no bilateral supraclavicular or axillary lymphadenopathy noted  Abdominal: Soft  Normal appearance  She exhibits no mass  There is no hepatosplenomegaly  There is no tenderness  Musculoskeletal: Normal range of motion  Lymphadenopathy:     She has no axillary adenopathy  Right: No supraclavicular adenopathy present  Left: No supraclavicular adenopathy present  Neurological: She is alert and oriented to person, place, and time  Skin: Skin is warm, dry and intact  No rash noted  She is not diaphoretic  Psychiatric: She has a normal mood and affect  Her speech is normal    Vitals reviewed  Advance Care Planning/Advance Directives:  Discussed disease status, cancer treatment plans and/or cancer treatment goals with the patient

## 2020-04-30 DIAGNOSIS — E03.9 HYPOTHYROIDISM, UNSPECIFIED TYPE: ICD-10-CM

## 2020-04-30 RX ORDER — LEVOTHYROXINE SODIUM 0.05 MG/1
TABLET ORAL
Qty: 90 TABLET | Refills: 1 | Status: SHIPPED | OUTPATIENT
Start: 2020-04-30 | End: 2020-07-22

## 2020-06-04 ENCOUNTER — HOSPITAL ENCOUNTER (OUTPATIENT)
Dept: MAMMOGRAPHY | Facility: CLINIC | Age: 75
Discharge: HOME | End: 2020-06-04
Payer: COMMERCIAL

## 2020-06-04 VITALS — BODY MASS INDEX: 28.02 KG/M2 | WEIGHT: 139 LBS | HEIGHT: 59 IN

## 2020-06-04 DIAGNOSIS — C50.212 MALIGNANT NEOPLASM OF UPPER-INNER QUADRANT OF LEFT BREAST IN FEMALE, ESTROGEN RECEPTOR POSITIVE (HCC): ICD-10-CM

## 2020-06-04 DIAGNOSIS — Z17.0 MALIGNANT NEOPLASM OF UPPER-INNER QUADRANT OF LEFT BREAST IN FEMALE, ESTROGEN RECEPTOR POSITIVE (HCC): ICD-10-CM

## 2020-06-04 PROCEDURE — 77065 DX MAMMO INCL CAD UNI: CPT

## 2020-06-04 PROCEDURE — G0279 TOMOSYNTHESIS, MAMMO: HCPCS

## 2020-07-22 DIAGNOSIS — E03.9 HYPOTHYROIDISM, UNSPECIFIED TYPE: ICD-10-CM

## 2020-07-22 RX ORDER — LEVOTHYROXINE SODIUM 0.05 MG/1
TABLET ORAL
Qty: 90 TABLET | Refills: 1 | Status: SHIPPED | OUTPATIENT
Start: 2020-07-22 | End: 2021-05-07

## 2020-08-19 ENCOUNTER — TRANSCRIBE ORDERS (OUTPATIENT)
Dept: LAB | Facility: CLINIC | Age: 75
End: 2020-08-19

## 2020-08-19 ENCOUNTER — APPOINTMENT (OUTPATIENT)
Dept: LAB | Facility: CLINIC | Age: 75
End: 2020-08-19
Payer: COMMERCIAL

## 2020-08-19 DIAGNOSIS — I42.9 CARDIOMYOPATHY, UNSPECIFIED TYPE (HCC): ICD-10-CM

## 2020-08-19 DIAGNOSIS — Z78.9 HEALTH MAINTENANCE ALTERATION: ICD-10-CM

## 2020-08-19 DIAGNOSIS — Z78.9 HEALTH MAINTENANCE ALTERATION: Primary | ICD-10-CM

## 2020-08-19 DIAGNOSIS — E03.9 HYPOTHYROIDISM, ADULT: ICD-10-CM

## 2020-08-19 LAB
ALBUMIN SERPL BCP-MCNC: 3.7 G/DL (ref 3.5–5)
ALP SERPL-CCNC: 91 U/L (ref 46–116)
ALT SERPL W P-5'-P-CCNC: 22 U/L (ref 12–78)
ANION GAP SERPL CALCULATED.3IONS-SCNC: 5 MMOL/L (ref 4–13)
AST SERPL W P-5'-P-CCNC: 25 U/L (ref 5–45)
BACTERIA UR QL AUTO: ABNORMAL /HPF
BASOPHILS # BLD AUTO: 0.05 THOUSANDS/ΜL (ref 0–0.1)
BASOPHILS NFR BLD AUTO: 1 % (ref 0–1)
BILIRUB SERPL-MCNC: 0.68 MG/DL (ref 0.2–1)
BILIRUB UR QL STRIP: NEGATIVE
BUN SERPL-MCNC: 23 MG/DL (ref 5–25)
CALCIUM SERPL-MCNC: 9.2 MG/DL (ref 8.3–10.1)
CHLORIDE SERPL-SCNC: 110 MMOL/L (ref 100–108)
CHOLEST SERPL-MCNC: 198 MG/DL (ref 50–200)
CLARITY UR: CLEAR
CO2 SERPL-SCNC: 26 MMOL/L (ref 21–32)
COLOR UR: YELLOW
CREAT SERPL-MCNC: 0.72 MG/DL (ref 0.6–1.3)
CREAT UR-MCNC: 93.3 MG/DL
EOSINOPHIL # BLD AUTO: 0.08 THOUSAND/ΜL (ref 0–0.61)
EOSINOPHIL NFR BLD AUTO: 2 % (ref 0–6)
ERYTHROCYTE [DISTWIDTH] IN BLOOD BY AUTOMATED COUNT: 13.5 % (ref 11.6–15.1)
GFR SERPL CREATININE-BSD FRML MDRD: 82 ML/MIN/1.73SQ M
GLUCOSE P FAST SERPL-MCNC: 107 MG/DL (ref 65–99)
GLUCOSE UR STRIP-MCNC: NEGATIVE MG/DL
HCT VFR BLD AUTO: 40.7 % (ref 34.8–46.1)
HDLC SERPL-MCNC: 52 MG/DL
HGB BLD-MCNC: 13 G/DL (ref 11.5–15.4)
HGB UR QL STRIP.AUTO: ABNORMAL
HYALINE CASTS #/AREA URNS LPF: ABNORMAL /LPF
IMM GRANULOCYTES # BLD AUTO: 0.01 THOUSAND/UL (ref 0–0.2)
IMM GRANULOCYTES NFR BLD AUTO: 0 % (ref 0–2)
KETONES UR STRIP-MCNC: NEGATIVE MG/DL
LDLC SERPL CALC-MCNC: 121 MG/DL (ref 0–100)
LEUKOCYTE ESTERASE UR QL STRIP: NEGATIVE
LYMPHOCYTES # BLD AUTO: 1.09 THOUSANDS/ΜL (ref 0.6–4.47)
LYMPHOCYTES NFR BLD AUTO: 22 % (ref 14–44)
MCH RBC QN AUTO: 28.5 PG (ref 26.8–34.3)
MCHC RBC AUTO-ENTMCNC: 31.9 G/DL (ref 31.4–37.4)
MCV RBC AUTO: 89 FL (ref 82–98)
MICROALBUMIN UR-MCNC: 20.5 MG/L (ref 0–20)
MICROALBUMIN/CREAT 24H UR: 22 MG/G CREATININE (ref 0–30)
MONOCYTES # BLD AUTO: 0.47 THOUSAND/ΜL (ref 0.17–1.22)
MONOCYTES NFR BLD AUTO: 9 % (ref 4–12)
NEUTROPHILS # BLD AUTO: 3.29 THOUSANDS/ΜL (ref 1.85–7.62)
NEUTS SEG NFR BLD AUTO: 66 % (ref 43–75)
NITRITE UR QL STRIP: NEGATIVE
NON-SQ EPI CELLS URNS QL MICRO: ABNORMAL /HPF
NONHDLC SERPL-MCNC: 146 MG/DL
NRBC BLD AUTO-RTO: 0 /100 WBCS
PH UR STRIP.AUTO: 6 [PH]
PLATELET # BLD AUTO: 169 THOUSANDS/UL (ref 149–390)
PMV BLD AUTO: 11.1 FL (ref 8.9–12.7)
POTASSIUM SERPL-SCNC: 4.4 MMOL/L (ref 3.5–5.3)
PROT SERPL-MCNC: 7.4 G/DL (ref 6.4–8.2)
PROT UR STRIP-MCNC: NEGATIVE MG/DL
RBC # BLD AUTO: 4.56 MILLION/UL (ref 3.81–5.12)
RBC #/AREA URNS AUTO: ABNORMAL /HPF
SODIUM SERPL-SCNC: 141 MMOL/L (ref 136–145)
SP GR UR STRIP.AUTO: 1.02 (ref 1–1.03)
T4 FREE SERPL-MCNC: 1.13 NG/DL (ref 0.76–1.46)
TRIGL SERPL-MCNC: 125 MG/DL
TSH SERPL DL<=0.05 MIU/L-ACNC: 1.16 UIU/ML (ref 0.36–3.74)
UROBILINOGEN UR QL STRIP.AUTO: 0.2 E.U./DL
WBC # BLD AUTO: 4.99 THOUSAND/UL (ref 4.31–10.16)
WBC #/AREA URNS AUTO: ABNORMAL /HPF

## 2020-08-19 PROCEDURE — 85025 COMPLETE CBC W/AUTO DIFF WBC: CPT

## 2020-08-19 PROCEDURE — 80053 COMPREHEN METABOLIC PANEL: CPT

## 2020-08-19 PROCEDURE — 84443 ASSAY THYROID STIM HORMONE: CPT

## 2020-08-19 PROCEDURE — 80061 LIPID PANEL: CPT

## 2020-08-19 PROCEDURE — 82043 UR ALBUMIN QUANTITATIVE: CPT

## 2020-08-19 PROCEDURE — 82570 ASSAY OF URINE CREATININE: CPT

## 2020-08-19 PROCEDURE — 84439 ASSAY OF FREE THYROXINE: CPT

## 2020-08-19 PROCEDURE — 81001 URINALYSIS AUTO W/SCOPE: CPT

## 2020-08-19 PROCEDURE — 36415 COLL VENOUS BLD VENIPUNCTURE: CPT

## 2020-08-24 ENCOUNTER — TELEPHONE (OUTPATIENT)
Dept: HEMATOLOGY ONCOLOGY | Facility: CLINIC | Age: 75
End: 2020-08-24

## 2020-08-24 NOTE — TELEPHONE ENCOUNTER
Called patient's mobile number due to her home phone number giving me the busy tone  Left a voice message stating that we have an opening tomorrow at 1:00pm asked if patient would like to come in sooner instead of her 1:40pm appointment time  Stated that if she could come in sooner to please give the office a call back

## 2020-08-25 ENCOUNTER — OFFICE VISIT (OUTPATIENT)
Dept: HEMATOLOGY ONCOLOGY | Facility: CLINIC | Age: 75
End: 2020-08-25
Payer: COMMERCIAL

## 2020-08-25 VITALS
HEIGHT: 60 IN | BODY MASS INDEX: 26.11 KG/M2 | SYSTOLIC BLOOD PRESSURE: 116 MMHG | DIASTOLIC BLOOD PRESSURE: 64 MMHG | HEART RATE: 56 BPM | TEMPERATURE: 98.3 F | WEIGHT: 133 LBS | OXYGEN SATURATION: 97 % | RESPIRATION RATE: 18 BRPM

## 2020-08-25 DIAGNOSIS — Z17.0 MALIGNANT NEOPLASM OF UPPER-INNER QUADRANT OF LEFT BREAST IN FEMALE, ESTROGEN RECEPTOR POSITIVE (HCC): Primary | ICD-10-CM

## 2020-08-25 DIAGNOSIS — Z17.0 MALIGNANT NEOPLASM OF BREAST IN FEMALE, ESTROGEN RECEPTOR POSITIVE, UNSPECIFIED LATERALITY, UNSPECIFIED SITE OF BREAST (HCC): ICD-10-CM

## 2020-08-25 DIAGNOSIS — C50.212 MALIGNANT NEOPLASM OF UPPER-INNER QUADRANT OF LEFT BREAST IN FEMALE, ESTROGEN RECEPTOR POSITIVE (HCC): Primary | ICD-10-CM

## 2020-08-25 DIAGNOSIS — C50.919 MALIGNANT NEOPLASM OF BREAST IN FEMALE, ESTROGEN RECEPTOR POSITIVE, UNSPECIFIED LATERALITY, UNSPECIFIED SITE OF BREAST (HCC): ICD-10-CM

## 2020-08-25 PROCEDURE — 3078F DIAST BP <80 MM HG: CPT | Performed by: INTERNAL MEDICINE

## 2020-08-25 PROCEDURE — 1036F TOBACCO NON-USER: CPT | Performed by: INTERNAL MEDICINE

## 2020-08-25 PROCEDURE — 4040F PNEUMOC VAC/ADMIN/RCVD: CPT | Performed by: INTERNAL MEDICINE

## 2020-08-25 PROCEDURE — 3074F SYST BP LT 130 MM HG: CPT | Performed by: INTERNAL MEDICINE

## 2020-08-25 PROCEDURE — 1160F RVW MEDS BY RX/DR IN RCRD: CPT | Performed by: INTERNAL MEDICINE

## 2020-08-25 PROCEDURE — 3008F BODY MASS INDEX DOCD: CPT | Performed by: INTERNAL MEDICINE

## 2020-08-25 PROCEDURE — 99214 OFFICE O/P EST MOD 30 MIN: CPT | Performed by: INTERNAL MEDICINE

## 2020-08-25 RX ORDER — ANASTROZOLE 1 MG/1
1 TABLET ORAL DAILY
Qty: 90 TABLET | Refills: 3 | Status: SHIPPED | OUTPATIENT
Start: 2020-08-25 | End: 2021-03-09 | Stop reason: HOSPADM

## 2020-08-25 NOTE — PROGRESS NOTES
Hematology / Oncology Outpatient Follow Up Note    Sid Alarcon 66 y o  female :1945 HIN:[id number]         Date:  2020    Assessment / Plan:  A 63 year old postmenopausal woman with stage IIA left breast cancer, invasive lobular histology, grade 1, ER strongly positive, WY negative, HER-2 negative disease  She underwent mastectomy with sentinel lymph node biopsy, resulting in JENIFER  Her tumor was found to be low risk, based on the MammaPrint  Since 2017, she has been on adjuvant hormonal therapy with anastrozole with minimal side effects  Clinically, she has no evidence recurrent disease  I recommended her to continue with anastrozole 1 mg once a day  She is in agreement with my recommendation  I will see her again in a year for routine follow-up         Subjective:      HPI: [de-identified] 66year old postmenopausal woman who has history of hypertrophic cardiomyopathy  She recently noticed a lump in the left breast  She did not have breast pain or abnormal discharge from the nipple  She brought this to medical attention  She was found to have radiographic abnormality in her left breast  Therefore, she underwent left breast biopsy in 2016, which showed invasive lobular carcinoma, grade 1  This was ER 90-95% positive, WY negative, HER-2 negative disease  She subsequently underwent left mastectomy with sentinel lymph node biopsy by Dr Jorge Alberto Whittaker in 2017  Mastectomy specimen showed 3 9 cm of invasive lobular carcinoma, grade 1  There is no evidence of lymphovascular invasion  One sentinel lymph node was negative for metastatic disease  She had immediate reconstruction with tissue expander  She presented today with her daughter to discuss adjuvant treatment options  She feels well  She has no fever, chills or night sweats  She denied any pain, except minor skin tightness in the left reconstructed breast  She has no recent weight loss   Her performance status is normal  She has no family history of breast or ovarian cancer             Interval History:  A 67-year-old postmenopausal woman with stage IIA left breast cancer with invasive lobular histology, grade 1, ER strongly positive, HI negative, HER-2 negative disease  She underwent mastectomy with sentinel lymph node biopsy, resulting in JENIFER  Her tumor was found to be low risk, based on MammaPrint  Therefore, adjuvant chemotherapy was not indicated  She has been on adjuvant hormonal therapy with anastrozole since March 2017  She has osteoporosis  Alena Fulton, she does not wish to be on denosumab injection  She presents today for routine follow-up  She continued to do well  She has very minimal hot flashes but no musculoskeletal symptoms  She denied any pain  She has no respiratory symptoms  Her weight is stable  She had mammography in June 2020 which was benign  Her performance status is normal         Objective:      Primary Diagnosis:     Left breast cancer, stage IIA (pT2, pN0,M0) invasive lobular histology, grade 1, ER strongly positive, HI negative, HER-2 negative disease  MammaPrint low risk  Diagnosed in February 2017       Cancer Staging:  No matching staging information was found for the patient         Previous Hematologic/ Oncologic Treatment:            Current Hematologic/ Oncologic Treatment:       Adjuvant hormonal therapy with anastrozole one once a day since March 2017       Disease Status:      JENIFER status post mastectomy with sentinel lymph node biopsy      Test Results:     Pathology:     3 9 cm of invasive lobular carcinoma, grade 1  No evidence of lymphovascular invasion  One sentinel lymph node was negative for metastatic disease  ER 90-95% positive, HI negative, HER-2 negative disease  MammaPrint low risk  Stage IIA (pT2, pN0,M0)     Radiology:     DEXA scan in April 2017 showed T score -2 5, consistent with osteoporosis     Mammography in June 2020  was benign   BI-RADS 2      Laboratory:     See below     Physical Exam:        General Appearance:    Alert, oriented          Eyes:    PERRL   Ears:    Normal external ear canals, both ears   Nose:   Nares normal, septum midline   Throat:   Mucosa moist  Pharynx without injection  Neck:   Supple         Lungs:     Clear to auscultation bilaterally   Chest Wall:    No tenderness or deformity    Heart:    Regular rate and rhythm         Abdomen:     Soft, non-tender, bowel sounds +, no organomegaly               Extremities:   Extremities no cyanosis or edema         Skin:   no rash or icterus  Lymph nodes:   Cervical, supraclavicular, and axillary nodes normal   Neurologic:   CNII-XII intact, normal strength, sensation and reflexes     Throughout             Breast exam:   status post left mastectomy with reconstruction  No palpable abnormality on reconstructed breast  Right breast exam is negative             ROS: Review of Systems   All other systems reviewed and are negative  Imaging: No results found  Labs:   Lab Results   Component Value Date    WBC 4 99 08/19/2020    HGB 13 0 08/19/2020    HCT 40 7 08/19/2020    MCV 89 08/19/2020     08/19/2020     Lab Results   Component Value Date     02/21/2015    K 4 4 08/19/2020     (H) 08/19/2020    CO2 26 08/19/2020    ANIONGAP 6 02/21/2015    BUN 23 08/19/2020    CREATININE 0 72 08/19/2020    GLUCOSE 105 02/21/2015    GLUF 107 (H) 08/19/2020    CALCIUM 9 2 08/19/2020    AST 25 08/19/2020    ALT 22 08/19/2020    ALKPHOS 91 08/19/2020    PROT 7 2 02/21/2015    BILITOT 0 47 02/21/2015    EGFR 82 08/19/2020         Current Medications: Reviewed  Allergies: Reviewed  PMH/FH/SH:  Reviewed      Vital Sign:    Body surface area is 1 57 meters squared      Wt Readings from Last 3 Encounters:   08/25/20 60 3 kg (133 lb)   06/04/20 63 kg (139 lb)   02/19/20 63 kg (139 lb)        Temp Readings from Last 3 Encounters:   08/25/20 98 3 °F (36 8 °C) (Tympanic Core)   02/19/20 98 7 °F (37 1 °C) (Tympanic) 10/11/19 (!) 97 °F (36 1 °C) (Tympanic)        BP Readings from Last 3 Encounters:   08/25/20 116/64   02/19/20 114/60   10/11/19 124/80         Pulse Readings from Last 3 Encounters:   08/25/20 56   02/19/20 56   10/11/19 72     @LASTSAO2(3)@

## 2020-09-04 ENCOUNTER — TELEPHONE (OUTPATIENT)
Dept: SURGICAL ONCOLOGY | Facility: CLINIC | Age: 75
End: 2020-09-04

## 2020-09-04 NOTE — TELEPHONE ENCOUNTER
Patient is not currently experiencing any symptoms of fever, cough, shortness of breath, chills, repeated shaking with chills, muscle pain, headache, sore throat, or new loss of taste/smell  Patient has not been tested for COVID  Patient has not been in contact with anyone that is confirmed COVID  Masking and visitor policies have been discussed with patient

## 2020-09-17 ENCOUNTER — OFFICE VISIT (OUTPATIENT)
Dept: SURGICAL ONCOLOGY | Facility: CLINIC | Age: 75
End: 2020-09-17
Payer: COMMERCIAL

## 2020-09-17 VITALS
DIASTOLIC BLOOD PRESSURE: 80 MMHG | RESPIRATION RATE: 18 BRPM | HEART RATE: 64 BPM | HEIGHT: 60 IN | TEMPERATURE: 97.2 F | WEIGHT: 135 LBS | BODY MASS INDEX: 26.5 KG/M2 | SYSTOLIC BLOOD PRESSURE: 116 MMHG

## 2020-09-17 DIAGNOSIS — Z17.0 MALIGNANT NEOPLASM OF UPPER-INNER QUADRANT OF LEFT BREAST IN FEMALE, ESTROGEN RECEPTOR POSITIVE (HCC): Primary | ICD-10-CM

## 2020-09-17 DIAGNOSIS — C50.212 MALIGNANT NEOPLASM OF UPPER-INNER QUADRANT OF LEFT BREAST IN FEMALE, ESTROGEN RECEPTOR POSITIVE (HCC): Primary | ICD-10-CM

## 2020-09-17 DIAGNOSIS — Z79.811 USE OF ANASTROZOLE (ARIMIDEX): ICD-10-CM

## 2020-09-17 PROCEDURE — 99214 OFFICE O/P EST MOD 30 MIN: CPT | Performed by: NURSE PRACTITIONER

## 2020-09-17 NOTE — PROGRESS NOTES
Surgical Oncology Follow Up       [address]  [facility]  [address] [place] [address]    Corrine Negrete  1945  [id number]  [address] [facility]  [facility]  [address] [place] [address]    Chief Complaint   Patient presents with    Breast Cancer     Pt is here for 6 month  f/u        Assessment/Plan:  1  Malignant neoplasm of upper-inner quadrant of left breast in female, estrogen receptor positive ([address] )  - 6 mo f/u visit    2  Use of anastrozole (Arimidex)  - Continue use per medical oncology    Discussion/Summary: Patient is a 69-year-old female that presents today for a six-month follow-up visit for left breast cancer diagnosed in 2017  Her pathology revealed invasive lobular carcinoma, ER 90%, ME negative, her 2-   She underwent a left mastectomy and sentinel node biopsy by Dr Ezequiel Moser and immediate reconstruction by Dr Cindy Lin tumor was low risk on Mammaprint   She is taking anastrozole  She had a right 3d diagnostic mammogram on June 4, 2020 which was BI-RADS 2, category 3 density  She has no new complaints today and there are no concerns on today's exam   I will plan to see her back in 6 months or sooner if the need arises  She was instructed to call with any new concerns or symptoms prior to that time  All of her questions were answered today        History of Present Illness:     Oncology History   Malignant neoplasm of upper-inner 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 breast ([facility] [address] )   2/9/2017 Surgery    Left mastectomy with SLN biopsy  Invasive lobular carcinoma  Grade 1  3 9 cm  0/1 lymph nodes  ER 90  ME <1  Her 2 negative  Stage IIA    Immediate reconstruction with Dr Kandice Rosenberg     3/3/2017 Genomic Testing    Mammaprint low risk     3/2017 -  Hormone Therapy    Anastrozole 1 mg daily  Dr Mat Loza          -Interval History:  Patient presents today for a follow-up visit for left breast cancer diagnosed in 2017  She notices no changes on her self exam   She denies headaches, back pain, bone pain, cough, shortness of breath, abdominal pain  She states that she is seeing a specialist for cardiac issues  Review of Systems:  Review of Systems   Constitutional: Negative for activity change, appetite change, chills, fatigue, fever and unexpected weight change  HENT: Negative for trouble swallowing  Eyes: Negative for pain, redness and visual disturbance  Respiratory: Negative for cough, shortness of breath and wheezing  Cardiovascular: Negative for chest pain, palpitations and leg swelling  Gastrointestinal: Negative for abdominal pain, constipation, diarrhea, nausea and vomiting  Endocrine: Negative for cold intolerance and heat intolerance  Musculoskeletal: Negative for arthralgias, back pain, gait problem and myalgias  Skin: Negative for color change and rash  Neurological: Negative for dizziness, syncope, light-headedness, numbness and headaches  Hematological: Negative for adenopathy  Psychiatric/Behavioral: Negative for agitation and confusion  All other systems reviewed and are negative        Patient Active Problem List   Diagnosis    Malignant neoplasm of upper-inner 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 breast ([facility] [address] )    Hypertension    Hypertrophic cardiomyopathy ([facility] [address] )    Hypothyroidism    Ischemic cardiomyopathy    Osteopenia    Plantar fasciitis    Acquired trigger finger    Carpal tunnel syndrome    Locking finger joint    Numbness of hand    Use of anastrozole (Arimidex)    Acute pain of right knee     Past Medical History:   Diagnosis Date    Hypertrophic cardiomyopathy ([facility] [address] )     Hypothyroidism     Ischemic cardiomyopathy     Malignant neoplasm of left female breast ([facility] [address] ) 02/09/2017    stage IIA    Osteopenia     Trigger finger of all digits of right hand      Past Surgical History:   Procedure Laterality Date    BREAST BIOPSY Right     years ago benign    BREAST RECONSTRUCTION Left 2/9/2017    Procedure: BREAST RECONSTRUCTION WITH TISSUE EXPANDERS AND ALLODERM ;  Surgeon: Noel Chang MD;  [facility];  Service:     COLONOSCOPY  2009    KNEE ARTHROSCOPY Bilateral     MASTECTOMY Left 02/09/2017    OK BIOPSY/EXCISION, LYMPH NODE(S) Left 2/9/2017    Procedure: LYMPHOSCINTIGRAPHY; INTRAOPERATIVE LYMPHATIC MAPPING; SENTINEL LYMPH NODE BIOPSY (INJECT AT 0700 BY DR ALBA); Surgeon: Israel Burris MD;  [facility];  Service: Surgical Oncology    OK DELAY BREAST PROS AFTER BREAST SURG Left 5/18/2017    Procedure: BREAST EXPANDER/IMPLANT EXCHANGE; CAPSULECTOMY ;  Surgeon: Noel Chang MD;  [facility];  Service: Plastics    OK MASTECTOMY, SIMPLE, COMPLETE Left 2/9/2017    Procedure: BREAST MASTECTOMY; FROZEN SECTION ;  Surgeon: Israel Burris MD;  [facility];  Service: Surgical Oncology    REFRACTIVE SURGERY Bilateral     US GUIDANCE BREAST BIOPSY LEFT EACH ADDITIONAL Left 12/1/2016    US GUIDED BREAST BIOPSY LEFT COMPLETE Left 12/1/2016     Family History   Problem Relation Age of Onset    Heart disease Mother     Heart disease Father     Brain cancer Sister     Heart disease Brother     No Known Problems Brother     No Known Problems Daughter     No Known Problems Daughter      Social History     Socioeconomic History    Marital status:       Spouse name: Not on file    Number of children: Not on file    Years of education: Not on file    Highest education level: Not on file   Occupational History    Not on file   Social Needs    Financial resource strain: Not on file    Food insecurity     Worry: Not on file     Inability: Not on file    Transportation needs     Medical: Not on file     Non-medical: Not on file   Tobacco Use    Smoking status: Never Smoker    Smokeless tobacco: Never Used   Substance and Sexual Activity    Alcohol use: No     Alcohol/week: 1 0 standard drinks     Types: 1 Glasses of wine per week  Drug use: No    Sexual activity: Not on file   Lifestyle    Physical activity     Days per week: Not on file     Minutes per session: Not on file    Stress: Not on file   Relationships    Social connections     Talks on phone: Not on file     Gets together: Not on file     Attends Pentecostalism service: Not on file     Active member of club or organization: Not on file     Attends meetings of clubs or organizations: Not on file     Relationship status: Not on file    Intimate partner violence     Fear of current or ex partner: Not on file     Emotionally abused: Not on file     Physically abused: Not on file     Forced sexual activity: Not on file   Other Topics Concern    Not on file   Social History Narrative    Not on file       Current Outpatient Medications:     anastrozole (ARIMIDEX) 1 mg tablet, Take 1 tablet (1 mg total) by mouth daily, Disp: 90 tablet, Rfl: 3    aspirin (ECOTRIN LOW STRENGTH) 81 mg EC tablet, Take 81 mg by mouth daily, Disp: , Rfl:     calcium citrate-vitamin D (CITRACAL+D) 315-200 MG-UNIT per tablet, Take 1 tablet by mouth 2 (two) times a day, Disp: , Rfl:     candesartan (ATACAND) 4 mg tablet, , Disp: , Rfl: 0    levothyroxine 50 mcg tablet, TAKE 1 TABLET BY MOUTH DAILY, Disp: 90 tablet, Rfl: 1    metoprolol succinate (TOPROL-XL) 25 mg 24 hr tablet, Take 25 mg by mouth every morning, Disp: , Rfl: 0    metoprolol tartrate (LOPRESSOR) 25 mg tablet, Take 25 mg by mouth 2 (two) times a day 1 pill in am/ half pill in pm, Disp: , Rfl:     Omega-3 Fatty Acids (FISH OIL) 1,000 mg, Take 1,000 mg by mouth daily, Disp: , Rfl:     acetaZOLAMIDE (DIAMOX) 125 mg tablet, , Disp: , Rfl: 0    ketorolac (ACULAR) 0 5 % ophthalmic solution, , Disp: , Rfl: 0    Multiple Vitamin (MULTIVITAMIN) tablet, Take 1 tablet by mouth daily, Disp: , Rfl:     naproxen (NAPROSYN) 500 mg tablet, Take 1 tablet (500 mg total) by mouth 2 (two) times a day with meals (Patient not taking: Reported on 9/17/2020), Disp: 30 tablet, Rfl: 0    ofloxacin (OCUFLOX) 0 3 % ophthalmic solution, , Disp: , Rfl: 0    prednisoLONE acetate (PRED FORTE) 1 % ophthalmic suspension, , Disp: , Rfl: 0  No Known Allergies  Vitals:    09/17/20 1334   BP: 116/80   Pulse: 64   Resp: 18   Temp: (!) 97 2 °F (36 2 °C)       Physical Exam  Vitals signs reviewed  Constitutional:       General: She is not in acute distress  Appearance: Normal appearance  She is well-developed  She is not diaphoretic  HENT:      Head: Normocephalic and atraumatic  Neck:      Musculoskeletal: Normal range of motion  Cardiovascular:      Rate and Rhythm: Normal rate and regular rhythm  Heart sounds: Normal heart sounds  Pulmonary:      Effort: Pulmonary effort is normal       Breath sounds: Normal breath sounds  Chest:      Breasts:         Right: No swelling, bleeding, inverted nipple, mass, nipple discharge, skin change or tenderness  Comments: Left reconstructed breast with implant present  There are no masses, skin nodules, skin changes  Abdominal:      Palpations: Abdomen is soft  There is no mass  Tenderness: There is no abdominal tenderness  Musculoskeletal: Normal range of motion  Lymphadenopathy:      Upper Body:      Right upper body: No supraclavicular or axillary adenopathy  Left upper body: No supraclavicular or axillary adenopathy  Skin:     General: Skin is warm and dry  Findings: No rash  Neurological:      Mental Status: She is alert and oriented to person, place, and time  Psychiatric:         Speech: Speech normal          Advance Care Planning/Advance Directives:  Discussed disease status, cancer treatment plans and/or cancer treatment goals with the patient

## 2020-09-29 ENCOUNTER — OFFICE VISIT (OUTPATIENT)
Dept: FAMILY MEDICINE CLINIC | Facility: CLINIC | Age: 75
End: 2020-09-29
Payer: COMMERCIAL

## 2020-09-29 VITALS
BODY MASS INDEX: 26.42 KG/M2 | HEART RATE: 73 BPM | TEMPERATURE: 97.1 F | RESPIRATION RATE: 15 BRPM | OXYGEN SATURATION: 97 % | DIASTOLIC BLOOD PRESSURE: 60 MMHG | WEIGHT: 134.6 LBS | SYSTOLIC BLOOD PRESSURE: 110 MMHG | HEIGHT: 60 IN

## 2020-09-29 DIAGNOSIS — I10 ESSENTIAL HYPERTENSION: ICD-10-CM

## 2020-09-29 DIAGNOSIS — E03.9 HYPOTHYROIDISM, UNSPECIFIED TYPE: ICD-10-CM

## 2020-09-29 DIAGNOSIS — I42.2 HYPERTROPHIC CARDIOMYOPATHY (HCC): ICD-10-CM

## 2020-09-29 DIAGNOSIS — Z23 NEED FOR INFLUENZA VACCINATION: ICD-10-CM

## 2020-09-29 DIAGNOSIS — Z00.00 PERIODIC HEALTH ASSESSMENT, GENERAL SCREENING, ADULT: Primary | ICD-10-CM

## 2020-09-29 LAB
SL AMB  POCT GLUCOSE, UA: NORMAL
SL AMB LEUKOCYTE ESTERASE,UA: NORMAL
SL AMB POCT BILIRUBIN,UA: NORMAL
SL AMB POCT BLOOD,UA: NORMAL
SL AMB POCT CLARITY,UA: CLEAR
SL AMB POCT COLOR,UA: YELLOW
SL AMB POCT KETONES,UA: NORMAL
SL AMB POCT NITRITE,UA: NORMAL
SL AMB POCT PH,UA: 6
SL AMB POCT SPECIFIC GRAVITY,UA: 1.02
SL AMB POCT URINE PROTEIN: NORMAL
SL AMB POCT UROBILINOGEN: 0.2

## 2020-09-29 PROCEDURE — 1170F FXNL STATUS ASSESSED: CPT | Performed by: FAMILY MEDICINE

## 2020-09-29 PROCEDURE — 1125F AMNT PAIN NOTED PAIN PRSNT: CPT | Performed by: FAMILY MEDICINE

## 2020-09-29 PROCEDURE — 99213 OFFICE O/P EST LOW 20 MIN: CPT | Performed by: FAMILY MEDICINE

## 2020-09-29 PROCEDURE — G0008 ADMIN INFLUENZA VIRUS VAC: HCPCS

## 2020-09-29 PROCEDURE — 81002 URINALYSIS NONAUTO W/O SCOPE: CPT | Performed by: FAMILY MEDICINE

## 2020-09-29 PROCEDURE — 3725F SCREEN DEPRESSION PERFORMED: CPT | Performed by: FAMILY MEDICINE

## 2020-09-29 PROCEDURE — G0439 PPPS, SUBSEQ VISIT: HCPCS | Performed by: FAMILY MEDICINE

## 2020-09-29 PROCEDURE — 3078F DIAST BP <80 MM HG: CPT | Performed by: FAMILY MEDICINE

## 2020-09-29 PROCEDURE — 3074F SYST BP LT 130 MM HG: CPT | Performed by: FAMILY MEDICINE

## 2020-09-29 PROCEDURE — 90662 IIV NO PRSV INCREASED AG IM: CPT

## 2020-09-29 NOTE — ASSESSMENT & PLAN NOTE
Cardiomyopathy  Patient with history of cardiomyopathy    Currently she is asymptomatic and will continue follow-up for monitoring with her cardiologist

## 2020-09-29 NOTE — PROGRESS NOTES
Assessment and Plan:     Problem List Items Addressed This Visit     None           Preventive health issues were discussed with patient, and age appropriate screening tests were ordered as noted in patient's After Visit Summary  Personalized health advice and appropriate referrals for health education or preventive services given if needed, as noted in patient's After Visit Summary  History of Present Illness:     Patient presents for Medicare Annual Wellness visit    Patient Care Team:  Idania Carrillo MD as PCP - General  MD Zahira Mandujano MD Phylis Hockey, MD as Cardiologist (Cardiology)     Problem List:     Patient Active Problem List   Diagnosis    Malignant neoplasm of upper-inner 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 breast ([facility] [address] )    Hypertension    Hypertrophic cardiomyopathy ([facility] [address] )    Hypothyroidism    Ischemic cardiomyopathy    Osteopenia    Plantar fasciitis    Acquired trigger finger    Carpal tunnel syndrome    Locking finger joint    Numbness of hand    Use of anastrozole (Arimidex)    Acute pain of right knee      Past Medical and Surgical History:     Past Medical History:   Diagnosis Date    Hypertrophic cardiomyopathy ([facility] [address] )     Hypothyroidism     Ischemic cardiomyopathy     Malignant neoplasm of left female breast ([facility] [address] ) 02/09/2017    stage IIA    Osteopenia     Trigger finger of all digits of right hand      Past Surgical History:   Procedure Laterality Date    BREAST BIOPSY Right     years ago benign    BREAST RECONSTRUCTION Left 2/9/2017    Procedure: BREAST RECONSTRUCTION WITH TISSUE EXPANDERS AND ALLODERM ;  Surgeon: Jessica Slater MD;  [facility];  Service:     COLONOSCOPY  2009    KNEE ARTHROSCOPY Bilateral     MASTECTOMY Left 02/09/2017    WA BIOPSY/EXCISION, LYMPH NODE(S) Left 2/9/2017    Procedure: LYMPHOSCINTIGRAPHY; INTRAOPERATIVE LYMPHATIC MAPPING; SENTINEL LYMPH NODE BIOPSY (INJECT AT 0700 BY DR ALBA);   Surgeon: Osiris Cifuentes MD;  [facility]; Service: Surgical Oncology    MT DELAY BREAST PROS AFTER BREAST SURG Left 5/18/2017    Procedure: BREAST EXPANDER/IMPLANT EXCHANGE; CAPSULECTOMY ;  Surgeon: Chiquis Arango MD;  [facility];  Service: Plastics    MT MASTECTOMY, SIMPLE, COMPLETE Left 2/9/2017    Procedure: BREAST MASTECTOMY; FROZEN SECTION ;  Surgeon: Colby Rivero MD;  [facility];  Service: Surgical Oncology    REFRACTIVE SURGERY Bilateral     US GUIDANCE BREAST BIOPSY LEFT EACH ADDITIONAL Left 12/1/2016    US GUIDED BREAST BIOPSY LEFT COMPLETE Left 12/1/2016      Family History:     Family History   Problem Relation Age of Onset    Heart disease Mother     Heart disease Father     Brain cancer Sister     Heart disease Brother     No Known Problems Brother     No Known Problems Daughter     No Known Problems Daughter       Social History:        Social History     Socioeconomic History    Marital status:       Spouse name: None    Number of children: None    Years of education: None    Highest education level: None   Occupational History    None   Social Needs    Financial resource strain: None    Food insecurity     Worry: None     Inability: None    Transportation needs     Medical: None     Non-medical: None   Tobacco Use    Smoking status: Never Smoker    Smokeless tobacco: Never Used   Substance and Sexual Activity    Alcohol use: No     Alcohol/week: 1 0 standard drinks     Types: 1 Glasses of wine per week    Drug use: No    Sexual activity: None   Lifestyle    Physical activity     Days per week: None     Minutes per session: None    Stress: None   Relationships    Social connections     Talks on phone: None     Gets together: None     Attends Rastafari service: None     Active member of club or organization: None     Attends meetings of clubs or organizations: None     Relationship status: None    Intimate partner violence     Fear of current or ex partner: None     Emotionally abused: None Physically abused: None     Forced sexual activity: None   Other Topics Concern    None   Social History Narrative    None      Medications and Allergies:     Current Outpatient Medications   Medication Sig Dispense Refill    acetaZOLAMIDE (DIAMOX) 125 mg tablet   0    anastrozole (ARIMIDEX) 1 mg tablet Take 1 tablet (1 mg total) by mouth daily 90 tablet 3    aspirin (ECOTRIN LOW STRENGTH) 81 mg EC tablet Take 81 mg by mouth daily      calcium citrate-vitamin D (CITRACAL+D) 315-200 MG-UNIT per tablet Take 1 tablet by mouth 2 (two) times a day      candesartan (ATACAND) 4 mg tablet   0    ketorolac (ACULAR) 0 5 % ophthalmic solution   0    levothyroxine 50 mcg tablet TAKE 1 TABLET BY MOUTH DAILY 90 tablet 1    metoprolol succinate (TOPROL-XL) 25 mg 24 hr tablet Take 25 mg by mouth every morning  0    metoprolol tartrate (LOPRESSOR) 25 mg tablet Take 25 mg by mouth 2 (two) times a day 1 pill in am/ half pill in pm      Multiple Vitamin (MULTIVITAMIN) tablet Take 1 tablet by mouth daily      ofloxacin (OCUFLOX) 0 3 % ophthalmic solution   0    Omega-3 Fatty Acids (FISH OIL) 1,000 mg Take 1,000 mg by mouth daily      prednisoLONE acetate (PRED FORTE) 1 % ophthalmic suspension   0    naproxen (NAPROSYN) 500 mg tablet Take 1 tablet (500 mg total) by mouth 2 (two) times a day with meals (Patient not taking: Reported on 9/17/2020) 30 tablet 0     No current facility-administered medications for this visit        No Known Allergies   Immunizations:     Immunization History   Administered Date(s) Administered    INFLUENZA 09/03/2014, 09/28/2015, 10/09/2016, 09/13/2017, 11/08/2018    Influenza Split High Dose Preservative Free IM 10/09/2016    Influenza TIV (IM) 10/30/2003, 11/13/2006, 11/24/2008, 10/07/2011, 10/26/2012, 10/15/2013, 10/01/2014    Influenza, high dose seasonal 0 7 mL 10/11/2019    Pneumococcal Conjugate 13-Valent 02/22/2016    Pneumococcal Polysaccharide PPV23 01/02/2018    Tdap 02/22/2016 Health Maintenance: There are no preventive care reminders to display for this patient  Topic Date Due    Influenza Vaccine  07/01/2020      Medicare Health Risk Assessment:     /60 (BP Location: Right arm, Patient Position: Sitting, Cuff Size: Standard)   Pulse 73   Temp (!) 97 1 °F (36 2 °C) (Temporal)   Resp 15   Ht 5' (1 524 m)   Wt 61 1 kg (134 lb 9 6 oz)   SpO2 97%   BMI 26 29 kg/m²          Health Risk Assessment:   Patient rates overall health as good  Patient feels that their physical health rating is same  Eyesight was rated as slightly worse  Hearing was rated as same  Patient feels that their emotional and mental health rating is slightly worse  Pain experienced in the last 7 days has been none  Depression Screening:   PHQ-2 Score: 0      Fall Risk Screening: In the past year, patient has experienced: no history of falling in past year      Urinary Incontinence Screening:   Patient has not leaked urine accidently in the last six months  Home Safety:  Patient does not have trouble with stairs inside or outside of their home  Patient has no working smoke alarms and has no working carbon monoxide detector  Home safety hazards include: none  Nutrition:   Current diet is Regular and Limited junk food  Medications:   Patient is currently taking over-the-counter supplements  OTC medications include: see medication list  Patient is able to manage medications  Activities of Daily Living (ADLs)/Instrumental Activities of Daily Living (IADLs):   Walk and transfer into and out of bed and chair?: Yes  Dress and groom yourself?: Yes    Bathe or shower yourself?: Yes    Feed yourself?  Yes  Do your laundry/housekeeping?: Yes  Manage your money, pay your bills and track your expenses?: Yes  Make your own meals?: Yes    Do your own shopping?: Yes    Previous Hospitalizations:   Any hospitalizations or ED visits within the last 12 months?: No      Advance Care Planning: Living will: Yes    Durable POA for healthcare: Yes    Advanced directive: Yes      PREVENTIVE SCREENINGS      Cardiovascular Screening:    General: Screening Current      Diabetes Screening:     General: Screening Current      Colorectal Cancer Screening:     General: Screening Not Indicated      Breast Cancer Screening:     General: History Breast Cancer      Cervical Cancer Screening:    General: Screening Not Indicated      Lung Cancer Screening:     General: Screening Not Indicated      Preventive Screening Comments: Patient received her annual flu vaccine today    She was recommended to inquire as to insurance coverage for shingles vaccine      Marthe Krabbe, MD

## 2020-09-29 NOTE — PATIENT INSTRUCTIONS
Medicare Preventive Visit Patient Instructions  Thank you for completing your Welcome to Medicare Visit or Medicare Annual Wellness Visit today  Your next wellness visit will be due in one year (9/29/2021)  The screening/preventive services that you may require over the next 5-10 years are detailed below  Some tests may not apply to you based off risk factors and/or age  Screening tests ordered at today's visit but not completed yet may show as past due  Also, please note that scanned in results may not display below  Preventive Screenings:  Service Recommendations Previous Testing/Comments   Colorectal Cancer Screening  * Colonoscopy    * Fecal Occult Blood Test (FOBT)/Fecal Immunochemical Test (FIT)  * Fecal DNA/Cologuard Test  * Flexible Sigmoidoscopy Age: 54-65 years old   Colonoscopy: every 10 years (may be performed more frequently if at higher risk)  OR  FOBT/FIT: every 1 year  OR  Cologuard: every 3 years  OR  Sigmoidoscopy: every 5 years  Screening may be recommended earlier than age 48 if at higher risk for colorectal cancer  Also, an individualized decision between you and your healthcare provider will decide whether screening between the ages of 74-80 would be appropriate  Colonoscopy: 09/09/2019  FOBT/FIT: Not on file  Cologuard: Not on file  Sigmoidoscopy: Not on file         Breast Cancer Screening Age: 36 years old  Frequency: every 1-2 years  Not required if history of left and right mastectomy Mammogram: 06/04/2020    History Breast Cancer   Cervical Cancer Screening Between the ages of 21-29, pap smear recommended once every 3 years  Between the ages of 33-67, can perform pap smear with HPV co-testing every 5 years     Recommendations may differ for women with a history of total hysterectomy, cervical cancer, or abnormal pap smears in past  Pap Smear: Not on file    Screening Not Indicated   Hepatitis C Screening Once for adults born between 1945 and 1965  More frequently in patients at high risk for Hepatitis C Hep C Antibody: Not on file       Diabetes Screening 1-2 times per year if you're at risk for diabetes or have pre-diabetes Fasting glucose: 107 mg/dL   A1C: 5 8 %    Screening Current   Cholesterol Screening Once every 5 years if you don't have a lipid disorder  May order more often based on risk factors  Lipid panel: 08/19/2020    Screening Current     Other Preventive Screenings Covered by Medicare:  1  Abdominal Aortic Aneurysm (AAA) Screening: covered once if your at risk  You're considered to be at risk if you have a family history of AAA  2  Lung Cancer Screening: covers low dose CT scan once per year if you meet all of the following conditions: (1) Age 50-69; (2) No signs or symptoms of lung cancer; (3) Current smoker or have quit smoking within the last 15 years; (4) You have a tobacco smoking history of at least 30 pack years (packs per day multiplied by number of years you smoked); (5) You get a written order from a healthcare provider  3  Glaucoma Screening: covered annually if you're considered high risk: (1) You have diabetes OR (2) Family history of glaucoma OR (3)  aged 48 and older OR (3)  American aged 72 and older  3  Osteoporosis Screening: covered every 2 years if you meet one of the following conditions: (1) You're estrogen deficient and at risk for osteoporosis based off medical history and other findings; (2) Have a vertebral abnormality; (3) On glucocorticoid therapy for more than 3 months; (4) Have primary hyperparathyroidism; (5) On osteoporosis medications and need to assess response to drug therapy  · Last bone density test (DXA Scan): 04/19/2017  5  HIV Screening: covered annually if you're between the age of 12-76  Also covered annually if you are younger than 13 and older than 72 with risk factors for HIV infection  For pregnant patients, it is covered up to 3 times per pregnancy      Immunizations:  Immunization Recommendations Influenza Vaccine Annual influenza vaccination during flu season is recommended for all persons aged >= 6 months who do not have contraindications   Pneumococcal Vaccine (Prevnar and Pneumovax)  * Prevnar = PCV13  * Pneumovax = PPSV23   Adults 25-60 years old: 1-3 doses may be recommended based on certain risk factors  Adults 72 years old: Prevnar (PCV13) vaccine recommended followed by Pneumovax (PPSV23) vaccine  If already received PPSV23 since turning 65, then PCV13 recommended at least one year after PPSV23 dose  Hepatitis B Vaccine 3 dose series if at intermediate or high risk (ex: diabetes, end stage renal disease, liver disease)   Tetanus (Td) Vaccine - COST NOT COVERED BY MEDICARE PART B Following completion of primary series, a booster dose should be given every 10 years to maintain immunity against tetanus  Td may also be given as tetanus wound prophylaxis  Tdap Vaccine - COST NOT COVERED BY MEDICARE PART B Recommended at least once for all adults  For pregnant patients, recommended with each pregnancy  Shingles Vaccine (Shingrix) - COST NOT COVERED BY MEDICARE PART B  2 shot series recommended in those aged 48 and above     Health Maintenance Due:  There are no preventive care reminders to display for this patient  Immunizations Due:      Topic Date Due    Influenza Vaccine  07/01/2020     Advance Directives   What are advance directives? Advance directives are legal documents that state your wishes and plans for medical care  These plans are made ahead of time in case you lose your ability to make decisions for yourself  Advance directives can apply to any medical decision, such as the treatments you want, and if you want to donate organs  What are the types of advance directives? There are many types of advance directives, and each state has rules about how to use them  You may choose a combination of any of the following:  · Living will:   This is a written record of the treatment you want  You can also choose which treatments you do not want, which to limit, and which to stop at a certain time  This includes surgery, medicine, IV fluid, and tube feedings  · Durable power of  for healthcare [facility]): This is a written record that states who you want to make healthcare choices for you when you are unable to make them for yourself  This person, called a proxy, is usually a family member or a friend  You may choose more than 1 proxy  · Do not resuscitate (DNR) order:  A DNR order is used in case your heart stops beating or you stop breathing  It is a request not to have certain forms of treatment, such as CPR  A DNR order may be included in other types of advance directives  · Medical directive: This covers the care that you want if you are in a coma, near death, or unable to make decisions for yourself  You can list the treatments you want for each condition  Treatment may include pain medicine, surgery, blood transfusions, dialysis, IV or tube feedings, and a ventilator (breathing machine)  · Values history: This document has questions about your views, beliefs, and how you feel and think about life  This information can help others choose the care that you would choose  Why are advance directives important? An advance directive helps you control your care  Although spoken wishes may be used, it is better to have your wishes written down  Spoken wishes can be misunderstood, or not followed  Treatments may be given even if you do not want them  An advance directive may make it easier for your family to make difficult choices about your care  Weight Management   Why it is important to manage your weight:  Being overweight increases your risk of health conditions such as heart disease, high blood pressure, type 2 diabetes, and certain types of cancer  It can also increase your risk for osteoarthritis, sleep apnea, and other respiratory problems  Aim for a slow, steady weight loss  Even a small amount of weight loss can lower your risk of health problems  How to lose weight safely:  A safe and healthy way to lose weight is to eat fewer calories and get regular exercise  You can lose up about 1 pound a week by decreasing the number of calories you eat by 500 calories each day  Healthy meal plan for weight management:  A healthy meal plan includes a variety of foods, contains fewer calories, and helps you stay healthy  A healthy meal plan includes the following:  · Eat whole-grain foods more often  A healthy meal plan should contain fiber  Fiber is the part of grains, fruits, and vegetables that is not broken down by your body  Whole-grain foods are healthy and provide extra fiber in your diet  Some examples of whole-grain foods are whole-wheat breads and pastas, oatmeal, brown rice, and bulgur  · Eat a variety of vegetables every day  Include dark, leafy greens such as spinach, kale, evan greens, and mustard greens  Eat yellow and orange vegetables such as carrots, sweet potatoes, and winter squash  · Eat a variety of fruits every day  Choose fresh or canned fruit (canned in its own juice or light syrup) instead of juice  Fruit juice has very little or no fiber  · Eat low-fat dairy foods  Drink fat-free (skim) milk or 1% milk  Eat fat-free yogurt and low-fat cottage cheese  Try low-fat cheeses such as mozzarella and other reduced-fat cheeses  · Choose meat and other protein foods that are low in fat  Choose beans or other legumes such as split peas or lentils  Choose fish, skinless poultry (chicken or turkey), or lean cuts of red meat (beef or pork)  Before you cook meat or poultry, cut off any visible fat  · Use less fat and oil  Try baking foods instead of frying them  Add less fat, such as margarine, sour cream, regular salad dressing and mayonnaise to foods  Eat fewer high-fat foods   Some examples of high-fat foods include french fries, doughnuts, ice cream, and cakes   · Eat fewer sweets  Limit foods and drinks that are high in sugar  This includes candy, cookies, regular soda, and sweetened drinks  Exercise:  Exercise at least 30 minutes per day on most days of the week  Some examples of exercise include walking, biking, dancing, and swimming  You can also fit in more physical activity by taking the stairs instead of the elevator or parking farther away from stores  Ask your healthcare provider about the best exercise plan for you  © Copyright [employer] 2018 Information is for End User's use only and may not be sold, redistributed or otherwise used for commercial purposes   All illustrations and images included in CareNotes® are the copyrighted property of [employer]  or [address] [place] [address]

## 2020-09-29 NOTE — ASSESSMENT & PLAN NOTE
Breast cancer    Patient with history of breast cancer and will continue surveillance with her radiation oncologist as well as her surgical oncologist

## 2020-09-29 NOTE — ASSESSMENT & PLAN NOTE
Hypertension    Patient blood pressure is stable and she will continue with current regimen of medications

## 2020-09-29 NOTE — PROGRESS NOTES
FAMILY PRACTICE OFFICE VISIT       NAME: Deanne Shah  AGE: 66 y o  SEX: female       : 1945        MRN: [id number]    DATE: 2020  TIME: 2:05 PM    Assessment and Plan     Problem List Items Addressed This Visit        Endocrine    Hypothyroidism - Primary     Hypothyroidism  TSH is stable on current dose of levothyroxine            Cardiovascular and Mediastinum    Hypertension     Hypertension  Patient blood pressure is stable and she will continue with current regimen of medications         Hypertrophic cardiomyopathy ([address] )     Cardiomyopathy  Patient with history of cardiomyopathy  Currently she is asymptomatic and will continue follow-up for monitoring with her cardiologist           Other Visit Diagnoses     Periodic health assessment, general screening, adult        Relevant Orders    Ambulatory referral to Gynecology              Chief Complaint     Chief Complaint   Patient presents with    Medicare Wellness Visit       History of Present Illness     Patient in the office to monitor chronic medical condition  Patient denies any recent illness  She still sees her radiation oncologist as well as other surgical oncologist   She also sees her cardiologist at least once a year  She had a colonoscopy in 2019  She does walk on a daily basis approximately 1 hour a day  Patient will be receiving her annual flu vaccine today  I reviewed the patient's most recent blood test results from 2020    Patient is seeing her ophthalmologist for decreased visual acuity and is scheduled to have a laser treatment on her left eye in the near future by Dr Micheline Faust       Review of Systems   Review of Systems   Constitutional: Negative  HENT: Negative  Eyes:        As per HPI   Respiratory: Negative  Cardiovascular: Negative  Gastrointestinal: Negative  Genitourinary: Negative  Musculoskeletal: Negative  Neurological: Negative  Psychiatric/Behavioral: Negative  Active Problem List     Patient Active Problem List   Diagnosis    Malignant neoplasm of upper-inner 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 breast ([facility]    Hypertension    Hypertrophic cardiomyopathy ([facility]    Hypothyroidism    Ischemic cardiomyopathy    Osteopenia    Plantar fasciitis    Acquired trigger finger    Carpal tunnel syndrome    Locking finger joint    Numbness of hand    Use of anastrozole (Arimidex)    Acute pain of right knee       Past Medical History:  Past Medical History:   Diagnosis Date    Hypertrophic cardiomyopathy ([facility] [address] [facility]     Hypothyroidism     Ischemic cardiomyopathy     Malignant neoplasm of left female breast ([facility] [address] [facility] 02/09/2017    stage IIA    Osteopenia     Trigger finger of all digits of right hand        Past Surgical History:  Past Surgical History:   Procedure Laterality Date    BREAST BIOPSY Right     years ago benign    BREAST RECONSTRUCTION Left 2/9/2017    Procedure: BREAST RECONSTRUCTION WITH TISSUE EXPANDERS AND ALLODERM ;  Surgeon: Sandi Betancourt MD;  [facility];  Service:     COLONOSCOPY  2009    KNEE ARTHROSCOPY Bilateral     MASTECTOMY Left 02/09/2017    FL BIOPSY/EXCISION, LYMPH NODE(S) Left 2/9/2017    Procedure: LYMPHOSCINTIGRAPHY; INTRAOPERATIVE LYMPHATIC MAPPING; SENTINEL LYMPH NODE BIOPSY (INJECT AT 0700 BY DR ALBA);   Surgeon: Peter Shelton MD;  [facility];  Service: Surgical Oncology    FL DELAY BREAST PROS AFTER BREAST SURG Left 5/18/2017    Procedure: BREAST EXPANDER/IMPLANT EXCHANGE; CAPSULECTOMY ;  Surgeon: Sandi Betancourt MD;  [facility];  Service: Plastics    FL MASTECTOMY, SIMPLE, COMPLETE Left 2/9/2017    Procedure: BREAST MASTECTOMY; FROZEN SECTION ;  Surgeon: Peter Shelton MD;  [facility];  Service: Surgical Oncology    REFRACTIVE SURGERY Bilateral     US GUIDANCE BREAST BIOPSY LEFT EACH ADDITIONAL Left 12/1/2016    US GUIDED BREAST BIOPSY LEFT COMPLETE Left 12/1/2016       Family History:  Family History   Problem Relation Age of Onset    Heart disease Mother     Heart disease Father     Brain cancer Sister     Heart disease Brother     No Known Problems Brother     No Known Problems Daughter     No Known Problems Daughter        Social History:  Social History     Socioeconomic History    Marital status:       Spouse name: Not on file    Number of children: Not on file    Years of education: Not on file    Highest education level: Not on file   Occupational History    Not on file   Social Needs    Financial resource strain: Not on file    Food insecurity     Worry: Not on file     Inability: Not on file    Transportation needs     Medical: Not on file     Non-medical: Not on file   Tobacco Use    Smoking status: Never Smoker    Smokeless tobacco: Never Used   Substance and Sexual Activity    Alcohol use: No     Alcohol/week: 1 0 standard drinks     Types: 1 Glasses of wine per week    Drug use: No    Sexual activity: Not on file   Lifestyle    Physical activity     Days per week: Not on file     Minutes per session: Not on file    Stress: Not on file   Relationships    Social connections     Talks on phone: Not on file     Gets together: Not on file     Attends Christianity service: Not on file     Active member of club or organization: Not on file     Attends meetings of clubs or organizations: Not on file     Relationship status: Not on file    Intimate partner violence     Fear of current or ex partner: Not on file     Emotionally abused: Not on file     Physically abused: Not on file     Forced sexual activity: Not on file   Other Topics Concern    Not on file   Social History Narrative    Not on file       Objective     Vitals:    09/29/20 1328   BP: 110/60   Pulse: 73   Resp: 15   Temp: (!) 97 1 °F (36 2 °C)   SpO2: 97%     Wt Readings from Last 3 Encounters:   09/29/20 61 1 kg (134 lb 9 6 oz)   09/17/20 61 2 kg (135 lb)   08/25/20 60 3 kg (133 lb)       Physical Exam  Constitutional: General: She is not in acute distress  Appearance: Normal appearance  She is not ill-appearing  HENT:      Right Ear: Tympanic membrane, ear canal and external ear normal  There is no impacted cerumen  Left Ear: Tympanic membrane, ear canal and external ear normal  There is no impacted cerumen  Eyes:      General:         Right eye: No discharge  Left eye: No discharge  Extraocular Movements: Extraocular movements intact  Conjunctiva/sclera: Conjunctivae normal       Pupils: Pupils are equal, round, and reactive to light  Neck:      Vascular: No carotid bruit  Cardiovascular:      Heart sounds: Normal heart sounds  Comments: Regular rate and rhythm with no murmurs  Pulmonary:      Effort: Pulmonary effort is normal       Comments: Lungs are clear to auscultation without wheezes,rales, or rhonchi  Abdominal:      Comments: Abdomen is soft, nontender with positive bowel sounds  There is no rebound or guarding  No masses palpated   Musculoskeletal:      Right lower leg: No edema  Left lower leg: No edema  Lymphadenopathy:      Cervical: No cervical adenopathy  Neurological:      General: No focal deficit present  Mental Status: She is alert and oriented to person, place, and time  Mental status is at baseline  Psychiatric:         Mood and Affect: Mood normal          Behavior: Behavior normal          Thought Content:  Thought content normal          Judgment: Judgment normal          Pertinent Laboratory/Diagnostic Studies:  Lab Results   Component Value Date    GLUCOSE 105 02/21/2015    BUN 23 08/19/2020    CREATININE 0 72 08/19/2020    CALCIUM 9 2 08/19/2020     02/21/2015    K 4 4 08/19/2020    CO2 26 08/19/2020     (H) 08/19/2020     Lab Results   Component Value Date    ALT 22 08/19/2020    AST 25 08/19/2020    ALKPHOS 91 08/19/2020    BILITOT 0 47 02/21/2015       Lab Results   Component Value Date    WBC 4 99 08/19/2020    HGB 13 0 08/19/2020 HCT 40 7 08/19/2020    MCV 89 08/19/2020     08/19/2020       No results found for: TSH    Lab Results   Component Value Date    CHOL 178 02/21/2015     Lab Results   Component Value Date    TRIG 125 08/19/2020     Lab Results   Component Value Date    HDL 52 08/19/2020     Lab Results   Component Value Date    LDLCALC 121 (H) 08/19/2020     Lab Results   Component Value Date    HGBA1C 5 8 01/19/2018       Results for orders placed or performed in visit on 08/19/20   CBC and differential   Result Value Ref Range    WBC 4 99 4 31 - 10 16 Thousand/uL    RBC 4 56 3 81 - 5 12 Million/uL    Hemoglobin 13 0 11 5 - 15 4 g/dL    Hematocrit 40 7 34 8 - 46 1 %    MCV 89 82 - 98 fL    MCH 28 5 26 8 - 34 3 pg    MCHC 31 9 31 4 - 37 4 g/dL    RDW 13 5 11 6 - 15 1 %    MPV 11 1 8 9 - 12 7 fL    Platelets 618 071 - 856 Thousands/uL    nRBC 0 /100 WBCs    Neutrophils Relative 66 43 - 75 %    Immat GRANS % 0 0 - 2 %    Lymphocytes Relative 22 14 - 44 %    Monocytes Relative 9 4 - 12 %    Eosinophils Relative 2 0 - 6 %    Basophils Relative 1 0 - 1 %    Neutrophils Absolute 3 29 1 85 - 7 62 Thousands/µL    Immature Grans Absolute 0 01 0 00 - 0 20 Thousand/uL    Lymphocytes Absolute 1 09 0 60 - 4 47 Thousands/µL    Monocytes Absolute 0 47 0 17 - 1 22 Thousand/µL    Eosinophils Absolute 0 08 0 00 - 0 61 Thousand/µL    Basophils Absolute 0 05 0 00 - 0 10 Thousands/µL   Comprehensive metabolic panel   Result Value Ref Range    Sodium 141 136 - 145 mmol/L    Potassium 4 4 3 5 - 5 3 mmol/L    Chloride 110 (H) 100 - 108 mmol/L    CO2 26 21 - 32 mmol/L    ANION GAP 5 4 - 13 mmol/L    BUN 23 5 - 25 mg/dL    Creatinine 0 72 0 60 - 1 30 mg/dL    Glucose, Fasting 107 (H) 65 - 99 mg/dL    Calcium 9 2 8 3 - 10 1 mg/dL    AST 25 5 - 45 U/L    ALT 22 12 - 78 U/L    Alkaline Phosphatase 91 46 - 116 U/L    Total Protein 7 4 6 4 - 8 2 g/dL    Albumin 3 7 3 5 - 5 0 g/dL    Total Bilirubin 0 68 0 20 - 1 00 mg/dL    eGFR 82 ml/min/1 73sq m   T4, free   Result Value Ref Range    Free T4 1 13 0 76 - 1 46 ng/dL   TSH, 3rd generation   Result Value Ref Range    TSH 3RD GENERATON 1 160 0 358 - 3 740 uIU/mL   Lipid panel   Result Value Ref Range    Cholesterol 198 50 - 200 mg/dL    Triglycerides 125 <=150 mg/dL    HDL, Direct 52 >=40 mg/dL    LDL Calculated 121 (H) 0 - 100 mg/dL    Non-HDL-Chol (CHOL-HDL) 146 mg/dl       Orders Placed This Encounter   Procedures    Ambulatory referral to Gynecology       ALLERGIES:  No Known Allergies    Current Medications     Current Outpatient Medications   Medication Sig Dispense Refill    acetaZOLAMIDE (DIAMOX) 125 mg tablet   0    anastrozole (ARIMIDEX) 1 mg tablet Take 1 tablet (1 mg total) by mouth daily 90 tablet 3    aspirin (ECOTRIN LOW STRENGTH) 81 mg EC tablet Take 81 mg by mouth daily      calcium citrate-vitamin D (CITRACAL+D) 315-200 MG-UNIT per tablet Take 1 tablet by mouth 2 (two) times a day      candesartan (ATACAND) 4 mg tablet   0    ketorolac (ACULAR) 0 5 % ophthalmic solution   0    levothyroxine 50 mcg tablet TAKE 1 TABLET BY MOUTH DAILY 90 tablet 1    metoprolol succinate (TOPROL-XL) 25 mg 24 hr tablet Take 25 mg by mouth every morning  0    metoprolol tartrate (LOPRESSOR) 25 mg tablet Take 25 mg by mouth 2 (two) times a day 1 pill in am/ half pill in pm      Multiple Vitamin (MULTIVITAMIN) tablet Take 1 tablet by mouth daily      ofloxacin (OCUFLOX) 0 3 % ophthalmic solution   0    Omega-3 Fatty Acids (FISH OIL) 1,000 mg Take 1,000 mg by mouth daily      prednisoLONE acetate (PRED FORTE) 1 % ophthalmic suspension   0    naproxen (NAPROSYN) 500 mg tablet Take 1 tablet (500 mg total) by mouth 2 (two) times a day with meals (Patient not taking: Reported on 9/17/2020) 30 tablet 0     No current facility-administered medications for this visit            Health Maintenance     Health Maintenance   Topic Date Due   [facility] Annual Wellness Visit (AWV)  1945    SLP PLAN OF CARE 1945    BMI: Followup Plan  09/27/1961    Fall Risk  02/04/2020    Depression Screening PHQ  02/04/2020    Influenza Vaccine  07/01/2020    BMI: Adult  09/29/2021    DTaP,Tdap,and Td Vaccines (2 - Td) 02/22/2026    Pneumococcal Vaccine: 65+ Years  Completed    HIB Vaccine  Aged Out    Hepatitis B Vaccine  Aged Out    IPV Vaccine  Aged Out    Hepatitis A Vaccine  Aged Out    Meningococcal ACWY Vaccine  Aged Out    HPV Vaccine  Aged [employer] History   Administered Date(s) Administered    INFLUENZA 09/03/2014, 09/28/2015, 10/09/2016, 09/13/2017, 11/08/2018    Influenza Split High Dose Preservative Free IM 10/09/2016    Influenza TIV (IM) 10/30/2003, 11/13/2006, 11/24/2008, 10/07/2011, 10/26/2012, 10/15/2013, 10/01/2014    Influenza, high dose seasonal 0 7 mL 10/11/2019    Pneumococcal Conjugate 13-Valent 02/22/2016    Pneumococcal Polysaccharide PPV23 01/02/2018    Tdap 89/56/1766       Corina Riley MD

## 2020-09-30 ENCOUNTER — TELEPHONE (OUTPATIENT)
Dept: FAMILY MEDICINE CLINIC | Facility: CLINIC | Age: 75
End: 2020-09-30

## 2020-09-30 NOTE — TELEPHONE ENCOUNTER
Please call patient  Urine sample left in the office yesterday did show some red blood cells in her urine but no signs of infection    Would like to have patient recheck a urine sample in the office sometime next week for further evaluation

## 2020-10-12 ENCOUNTER — TELEPHONE (OUTPATIENT)
Dept: FAMILY MEDICINE CLINIC | Facility: CLINIC | Age: 75
End: 2020-10-12

## 2020-10-12 ENCOUNTER — CLINICAL SUPPORT (OUTPATIENT)
Dept: FAMILY MEDICINE CLINIC | Facility: CLINIC | Age: 75
End: 2020-10-12
Payer: COMMERCIAL

## 2020-10-12 DIAGNOSIS — R82.90 ABNORMAL URINE FINDING: Primary | ICD-10-CM

## 2020-10-12 LAB
SL AMB  POCT GLUCOSE, UA: NORMAL
SL AMB LEUKOCYTE ESTERASE,UA: 70
SL AMB POCT BILIRUBIN,UA: NORMAL
SL AMB POCT BLOOD,UA: NORMAL
SL AMB POCT CLARITY,UA: CLEAR
SL AMB POCT COLOR,UA: YELLOW
SL AMB POCT KETONES,UA: NORMAL
SL AMB POCT NITRITE,UA: NORMAL
SL AMB POCT PH,UA: 5
SL AMB POCT SPECIFIC GRAVITY,UA: 1.01
SL AMB POCT URINE PROTEIN: NORMAL
SL AMB POCT UROBILINOGEN: 0.2

## 2020-10-12 PROCEDURE — 99211 OFF/OP EST MAY X REQ PHY/QHP: CPT | Performed by: FAMILY MEDICINE

## 2020-10-12 PROCEDURE — 1160F RVW MEDS BY RX/DR IN RCRD: CPT | Performed by: FAMILY MEDICINE

## 2020-10-12 PROCEDURE — 1036F TOBACCO NON-USER: CPT | Performed by: FAMILY MEDICINE

## 2020-10-12 PROCEDURE — 81002 URINALYSIS NONAUTO W/O SCOPE: CPT | Performed by: FAMILY MEDICINE

## 2020-10-22 ENCOUNTER — OFFICE VISIT (OUTPATIENT)
Dept: CARDIOLOGY CLINIC | Facility: CLINIC | Age: 75
End: 2020-10-22
Payer: COMMERCIAL

## 2020-10-22 VITALS
HEART RATE: 57 BPM | SYSTOLIC BLOOD PRESSURE: 120 MMHG | DIASTOLIC BLOOD PRESSURE: 58 MMHG | HEIGHT: 60 IN | BODY MASS INDEX: 26.42 KG/M2 | WEIGHT: 134.6 LBS

## 2020-10-22 DIAGNOSIS — I47.2 NSVT (NONSUSTAINED VENTRICULAR TACHYCARDIA) (HCC): Primary | ICD-10-CM

## 2020-10-22 DIAGNOSIS — I42.2 HYPERTROPHIC CARDIOMYOPATHY (HCC): ICD-10-CM

## 2020-10-22 DIAGNOSIS — I10 ESSENTIAL HYPERTENSION: ICD-10-CM

## 2020-10-22 PROCEDURE — 99204 OFFICE O/P NEW MOD 45 MIN: CPT | Performed by: INTERNAL MEDICINE

## 2020-10-29 PROBLEM — I47.2 NSVT (NONSUSTAINED VENTRICULAR TACHYCARDIA): Status: ACTIVE | Noted: 2020-10-29

## 2020-10-29 PROBLEM — I47.29 NSVT (NONSUSTAINED VENTRICULAR TACHYCARDIA): Status: ACTIVE | Noted: 2020-10-29

## 2020-10-29 PROCEDURE — 93000 ELECTROCARDIOGRAM COMPLETE: CPT | Performed by: INTERNAL MEDICINE

## 2021-01-25 ENCOUNTER — IMMUNIZATIONS (OUTPATIENT)
Dept: FAMILY MEDICINE CLINIC | Facility: HOSPITAL | Age: 76
End: 2021-01-25

## 2021-01-25 DIAGNOSIS — Z23 ENCOUNTER FOR IMMUNIZATION: Primary | ICD-10-CM

## 2021-01-25 PROCEDURE — 91300 SARS-COV-2 / COVID-19 MRNA VACCINE (PFIZER-BIONTECH) 30 MCG: CPT

## 2021-01-25 PROCEDURE — 0001A SARS-COV-2 / COVID-19 MRNA VACCINE (PFIZER-BIONTECH) 30 MCG: CPT

## 2021-02-14 ENCOUNTER — IMMUNIZATIONS (OUTPATIENT)
Dept: FAMILY MEDICINE CLINIC | Facility: HOSPITAL | Age: 76
End: 2021-02-14

## 2021-02-14 DIAGNOSIS — Z23 ENCOUNTER FOR IMMUNIZATION: Primary | ICD-10-CM

## 2021-02-14 PROCEDURE — 91300 SARS-COV-2 / COVID-19 MRNA VACCINE (PFIZER-BIONTECH) 30 MCG: CPT

## 2021-02-14 PROCEDURE — 0002A SARS-COV-2 / COVID-19 MRNA VACCINE (PFIZER-BIONTECH) 30 MCG: CPT

## 2021-03-04 ENCOUNTER — APPOINTMENT (EMERGENCY)
Dept: RADIOLOGY | Facility: HOSPITAL | Age: 76
DRG: 225 | End: 2021-03-04
Payer: COMMERCIAL

## 2021-03-04 ENCOUNTER — HOSPITAL ENCOUNTER (INPATIENT)
Facility: HOSPITAL | Age: 76
LOS: 5 days | Discharge: HOME | DRG: 225 | End: 2021-03-09
Attending: EMERGENCY MEDICINE | Admitting: INTERNAL MEDICINE
Payer: COMMERCIAL

## 2021-03-04 DIAGNOSIS — S02.92XA MULTIPLE CLOSED FRACTURES OF FACIAL BONE, INITIAL ENCOUNTER (HCC): ICD-10-CM

## 2021-03-04 DIAGNOSIS — R77.8 ELEVATED TROPONIN: ICD-10-CM

## 2021-03-04 DIAGNOSIS — I47.2 NSVT (NONSUSTAINED VENTRICULAR TACHYCARDIA) (HCC): ICD-10-CM

## 2021-03-04 DIAGNOSIS — Z17.0 MALIGNANT NEOPLASM OF UPPER-INNER QUADRANT OF LEFT BREAST IN FEMALE, ESTROGEN RECEPTOR POSITIVE (HCC): ICD-10-CM

## 2021-03-04 DIAGNOSIS — I42.2 HYPERTROPHIC CARDIOMYOPATHY (HCC): ICD-10-CM

## 2021-03-04 DIAGNOSIS — S02.2XXA CLOSED FRACTURE OF NASAL BONE, INITIAL ENCOUNTER: Primary | ICD-10-CM

## 2021-03-04 DIAGNOSIS — R55 SYNCOPE: ICD-10-CM

## 2021-03-04 DIAGNOSIS — C50.212 MALIGNANT NEOPLASM OF UPPER-INNER QUADRANT OF LEFT BREAST IN FEMALE, ESTROGEN RECEPTOR POSITIVE (HCC): ICD-10-CM

## 2021-03-04 LAB
ALBUMIN SERPL BCP-MCNC: 3.8 G/DL (ref 3.5–5)
ALP SERPL-CCNC: 105 U/L (ref 46–116)
ALT SERPL W P-5'-P-CCNC: 22 U/L (ref 12–78)
ANION GAP SERPL CALCULATED.3IONS-SCNC: 5 MMOL/L (ref 4–13)
APTT PPP: 28 SECONDS (ref 23–37)
AST SERPL W P-5'-P-CCNC: 27 U/L (ref 5–45)
BASOPHILS # BLD AUTO: 0.05 THOUSANDS/ΜL (ref 0–0.1)
BASOPHILS NFR BLD AUTO: 0 % (ref 0–1)
BILIRUB SERPL-MCNC: 0.52 MG/DL (ref 0.2–1)
BUN SERPL-MCNC: 24 MG/DL (ref 5–25)
CALCIUM SERPL-MCNC: 9.2 MG/DL (ref 8.3–10.1)
CHLORIDE SERPL-SCNC: 105 MMOL/L (ref 100–108)
CO2 SERPL-SCNC: 28 MMOL/L (ref 21–32)
CREAT SERPL-MCNC: 0.86 MG/DL (ref 0.6–1.3)
EOSINOPHIL # BLD AUTO: 0.07 THOUSAND/ΜL (ref 0–0.61)
EOSINOPHIL NFR BLD AUTO: 1 % (ref 0–6)
ERYTHROCYTE [DISTWIDTH] IN BLOOD BY AUTOMATED COUNT: 13.8 % (ref 11.6–15.1)
GFR SERPL CREATININE-BSD FRML MDRD: 65 ML/MIN/1.73SQ M
GLUCOSE SERPL-MCNC: 117 MG/DL (ref 65–140)
HCT VFR BLD AUTO: 39.9 % (ref 34.8–46.1)
HGB BLD-MCNC: 12.5 G/DL (ref 11.5–15.4)
IMM GRANULOCYTES # BLD AUTO: 0.06 THOUSAND/UL (ref 0–0.2)
IMM GRANULOCYTES NFR BLD AUTO: 1 % (ref 0–2)
INR PPP: 1.05 (ref 0.84–1.19)
LYMPHOCYTES # BLD AUTO: 0.75 THOUSANDS/ΜL (ref 0.6–4.47)
LYMPHOCYTES NFR BLD AUTO: 6 % (ref 14–44)
MCH RBC QN AUTO: 27.1 PG (ref 26.8–34.3)
MCHC RBC AUTO-ENTMCNC: 31.3 G/DL (ref 31.4–37.4)
MCV RBC AUTO: 87 FL (ref 82–98)
MONOCYTES # BLD AUTO: 0.77 THOUSAND/ΜL (ref 0.17–1.22)
MONOCYTES NFR BLD AUTO: 6 % (ref 4–12)
NEUTROPHILS # BLD AUTO: 11.57 THOUSANDS/ΜL (ref 1.85–7.62)
NEUTS SEG NFR BLD AUTO: 86 % (ref 43–75)
NRBC BLD AUTO-RTO: 0 /100 WBCS
PLATELET # BLD AUTO: 174 THOUSANDS/UL (ref 149–390)
PMV BLD AUTO: 11.2 FL (ref 8.9–12.7)
POTASSIUM SERPL-SCNC: 4.1 MMOL/L (ref 3.5–5.3)
PROT SERPL-MCNC: 7.9 G/DL (ref 6.4–8.2)
PROTHROMBIN TIME: 13.7 SECONDS (ref 11.6–14.5)
RBC # BLD AUTO: 4.61 MILLION/UL (ref 3.81–5.12)
SODIUM SERPL-SCNC: 138 MMOL/L (ref 136–145)
TROPONIN I SERPL-MCNC: 1.06 NG/ML
WBC # BLD AUTO: 13.27 THOUSAND/UL (ref 4.31–10.16)

## 2021-03-04 PROCEDURE — 99205 OFFICE O/P NEW HI 60 MIN: CPT | Performed by: SURGERY

## 2021-03-04 PROCEDURE — 99223 1ST HOSP IP/OBS HIGH 75: CPT | Performed by: INTERNAL MEDICINE

## 2021-03-04 PROCEDURE — 36415 COLL VENOUS BLD VENIPUNCTURE: CPT | Performed by: EMERGENCY MEDICINE

## 2021-03-04 PROCEDURE — 85610 PROTHROMBIN TIME: CPT | Performed by: EMERGENCY MEDICINE

## 2021-03-04 PROCEDURE — 99285 EMERGENCY DEPT VISIT HI MDM: CPT | Performed by: EMERGENCY MEDICINE

## 2021-03-04 PROCEDURE — 99285 EMERGENCY DEPT VISIT HI MDM: CPT

## 2021-03-04 PROCEDURE — 84443 ASSAY THYROID STIM HORMONE: CPT | Performed by: INTERNAL MEDICINE

## 2021-03-04 PROCEDURE — 85025 COMPLETE CBC W/AUTO DIFF WBC: CPT | Performed by: EMERGENCY MEDICINE

## 2021-03-04 PROCEDURE — 72125 CT NECK SPINE W/O DYE: CPT

## 2021-03-04 PROCEDURE — 84484 ASSAY OF TROPONIN QUANT: CPT | Performed by: EMERGENCY MEDICINE

## 2021-03-04 PROCEDURE — 70450 CT HEAD/BRAIN W/O DYE: CPT

## 2021-03-04 PROCEDURE — 93005 ELECTROCARDIOGRAM TRACING: CPT

## 2021-03-04 PROCEDURE — 80053 COMPREHEN METABOLIC PANEL: CPT | Performed by: EMERGENCY MEDICINE

## 2021-03-04 PROCEDURE — 85730 THROMBOPLASTIN TIME PARTIAL: CPT | Performed by: EMERGENCY MEDICINE

## 2021-03-04 PROCEDURE — 70486 CT MAXILLOFACIAL W/O DYE: CPT

## 2021-03-04 RX ORDER — ASPIRIN 81 MG/1
324 TABLET, CHEWABLE ORAL ONCE
Status: COMPLETED | OUTPATIENT
Start: 2021-03-04 | End: 2021-03-04

## 2021-03-04 RX ORDER — HEPARIN SODIUM 10000 [USP'U]/100ML
3-20 INJECTION, SOLUTION INTRAVENOUS
Status: DISCONTINUED | OUTPATIENT
Start: 2021-03-04 | End: 2021-03-05

## 2021-03-04 RX ORDER — HEPARIN SODIUM 1000 [USP'U]/ML
1800 INJECTION, SOLUTION INTRAVENOUS; SUBCUTANEOUS
Status: DISCONTINUED | OUTPATIENT
Start: 2021-03-04 | End: 2021-03-05

## 2021-03-04 RX ORDER — HEPARIN SODIUM 1000 [USP'U]/ML
3600 INJECTION, SOLUTION INTRAVENOUS; SUBCUTANEOUS ONCE
Status: COMPLETED | OUTPATIENT
Start: 2021-03-04 | End: 2021-03-04

## 2021-03-04 RX ORDER — HEPARIN SODIUM 1000 [USP'U]/ML
3600 INJECTION, SOLUTION INTRAVENOUS; SUBCUTANEOUS
Status: DISCONTINUED | OUTPATIENT
Start: 2021-03-04 | End: 2021-03-05

## 2021-03-04 RX ADMIN — HEPARIN SODIUM 12 UNITS/KG/HR: 10000 INJECTION, SOLUTION INTRAVENOUS at 22:08

## 2021-03-04 RX ADMIN — ASPIRIN 324 MG: 81 TABLET, CHEWABLE ORAL at 22:07

## 2021-03-04 RX ADMIN — HEPARIN SODIUM 3600 UNITS: 1000 INJECTION INTRAVENOUS; SUBCUTANEOUS at 22:08

## 2021-03-05 ENCOUNTER — APPOINTMENT (INPATIENT)
Dept: RADIOLOGY | Facility: HOSPITAL | Age: 76
DRG: 225 | End: 2021-03-05
Payer: COMMERCIAL

## 2021-03-05 ENCOUNTER — APPOINTMENT (INPATIENT)
Dept: NON INVASIVE DIAGNOSTICS | Facility: HOSPITAL | Age: 76
DRG: 225 | End: 2021-03-05
Payer: COMMERCIAL

## 2021-03-05 PROBLEM — R79.89 ELEVATED TROPONIN I LEVEL: Status: ACTIVE | Noted: 2021-03-05

## 2021-03-05 PROBLEM — M79.641 RIGHT HAND PAIN: Status: ACTIVE | Noted: 2021-03-05

## 2021-03-05 PROBLEM — R77.8 ELEVATED TROPONIN I LEVEL: Status: ACTIVE | Noted: 2021-03-05

## 2021-03-05 PROBLEM — M25.512 LEFT SHOULDER PAIN: Status: ACTIVE | Noted: 2021-03-05

## 2021-03-05 LAB
ANION GAP SERPL CALCULATED.3IONS-SCNC: 6 MMOL/L (ref 4–13)
APTT PPP: 34 SECONDS (ref 23–37)
ATRIAL RATE: 69 BPM
BUN SERPL-MCNC: 21 MG/DL (ref 5–25)
CALCIUM SERPL-MCNC: 9 MG/DL (ref 8.3–10.1)
CHLORIDE SERPL-SCNC: 106 MMOL/L (ref 100–108)
CHOLEST SERPL-MCNC: 206 MG/DL (ref 50–200)
CO2 SERPL-SCNC: 25 MMOL/L (ref 21–32)
CREAT SERPL-MCNC: 0.73 MG/DL (ref 0.6–1.3)
ERYTHROCYTE [DISTWIDTH] IN BLOOD BY AUTOMATED COUNT: 13.6 % (ref 11.6–15.1)
EST. AVERAGE GLUCOSE BLD GHB EST-MCNC: 120 MG/DL
GFR SERPL CREATININE-BSD FRML MDRD: 80 ML/MIN/1.73SQ M
GLUCOSE SERPL-MCNC: 112 MG/DL (ref 65–140)
GLUCOSE SERPL-MCNC: 123 MG/DL (ref 65–140)
HBA1C MFR BLD: 5.8 %
HCT VFR BLD AUTO: 37.6 % (ref 34.8–46.1)
HDLC SERPL-MCNC: 55 MG/DL
HGB BLD-MCNC: 12.2 G/DL (ref 11.5–15.4)
LDLC SERPL CALC-MCNC: 126 MG/DL (ref 0–100)
LDLC SERPL DIRECT ASSAY-MCNC: 131 MG/DL (ref 0–100)
MAGNESIUM SERPL-MCNC: 1.9 MG/DL (ref 1.6–2.6)
MCH RBC QN AUTO: 27.4 PG (ref 26.8–34.3)
MCHC RBC AUTO-ENTMCNC: 32.4 G/DL (ref 31.4–37.4)
MCV RBC AUTO: 84 FL (ref 82–98)
NONHDLC SERPL-MCNC: 151 MG/DL
P AXIS: 84 DEGREES
PLATELET # BLD AUTO: 165 THOUSANDS/UL (ref 149–390)
PMV BLD AUTO: 10.3 FL (ref 8.9–12.7)
POTASSIUM SERPL-SCNC: 4.1 MMOL/L (ref 3.5–5.3)
PR INTERVAL: 176 MS
QRS AXIS: 42 DEGREES
QRSD INTERVAL: 126 MS
QT INTERVAL: 424 MS
QTC INTERVAL: 454 MS
RBC # BLD AUTO: 4.46 MILLION/UL (ref 3.81–5.12)
SODIUM SERPL-SCNC: 137 MMOL/L (ref 136–145)
T WAVE AXIS: 120 DEGREES
TRIGL SERPL-MCNC: 123 MG/DL
TROPONIN I SERPL-MCNC: 1.04 NG/ML
TROPONIN I SERPL-MCNC: 1.14 NG/ML
TSH SERPL DL<=0.05 MIU/L-ACNC: 2.29 UIU/ML (ref 0.36–3.74)
VENTRICULAR RATE: 69 BPM
WBC # BLD AUTO: 8.21 THOUSAND/UL (ref 4.31–10.16)

## 2021-03-05 PROCEDURE — C1769 GUIDE WIRE: HCPCS | Performed by: PHYSICIAN ASSISTANT

## 2021-03-05 PROCEDURE — 93010 ELECTROCARDIOGRAM REPORT: CPT | Performed by: INTERNAL MEDICINE

## 2021-03-05 PROCEDURE — 84484 ASSAY OF TROPONIN QUANT: CPT | Performed by: EMERGENCY MEDICINE

## 2021-03-05 PROCEDURE — 83735 ASSAY OF MAGNESIUM: CPT | Performed by: INTERNAL MEDICINE

## 2021-03-05 PROCEDURE — 80048 BASIC METABOLIC PNL TOTAL CA: CPT | Performed by: INTERNAL MEDICINE

## 2021-03-05 PROCEDURE — 82948 REAGENT STRIP/BLOOD GLUCOSE: CPT

## 2021-03-05 PROCEDURE — 99233 SBSQ HOSP IP/OBS HIGH 50: CPT | Performed by: SURGERY

## 2021-03-05 PROCEDURE — 85730 THROMBOPLASTIN TIME PARTIAL: CPT | Performed by: INTERNAL MEDICINE

## 2021-03-05 PROCEDURE — 36415 COLL VENOUS BLD VENIPUNCTURE: CPT | Performed by: EMERGENCY MEDICINE

## 2021-03-05 PROCEDURE — 80061 LIPID PANEL: CPT | Performed by: STUDENT IN AN ORGANIZED HEALTH CARE EDUCATION/TRAINING PROGRAM

## 2021-03-05 PROCEDURE — 99152 MOD SED SAME PHYS/QHP 5/>YRS: CPT | Performed by: INTERNAL MEDICINE

## 2021-03-05 PROCEDURE — 73130 X-RAY EXAM OF HAND: CPT

## 2021-03-05 PROCEDURE — 85027 COMPLETE CBC AUTOMATED: CPT | Performed by: INTERNAL MEDICINE

## 2021-03-05 PROCEDURE — 83721 ASSAY OF BLOOD LIPOPROTEIN: CPT | Performed by: STUDENT IN AN ORGANIZED HEALTH CARE EDUCATION/TRAINING PROGRAM

## 2021-03-05 PROCEDURE — 83036 HEMOGLOBIN GLYCOSYLATED A1C: CPT | Performed by: STUDENT IN AN ORGANIZED HEALTH CARE EDUCATION/TRAINING PROGRAM

## 2021-03-05 PROCEDURE — 73030 X-RAY EXAM OF SHOULDER: CPT

## 2021-03-05 PROCEDURE — B2111ZZ FLUOROSCOPY OF MULTIPLE CORONARY ARTERIES USING LOW OSMOLAR CONTRAST: ICD-10-PCS | Performed by: INTERNAL MEDICINE

## 2021-03-05 PROCEDURE — 99232 SBSQ HOSP IP/OBS MODERATE 35: CPT | Performed by: PHYSICIAN ASSISTANT

## 2021-03-05 PROCEDURE — 93454 CORONARY ARTERY ANGIO S&I: CPT | Performed by: PHYSICIAN ASSISTANT

## 2021-03-05 PROCEDURE — 93454 CORONARY ARTERY ANGIO S&I: CPT | Performed by: INTERNAL MEDICINE

## 2021-03-05 PROCEDURE — 84484 ASSAY OF TROPONIN QUANT: CPT | Performed by: INTERNAL MEDICINE

## 2021-03-05 PROCEDURE — 99222 1ST HOSP IP/OBS MODERATE 55: CPT | Performed by: INTERNAL MEDICINE

## 2021-03-05 PROCEDURE — 99223 1ST HOSP IP/OBS HIGH 75: CPT | Performed by: INTERNAL MEDICINE

## 2021-03-05 PROCEDURE — C1894 INTRO/SHEATH, NON-LASER: HCPCS | Performed by: PHYSICIAN ASSISTANT

## 2021-03-05 PROCEDURE — 99152 MOD SED SAME PHYS/QHP 5/>YRS: CPT | Performed by: PHYSICIAN ASSISTANT

## 2021-03-05 RX ORDER — NITROGLYCERIN 20 MG/100ML
INJECTION INTRAVENOUS CODE/TRAUMA/SEDATION MEDICATION
Status: COMPLETED | OUTPATIENT
Start: 2021-03-05 | End: 2021-03-05

## 2021-03-05 RX ORDER — AMOXICILLIN AND CLAVULANATE POTASSIUM 875; 125 MG/1; MG/1
1 TABLET, FILM COATED ORAL EVERY 12 HOURS SCHEDULED
Status: DISCONTINUED | OUTPATIENT
Start: 2021-03-05 | End: 2021-03-09 | Stop reason: HOSPADM

## 2021-03-05 RX ORDER — METOPROLOL SUCCINATE 25 MG/1
25 TABLET, EXTENDED RELEASE ORAL EVERY MORNING
Status: DISCONTINUED | OUTPATIENT
Start: 2021-03-05 | End: 2021-03-09

## 2021-03-05 RX ORDER — VANCOMYCIN HYDROCHLORIDE 1 G/200ML
1000 INJECTION, SOLUTION INTRAVENOUS ONCE
Status: COMPLETED | OUTPATIENT
Start: 2021-03-08 | End: 2021-03-08

## 2021-03-05 RX ORDER — HEPARIN SODIUM 1000 [USP'U]/ML
INJECTION, SOLUTION INTRAVENOUS; SUBCUTANEOUS CODE/TRAUMA/SEDATION MEDICATION
Status: COMPLETED | OUTPATIENT
Start: 2021-03-05 | End: 2021-03-05

## 2021-03-05 RX ORDER — LEVOTHYROXINE SODIUM 0.05 MG/1
50 TABLET ORAL DAILY
Status: DISCONTINUED | OUTPATIENT
Start: 2021-03-05 | End: 2021-03-09 | Stop reason: HOSPADM

## 2021-03-05 RX ORDER — MIDAZOLAM HYDROCHLORIDE 2 MG/2ML
INJECTION, SOLUTION INTRAMUSCULAR; INTRAVENOUS CODE/TRAUMA/SEDATION MEDICATION
Status: COMPLETED | OUTPATIENT
Start: 2021-03-05 | End: 2021-03-05

## 2021-03-05 RX ORDER — LANOLIN ALCOHOL/MO/W.PET/CERES
3 CREAM (GRAM) TOPICAL
Status: DISCONTINUED | OUTPATIENT
Start: 2021-03-05 | End: 2021-03-09 | Stop reason: HOSPADM

## 2021-03-05 RX ORDER — ASPIRIN 81 MG/1
81 TABLET ORAL DAILY
Status: DISCONTINUED | OUTPATIENT
Start: 2021-03-05 | End: 2021-03-09 | Stop reason: HOSPADM

## 2021-03-05 RX ORDER — SODIUM CHLORIDE 9 MG/ML
75 INJECTION, SOLUTION INTRAVENOUS CONTINUOUS
Status: DISPENSED | OUTPATIENT
Start: 2021-03-05 | End: 2021-03-05

## 2021-03-05 RX ORDER — ONDANSETRON 2 MG/ML
4 INJECTION INTRAMUSCULAR; INTRAVENOUS EVERY 6 HOURS PRN
Status: DISCONTINUED | OUTPATIENT
Start: 2021-03-05 | End: 2021-03-09 | Stop reason: HOSPADM

## 2021-03-05 RX ORDER — ACETAMINOPHEN 325 MG/1
650 TABLET ORAL EVERY 4 HOURS PRN
Status: DISCONTINUED | OUTPATIENT
Start: 2021-03-05 | End: 2021-03-09 | Stop reason: HOSPADM

## 2021-03-05 RX ORDER — OXYCODONE HYDROCHLORIDE 5 MG/1
2.5 TABLET ORAL EVERY 4 HOURS PRN
Status: DISCONTINUED | OUTPATIENT
Start: 2021-03-05 | End: 2021-03-09 | Stop reason: HOSPADM

## 2021-03-05 RX ORDER — FENTANYL CITRATE 50 UG/ML
INJECTION, SOLUTION INTRAMUSCULAR; INTRAVENOUS CODE/TRAUMA/SEDATION MEDICATION
Status: COMPLETED | OUTPATIENT
Start: 2021-03-05 | End: 2021-03-05

## 2021-03-05 RX ORDER — VERAPAMIL HYDROCHLORIDE 2.5 MG/ML
INJECTION, SOLUTION INTRAVENOUS CODE/TRAUMA/SEDATION MEDICATION
Status: COMPLETED | OUTPATIENT
Start: 2021-03-05 | End: 2021-03-05

## 2021-03-05 RX ORDER — B-COMPLEX WITH VITAMIN C
1 TABLET ORAL
Status: DISCONTINUED | OUTPATIENT
Start: 2021-03-05 | End: 2021-03-09 | Stop reason: HOSPADM

## 2021-03-05 RX ORDER — CHLORAL HYDRATE 500 MG
1000 CAPSULE ORAL DAILY
Status: DISCONTINUED | OUTPATIENT
Start: 2021-03-05 | End: 2021-03-09

## 2021-03-05 RX ORDER — VERAPAMIL HCL 2.5 MG/ML
AMPUL (ML) INTRAVENOUS CODE/TRAUMA/SEDATION MEDICATION
Status: COMPLETED | OUTPATIENT
Start: 2021-03-05 | End: 2021-03-05

## 2021-03-05 RX ORDER — LIDOCAINE HYDROCHLORIDE 10 MG/ML
INJECTION, SOLUTION EPIDURAL; INFILTRATION; INTRACAUDAL; PERINEURAL CODE/TRAUMA/SEDATION MEDICATION
Status: COMPLETED | OUTPATIENT
Start: 2021-03-05 | End: 2021-03-05

## 2021-03-05 RX ORDER — ACETAMINOPHEN 325 MG/1
975 TABLET ORAL EVERY 8 HOURS SCHEDULED
Status: DISCONTINUED | OUTPATIENT
Start: 2021-03-05 | End: 2021-03-09 | Stop reason: HOSPADM

## 2021-03-05 RX ORDER — OXYCODONE HYDROCHLORIDE 5 MG/1
5 TABLET ORAL EVERY 4 HOURS PRN
Status: DISCONTINUED | OUTPATIENT
Start: 2021-03-05 | End: 2021-03-09 | Stop reason: HOSPADM

## 2021-03-05 RX ORDER — ANASTROZOLE 1 MG/1
1 TABLET ORAL
Status: DISCONTINUED | OUTPATIENT
Start: 2021-03-05 | End: 2021-03-09 | Stop reason: HOSPADM

## 2021-03-05 RX ORDER — ONDANSETRON 2 MG/ML
4 INJECTION INTRAMUSCULAR; INTRAVENOUS EVERY 6 HOURS PRN
Status: DISCONTINUED | OUTPATIENT
Start: 2021-03-05 | End: 2021-03-05

## 2021-03-05 RX ORDER — MELATONIN
1000 DAILY
COMMUNITY

## 2021-03-05 RX ORDER — LOSARTAN POTASSIUM 25 MG/1
25 TABLET ORAL
Status: DISCONTINUED | OUTPATIENT
Start: 2021-03-05 | End: 2021-03-09 | Stop reason: HOSPADM

## 2021-03-05 RX ORDER — CHLORHEXIDINE GLUCONATE 0.12 MG/ML
15 RINSE ORAL ONCE
Status: DISCONTINUED | OUTPATIENT
Start: 2021-03-05 | End: 2021-03-09 | Stop reason: HOSPADM

## 2021-03-05 RX ADMIN — MIDAZOLAM 2 MG: 1 INJECTION INTRAMUSCULAR; INTRAVENOUS at 17:04

## 2021-03-05 RX ADMIN — OXYCODONE HYDROCHLORIDE 2.5 MG: 5 TABLET ORAL at 04:35

## 2021-03-05 RX ADMIN — ANASTROZOLE 1 MG: 1 TABLET, COATED ORAL at 21:22

## 2021-03-05 RX ADMIN — SODIUM CHLORIDE 75 ML/HR: 0.9 INJECTION, SOLUTION INTRAVENOUS at 17:56

## 2021-03-05 RX ADMIN — VERAPAMIL HYDROCHLORIDE 1.25 MG: 2.5 INJECTION, SOLUTION INTRAVENOUS at 17:14

## 2021-03-05 RX ADMIN — FENTANYL CITRATE 50 MCG: 50 INJECTION INTRAMUSCULAR; INTRAVENOUS at 17:04

## 2021-03-05 RX ADMIN — MELATONIN 3 MG: at 04:34

## 2021-03-05 RX ADMIN — HEPARIN SODIUM 3600 UNITS: 1000 INJECTION INTRAVENOUS; SUBCUTANEOUS at 05:48

## 2021-03-05 RX ADMIN — MELATONIN 3 MG: at 21:22

## 2021-03-05 RX ADMIN — LOSARTAN POTASSIUM 25 MG: 25 TABLET, FILM COATED ORAL at 21:22

## 2021-03-05 RX ADMIN — NITROGLYCERIN 200 MCG: 20 INJECTION INTRAVENOUS at 17:14

## 2021-03-05 RX ADMIN — HEPARIN SODIUM 3000 UNITS: 1000 INJECTION INTRAVENOUS; SUBCUTANEOUS at 17:13

## 2021-03-05 RX ADMIN — LIDOCAINE HYDROCHLORIDE 1 ML: 10 INJECTION, SOLUTION EPIDURAL; INFILTRATION; INTRACAUDAL; PERINEURAL at 17:08

## 2021-03-05 RX ADMIN — ACETAMINOPHEN 975 MG: 325 TABLET, FILM COATED ORAL at 21:22

## 2021-03-05 RX ADMIN — IOHEXOL 40 ML: 350 INJECTION, SOLUTION INTRAVENOUS at 17:18

## 2021-03-05 NOTE — PROGRESS NOTES
Progress Note - Ester Pritchard 1945, 66 y o  female MRN: [id number]    Unit/Bed#: ED 28 Encounter: [id number]    Primary Care Provider: Luca Vo MD   Date and time admitted to hospital: 3/4/2021  7:31 PM    * Syncope  Assessment & Plan  · Syncopal episode resulting in fall down approximately 10 steps  · EKG without marked abnormality, elevated troponin noted and patient to be on heparin drip  · Patient with known history of hypertrophic cardiomyopathy and NSVT and evaluated 10/2020 for possible dual chamber ICD, concern this may be possible etiology  · Continue telemetry monitoring  · Electrophysiology consultation, keep NPO  · Continue to trend troponin  · Per primary team    Left shoulder pain  Assessment & Plan  · Patient noting left-sided shoulder pain  · Will obtain XR left shoulder to evaluate for osseous abnormalities    Right hand pain  Assessment & Plan  · Patient notes significant hand pain  · Will order right hand XR to evaluate for osseous abnormalities    Elevated troponin I level  Assessment & Plan  · Pt with syncope, fall down flight of stairs   · Troponin 1 06 - 1 14 - 1 04  · Likely Type 2 MI  · Placed on heparin drip on admission  · Cardiology and electrophysiology following        Multiple facial fractures [facility])  Assessment & Plan  · Per radiology: "Comminuted fractures of the nasal bridge, nondiastatic fracture anterior nasal septum, roof and lateral wall of the left maxillary sinus  Minor blood within the left maxillary sinus    No left orbital floor diastases " on CT Facial Bones  · OMFS consulted; recommending non operative treatment at this time  · OMFS recommending sinus precautions, icing for 20 minutes on 20 minutes off, Augmentin therapy for 7 days, and follow up outpatient 1 week after discharge    NSVT (nonsustained ventricular tachycardia) ([facility] [address] )  Assessment & Plan  · Per primary team    Hypothyroidism  Assessment & Plan  · Per primary team    Hypertrophic cardiomyopathy [facility])  Assessment & Plan  · Per primary team    Malignant neoplasm of upper-inner 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 breast [facility])  Assessment & Plan  · Per primary team        TERTIARY TRAUMA SURVEY NOTE    Prophylaxis: Heparin    Disposition: Neutral    Code status:  Level 3 - DNAR and DNI    Consultants:  · Internal Medicine  · Trauma Surgery  · Cardiology  · Electrophysiology  · OMFS    Is the patient 72 years or older?: YES:    1  Before the illness or injury that brought you to the Emergency, did you need someone to help you on a regular basis? 0=No   2  Since the illness or injury that brought you to the Emergency, have you needed more help than usual to take care of yourself? 0=No   3  Have you been hospitalized for one or more nights during the past 6 months (excluding a stay in the Emergency Department)? 0=No   4  In general, do you see well? 1=No   5  In general, do you have serious problems with your memory? 0=No   6  Do you take more than three different medications everyday? 1=Yes   TOTAL   2     Did you order a geriatric consult if the score was 2 or greater?: yes    SUBJECTIVE:     Transfer from: N/A  Outside Films Received: not applicable  Tertiary Exam Due on: 3/5/2021    Mechanism of Injury:Fall    Details related to Injury:  The patient had a syncopal episode that resulted in a fall down 10-15 stairs  Chief Complaint:  Fall    HPI/Last 24 hour events:  · This is a 77-year-old female who had a syncopal episode resulting in fall down approximately 10-15 stairs  She states that she was walking down the stairs from her bedroom to the living room when she got dizzy and fell down the stairs  Positive head strike  The patient had immediate facial pain, left arm pain, and epistaxis  She denies any loss of consciousness and remembers the entire event  The patient takes a baby aspirin daily  Trauma imaging significant for multiple facial fractures    The patient had numerous EKG abnormalities and elevated troponin  Cardiology, electrophysiology, and OMFS all consulted  OMFS recommending non operative management with 7 days of Augmentin, sinus precautions, icing, and follow-up outpatient 1 week post discharge      Active medications:           Current Facility-Administered Medications:     acetaminophen (TYLENOL) tablet 975 mg, 975 mg, Oral, Q8H JEAN, Stopped at 03/05/21 0552    amoxicillin-clavulanate (AUGMENTIN) 875-125 mg per tablet 1 tablet, 1 tablet, Oral, Q12H JEAN    anastrozole (ARIMIDEX) tablet 1 mg, 1 mg, Oral, HS    aspirin (ECOTRIN LOW STRENGTH) EC tablet 81 mg, 81 mg, Oral, Daily    calcium carbonate-vitamin D (OSCAL-D) 500 mg-200 units per tablet 1 tablet, 1 tablet, Oral, Daily With Breakfast    fish oil capsule 1,000 mg, 1,000 mg, Oral, Daily    levothyroxine tablet 50 mcg, 50 mcg, Oral, Daily, Stopped at 03/05/21 0552    losartan (COZAAR) tablet 25 mg, 25 mg, Oral, HS    melatonin tablet 3 mg, 3 mg, Oral, HS, 3 mg at 03/05/21 0434    metoprolol succinate (TOPROL-XL) 24 hr tablet 25 mg, 25 mg, Oral, QAM    multivitamin-minerals (CENTRUM) tablet 1 tablet, 1 tablet, Oral, Daily    ondansetron (ZOFRAN) injection 4 mg, 4 mg, Intravenous, Q6H PRN    oxyCODONE (ROXICODONE) IR tablet 2 5 mg, 2 5 mg, Oral, Q4H PRN, 2 5 mg at 03/05/21 0435    oxyCODONE (ROXICODONE) IR tablet 5 mg, 5 mg, Oral, Q4H PRN    Current Outpatient Medications:     anastrozole (ARIMIDEX) 1 mg tablet    aspirin (ECOTRIN LOW STRENGTH) 81 mg EC tablet    calcium citrate-vitamin D (CITRACAL+D) 315-200 MG-UNIT per tablet    candesartan (ATACAND) 4 mg tablet    levothyroxine 50 mcg tablet    metoprolol succinate (TOPROL-XL) 25 mg 24 hr tablet    Multiple Vitamin (MULTIVITAMIN) tablet    Omega-3 Fatty Acids (FISH OIL) 1,000 mg    acetaZOLAMIDE (DIAMOX) 125 mg tablet    ketorolac (ACULAR) 0 5 % ophthalmic solution    metoprolol tartrate (LOPRESSOR) 25 mg tablet    naproxen (NAPROSYN) 500 mg tablet    ofloxacin (OCUFLOX) 0 3 % ophthalmic solution    prednisoLONE acetate (PRED FORTE) 1 % ophthalmic suspension    OBJECTIVE:     Vitals:   Vitals:    03/05/21 0900   BP: 150/80   Pulse: 74   Resp: 18   Temp:    SpO2: 97%     Physical Exam:   Physical Exam  Vitals signs and nursing note reviewed  Constitutional:       General: She is not in acute distress  Appearance: Normal appearance  She is normal weight  She is not ill-appearing, toxic-appearing or diaphoretic  HENT:      Head: Normocephalic  Right Ear: Tympanic membrane, ear canal and external ear normal  There is no impacted cerumen  Left Ear: Tympanic membrane, ear canal and external ear normal  There is no impacted cerumen  Ears:      Comments: No hemotympanum  No Babin sign     Nose:      Comments: Patient has ecchymosis over her nasal bridge  Tenderness to palpation of nose bilaterally  Epistaxis is hemostatic at this time     Mouth/Throat:      Mouth: Mucous membranes are moist       Comments: Mild abrasion noted to lower lip; otherwise, atraumatic  Eyes:      General: No scleral icterus  Right eye: No discharge  Left eye: No discharge  Extraocular Movements: Extraocular movements intact  Conjunctiva/sclera: Conjunctivae normal       Comments: Significant ecchymosis surrounding both of the patient's eyes and extending onto the patient's forehead and towards her bilateral zygomatic arches  Tenderness to palpation throughout face bilaterally  No gross deformities other than ecchymoses as mentioned   Neck:      Comments: Mild posterior cervical spine and paracervical musculature tenderness to palpation  Mildly decreased range of motion of the patient's cervical spine  No gross deformities or step-offs  Cardiovascular:      Rate and Rhythm: Normal rate and regular rhythm  Pulses: Normal pulses  Heart sounds: Normal heart sounds  No murmur  No friction rub  No gallop         Comments: No heaves, lifts, or thrills  2+ DP and radial pulses bilaterally  Pulmonary:      Effort: Pulmonary effort is normal  No respiratory distress  Breath sounds: Normal breath sounds  No stridor  No wheezing, rhonchi or rales  Chest:      Chest wall: No tenderness  Abdominal:      General: Abdomen is flat  Comments: Nontender, nondistended, and without organomegaly   Musculoskeletal:      Right lower leg: No edema  Left lower leg: No edema  Comments: Patient has tenderness to palpation of her ulnar side of her right hand and her left shoulder in the [place] region  Mild ecchymosis noted to lateral arm on left side  No tenderness to palpation of bilateral upper extremities unless noted above  No clavicular tenderness, pelvic tenderness, or pelvic instability  No tenderness to palpation in bilateral lower extremities; no ecchymosis     Skin:     General: Skin is warm and dry  Capillary Refill: Capillary refill takes less than 2 seconds  Coloration: Skin is not jaundiced or pale  Neurological:      General: No focal deficit present  Mental Status: She is alert and oriented to person, place, and time  Mental status is at baseline  Comments: 5/5 strength in bilateral upper and lower extremities  Sensation is equal and intact in bilateral upper and lower extremities  No focal neurologic deficits or weaknesses  No stroke-like symptoms  Overall, a normal neurologic examination   Psychiatric:         Mood and Affect: Mood normal          Behavior: Behavior normal        I/O:   I/O     None        Invasive Devices: Invasive Devices     Peripheral Intravenous Line            Peripheral IV 03/04/21 Right Antecubital less than 1 day              Imaging:   Ct Head Without Contrast    Result Date: 3/4/2021  Impression: No acute intracranial disease  Hematoma anterior frontal scalp, facial fractures see subsequent facial bone CT  The study was marked in [facility] for immediate notification   Workstation performed: [id number]     Ct Facial Bones Without Contrast    Result Date: 3/4/2021  Impression: Comminuted fractures of the nasal bridge, nondiastatic fracture anterior nasal septum, roof and lateral wall of the left maxillary sinus  Minor blood within the left maxillary sinus  No left orbital floor diastases  The study was marked in [facility] for immediate notification  Workstation performed: [id number]     Ct Spine Cervical Without Contrast    Result Date: 3/4/2021  Impression: No cervical spine fracture or traumatic malalignment    Workstation performed: [id number]       Labs:   CBC:   Lab Results   Component Value Date    WBC 8 21 03/05/2021    HGB 12 2 03/05/2021    HCT 37 6 03/05/2021    MCV 84 03/05/2021     03/05/2021    MCH 27 4 03/05/2021    MCHC 32 4 03/05/2021    RDW 13 6 03/05/2021    MPV 10 3 03/05/2021    NRBC 0 03/04/2021     CMP:   Lab Results   Component Value Date     03/05/2021    CO2 25 03/05/2021    BUN 21 03/05/2021    CREATININE 0 73 03/05/2021    CALCIUM 9 0 03/05/2021    AST 27 03/04/2021    ALT 22 03/04/2021    ALKPHOS 105 03/04/2021    EGFR 80 03/05/2021     Phosphorus: No results found for: PHOS  Magnesium:   Lab Results   Component Value Date    MG 1 9 03/05/2021     Troponin:   Lab Results   Component Value Date    TROPONINI 1 04 (H) 03/05/2021

## 2021-03-05 NOTE — ASSESSMENT & PLAN NOTE
· Syncopal episode resulting in fall down 10-15 stairs  · EKG without marked abnormality, elevated troponin noted and patient to be on heparin GTT if epistaxis is controlled  · Patient with known history of hypertrophic cardiomyopathy and NSVT and evaluated 10/2020 for possible dual chamber ICD, concern this may be possible etiology  · Continue telemetry monitoring  · Electrophysiology consultation, keep NPO  · Continue to trend troponin

## 2021-03-05 NOTE — ED PROVIDER NOTES
Emergency Department Trauma Note  Haja Auguste 66 y o  female MRN: [id number]  Unit/Bed#: ED 28/ED 28 Encounter: [id number]      Trauma Alert: Trauma Acuity: C  Model of Arrival:   via    Trauma Team: Current Providers  Attending Provider: Lorenzo Yu DO  Attending Provider: Abril Joseph DO  Registered Nurse: Cliff Joseph RN  Nursing Student: Tommy Burrell  ED Technician: Baron Vann  Resident: Apryl Perez MD  Registered Nurse: Jack Lamas RN  Registered Nurse: Emiila Sky RN  Registered Nurse: Maddison Atkins RN  Consultants: OMFS, Trauma      History of Present Illness     Chief Complaint:   Chief Complaint   Patient presents with    Fall     dizzy and fall potentially down 2-3 stairs  +head strike, ASA, LOC     HPI:  Haja Auguste is a 66 y o  female who presents with past medical history of cardiomyopathy with EF of 40% and episodes of nonsustained V-tach who presents as a trauma level C after a fall down approximately 10 stairs  Patient is on baby aspirin daily  Patient states she was going to walk down her stairs today  The next thing she knows, she fell down a flight of stairs  She thinks she may have lost consciousness  Denies any prodromal symptoms prior to the fall  She did strike her face at the bottom of the stairs  She was able to get up on her own  She called her daughter who recommended she come to the emergency department for evaluation  Patient has pain in her face and neck  Patient had a nose bleed the time of the injury which is no longer bleeding  Patient states she has mild pain in her left arm  Denies any numbness or tingling in her arms or legs  Denies any blurred vision or double vision  No pain with extraoccular movements  Denies any chest pain, shortness on breath, abdominal pain, nausea or vomiting  Patient was seen by Dr Jayro Dawson from cardiology in October 2020 due to episodes of nonsustained V-tach and mildly depressed LV function  At that time, they discussed placement of dual chamber pacemaker/ICD which she states has not been scheduled yet  Mechanism:Details of Incident: pt states she got dizzy and fell potentially down a few stairs does not remember falling  hitL side of face, bloody nose  Injury Date: 03/04/21            Review of Systems   Constitutional: Negative for chills and fever  HENT: Positive for nosebleeds  Negative for congestion, rhinorrhea and sore throat  Eyes: Negative for visual disturbance  Respiratory: Negative for cough, chest tightness and shortness of breath  Cardiovascular: Negative for chest pain, palpitations and leg swelling  Gastrointestinal: Negative for abdominal pain, constipation, diarrhea, nausea and vomiting  Genitourinary: Negative for difficulty urinating  Musculoskeletal: Positive for neck pain  Negative for arthralgias, back pain and myalgias  Skin: Negative for color change, pallor and rash  Neurological: Positive for syncope  Negative for dizziness, weakness, light-headedness, numbness and headaches  All other systems reviewed and are negative        Historical Information     Immunizations:   Immunization History   Administered Date(s) Administered    INFLUENZA 09/03/2014, 09/28/2015, 10/09/2016, 09/13/2017, 11/08/2018    Influenza Split High Dose Preservative Free IM 10/09/2016    Influenza, high dose seasonal 0 7 mL 10/11/2019, 09/29/2020    Influenza, seasonal, injectable 10/30/2003, 11/13/2006, 11/24/2008, 10/07/2011, 10/26/2012, 10/15/2013, 10/01/2014    Pneumococcal Conjugate 13-Valent 02/22/2016    Pneumococcal Polysaccharide PPV23 01/02/2018    SARS-CoV-2 / COVID-19 mRNA IM (Pfizer-BioNT[employer]) 01/25/2021, 02/14/2021    Tdap 02/22/2016       Past Medical History:   Diagnosis Date    Hypertrophic cardiomyopathy ([facility] [address] [facility] )     Hypothyroidism     Ischemic cardiomyopathy     Malignant neoplasm of left female breast ([facility] [address] ) 02/09/2017    stage IIA    Osteopenia     Trigger finger of all digits of right hand        Family History   Problem Relation Age of Onset    Heart disease Mother     Heart disease Father     Brain cancer Sister     Heart disease Brother     No Known Problems Brother     No Known Problems Daughter     No Known Problems Daughter      Past Surgical History:   Procedure Laterality Date    BREAST BIOPSY Right     years ago benign    BREAST RECONSTRUCTION Left 2/9/2017    Procedure: BREAST RECONSTRUCTION WITH TISSUE EXPANDERS AND ALLODERM ;  Surgeon: Caridad Casey MD;  [facility];  Service:     COLONOSCOPY  2009    KNEE ARTHROSCOPY Bilateral     MASTECTOMY Left 02/09/2017    ME BIOPSY/EXCISION, LYMPH NODE(S) Left 2/9/2017    Procedure: LYMPHOSCINTIGRAPHY; INTRAOPERATIVE LYMPHATIC MAPPING; SENTINEL LYMPH NODE BIOPSY (INJECT AT 0700 BY DR ALBA);   Surgeon: Beth Gunn MD;  [facility];  Service: Surgical Oncology    ME INSJ/RPLCMT BREAST IMPLANT SEP DAY MASTECTOMY Left 5/18/2017    Procedure: BREAST EXPANDER/IMPLANT EXCHANGE; CAPSULECTOMY ;  Surgeon: Caridad Casey MD;  [facility];  Service: Plastics    ME MASTECTOMY, SIMPLE, COMPLETE Left 2/9/2017    Procedure: BREAST MASTECTOMY; FROZEN SECTION ;  Surgeon: Beth Gunn MD;  [facility];  Service: Surgical Oncology    REFRACTIVE SURGERY Bilateral     US GUIDANCE BREAST BIOPSY LEFT EACH ADDITIONAL Left 12/1/2016    US GUIDED BREAST BIOPSY LEFT COMPLETE Left 12/1/2016     Social History     Tobacco Use    Smoking status: Never Smoker    Smokeless tobacco: Never Used   Substance Use Topics    Alcohol use: No     Alcohol/week: 1 0 standard drinks     Types: 1 Glasses of wine per week    Drug use: No     E-Cigarette/Vaping     E-Cigarette/Vaping Substances       Family History:   Family History   Problem Relation Age of Onset    Heart disease Mother     Heart disease Father     Brain cancer Sister     Heart disease Brother     No Known Problems Brother     No Known Problems Daughter     No Known Problems Daughter        Meds/Allergies   Prior to Admission Medications   Prescriptions Last Dose Informant Patient Reported? Taking?    Multiple Vitamin (MULTIVITAMIN) tablet  Self Yes Yes   Sig: Take 1 tablet by mouth daily   Omega-3 Fatty Acids (FISH OIL) 1,000 mg  Self Yes Yes   Sig: Take 1,000 mg by mouth daily   acetaZOLAMIDE (DIAMOX) 125 mg tablet  Self Yes No   anastrozole (ARIMIDEX) 1 mg tablet  Self No Yes   Sig: Take 1 tablet (1 mg total) by mouth daily   aspirin (ECOTRIN LOW STRENGTH) 81 mg EC tablet  Self Yes Yes   Sig: Take 81 mg by mouth daily   calcium citrate-vitamin D (CITRACAL+D) 315-200 MG-UNIT per tablet  Self Yes Yes   Sig: Take 1 tablet by mouth 2 (two) times a day   candesartan (ATACAND) 4 mg tablet  Self Yes Yes   ketorolac (ACULAR) 0 5 % ophthalmic solution  Self Yes No   levothyroxine 50 mcg tablet  Self No Yes   Sig: TAKE 1 TABLET BY MOUTH DAILY   metoprolol succinate (TOPROL-XL) 25 mg 24 hr tablet  Self Yes Yes   Sig: Take 25 mg by mouth every morning   metoprolol tartrate (LOPRESSOR) 25 mg tablet  Self Yes No   Sig: Take 25 mg by mouth 2 (two) times a day 1 pill in am/ half pill in pm   naproxen (NAPROSYN) 500 mg tablet  Self No No   Sig: Take 1 tablet (500 mg total) by mouth 2 (two) times a day with meals   Patient not taking: Reported on 9/17/2020   ofloxacin (OCUFLOX) 0 3 % ophthalmic solution  Self Yes No   prednisoLONE acetate (PRED FORTE) 1 % ophthalmic suspension  Self Yes No      Facility-Administered Medications: None       No Known Allergies    PHYSICAL EXAM    PE limited by: none    Objective   Vitals:   First set: Temperature: 98 2 °F (36 8 °C) (03/04/21 1942)  Pulse: 77 (03/04/21 1936)  Respirations: 18 (03/04/21 1936)  Blood Pressure: 168/92 (03/04/21 1936)  SpO2: 97 % (03/04/21 1936)    Primary Survey:   (A) Airway: Patent, patient able to protect airway  (B) Breathing: Breath sounds equal bilaterally, no respiratory distress  (C) Circulation: Pulses:   pedal  2/4 and radial  2/4  (D) Disabliity:  GCS Total:  15  (E) Expose:  Completed    Secondary Survey: (Click on Physical Exam tab above)  Physical Exam  Vitals signs and nursing note reviewed  Constitutional:       General: She is not in acute distress  Appearance: Normal appearance  She is well-developed and normal weight  She is not ill-appearing, toxic-appearing or diaphoretic  HENT:      Head: Normocephalic  Nose:      Comments: Dried blood present around the nares  No active bleeding  Mouth/Throat:      Mouth: Mucous membranes are moist       Pharynx: Oropharynx is clear  Comments: Small abrasion on the upper lip  Eyes:      General: No visual field deficit  Conjunctiva/sclera: Conjunctivae normal       Pupils: Pupils are equal, round, and reactive to light  Neck:      Musculoskeletal: Neck supple  Muscular tenderness present  Comments: Midline neck tenderness  Cardiovascular:      Rate and Rhythm: Normal rate and regular rhythm  Pulses: Normal pulses  Heart sounds: Normal heart sounds  No murmur  No friction rub  No gallop  Pulmonary:      Effort: Pulmonary effort is normal  No respiratory distress  Breath sounds: Normal breath sounds  No wheezing or rales  Chest:      Chest wall: No tenderness  Abdominal:      General: Bowel sounds are normal  There is no distension  Palpations: Abdomen is soft  Tenderness: There is no abdominal tenderness  There is no guarding or rebound  Musculoskeletal:         General: Tenderness present  Comments: Mild tenderness over the left humerus, no deformity  Full range of motion in all extremities  No pelvic tenderness or instability  Skin:     General: Skin is warm and dry  Capillary Refill: Capillary refill takes less than 2 seconds  Coloration: Skin is not pale  Findings: Bruising present  No erythema or rash        Comments: Ecchymosis over the left lateral humerus  Neurological:      General: No focal deficit present  Mental Status: She is alert and oriented to person, place, and time  GCS: GCS eye subscore is 4  GCS verbal subscore is 5  GCS motor subscore is 6  Cranial Nerves: Cranial nerves are intact  No cranial nerve deficit, dysarthria or facial asymmetry  Sensory: Sensation is intact  No sensory deficit  Motor: Motor function is intact  No weakness  Coordination: Coordination is intact  Psychiatric:         Mood and Affect: Mood normal          Behavior: Behavior normal          Cervical spine cleared by clinical criteria?  No (imaging required)      Invasive Devices     Peripheral Intravenous Line            Peripheral IV 03/04/21 Right Antecubital less than 1 day                Lab Results:   Results Reviewed     Procedure Component Value Units Date/Time    Troponin I [[id number]]  (Abnormal) Collected: 03/05/21 0045    Lab Status: Final result Specimen: Blood from Arm, Right Updated: 03/05/21 0109     Troponin I 1 14 ng/mL     Troponin I [[id number]]     Lab Status: No result Specimen: Blood     Troponin I [[id number]]     Lab Status: No result Specimen: Blood     TSH, 3rd generation [[id number]]     Lab Status: No result Specimen: Blood     APTT six (6) hours after Heparin bolus/drip initiation or dosing change [[id number]]  (Normal) Resulted: 03/04/21 2234    Lab Status: Final result Specimen: Blood Updated: 03/04/21 2234     PTT 28 seconds     Protime-INR [[id number]]  (Normal) Resulted: 03/04/21 2234    Lab Status: Final result Specimen: Blood Updated: 03/04/21 2234     Protime 13 7 seconds      INR 1 05    Troponin I [[id number]]  (Abnormal) Collected: 03/04/21 2031    Lab Status: Final result Specimen: Blood from Arm, Right Updated: 03/04/21 2104     Troponin I 1 06 ng/mL     Comprehensive metabolic panel [[id number]] Collected: 03/04/21 2031    Lab Status: Final result Specimen: Blood from Arm, Right Updated: 03/04/21 2103 Sodium 138 mmol/L      Potassium 4 1 mmol/L      Chloride 105 mmol/L      CO2 28 mmol/L      ANION GAP 5 mmol/L      BUN 24 mg/dL      Creatinine 0 86 mg/dL      Glucose 117 mg/dL      Calcium 9 2 mg/dL      AST 27 U/L      ALT 22 U/L      Alkaline Phosphatase 105 U/L      Total Protein 7 9 g/dL      Albumin 3 8 g/dL      Total Bilirubin 0 52 mg/dL      eGFR 65 ml/min/1 73sq m     Narrative:      National Kidney Disease Foundation guidelines for Chronic Kidney Disease (CKD):     Stage 1 with normal or high GFR (GFR > 90 mL/min/1 73 square meters)    Stage 2 Mild CKD (GFR = 60-89 mL/min/1 73 square meters)    Stage 3A Moderate CKD (GFR = 45-59 mL/min/1 73 square meters)    Stage 3B Moderate CKD (GFR = 30-44 mL/min/1 73 square meters)    Stage 4 Severe CKD (GFR = 15-29 mL/min/1 73 square meters)    Stage 5 End Stage CKD (GFR <15 mL/min/1 73 square meters)  Note: GFR calculation is accurate only with a steady state creatinine    CBC and differential [[id number]]  (Abnormal) Collected: 03/04/21 2031    Lab Status: Final result Specimen: Blood from Arm, Right Updated: 03/04/21 2042     WBC 13 27 Thousand/uL      RBC 4 61 Million/uL      Hemoglobin 12 5 g/dL      Hematocrit 39 9 %      MCV 87 fL      MCH 27 1 pg      MCHC 31 3 g/dL      RDW 13 8 %      MPV 11 2 fL      Platelets 471 Thousands/uL      nRBC 0 /100 WBCs      Neutrophils Relative 86 %      Immat GRANS % 1 %      Lymphocytes Relative 6 %      Monocytes Relative 6 %      Eosinophils Relative 1 %      Basophils Relative 0 %      Neutrophils Absolute 11 57 Thousands/µL      Immature Grans Absolute 0 06 Thousand/uL      Lymphocytes Absolute 0 75 Thousands/µL      Monocytes Absolute 0 77 Thousand/µL      Eosinophils Absolute 0 07 Thousand/µL      Basophils Absolute 0 05 Thousands/µL                  Imaging Studies:   Direct to CT: Yes  CT head without contrast   Final Result by Marissa Thomas MD (03/04 2030)      No acute intracranial disease  Hematoma anterior frontal scalp, facial fractures see subsequent facial bone CT  The study was marked in [facility] for immediate notification  Workstation performed: [id number]         CT spine cervical without contrast   Final Result by Merissa Adams MD (03/04 2034)      No cervical spine fracture or traumatic malalignment  Workstation performed: [id number]         CT facial bones without contrast   Final Result by Merissa Adams MD (03/04 2040)      Comminuted fractures of the nasal bridge, nondiastatic fracture anterior nasal septum, roof and lateral wall of the left maxillary sinus  Minor blood within the left maxillary sinus  No left orbital floor diastases  The study was marked in [facility] for immediate notification  Workstation performed: [id number]               Procedures  ECG 12 Lead Documentation Only    Date/Time: 3/4/2021 9:37 PM  Performed by: Jair Reaves MD  Authorized by: Jair Reaves MD     Indications / Diagnosis:  Syncope  ECG reviewed by me, the ED Provider: yes    Patient location:  ED  Previous ECG:     Previous ECG:  Compared to current    Comparison ECG info:  Jan 2017    Similarity:  Changes noted    Comparison to cardiac monitor: Yes    Interpretation:     Interpretation: abnormal    Rate:     ECG rate:  69    ECG rate assessment: normal    Rhythm:     Rhythm: sinus rhythm    Ectopy:     Ectopy: none    QRS:     QRS axis:  Normal  Conduction:     Conduction: abnormal      Abnormal conduction: non-specific intraventricular conduction delay    ST segments:     ST segments:  Abnormal    Depression:  V4, V5 and V6  T waves:     T waves: inverted      Inverted:  I, aVL, V4, V5 and V6  Q waves:     Q waves:  I and aVL             ED Course           MDM    [de-identified] female with past history of cardiomyopathy with EF of 40% and history of NSVT who presents as a trauma level C after falling down a flight of stairs    Patient syncopized causing her to fall  Positive LOC  Positive headstrike  Ecchymosis and tenderness over the nasal bone and left maxillary sinus  Will obtain CT head, CT C-spine,  CT facial bones  Will also obtain cardiac workup secondary to syncope  CT facial bone shows comminuted fracture of nasal bridge along with fractures of the ribs and lateral wall the left maxillary sinus  CT head and CT C-spine negative  C-spine cleared  Reviewed labs, troponin 1 06  CBC and CMP without any marked abnormalities  Will admit patient for suspected cardiac syncope  Discussed with Dr Liset Ellis from trauma since patient came in as a trauma level C, will admit patient to Medicine with trauma consult  Discussed with Dr Renzo Posadas from [facility] for admission  Will start patient on heparin for elevated troponin and place cardiology consult  Disposition    Final diagnoses:   Closed fracture of nasal bone, initial encounter   Syncope   Elevated troponin     Time reflects when diagnosis was documented in both MDM as applicable and the Disposition within this note     Time User Action Codes Description Comment    3/4/2021  9:52 PM Genesis Mccord Add [S02  2XXA] Closed fracture of nasal bone, initial encounter     3/4/2021  9:52 PM Rosaagustina Mccord Add [R55] Syncope     3/4/2021  9:52 PM Rosaagustina Mccord Add [R77 8] Elevated troponin     3/4/2021 11:43 PM Sarah Primrose Add [I42 2] Hypertrophic cardiomyopathy [facility])       ED Disposition     ED Disposition Condition Date/Time Comment    Admit Stable Thu Mar 4, 2021  9:52 PM Case was discussed with DIOGO and the patient's admission status was agreed to be Admission Status: inpatient status to the service of Dr Renzo Posadas  Follow-up Information    None       Patient's Medications   Discharge Prescriptions    No medications on file     No discharge procedures on file      PDMP Review       Value Time User    PDMP Reviewed  Yes 3/4/2021 10:00 PM Narvis More, DO          ED Provider  Electronically Signed by         Pelon Estrada MD  03/05/21 5179

## 2021-03-05 NOTE — ASSESSMENT & PLAN NOTE
· Patient noting left-sided shoulder pain  · Will obtain XR left shoulder to evaluate for osseous abnormalities

## 2021-03-05 NOTE — ASSESSMENT & PLAN NOTE
· Syncopal episode resulting in fall down approximately 10 steps  · EKG without marked abnormality, elevated troponin noted and patient to be on heparin drip  · Patient with known history of hypertrophic cardiomyopathy and NSVT and evaluated 10/2020 for possible dual chamber ICD, concern this may be possible etiology  · Continue telemetry monitoring  · Electrophysiology consultation, keep NPO  · Continue to trend troponin  · Per primary team

## 2021-03-05 NOTE — CONSULTS
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 ([facility] [address] )    Hypertrophic cardiomyopathy ([facility] [address] )    Hypothyroidism    NSVT (nonsustained ventricular tachycardia) ([facility] [address] )        Trauma Plan:   Analgesia  Monitor epistaxis  Medical admission for workup of syncope  OMFS consult for facial fracture evaluation    Chief Complaint:   Fall  History of Present Illness   Physician Requesting Evaluation: Thao Burrows DO  Reason for Evaluation / Principal Problem: Syncopal fall with facial bone fractures  HPI:  Bianca Taylor is a 66 y o  female who presents after a fall down stairs at home  States that she was walking downstairs from her bedroom to living room when she got dizzy and fell down stairs with headstrike  Had immediate facial pain, left arm pain, and noted bleeding from nose and mouth  Denies LOC  Takes ASA 81 daily for ischemic cardiomyopathy and history of nonsustained v tach  Additional history of breast cancer for which she takes anastrazole  Denies current headache, nausea, vomiting, issues with vision, chest pain, shortness of breath, numbness weakness or tingling  Mechanism:Fall    Trauma evaluation  Consult performed by: Shannon Casey MD  Consult ordered by: Sharita Apodaca DO          Review of Systems   HENT: Positive for facial swelling and nosebleeds  Negative for trouble swallowing  Eyes: Negative for visual disturbance  Respiratory: Negative for chest tightness, shortness of breath and stridor  Cardiovascular: Negative for chest pain and palpitations     Gastrointestinal: Negative for abdominal pain, nausea and vomiting  Musculoskeletal: Positive for arthralgias (Left knee pain) and neck pain  Neurological: Positive for dizziness (At time of fall, since resolved) and headaches (Headache during day before fall, accompanied by nausea  Resolved with tylenol)  Negative for weakness and numbness  Psychiatric/Behavioral: Negative for behavioral problems  Historical Information    Efforts to obtain history included the following sources: obtained from other records, and patient    Past Medical History:   Diagnosis Date    Hypertrophic cardiomyopathy ([facility] [address] )     Hypothyroidism     Ischemic cardiomyopathy     Malignant neoplasm of left female breast ([facility] [address] ) 02/09/2017    stage IIA    Osteopenia     Trigger finger of all digits of right hand      Past Surgical History:   Procedure Laterality Date    BREAST BIOPSY Right     years ago benign    BREAST RECONSTRUCTION Left 2/9/2017    Procedure: BREAST RECONSTRUCTION WITH TISSUE EXPANDERS AND ALLODERM ;  Surgeon: Tamie Peterson MD;  [facility];  Service:     COLONOSCOPY  2009    KNEE ARTHROSCOPY Bilateral     MASTECTOMY Left 02/09/2017    MA BIOPSY/EXCISION, LYMPH NODE(S) Left 2/9/2017    Procedure: LYMPHOSCINTIGRAPHY; INTRAOPERATIVE LYMPHATIC MAPPING; SENTINEL LYMPH NODE BIOPSY (INJECT AT 0700 BY DR ALBA);   Surgeon: Monet Santos MD;  [facility];  Service: Surgical Oncology    MA INSJ/RPLCMT BREAST IMPLANT SEP DAY MASTECTOMY Left 5/18/2017    Procedure: BREAST EXPANDER/IMPLANT EXCHANGE; CAPSULECTOMY ;  Surgeon: Tamie Peterson MD;  [facility];  Service: Plastics    MA MASTECTOMY, SIMPLE, COMPLETE Left 2/9/2017    Procedure: BREAST MASTECTOMY; FROZEN SECTION ;  Surgeon: Monet Santos MD;  [facility];  Service: Surgical Oncology    REFRACTIVE SURGERY Bilateral     US GUIDANCE BREAST BIOPSY LEFT EACH ADDITIONAL Left 12/1/2016    US GUIDED BREAST BIOPSY LEFT COMPLETE Left 12/1/2016     Social History   Social History     Substance and Sexual Activity   Alcohol Use No    Alcohol/week: 1 0 standard drinks    Types: 1 Glasses of wine per week     Social History     Substance and Sexual Activity   Drug Use No     E-Cigarette/Vaping     E-Cigarette/Vaping Substances     Social History     Tobacco Use   Smoking Status Never Smoker   Smokeless Tobacco Never Used     Immunization History   Administered Date(s) Administered    INFLUENZA 09/03/2014, 09/28/2015, 10/09/2016, 09/13/2017, 11/08/2018    Influenza Split High Dose Preservative Free IM 10/09/2016    Influenza, high dose seasonal 0 7 mL 10/11/2019, 09/29/2020    Influenza, seasonal, injectable 10/30/2003, 11/13/2006, 11/24/2008, 10/07/2011, 10/26/2012, 10/15/2013, 10/01/2014    Pneumococcal Conjugate 13-Valent 02/22/2016    Pneumococcal Polysaccharide PPV23 01/02/2018    SARS-CoV-2 / COVID-19 mRNA IM (Pf[employer]) 01/25/2021, 02/14/2021    Tdap 02/22/2016     Family History: Heart disease in parents        Meds/Allergies   PTA meds:   Prior to Admission Medications   Prescriptions Last Dose Informant Patient Reported? Taking?    Multiple Vitamin (MULTIVITAMIN) tablet  Self Yes Yes   Sig: Take 1 tablet by mouth daily   Omega-3 Fatty Acids (FISH OIL) 1,000 mg  Self Yes Yes   Sig: Take 1,000 mg by mouth daily   acetaZOLAMIDE (DIAMOX) 125 mg tablet  Self Yes No   anastrozole (ARIMIDEX) 1 mg tablet  Self No Yes   Sig: Take 1 tablet (1 mg total) by mouth daily   aspirin (ECOTRIN LOW STRENGTH) 81 mg EC tablet  Self Yes Yes   Sig: Take 81 mg by mouth daily   calcium citrate-vitamin D (CITRACAL+D) 315-200 MG-UNIT per tablet  Self Yes Yes   Sig: Take 1 tablet by mouth 2 (two) times a day   candesartan (ATACAND) 4 mg tablet  Self Yes Yes   ketorolac (ACULAR) 0 5 % ophthalmic solution  Self Yes No   levothyroxine 50 mcg tablet  Self No Yes   Sig: TAKE 1 TABLET BY MOUTH DAILY   metoprolol succinate (TOPROL-XL) 25 mg 24 hr tablet  Self Yes Yes   Sig: Take 25 mg by mouth every morning   metoprolol tartrate (LOPRESSOR) 25 mg tablet  Self Yes No   Sig: Take 25 mg by mouth 2 (two) times a day 1 pill in am/ half pill in pm   naproxen (NAPROSYN) 500 mg tablet  Self No No   Sig: Take 1 tablet (500 mg total) by mouth 2 (two) times a day with meals   Patient not taking: Reported on 9/17/2020   ofloxacin (OCUFLOX) 0 3 % ophthalmic solution  Self Yes No   prednisoLONE acetate (PRED FORTE) 1 % ophthalmic suspension  Self Yes No      Facility-Administered Medications: None       No Known Allergies      Objective   Vitals:   First set: Temperature: 98 2 °F (36 8 °C) (03/04/21 1942)  Pulse: 77 (03/04/21 1936)  Respirations: 18 (03/04/21 1936)  Blood Pressure: 168/92 (03/04/21 1936)    Invasive Devices     Peripheral Intravenous Line            Peripheral IV 03/04/21 Right Antecubital less than 1 day                Neurologic Exam     Mental Status   Level of consciousness: alert    Cranial Nerves     CN III, IV, VI   Pupils are equal, round, and reactive to light  Physical Exam  HENT:      Head: Normocephalic  Contusion (Multiple facial and forehead contusions) present  Nose: Signs of injury and nasal tenderness present  Right Nostril: Epistaxis present  Left Nostril: Epistaxis present  Right Sinus: Frontal sinus tenderness present  Left Sinus: Frontal sinus tenderness present  Mouth/Throat:      Mouth: Mucous membranes are moist       Pharynx: Oropharynx is clear  Eyes:      Conjunctiva/sclera: Conjunctivae normal       Pupils: Pupils are equal, round, and reactive to light  Neck:      Musculoskeletal: Normal range of motion  Pain with movement and muscular tenderness present  No spinous process tenderness  Trachea: Trachea normal    Cardiovascular:      Rate and Rhythm: Normal rate and regular rhythm  Pulses: Normal pulses  Radial pulses are 2+ on the right side and 2+ on the left side  Heart sounds: Normal heart sounds     Pulmonary: Effort: Pulmonary effort is normal       Breath sounds: Normal breath sounds  Chest:      Chest wall: No tenderness  Abdominal:      General: Abdomen is protuberant  Palpations: Abdomen is soft  Tenderness: There is no abdominal tenderness  There is no guarding  Musculoskeletal:      Left knee: Tenderness found  Skin:     General: Skin is warm  Neurological:      General: No focal deficit present  GCS: GCS eye subscore is 4  GCS verbal subscore is 5  GCS motor subscore is 6  Sensory: Sensation is intact  Motor: Motor function is intact  Lab Results:   Results: I have personally reviewed pertinent reports   , BMP/CMP:   Lab Results   Component Value Date    SODIUM 138 03/04/2021    K 4 1 03/04/2021     03/04/2021    CO2 28 03/04/2021    BUN 24 03/04/2021    CREATININE 0 86 03/04/2021    CALCIUM 9 2 03/04/2021    AST 27 03/04/2021    ALT 22 03/04/2021    ALKPHOS 105 03/04/2021    EGFR 65 03/04/2021   , CBC:   Lab Results   Component Value Date    WBC 13 27 (H) 03/04/2021    HGB 12 5 03/04/2021    HCT 39 9 03/04/2021    MCV 87 03/04/2021     03/04/2021    MCH 27 1 03/04/2021    MCHC 31 3 (L) 03/04/2021    RDW 13 8 03/04/2021    MPV 11 2 03/04/2021    NRBC 0 03/04/2021    and Coagulation: No results found for: PT, INR, APTT  Imaging/EKG Studies: Results: I have personally reviewed pertinent reports    , CT Scan Head: No acute intracranial abnormality; forehead hematoma and facial bone fracture, CT Scan C-Spine: No fractures or dislocations, CT Scan Face: Nasal bone fracture, left maxillary sinus fracture    Code Status: Prior  Advance Directive and Living Will:      Power of :    POLST:

## 2021-03-05 NOTE — ASSESSMENT & PLAN NOTE
· Pt with syncope, fall down flight of stairs   · Troponin 1 06 - 1 14 - 1 04  · Likely Type 2 MI  · Placed on heparin drip on admission  · Cardiology and electrophysiology following

## 2021-03-05 NOTE — ASSESSMENT & PLAN NOTE
· Per radiology: "Comminuted fractures of the nasal bridge, nondiastatic fracture anterior nasal septum, roof and lateral wall of the left maxillary sinus  Minor blood within the left maxillary sinus    No left orbital floor diastases " on CT Facial Bones  · OMFS consulted; recommending non operative treatment at this time  · OMFS recommending sinus precautions, icing for 20 minutes on 20 minutes off, Augmentin therapy for 7 days, and follow up outpatient 1 week after discharge

## 2021-03-05 NOTE — CONSULTS
Consultation - Electrophysiology-Cardiology (EP)   Ashley Ann 66 y o  female MRN: [id number]  Unit/Bed#: ED 28 Encounter: [id number]      Inpatient consult to Electrophysiology  Consult performed by: Kirsten Brantley PA-C  Consult ordered by: Nel Pitt DO          Assessment/Plan     Assessment:  1  Syncope with facial fractures (being treated conservatively), left shoulder/right hand pain being worked up by trauma  2  Hypertrophic cardiomyopathy, LVEF 35% per echo in 2019  3  Elevated troponin, unclear type 1 versus type 2   A ) prior nuclear stress test, no significant findings   B ) exertional fatigue/shortness of breath as an outpatient  4  Known nonsustained VT  5  Hypothyroidism  6  Left-sided breast cancer maintained on anastrozole, status post left mastectomy with sentinel node biopsy      Plan:  Given her mildly elevated troponin, recommend cardiac catheterization to rule out obstructive CAD as a possible cause  If negative, would then recommend proceeding with right-sided dual-chamber ICD implantation  Timing of this (today versus Monday) is dependent on timing of cath  This was discussed with the patient, her daughter, as well as her primary cardiologist, and everyone is in agreement with this plan  IV heparin was discontinued earlier this morning in preparation for possible device placement  She will undergo cardiac catheterization this afternoon, will await those results  She has a reported history of sinus bradycardia, after device implantation we can hopefully up titrate her beta-blocker therapy  ADDENDUM:  Due to timing of cardiac catheterization Friday afternoon, will schedule ICD implantation on Monday 3/8/2021  Please make sure patient is NPO at midnight with a m  Labs on Monday  Her device again will be right-sided  IV antibiotics are ordered and on-call to be given in the EP lab        History of Present Illness   Physician Requesting Consult: Supriya Cabrera, MD  Reason for Consult / Principal Problem:  Syncope, HCM, known nonsustained VT    HPI: Mik Kingsley is a 66y o  year old female with hypertrophic cardiomyopathy, reduced LVEF 35% per echo report in 2019, nonsustained VT, hypothyroidism, and prior breast cancer status post left mastectomy with sentinel node biopsy  She typically follows with Dr Garcia Garcia as an outpatient, but was also previously seen by Dr Terrial Opitz in EP consultation  Given her history of HCM, reduced EF, and nonsustained VT, ICD implantation was recommended  After lengthy discussion the patient and her family preferred to think about the procedure before proceeding  She presented to the hospital this admission with syncope which led to a fall down the stairs  She suffered multiple facial fractures, as well as left shoulder and right wrist pain  Thus far, conservative management has been recommended, and further imaging is pending  In the recent past, she reported worsening dyspnea with exertion and fatigue  Several days prior to this event she had a headache and nausea  She denies actual chest pain  Her syncope was sudden, without prodrome  She denies associated palpitations, or preceding dizziness or lightheadedness  She thinks she regained consciousness quickly  She denies similar prior history  She still admits to mild ongoing pain in the back of her head and her left shoulder  Per outpatient cardiology notes, she also has a history of sinus bradycardia  She underwent an event monitor 08/2020 which showed several runs of nonsustained VT  She had a recent stress test which showed no evidence of ischemia, however target heart rate was not achieved  She is maintained on Toprol-XL 25 mg daily at home  Review of Systems  ROS as noted above, otherwise 12 point review of systems was performed and is negative         Historical Information   Past Medical History:   Diagnosis Date    Hypertrophic cardiomyopathy ([facility] [address] )     Hypothyroidism     Ischemic cardiomyopathy     Malignant neoplasm of left female breast ([facility] [address] ) 02/09/2017    stage IIA    Osteopenia     Trigger finger of all digits of right hand      Past Surgical History:   Procedure Laterality Date    BREAST BIOPSY Right     years ago benign    BREAST RECONSTRUCTION Left 2/9/2017    Procedure: BREAST RECONSTRUCTION WITH TISSUE EXPANDERS AND ALLODERM ;  Surgeon: Sen Ortiz MD;  [facility];  Service:     COLONOSCOPY  2009    KNEE ARTHROSCOPY Bilateral     MASTECTOMY Left 02/09/2017    NC BIOPSY/EXCISION, LYMPH NODE(S) Left 2/9/2017    Procedure: LYMPHOSCINTIGRAPHY; INTRAOPERATIVE LYMPHATIC MAPPING; SENTINEL LYMPH NODE BIOPSY (INJECT AT 0700 BY DR ALBA);   Surgeon: Shantell Ryan MD;  [facility];  Service: Surgical Oncology    NC INSJ/RPLCMT BREAST IMPLANT SEP DAY MASTECTOMY Left 5/18/2017    Procedure: BREAST EXPANDER/IMPLANT EXCHANGE; CAPSULECTOMY ;  Surgeon: Sen Ortiz MD;  [facility];  Service: Plastics    NC MASTECTOMY, SIMPLE, COMPLETE Left 2/9/2017    Procedure: BREAST MASTECTOMY; FROZEN SECTION ;  Surgeon: Shantell Ryan MD;  [facility];  Service: Surgical Oncology    REFRACTIVE SURGERY Bilateral     US GUIDANCE BREAST BIOPSY LEFT EACH ADDITIONAL Left 12/1/2016    US GUIDED BREAST BIOPSY LEFT COMPLETE Left 12/1/2016     Social History     Substance and Sexual Activity   Alcohol Use No    Alcohol/week: 1 0 standard drinks    Types: 1 Glasses of wine per week     Social History     Substance and Sexual Activity   Drug Use No     Social History     Tobacco Use   Smoking Status Never Smoker   Smokeless Tobacco Never Used     Family History:   Family History   Problem Relation Age of Onset    Heart disease Mother     Heart disease Father     Brain cancer Sister     Heart disease Brother     No Known Problems Brother     No Known Problems Daughter     No Known Problems Daughter        Meds/Allergies   Hospital Medications: Current Facility-Administered Medications   Medication Dose Route Frequency    acetaminophen (TYLENOL) tablet 975 mg  975 mg Oral Q8H [facility]    amoxicillin-clavulanate (AUGMENTIN) 875-125 mg per tablet 1 tablet  1 tablet Oral Q12H JEAN    anastrozole (ARIMIDEX) tablet 1 mg  1 mg Oral HS    aspirin (ECOTRIN LOW STRENGTH) EC tablet 81 mg  81 mg Oral Daily    calcium carbonate-vitamin D (OSCAL-D) 500 mg-200 units per tablet 1 tablet  1 tablet Oral Daily With Breakfast    fish oil capsule 1,000 mg  1,000 mg Oral Daily    levothyroxine tablet 50 mcg  50 mcg Oral Daily    losartan (COZAAR) tablet 25 mg  25 mg Oral HS    melatonin tablet 3 mg  3 mg Oral HS    metoprolol succinate (TOPROL-XL) 24 hr tablet 25 mg  25 mg Oral QAM    multivitamin-minerals (CENTRUM) tablet 1 tablet  1 tablet Oral Daily    ondansetron (ZOFRAN) injection 4 mg  4 mg Intravenous Q6H PRN    oxyCODONE (ROXICODONE) IR tablet 2 5 mg  2 5 mg Oral Q4H PRN    oxyCODONE (ROXICODONE) IR tablet 5 mg  5 mg Oral Q4H PRN     Home Medications: (Not in a hospital admission)      No Known Allergies    Objective   Vitals: Blood pressure 120/59, pulse 62, temperature 98 2 °F (36 8 °C), temperature source Oral, resp  rate 16, SpO2 96 %    Orthostatic Blood Pressures      Most Recent Value   Blood Pressure  120/59 filed at 03/05/2021 1100   Patient Position - Orthostatic VS  Lying filed at 03/05/2021 1839          No intake or output data in the 24 hours ending 03/05/21 1148    Invasive Devices     Peripheral Intravenous Line            Peripheral IV 03/04/21 Right Antecubital less than 1 day                Physical Exam   GEN: NAD, alert and oriented x 3, well appearing  SKIN: dry without rashes on exposed skin, b/l orbital ecchymosis noted  HEENT: NCAT, PERRL, EOMs intact  NECK: No JVD appreciated  CARDIOVASCULAR: RRR, normal S1, S2 without murmurs, rubs, or gallops appreciated  LUNGS: Clear to auscultation bilaterally without wheezes, rhonchi, or rales  ABDOMEN: Soft, nontender, nondistended  EXTREMITIES/VASCULAR: perfused without clubbing, cyanosis, or LE edema b/l  PSYCH: Normal mood and affect  NEURO: CN ll-Xll grossly intact        Lab Results: I have personally reviewed pertinent lab results  Results from last 7 days   Lab Units 03/05/21 0427 03/04/21 2031   WBC Thousand/uL 8 21 13 27*   HEMOGLOBIN g/dL 12 2 12 5   HEMATOCRIT % 37 6 39 9   PLATELETS Thousands/uL 165 174     Results from last 7 days   Lab Units 03/05/21 0427 03/04/21 2031   POTASSIUM mmol/L 4 1 4 1   CHLORIDE mmol/L 106 105   CO2 mmol/L 25 28   BUN mg/dL 21 24   CREATININE mg/dL 0 73 0 86   CALCIUM mg/dL 9 0 9 2     Results from last 7 days   Lab Units 03/05/21 0427 03/04/21  2234   INR   --  1 05   PTT seconds 34 28     Results from last 7 days   Lab Units 03/05/21 0427   MAGNESIUM mg/dL 1 9       Imaging: I have personally reviewed pertinent reports  ECHO:   Results for orders placed in visit on 02/24/16   Echo complete with contrast if indicated    Narrative 4/18/2011       STRESS TEST 6/2019: This stress-echo was normal at moderate workload, equivalent of 7  METs  Functional capacity was average  Left ventricular systolic function was mildly reduced at rest (EF  already improved compared to echo in April 2019 with recent medical  therapy changes)         EKG:         TELEMETRY:        VTE Prophylaxis: Heparin gtt

## 2021-03-05 NOTE — ASSESSMENT & PLAN NOTE
· Known history of this, has been seen by EP and evaluated for Bi V ICD  · Electrophysiology consultation while inpatient

## 2021-03-05 NOTE — H&P
H&P- Ashley Ann 1945, 66 y o  female MRN: [id number]    Unit/Bed#: ED 28 Encounter: [id number]    Primary Care Provider: Yayo Mcwilliams MD   Date and time admitted to hospital: 3/4/2021  7:31 PM        * Syncope  Assessment & Plan  · Syncopal episode resulting in fall down 10-15 stairs  · EKG without marked abnormality, elevated troponin noted and patient to be on heparin GTT if epistaxis is controlled  · Patient with known history of hypertrophic cardiomyopathy and NSVT and evaluated 10/2020 for possible dual chamber ICD, concern this may be possible etiology  · Continue telemetry monitoring  · Electrophysiology consultation, keep NPO  · Continue to trend troponin    Multiple facial fractures (HCC)  Assessment & Plan  · CT facial bones revealing "Comminuted fractures of the nasal bridge, nondiastatic fracture anterior nasal septum, roof and lateral wall of the left maxillary sinus "  · Patient with some persistent epistaxis  · Was seen by trauma in ED, recommendations noted  · To be evaluated by OMFS in a m   · Monitor epistaxis    NSVT (nonsustained ventricular tachycardia) (HCC)  Assessment & Plan  · Known history of this, has been seen by EP and evaluated for Bi V ICD  · Electrophysiology consultation while inpatient    Hypothyroidism  Assessment & Plan  · TSH to be recheck  · Continue home dose levothyroxine in the interim    Hypertrophic cardiomyopathy ([facility] [address] )  Assessment & Plan  · Follows with Dr Michelle Albert as cardiologist, was seen by electrophysiology for possible BiV-ICD  · Stress echo 02/2021 reported unrevealing, had apparent echo as outpatient 2020 with EF 40%  · Electrophysiology consultation will be appreciated    Malignant neoplasm of upper-inner 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 breast [facility])  Assessment & Plan  · Follows with [facility]  · Continue anastrozole        VTE Prophylaxis: Heparin Drip  / sequential compression device   Code Status: DNR/DNI  POLST: POLST form is not discussed and not completed at this time  Discussion with family:  Sister at bedside    Anticipated Length of Stay:  Patient will be admitted on an Inpatient basis with an anticipated length of stay of  Greater than 2 midnights  Justification for Hospital Stay: Please see detailed plans noted above  Chief Complaint:     Suspected syncope  History of Present Illness:  Kendell Blackman is a 66 y o  female who has a past medical history significant for hypertrophic cardiomyopathy EF 40%, nonsustained V-tach, hypothyroidism, and history of breast cancer maintained on anastrozole  Notably has been evaluated by electrophysiology for NSVT and is currently being worked up for potential dual chamber ICD placement  Presented today after apparent syncopal episode where patient reports she fell down 10-15 steps at home with reported dizziness prior to fall and loss of consciousness  Denies any chest pain/pressure or palpitations immediately prior to the fall  Did note epistaxis following injury, along with some facial pain, and trauma evaluation was completed in ED which revealed several facial fractures for which patient was evaluated by trauma service who advised conservative management at this time and further workup of syncope  Additionally was noted with elevated troponin ED for which heparin drip was to be initiated  At the time my evaluation, patient lying in bed in no acute distress, noting no dizziness or lightheadedness at this time; does note recurrence of epistaxis however        Review of Systems:    Constitutional:  Denies fever or chills   Eyes:  Denies change in visual acuity   HENT:  Denies nasal congestion or sore throat but endorsed facial pain and epistaxis  Respiratory:  Denies cough or shortness of breath   Cardiovascular:  Denies chest pain or edema   GI:  Denies abdominal pain, nausea, vomiting, bloody stools or diarrhea   :  Denies dysuria   Musculoskeletal:  Denies back pain or joint pain   Integument: Denies rash   Neurologic:  Denies headache, focal weakness or sensory changes but endorsed dizziness and possible syncope  Endocrine:  Denies polyuria or polydipsia   Lymphatic:  Denies swollen glands   Psychiatric:  Denies depression or anxiety     Past Medical and Surgical History:   Past Medical History:   Diagnosis Date    Hypertrophic cardiomyopathy ([facility] [address] )     Hypothyroidism     Ischemic cardiomyopathy     Malignant neoplasm of left female breast ([facility] [address] ) 02/09/2017    stage IIA    Osteopenia     Trigger finger of all digits of right hand      Past Surgical History:   Procedure Laterality Date    BREAST BIOPSY Right     years ago benign    BREAST RECONSTRUCTION Left 2/9/2017    Procedure: BREAST RECONSTRUCTION WITH TISSUE EXPANDERS AND ALLODERM ;  Surgeon: Sen Ortiz MD;  [facility];  Service:     COLONOSCOPY  2009    KNEE ARTHROSCOPY Bilateral     MASTECTOMY Left 02/09/2017    WV BIOPSY/EXCISION, LYMPH NODE(S) Left 2/9/2017    Procedure: LYMPHOSCINTIGRAPHY; INTRAOPERATIVE LYMPHATIC MAPPING; SENTINEL LYMPH NODE BIOPSY (INJECT AT 0700 BY DR ALBA);   Surgeon: Shantell Ryan MD;  [facility];  Service: Surgical Oncology    WV INSJ/RPLCMT BREAST IMPLANT SEP DAY MASTECTOMY Left 5/18/2017    Procedure: BREAST EXPANDER/IMPLANT EXCHANGE; CAPSULECTOMY ;  Surgeon: Sen Ortiz MD;  [facility];  Service: Plastics    WV MASTECTOMY, SIMPLE, COMPLETE Left 2/9/2017    Procedure: BREAST MASTECTOMY; FROZEN SECTION ;  Surgeon: Shantell Ryan MD;  [facility];  Service: Surgical Oncology    REFRACTIVE SURGERY Bilateral     US GUIDANCE BREAST BIOPSY LEFT EACH ADDITIONAL Left 12/1/2016    US GUIDED BREAST BIOPSY LEFT COMPLETE Left 12/1/2016       Meds/Allergies:    Current Facility-Administered Medications:     heparin (porcine) 25,000 units in 0 45% NaCl 250 mL infusion (premix), 3-20 Units/kg/hr (Order-Specific), Intravenous, Titrated, Bry Khoury MD, Last Rate: 7 2 mL/hr at 03/04/21 2208, 12 Units/kg/hr at 03/04/21 2208    heparin (porcine) injection 1,800 Units, 1,800 Units, Intravenous, Q1H PRN, Claudia Iraheta MD    heparin (porcine) injection 3,600 Units, 3,600 Units, Intravenous, Q1H PRN, Claudia Iraheta MD    Current Outpatient Medications:     anastrozole (ARIMIDEX) 1 mg tablet, Take 1 tablet (1 mg total) by mouth daily, Disp: 90 tablet, Rfl: 3    aspirin (ECOTRIN LOW STRENGTH) 81 mg EC tablet, Take 81 mg by mouth daily, Disp: , Rfl:     calcium citrate-vitamin D (CITRACAL+D) 315-200 MG-UNIT per tablet, Take 1 tablet by mouth 2 (two) times a day, Disp: , Rfl:     candesartan (ATACAND) 4 mg tablet, , Disp: , Rfl: 0    levothyroxine 50 mcg tablet, TAKE 1 TABLET BY MOUTH DAILY, Disp: 90 tablet, Rfl: 1    metoprolol succinate (TOPROL-XL) 25 mg 24 hr tablet, Take 25 mg by mouth every morning, Disp: , Rfl: 0    Multiple Vitamin (MULTIVITAMIN) tablet, Take 1 tablet by mouth daily, Disp: , Rfl:     Omega-3 Fatty Acids (FISH OIL) 1,000 mg, Take 1,000 mg by mouth daily, Disp: , Rfl:     acetaZOLAMIDE (DIAMOX) 125 mg tablet, , Disp: , Rfl: 0    ketorolac (ACULAR) 0 5 % ophthalmic solution, , Disp: , Rfl: 0    metoprolol tartrate (LOPRESSOR) 25 mg tablet, Take 25 mg by mouth 2 (two) times a day 1 pill in am/ half pill in pm, Disp: , Rfl:     naproxen (NAPROSYN) 500 mg tablet, Take 1 tablet (500 mg total) by mouth 2 (two) times a day with meals (Patient not taking: Reported on 9/17/2020), Disp: 30 tablet, Rfl: 0    ofloxacin (OCUFLOX) 0 3 % ophthalmic solution, , Disp: , Rfl: 0    prednisoLONE acetate (PRED FORTE) 1 % ophthalmic suspension, , Disp: , Rfl: 0    Allergies: No Known Allergies  History:  Marital Status:       Substance Use History:   Social History     Substance and Sexual Activity   Alcohol Use No    Alcohol/week: 1 0 standard drinks    Types: 1 Glasses of wine per week     Social History     Tobacco Use   Smoking Status Never Smoker   Smokeless Tobacco Never Used Social History     Substance and Sexual Activity   Drug Use No       Family History:  Family History   Problem Relation Age of Onset    Heart disease Mother     Heart disease Father     Brain cancer Sister     Heart disease Brother     No Known Problems Brother     No Known Problems Daughter     No Known Problems Daughter        Physical Exam:     Vitals:   Blood Pressure: 147/83 (03/04/21 2300)  Pulse: 68 (03/04/21 2300)  Temperature: 98 2 °F (36 8 °C) (03/04/21 1942)  Temp Source: Oral (03/04/21 1942)  Respirations: 16 (03/04/21 2130)  SpO2: 95 % (03/04/21 2300)    Constitutional:  Well developed, well nourished, no acute distress, non-toxic appearance   Eyes:  PERRL, conjunctiva normal   HENT:  Atraumatic, external ears normal, nose normal, oropharynx moist, no pharyngeal exudates  Neck- normal range of motion, no tenderness, supple   Respiratory:  No respiratory distress, normal breath sounds, no rales, no wheezing   Cardiovascular:  Normal rate, normal rhythm, no murmurs, no gallops, no rubs   GI:  Soft, nondistended, normal bowel sounds, nontender, no organomegaly, no mass, no rebound, no guarding   :  No costovertebral angle tenderness   Musculoskeletal:  No edema, no tenderness, no deformities  Back- no tenderness  Integument:  Well hydrated, no rash   Lymphatic:  No lymphadenopathy noted   Neurologic:  Alert &awake, communicative, CN 2-12 normal, normal motor function, normal sensory function, no focal deficits noted   Psychiatric:  Speech and behavior appropriate       Lab Results: I have personally reviewed pertinent reports        Results from last 7 days   Lab Units 03/04/21 2031   WBC Thousand/uL 13 27*   HEMOGLOBIN g/dL 12 5   HEMATOCRIT % 39 9   PLATELETS Thousands/uL 174   NEUTROS PCT % 86*   LYMPHS PCT % 6*   MONOS PCT % 6   EOS PCT % 1     Results from last 7 days   Lab Units 03/04/21 2031   POTASSIUM mmol/L 4 1   CHLORIDE mmol/L 105   CO2 mmol/L 28   BUN mg/dL 24   CREATININE mg/dL 0  86   CALCIUM mg/dL 9 2   ALK PHOS U/L 105   ALT U/L 22   AST U/L 27     Results from last 7 days   Lab Units 03/04/21  2234   INR  1 05       EKG:  Sinus rhythm with sinus arrhythmia, nonspecific intraventricular conduction block    Imaging: I have personally reviewed pertinent reports  Ct Head Without Contrast    Result Date: 3/4/2021  Narrative: CT BRAIN - WITHOUT CONTRAST INDICATION:   head strike ASA  COMPARISON:  None  TECHNIQUE:  CT examination of the brain was performed  In addition to axial images, sagittal and coronal 2D reformatted images were created and submitted for interpretation  Radiation dose length product (DLP) for this visit:  864 65 mGy-cm   This examination, like all CT scans performed in the [facility], was performed utilizing techniques to minimize radiation dose exposure, including the use of iterative  reconstruction and automated exposure control  IMAGE QUALITY:  Diagnostic  FINDINGS: PARENCHYMA:  No intracranial mass, mass effect or midline shift  No CT signs of acute infarction  No acute parenchymal hemorrhage  Age-appropriate volume loss  Minor calcification of the right SHELLEY and the distal M2 segment  VENTRICLES AND EXTRA-AXIAL SPACES:  Normal for the patient's age  VISUALIZED ORBITS AND PARANASAL SINUSES:  Left maxillary sinus and nasal bone fractures, see accompanying CT of the facial bones  CALVARIUM AND EXTRACRANIAL SOFT TISSUES:  Hematoma in the midline frontal scalp, no depressed or diastatic calvarial fractures  Impression: No acute intracranial disease  Hematoma anterior frontal scalp, facial fractures see subsequent facial bone CT  The study was marked in [facility] for immediate notification  Workstation performed: [id number]     Ct Facial Bones Without Contrast    Result Date: 3/4/2021  Narrative: CT FACIAL BONES WITHOUT INTRAVENOUS CONTRAST INDICATION:   head strike ASA  COMPARISON: Contemporary CT brain   TECHNIQUE:  Axial CT images were obtained through the facial bones with additional sagittal and coronal reconstructions  Radiation dose length product (DLP) for this visit:  400 12 mGy-cm   This examination, like all CT scans performed in the [facility], was performed utilizing techniques to minimize radiation dose exposure, including the use of iterative  reconstruction and automated exposure control  IMAGE QUALITY:  Diagnostic  FINDINGS: FACIAL BONES:  Comminuted fractures of the nasal bridge, anterior left nasal septum, posterolateral left maxillary sinus wall and sinus roof  No orbital floor diastases    ORBITS:  Bilateral staphyloma, optic nerves, and extraocular muscles appear symmetric and normal  There is no evidence of retrobulbar mass, abscess, or hematoma  SINUSES:  Small amount of blood in the left maxillary sinus  Trace mucosal disease left sphenoid and bilateral ethmoid air cells  SOFT TISSUES:  Hematoma along the nasal bridge extending cephalad to the the frontal scalp  No indication of underlying calvarial fracture  Frontal Sinuses normal      Impression: Comminuted fractures of the nasal bridge, nondiastatic fracture anterior nasal septum, roof and lateral wall of the left maxillary sinus  Minor blood within the left maxillary sinus  No left orbital floor diastases  The study was marked in [facility] for immediate notification  Workstation performed: [id number]     Ct Spine Cervical Without Contrast    Result Date: 3/4/2021  Narrative: CT CERVICAL SPINE - WITHOUT CONTRAST INDICATION:   head strike ASA  COMPARISON:  None  TECHNIQUE:  CT examination of the cervical spine was performed without intravenous contrast   Contiguous axial images were obtained  Sagittal and coronal reconstructions were performed  Radiation dose length product (DLP) for this visit:  312 45 mGy-cm     This examination, like all CT scans performed in the [facility], was performed utilizing techniques to minimize radiation dose exposure, including the use of iterative  reconstruction and automated exposure control  IMAGE QUALITY:  Diagnostic  FINDINGS: ALIGNMENT:  Minor straightening of normal cervical lordosis  Loss of disc height C4-C5 and C5-C6 minor degenerative anterolisthesis of C3 on C4  VERTEBRAL BODIES:  No fracture  DEGENERATIVE CHANGES:  Multilevel spondylosis and osteoarthritis includes severe right greater than left C3-4 facet arthrosis, right C4-5 anterolisthesis and 8 bilateral facet arthrosis, left uncinate arthrosis C5-C6, bilateral facet arthrosis C6-C7 and left facet arthrosis C7-T1  PREVERTEBRAL AND PARASPINAL SOFT TISSUES:  Unremarkable  Small calcified THORACIC INLET:  Normal      Impression: No cervical spine fracture or traumatic malalignment  Workstation performed: [id number]         ** Please Note: Dragon 360 Dictation voice to text software was used in the creation of this document   **

## 2021-03-05 NOTE — ASSESSMENT & PLAN NOTE
· Follows with Dr Baldemar Zaman as cardiologist, was seen by electrophysiology for possible BiV-ICD  · Stress echo 02/2021 reported unrevealing, had apparent echo as outpatient 2020 with EF 40%  · Electrophysiology consultation will be appreciated

## 2021-03-05 NOTE — CONSULTS
Consultation - Geriatric Medicine   Diana Joshua 66 y o  female MRN: [id number]  Unit/Bed#: CATALINA Encounter: [id number]      Assessment/Plan     Fall down stairs  -pt reports was feeling ill day of fall, lightheaded and fall down stairs, she denies loss of consciousness and reports getting up to clean the stairs as soon as she reached bottom   -no history of falls  -does not use assist device for ambulation at baseline  -continue to encourage good body mechanics assist with all transfers  -encourage appropriate footwear, adequate lighting and corrective lenses at all appropriate times when out of bed  -maintain environment free of fall hazards  -PT/OT consult pending    Comminuted nasal bone fracture  -s/p fall down stairs  -continue acute pain control  -sinus precautions/no lying flat   -Augmentin x7 days per OMFS  -o/p f/u with OMFS to evaluate need for surgical intervention     Left maxillary sinus wall fracture  -see above    Acute pain due to trauma  -recommend pain control per Geriatric pain protocol:  Tylenol 975mg Q8H scheduled  Roxicodone 2 5mg Q4H PRN moderate pain  Roxicodone 5mg Q4H PRN severe pain  Dilaudid 0 2mg Q4H PRN  -consider adjuncts such as Gabapentin 100mg HS and lidocaine patch topically  -encourage addition of non-pharmacologic pain treatment including ice and frequent repositioning  -recommend  bowel regimen to prevent and treat constipation due to increased risk with acute pain and opiate pain medications    Cognitive screening  -A/Ox4, fully independent with ADLs/iADLs  -CTH on admission revealed mild chronic microangiopathic disease with no acute intracranial abnormalities   -TSH WNL  -continue to encourage patient to remain physically, socially and cognitively active and engaged to maintain cognitive acuity    Insomnia  -to maintain normal circadian rhythm continue good sleep hygiene  -recommend consistent bed/awaken time   -limit caffeine and stimulating activities near bedtime   -limit fluid intake within 2H bed to reduce overnight interruptions to awaken to void    Hypothyroidism  -TSH 2 290  -continue home levothyroxine regimen    Invasive lobular ER positive, OH negative HER2 negative carcinoma of left breast  -diagnosed 2017, now s/p mastectomy and sentinel node biopsy with reconstruction  -follows with [address]  -maintained on Anastrozole daily (confirmed with pharmacy and o/p heme-onc documentation)    Impaired Vision  -recommend use of corrective lenses at all appropriate times  -encourage adequate lighting and encourage use of assistance with ambulation  -keep personal belongings close to person to avoid reaching  -encourage appropriate footwear at all times    Delirium precautions  -Patient is high risk of delirium due to fall with traumatic injuries, acute pain, age and hospitalization   -Initiate delirium precautions  -maintain normal sleep/wake cycle  -minimize overnight interruptions, group overnight vitals/labs/nursing checks as possible  -dim lights, close blinds and turn off tv to minimize stimulation and encourage sleep environment in evenings  -ensure that pain is well controlled   -monitor for fecal and urinary retention which may precipitate delirium  -encourage early mobilization and ambulation once cleared to do so and with assistance   -provide frequent reorientation and redirection as indicated and appropriate   -encourage family and friends at the bedside to help help calm patient if anxious  -limit use of medications which may precipitate or worsen delirium such as tramadol, benzodiazepine, anticholinergics, and benadryl  -encourage hydration and nutrition   -redirect unwanted behaviors as first line,    Deconditioning/debility/frailty  -clinical frailty scale stage 4, vulnerable  -multifactorial including age, active malignancy, hypertrophic nonobstructive cardiomyopathy and multiple additional chronic medical comorbidities  -albumin 3 8, continue to encourage well-balanced nutrition and good control chronic medical conditions  -patient has supportive family and good social supports    Home medication review  Personally confirmed with [facility] ([id number]:    Anastrozole 1 mg   Candesartan 4 mg daily  Levothyroxine 50 mcg daily  Metoprolol succinate 25 mg daily     History of Present Illness   Physician Requesting Consult: Fernandez Dow MD  Reason for Consult / Principal Problem: Fall   Hx and PE limited by: N/A  Additional history obtained from: Chart review and patient interview, confirmed medications with pharmacy and daughter at bedside     HPI: Capri Dill is a 66y o  year old female with hypertrophic nonobstructive cardiomyopathy, hypertension, hypothyroidism, insomnia, NSVT, breast cancer, and impaired vision who is admitted to the medical service with syncopal episode and fall down stairs found to have nasal bone and maxillary sinus fractures  She is being seen consultation by Geriatrics for high risk of developing delirium during hospitalization  She seen examined at bedside with her daughter at her side  She explains that the fall occurred yesterday at home heading down the stairs when she got lightheaded and fell down the stairs  She remembers getting to the bottom of the stairs and getting up to see the blood on the stairs which she proceeded to clean up, when she continued to have bleeding from her nose after getting the stairs and floor cleaned up she called her daughter (who is a physician) and was advised to come to the ED for further evaluation  She was evaluated by Trauma and OMFS who recommended conservative management  She is following with Cardiology for workup of near syncope  Of note she reports that she was feeling ill the past couple of days leading up to the fall      Prior to admission she was residing home alone with close family support, she is independent with all ADLs and iADLs, does not use assist device for ambulation and denies history of falls, she wears glasses but does not use hearing aids or dentures  Inpatient consult to Gerontology  Consult performed by: Blade Jimenez DO  Consult ordered by: Liam Morgan PA-C        Review of Systems   Constitutional: Negative for appetite change, chills and fever  HENT: Positive for sinus pain  Negative for dental problem and hearing loss  Facial swelling and discomfort, states she continues to have blood running down back of throat since the fall   Eyes: Negative for visual disturbance  Respiratory: Negative for shortness of breath  Cardiovascular: Negative  Gastrointestinal: Negative  Endocrine: Negative  Genitourinary: Negative  Musculoskeletal: Negative for gait problem  Skin:        Facial bruising   Neurological: Positive for dizziness and light-headedness (leading to fall)  Hematological: Negative  Psychiatric/Behavioral: Negative  All other systems reviewed and are negative  Historical Information   Past Medical History:   Diagnosis Date    [facility] [address] [facility]     Hypothyroidism     Ischemic cardiomyopathy     Malignant neoplasm of left female breast ([facility] [address] ) 02/09/2017    stage IIA    Osteopenia     Trigger finger of all digits of right hand      Past Surgical History:   Procedure Laterality Date    BREAST BIOPSY Right     years ago benign    BREAST RECONSTRUCTION Left 2/9/2017    Procedure: BREAST RECONSTRUCTION WITH TISSUE EXPANDERS AND ALLODERM ;  Surgeon: Alfredo Hackett MD;  [facility];  Service:     COLONOSCOPY  2009    KNEE ARTHROSCOPY Bilateral     MASTECTOMY Left 02/09/2017    CA BIOPSY/EXCISION, LYMPH NODE(S) Left 2/9/2017    Procedure: LYMPHOSCINTIGRAPHY; INTRAOPERATIVE LYMPHATIC MAPPING; SENTINEL LYMPH NODE BIOPSY (INJECT AT 0700 BY DR ALBA);   Surgeon: Dotty Toscano MD;  [facility];  Service: Surgical Oncology    CA INSJ/RPLCMT BREAST IMPLANT SEP DAY MASTECTOMY Left 5/18/2017    Procedure: BREAST EXPANDER/IMPLANT EXCHANGE; CAPSULECTOMY ;  Surgeon: Ricky Hays MD;  [facility];  Service: Plastics    SD MASTECTOMY, SIMPLE, COMPLETE Left 2/9/2017    Procedure: BREAST MASTECTOMY; FROZEN SECTION ;  Surgeon: Magda Gagnon MD;  [facility];  Service: Surgical Oncology    REFRACTIVE SURGERY Bilateral     US GUIDANCE BREAST BIOPSY LEFT EACH ADDITIONAL Left 12/1/2016    US GUIDED BREAST BIOPSY LEFT COMPLETE Left 12/1/2016     Social History   Social History     Substance and Sexual Activity   Alcohol Use No    Alcohol/week: 1 0 standard drinks    Types: 1 Glasses of wine per week     Social History     Substance and Sexual Activity   Drug Use No     Social History     Tobacco Use   Smoking Status Never Smoker   Smokeless Tobacco Never Used     Family History: non-contributory    Meds/Allergies   all current active meds have been reviewed    No Known Allergies    Objective   No intake or output data in the 24 hours ending 03/05/21 1553  Invasive Devices     Peripheral Intravenous Line            Peripheral IV 03/04/21 Right Antecubital less than 1 day              Physical Exam  Vitals signs and nursing note reviewed  Constitutional:       General: She is not in acute distress  Appearance: She is not toxic-appearing  HENT:      Head: Normocephalic  Comments: Facial swelling and bruising most prominent across nasal bridge and periorbuital     Nose: Congestion present  Mouth/Throat:      Mouth: Mucous membranes are moist       Comments: Dentition in tact  Eyes:      General:         Right eye: No discharge  Left eye: No discharge  Conjunctiva/sclera: Conjunctivae normal    Neck:      Musculoskeletal: Neck supple  Comments: Trachea appears midline  Neck ROM limited due to pain in back of neck and head following fall  Cardiovascular:      Rate and Rhythm: Normal rate  Pulses: Normal pulses     Pulmonary:      Effort: No respiratory distress  Breath sounds: No wheezing  Abdominal:      General: Bowel sounds are normal  There is no distension  Palpations: Abdomen is soft  Tenderness: There is no abdominal tenderness  Musculoskeletal:      Comments: Normal overall muscle mass  Moves all 4 extremities spontaneously   Skin:     General: Skin is warm and dry  Comments: Facial swelling and ecchymosis across nasal bridge and periorbital area   Neurological:      Mental Status: She is alert        Comments: Awake and alert, fully oriented, answers questions appropriately   Psychiatric:         Attention and Perception: Attention normal          Behavior: Behavior normal       Comments: Mood and affect appropriate for current situation       Lab Results:   I have personally reviewed pertinent lab results including the following:  -sodium 137, potassium 4 1, chloride 106, CO2 25, BUN 21, creatinine 0 73, glucose 33, calcium 9 0, GFR 80, magnesium 1 9  -troponin 1 4, 1 04  -hemoglobin 12 2, hematocrit 37 6, WBC 8 21, platelets 962  -INR 2 71  -TSH 2 29  -hemoglobin A1c 5 8    I have personally reviewed the following imaging study reports in PACS:    CT head without contrast 03/04/2021:  No acute intracranial disease, hematoma anterior frontal scalp facial fractures seen on facial bone CT  CT cervical spine without contrast 03/04/2021:  No cervical spine fracture or traumatic malalignment  CT facial bones without contrast 03/04/2021:  Comminuted fractures of nasal bridge, non diaphoretic fracture anterior nasal septum, roof, and lateral wall of left maxillary sinus, monitor blood within left maxillary sinus, no left orbital floor diastases  Left shoulder XR 03/05/2021:  No acute osseous abnormality, rotator cuff calcific tendinopathy  Right hand XR 03/05/2021:  Degenerative change without acute abnormality noted    Therapies:   PT:  Pending  OT:  Pending    VTE Prophylaxis: Sequential compression device (Venodyne) Code Status: Level 3 - DNAR and DNI  Advance Directive and Living Will:      Power of :    POLST:      Family and Social Support: Daughters

## 2021-03-05 NOTE — ASSESSMENT & PLAN NOTE
· Follows with Dr Earl Rocha as cardiologist, was seen by electrophysiology for possible BiV-ICD  · Stress echo 02/2021 reported unrevealing, had apparent echo as outpatient 2020 with EF 40%  · Electrophysiology consultation will be appreciated

## 2021-03-05 NOTE — CONSULTS
Oral and Maxillofacial Surgery Consult    Pt seen 03/05/21 8:16 AM    Assessment  66 y o  female who presents to ED s/p facial trauma sustaining comminuted nasal bone fracture and left maxillary sinus wall fracture, minimally displaced confirmed on CT  On exam no obvious cosmetic deformity, with moderate inflammation and ecchymosis  Plan:  No OMFS intervention at this time, will monitor as outpatient and re-evaluate need for surgery  - Analgesia as per primary team  - Rx abx augmentin 875-125mg BID PO x 7 days  - Ice to face: 20min on, 20min off for 1 day  - Sinus precautions: no nose blowing, no heavy lifting, avoid pressure to the area, head of bed elevated, decongestants as needed   - follow up at outpatient [facility] -- patient can call [phone] to schedule an appointment for 1 week after discharge     D/w OMFS attg on call    Inpatient consult to Oral and Maxillofacial Surgery  Consult performed by: Pastora Johnson DDS  Consult ordered by: Sona Franco DO           HPI: 66 y o  female w PMH as listed below  Pt presents to ED s/p mechanical fall down stairs, complains of nose pain  Pain 4/10  Pt denies LOC, - headache, - change in occlusion/open bite, -tooth pain/trauma, - rhinorrhea, - otorrhea, - vision changes, - blurry/double vision  Pt denies fever, chills, nausea, shortness of breath, neck pain       PMH:   Past Medical History:   Diagnosis Date    [facility] [address]     Hypothyroidism     Ischemic cardiomyopathy     Malignant neoplasm of left female breast ([facility] [address] ) 02/09/2017    stage IIA    Osteopenia     Trigger finger of all digits of right hand         Allergies:   No Known Allergies    Meds:     Current Facility-Administered Medications:     acetaminophen (TYLENOL) tablet 975 mg, 975 mg, Oral, Q8H [facility], Sona Franco DO, Stopped at 03/05/21 0552    anastrozole (ARIMIDEX) tablet 1 mg, 1 mg, Oral, HS, Sona Franco DO    aspirin (ECOTRIN LOW STRENGTH) EC tablet 81 mg, 81 mg, Oral, Daily, Lajoyce Found, DO    calcium carbonate-vitamin D (OSCAL-D) 500 mg-200 units per tablet 1 tablet, 1 tablet, Oral, Daily With Breakfast, Lajoyce Found, DO    fish oil capsule 1,000 mg, 1,000 mg, Oral, Daily, Lajoyce Found, DO    heparin (porcine) 25,000 units in 0 45% NaCl 250 mL infusion (premix), 3-20 Units/kg/hr (Order-Specific), Intravenous, Titrated, Lajoyce Found, DO, Last Rate: 9 6 mL/hr at 03/05/21 0548, 16 Units/kg/hr at 03/05/21 0548    heparin (porcine) injection 1,800 Units, 1,800 Units, Intravenous, Q1H PRN, Lajoyce Found, DO    heparin (porcine) injection 3,600 Units, 3,600 Units, Intravenous, Q1H PRN, Lajoyce Found, DO, 3,600 Units at 03/05/21 0548    levothyroxine tablet 50 mcg, 50 mcg, Oral, Daily, Lajoyce Found, DO, Stopped at 03/05/21 2306    losartan (COZAAR) tablet 25 mg, 25 mg, Oral, HS, Lajoyce Found, DO    melatonin tablet 3 mg, 3 mg, Oral, HS, Lajoyce Found, DO, 3 mg at 03/05/21 0434    metoprolol succinate (TOPROL-XL) 24 hr tablet 25 mg, 25 mg, Oral, QAM, Lajoyce Found, DO    multivitamin-minerals (CENTRUM) tablet 1 tablet, 1 tablet, Oral, Daily, Lajoyce Found, DO    ondansetron [facility]) injection 4 mg, 4 mg, Intravenous, Q6H PRN, Lajoyce Found, DO    oxyCODONE (ROXICODONE) IR tablet 2 5 mg, 2 5 mg, Oral, Q4H PRN, Lajoyce Found, DO, 2 5 mg at 03/05/21 0435    oxyCODONE (ROXICODONE) IR tablet 5 mg, 5 mg, Oral, Q4H PRN, Lajoyce Found, DO    Current Outpatient Medications:     anastrozole (ARIMIDEX) 1 mg tablet, Take 1 tablet (1 mg total) by mouth daily, Disp: 90 tablet, Rfl: 3    aspirin (ECOTRIN LOW STRENGTH) 81 mg EC tablet, Take 81 mg by mouth daily, Disp: , Rfl:     calcium citrate-vitamin D (CITRACAL+D) 315-200 MG-UNIT per tablet, Take 1 tablet by mouth 2 (two) times a day, Disp: , Rfl:     candesartan (ATACAND) 4 mg tablet, , Disp: , Rfl: 0    levothyroxine 50 mcg tablet, TAKE 1 TABLET BY MOUTH DAILY, Disp: 90 tablet, Rfl: 1    metoprolol succinate (TOPROL-XL) 25 mg 24 hr tablet, Take 25 mg by mouth every morning, Disp: , Rfl: 0    Multiple Vitamin (MULTIVITAMIN) tablet, Take 1 tablet by mouth daily, Disp: , Rfl:     Omega-3 Fatty Acids (FISH OIL) 1,000 mg, Take 1,000 mg by mouth daily, Disp: , Rfl:     acetaZOLAMIDE (DIAMOX) 125 mg tablet, , Disp: , Rfl: 0    ketorolac (ACULAR) 0 5 % ophthalmic solution, , Disp: , Rfl: 0    metoprolol tartrate (LOPRESSOR) 25 mg tablet, Take 25 mg by mouth 2 (two) times a day 1 pill in am/ half pill in pm, Disp: , Rfl:     naproxen (NAPROSYN) 500 mg tablet, Take 1 tablet (500 mg total) by mouth 2 (two) times a day with meals (Patient not taking: Reported on 9/17/2020), Disp: 30 tablet, Rfl: 0    ofloxacin (OCUFLOX) 0 3 % ophthalmic solution, , Disp: , Rfl: 0    prednisoLONE acetate (PRED FORTE) 1 % ophthalmic suspension, , Disp: , Rfl: 0    PSH:   Past Surgical History:   Procedure Laterality Date    BREAST BIOPSY Right     years ago benign    BREAST RECONSTRUCTION Left 2/9/2017    Procedure: BREAST RECONSTRUCTION WITH TISSUE EXPANDERS AND ALLODERM ;  Surgeon: Casey Mariee MD;  [facility];  Service:     COLONOSCOPY  2009    KNEE ARTHROSCOPY Bilateral     MASTECTOMY Left 02/09/2017    ND BIOPSY/EXCISION, LYMPH NODE(S) Left 2/9/2017    Procedure: LYMPHOSCINTIGRAPHY; INTRAOPERATIVE LYMPHATIC MAPPING; SENTINEL LYMPH NODE BIOPSY (INJECT AT 0700 BY DR ALBA);   Surgeon: Michelle Hi MD;  [facility];  Service: Surgical Oncology    ND INSJ/RPLCMT BREAST IMPLANT SEP DAY MASTECTOMY Left 5/18/2017    Procedure: BREAST EXPANDER/IMPLANT EXCHANGE; CAPSULECTOMY ;  Surgeon: Casey Mariee MD;  [facility];  Service: Plastics    ND MASTECTOMY, SIMPLE, COMPLETE Left 2/9/2017    Procedure: BREAST MASTECTOMY; FROZEN SECTION ;  Surgeon: Michelle Hi MD;  [facility];  Service: Surgical Oncology    REFRACTIVE SURGERY Bilateral     US GUIDANCE BREAST BIOPSY LEFT EACH ADDITIONAL Left 12/1/2016    US GUIDED BREAST BIOPSY LEFT COMPLETE Left 12/1/2016      Family History   Problem Relation Age of Onset    Heart disease Mother     Heart disease Father     Brain cancer Sister     Heart disease Brother     No Known Problems Brother     No Known Problems Daughter     No Known Problems Daughter         Review of Systems   HENT: Positive for facial swelling (swelling bridge of nose, b/l periorbital) and nosebleeds (intermittent nosebleeds, dry blood in nares)  Negative for dental problem, rhinorrhea and sinus pressure  All other systems reviewed and are negative  Temp:  [98 2 °F (36 8 °C)] 98 2 °F (36 8 °C)  HR:  [] 66  Resp:  [16-18] 16  BP: (120-168)/(60-92) 125/60  SpO2:  [90 %-99 %] 94 %     No intake or output data in the 24 hours ending 03/05/21 0816     Physical Exam:  Gen: AAOx3  NAD  Resp:  unlabored on RA  Neuro: bilateral CN V2-V3 intact  bilateral CN VII grossly intact  HEENT:   Head: moderate nasal facial swelling/ecchymosis consistent with injury  No bony step-off palpated  + tenderness to palpation  No  facial laceration/abrasion  Eye: EOM intact  PERRL  No subconjunctival hemorrhage  bilateral periorbital ecchymosis/edema  No exophthalmos, enophthalmos, chemosis  Visual acuity grossly intact  Nose:  No  nasal dorsum deviation  No septal hematoma  +  dried blood in nares  Intraoral: DAY ~40mm  Partially edentulous  Occlusion stable  No segmental mobility  Minor ttp left maxilla consistent with injury  FOM soft, non-elevated, non-tender  Uvula midline  Lab Results: I have personally reviewed pertinent lab results  Imaging: I have personally reviewed pertinent reports  EKG, Pathology, and Other Studies: I have personally reviewed pertinent reports  Counseling / Coordination of Care  Total floor / unit time spent today 20 minutes    Greater than 50% of total time was spent with the patient and / or family counseling and / or coordination of care

## 2021-03-05 NOTE — UTILIZATION REVIEW
Initial Clinical Review    Admission: Date/Time/Statement:   Admission Orders (From admission, onward)     Ordered        03/04/21 2153  Inpatient Admission  Once                   Orders Placed This Encounter   Procedures    Inpatient Admission     Standing Status:   Standing     Number of Occurrences:   1     Order Specific Question:   Level of Care     Answer:   Med Surg [16]     Order Specific Question:   Estimated length of stay     Answer:   More than 2 Midnights     Order Specific Question:   Certification     Answer:   I certify that inpatient services are medically necessary for this patient for a duration of greater than two midnights  See H&P and MD Progress Notes for additional information about the patient's course of treatment  ED Arrival Information     Expected Arrival Acuity Means of Arrival Escorted By Service Admission Type    - 3/4/2021 19:28 Emergent Walk-In Family Member Hospitalist Emergency    Arrival Complaint    fall,head injury        Chief Complaint   Patient presents with    Fall     dizzy and fall potentially down 2-3 stairs  +head strike, ASA, LOC     Assessment/Plan: 66year old female, presented to the ED @ [facility], from home via walk in  Admitted as Inpatient due to  Syncope  PTA, dizzy and fell down 15 steps  EKG without marked abnormality, elevated troponin noted and patient to be on heparin GTT if epistaxis is controlled  Known history of hypertrophic cardiomyopathy and NSVT and evaluated 10/2020 for possible dual chamber ICD, concern this may be possible etiology  Monitor on telemetry  Consult EPS   NPO  Trend trops  Facial Fractures _ Consult OMFS  Monitor epistaxis      VTE Prophylaxis: Heparin Drip  / sequential compression device     ED Triage Vitals   Temperature Pulse Respirations Blood Pressure SpO2   03/04/21 1942 03/04/21 1936 03/04/21 1936 03/04/21 1936 03/04/21 1936   98 2 °F (36 8 °C) 77 18 168/92 97 %      Temp Source Heart Rate Source Patient Position - Orthostatic VS BP Location FiO2 (%)   03/04/21 1942 03/05/21 0441 03/05/21 0441 03/05/21 0441 --   Oral Monitor Lying Right arm       Pain Score       03/05/21 0435       5          Wt Readings from Last 1 Encounters:   10/22/20 61 1 kg (134 lb 9 6 oz)     Additional Vital Signs:   Date/Time  Temp  Pulse  Resp  BP  MAP (mmHg)  SpO2  O2 Device  Patient Position - Orthostatic VS   03/05/21 1300  --  68  16  141/71  99  96 %  --  --   03/05/21 1100  --  62  16  120/59  85  96 %  --  --   03/05/21 0900  --  74  18  150/80  108  97 %  --  --   03/05/21 0800  --  66  16  125/60  85  94 %  --  --   03/05/21 0639  --  60  16  120/60  --  90 %  None (Room air)  Lying   03/05/21 0441  --  103  16  142/74  --  99 %  None (Room air)  Lying   03/05/21 0100  --  69  16  132/60  86  95 %  None (Room air)  --   03/04/21 2300  --  68  --  147/83  109  95 %  --  --   03/04/21 2130  --  66  16  143/70  --  98 %  --  --   03/04/21 2100  --  70  18  147/74  --  98 %  --  --   03/04/21 2030  --  66  18  148/69  --  96 %  --  --   03/04/21 2015  --  74  18  161/66  --  95 %  --  --   03/04/21 2000  --  66  16  138/75  --  94 %  --  --   03/04/21 1945  --  72  18  142/79  --  95 %  --  --     Date and Time Eye Opening Best Verbal Response Best Motor Response Muna Coma Scale Score   03/05/21 0101 4 5 6 15   03/04/21 2130 4 5 6 15   03/04/21 2100 4 5 6 15   03/04/21 2030 4 5 6 15   03/04/21 2015 4 5 6 15   03/04/21 2000 4 5 6 15   03/04/21 1945 4 5 6 15   03/04/21 1936 4 5 6 15     03/05/2021 @ 1155  Right hand X: Degenerative change without acute abnormality noted  03/05/2021 @ 1157  Left shoulder X:   No acute osseous abnormality   Rotator cuff calcific tendinopathy  03/04/2021 @ 2031  CT head:  No acute intracranial disease  Hematoma anterior frontal scalp, facial fractures see subsequent facial bone CT        03/04/2021 @ 2031  CT C Spine: No cervical spine fracture or traumatic malalignment       03/04/2021 @ 2034 CT facial bones:   Comminuted fractures of the nasal bridge, nondiastatic fracture anterior nasal septum, roof and lateral wall of the left maxillary sinus   Minor blood within the left maxillary sinus   No left orbital floor diastases       2021 @ 1938  EC, Sinus rhythm    Pertinent Labs/Diagnostic Test Results:     Results from last 7 days   Lab Units 21   WBC Thousand/uL 8 21 13 27*   HEMOGLOBIN g/dL 12 2 12 5   HEMATOCRIT % 37 6 39 9   PLATELETS Thousands/uL 165 174   NEUTROS ABS Thousands/µL  --  11 57*     Results from last 7 days   Lab Units 21   SODIUM mmol/L 137 138   POTASSIUM mmol/L 4 1 4 1   CHLORIDE mmol/L 106 105   CO2 mmol/L 25 28   ANION GAP mmol/L 6 5   BUN mg/dL 21 24   CREATININE mg/dL 0 73 0 86   EGFR ml/min/1 73sq m 80 65   CALCIUM mg/dL 9 0 9 2   MAGNESIUM mg/dL 1 9  --      Results from last 7 days   Lab Units 21   AST U/L 27   ALT U/L 22   ALK PHOS U/L 105   TOTAL PROTEIN g/dL 7 9   ALBUMIN g/dL 3 8   TOTAL BILIRUBIN mg/dL 0 52     Results from last 7 days   Lab Units 21   GLUCOSE RANDOM mg/dL 123 117     Results from last 7 days   Lab Units 217 21  0045 21   TROPONIN I ng/mL 1 04* 1 14* 1 06*     Results from last 7 days   Lab Units 21  2234   PROTIME seconds  --  13 7   INR   --  1 05   PTT seconds 34 28     Results from last 7 days   Lab Units 21   TSH 3RD GENERATON uIU/mL 2 290     ED Treatment:   Medication Administration from 2021 1928 to 2021 1414       Date/Time Order Dose Route Action     2021 aspirin chewable tablet 324 mg 324 mg Oral Given     2021 heparin (porcine) injection 3,600 Units 3,600 Units Intravenous Given     2021 heparin (porcine) 25,000 units in 0 45% NaCl 250 mL infusion (premix) 12 Units/kg/hr Intravenous New Bag     2021 0520 heparin (porcine) injection 3,600 Units 3,600 Units Intravenous Given     03/05/2021 0435 oxyCODONE (ROXICODONE) IR tablet 2 5 mg 2 5 mg Oral Given     03/05/2021 0434 melatonin tablet 3 mg 3 mg Oral Given        Past Medical History:   Diagnosis Date    [facility] cardiomyopathy ([facility] )     Hypothyroidism     Ischemic cardiomyopathy     Malignant neoplasm of left female breast ([facility] ) 02/09/2017    stage IIA    Osteopenia     Trigger finger of all digits of right hand      Present on Admission:   Hypertrophic cardiomyopathy (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 )   Multiple facial fractures ([facility])      Admitting Diagnosis: No admission diagnoses are documented for this encounter  Age/Sex: 66 y o  female  Admission Orders:  Scheduled Medications:  acetaminophen, 975 mg, Oral, Q8H JEAN  amoxicillin-clavulanate, 1 tablet, Oral, Q12H JEAN  anastrozole, 1 mg, Oral, HS  aspirin, 81 mg, Oral, Daily  calcium carbonate-vitamin D, 1 tablet, Oral, Daily With Breakfast  fish oil, 1,000 mg, Oral, Daily  levothyroxine, 50 mcg, Oral, Daily  losartan, 25 mg, Oral, HS  melatonin, 3 mg, Oral, HS  metoprolol succinate, 25 mg, Oral, QAM  multivitamin-minerals, 1 tablet, Oral, Daily      Continuous IV Infusions:     PRN Meds:  ondansetron, 4 mg, Intravenous, Q6H PRN  oxyCODONE, 2 5 mg, Oral, Q4H PRN  oxyCODONE, 5 mg, Oral, Q4H PRN      NPO  Telemetry  [facility] SCDs  IP CONSULT TO TRAUMA SURGERY  IP CONSULT TO ORAL AND MAXILLOFACIAL SURGERY  IP CONSULT TO ELECTROPHYSIOLOGY  IP CONSULT TO GERONTOLOGY    Network Utilization Review Department  ATTENTION: Please call with any questions or concerns to [phone] and carefully listen to the prompts so that you are directed to the right person   All voicemails are confidential   Azeb Freeman all requests for admission clinical reviews, approved or denied determinations and any other requests to dedicated fax number below belonging to the campus where the patient is receiving treatment   List of dedicated fax numbers for the Facilities:  [address] [place] [address] DENIALS (Administrative/Medical Necessity) [phone]   [address]  [facility] (Maternity/NICU/Pediatrics) [phone]   [address] Isidro Benavides 5306 (  Javid Mendoza "Cherie" 103) [address] [address] Melinda Pearl 1484 [address] [phone]

## 2021-03-05 NOTE — ASSESSMENT & PLAN NOTE
· CT facial bones revealing "Comminuted fractures of the nasal bridge, nondiastatic fracture anterior nasal septum, roof and lateral wall of the left maxillary sinus "  · Patient with some persistent epistaxis  · Was seen by trauma in ED, recommendations noted  · To be evaluated by OMFS in a m   · Monitor epistaxis

## 2021-03-05 NOTE — ED NOTES
Cardiology at bedside at this time  Plans to go to Cath lab later today poss  Will let us know around noon  Pt remain NPO   Heparin can be stopped pre Card team       King Griselda, RN  03/05/21 1036

## 2021-03-05 NOTE — ASSESSMENT & PLAN NOTE
· CT facial bones revealing "Comminuted fractures of the nasal bridge, nondiastatic fracture anterior nasal septum, roof and lateral wall of the left maxillary sinus "  · Patient with some persistent epistaxis  · Was seen by trauma in ED, recommendations noted  · To be evaluated by OMFS in a m  - consult pending  · Monitor epistaxis

## 2021-03-05 NOTE — ASSESSMENT & PLAN NOTE
· Pt with syncope, fall down flight of stairs   · Troponin 1 06/1 14/1 04 - flat  · Likely type 2 MI due to syncope/fall  · Placed on heparin gtt on admission - will discontinue as not NSTEMI

## 2021-03-05 NOTE — PROGRESS NOTES
[facility]  Progress Note - Russ Vaughan 1945, 66 y o  female MRN: [id number]    Unit/Bed#: ED 28 Encounter: [id number]    Primary Care Provider: Ananya Harp MD   Date and time admitted to hospital: 3/4/2021  7:31 PM        * Syncope  Assessment & Plan  · Syncopal episode resulting in fall down 10-15 stairs  · EKG without marked abnormality, elevated troponin noted and patient to be on heparin GTT if epistaxis is controlled  · Patient with known history of hypertrophic cardiomyopathy and NSVT and evaluated 10/2020 for possible dual chamber ICD, concern this may be possible etiology  · Continue telemetry monitoring  · Electrophysiology consultation, keep NPO  · Continue to trend troponin    Elevated troponin I level  Assessment & Plan  · Pt with syncope, fall down flight of stairs   · Troponin 1 06/1 14/1 04 - flat  · Likely type 2 MI   · Placed on heparin gtt on admission  · Cardiology evaluation     Multiple facial fractures (HCC)  Assessment & Plan  · CT facial bones revealing "Comminuted fractures of the nasal bridge, nondiastatic fracture anterior nasal septum, roof and lateral wall of the left maxillary sinus "  · Patient with some persistent epistaxis  · Was seen by trauma in ED, recommendations noted  · To be evaluated by OMFS in a m  - consult pending  · Monitor epistaxis    NSVT (nonsustained ventricular tachycardia) (HCC)  Assessment & Plan  · Known history of this, has been seen by EP and evaluated for Bi V ICD  · Electrophysiology consultation while inpatient    Hypothyroidism  Assessment & Plan  · TSH normal  · Continue home dose levothyroxine     Hypertrophic cardiomyopathy ([address] )  Assessment & Plan  · Follows with Dr Lucy Duong as cardiologist, was seen by electrophysiology for possible BiV-ICD  · Stress echo 02/2021 reported unrevealing, had apparent echo as outpatient 2020 with EF 40%  · Electrophysiology consultation will be appreciated    Malignant neoplasm of upper-inner 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 breast [facility])  Assessment & Plan  · Follow[facility]  · Continue anastrozole        VTE Pharmacologic Prophylaxis:   Pharmacologic: Heparin Drip  Mechanical VTE Prophylaxis in Place: Yes    Patient Centered Rounds: I have performed bedside rounds with nursing staff today  Discussions with Specialists or Other Care Team Provider: RN    Education and Discussions with Family / Patient: patient, declined call to family at this time she told me EP would call her daughter    Time Spent for Care: 30 minutes  More than 50% of total time spent on counseling and coordination of care as described above  Current Length of Stay: 1 day(s)    Current Patient Status: Inpatient   Certification Statement: The patient will continue to require additional inpatient hospital stay due to syncope work up    Discharge Plan: pending EP evaluation/syncope work up    Code Status: Level 3 - DNAR and DNI      Subjective: The patient reports to feeling sore this morning otherwise no acute complaints     Objective:     Vitals:   Temp (24hrs), Av 2 °F (36 8 °C), Min:98 2 °F (36 8 °C), Max:98 2 °F (36 8 °C)    Temp:  [98 2 °F (36 8 °C)] 98 2 °F (36 8 °C)  HR:  [] 66  Resp:  [16-18] 16  BP: (120-168)/(60-92) 125/60  SpO2:  [90 %-99 %] 94 %  There is no height or weight on file to calculate BMI  Input and Output Summary (last 24 hours):     No intake or output data in the 24 hours ending 21 0856    Physical Exam:     Physical Exam  Vitals signs reviewed  Constitutional:       General: She is not in acute distress  Appearance: She is not toxic-appearing  HENT:      Nose:      Comments: Very mild blood oozing from nose  Eyes:      Comments: Ecchymosis b/l eyes "raccoon eyes"   Neck:      Musculoskeletal: Normal range of motion  Cardiovascular:      Rate and Rhythm: Normal rate and regular rhythm  Pulmonary:      Effort: Pulmonary effort is normal  No respiratory distress  Breath sounds: Normal breath sounds  Abdominal:      General: Bowel sounds are normal  There is no distension  Palpations: Abdomen is soft  Tenderness: There is no abdominal tenderness  Musculoskeletal: Normal range of motion  General: No swelling  Skin:     General: Skin is warm and dry  Neurological:      General: No focal deficit present  Mental Status: She is alert and oriented to person, place, and time  Psychiatric:         Mood and Affect: Mood normal          Behavior: Behavior normal          Thought Content: Thought content normal          Additional Data:     Labs:    Results from last 7 days   Lab Units 03/05/21 0427 03/04/21 2031   WBC Thousand/uL 8 21 13 27*   HEMOGLOBIN g/dL 12 2 12 5   HEMATOCRIT % 37 6 39 9   PLATELETS Thousands/uL 165 174   NEUTROS PCT %  --  86*   LYMPHS PCT %  --  6*   MONOS PCT %  --  6   EOS PCT %  --  1     Results from last 7 days   Lab Units 03/05/21 0427 03/04/21 2031   SODIUM mmol/L 137 138   POTASSIUM mmol/L 4 1 4 1   CHLORIDE mmol/L 106 105   CO2 mmol/L 25 28   BUN mg/dL 21 24   CREATININE mg/dL 0 73 0 86   ANION GAP mmol/L 6 5   CALCIUM mg/dL 9 0 9 2   ALBUMIN g/dL  --  3 8   TOTAL BILIRUBIN mg/dL  --  0 52   ALK PHOS U/L  --  105   ALT U/L  --  22   AST U/L  --  27   GLUCOSE RANDOM mg/dL 123 117     Results from last 7 days   Lab Units 03/04/21  2234   INR  1 05                       * I Have Reviewed All Lab Data Listed Above  * Additional Pertinent Lab Tests Reviewed:  All Labs Within Last 24 Hours Reviewed    Imaging:    Imaging Reports Reviewed Today Include: none  Imaging Personally Reviewed by Myself Includes:  none    Recent Cultures (last 7 days):           Last 24 Hours Medication List:   Current Facility-Administered Medications   Medication Dose Route Frequency Provider Last Rate    acetaminophen  975 mg Oral [facility] Camille Lawrence, DO      anastrozole  1 mg Oral HS Camille Bravo, DO      aspirin  81 mg Oral Daily Sona Haven, DO      calcium carbonate-vitamin D  1 tablet Oral Daily With Breakfast Coats Haven, DO      fish oil  1,000 mg Oral Daily Sona Haven, DO      heparin (porcine)  3-20 Units/kg/hr (Order-Specific) Intravenous Titrated Sona Haven, DO 16 Units/kg/hr (03/05/21 0548)    heparin (porcine)  1,800 Units Intravenous Q1H PRN Sona Haven, DO      heparin (porcine)  3,600 Units Intravenous Q1H PRN Coats Haven, DO      levothyroxine  50 mcg Oral Daily Coats Haven, DO      losartan  25 mg Oral HS Sona Haven, DO      melatonin  3 mg Oral HS Coats Haven, DO      metoprolol succinate  25 mg Oral QAM Coats Haven, DO      multivitamin-minerals  1 tablet Oral Daily Coats Haven, DO      ondansetron  4 mg Intravenous Q6H PRN Coats Haven, DO      oxyCODONE  2 5 mg Oral Q4H PRN Sona Haven, DO      oxyCODONE  5 mg Oral Q4H PRN Sona Haven, DO          Today, Patient Was Seen By: Kendra Badillo PA-C    ** Please Note: Dictation voice to text software may have been used in the creation of this document   **

## 2021-03-05 NOTE — ASSESSMENT & PLAN NOTE
· Patient notes significant hand pain  · Will order right hand XR to evaluate for osseous abnormalities

## 2021-03-05 NOTE — ED ATTENDING ATTESTATION
3/4/2021  Jeffry Apodaca DO, saw and evaluated the patient  I have discussed the patient with the resident/non-physician practitioner and agree with the resident's/non-physician practitioner's findings, Plan of Care, and MDM as documented in the resident's/non-physician practitioner's note, except where noted  All available labs and Radiology studies were reviewed  I was present for key portions of any procedure(s) performed by the resident/non-physician practitioner and I was immediately available to provide assistance  At this point I agree with the current assessment done in the Emergency Department  I have conducted an independent evaluation of this patient a history and physical is as follows:      65 yo female presents for evaluation s/p fall down 10-15 steps  Primary complaint was epistaxis which resolved now  She has bruising and swelling over the center of her forehead, and nasal bridge  There is a 1mm laceration to the mucosal surface of her upper lip  Bleeding controlled  Pt does not recall event, +LOC  Takes ASA but no other thinners  Has hx of CM, NSVT currently being evaluated by cardiology for possible PPM      Imp: closed head injury, fall down stairs  Possible cardiac syncope plan: CT head, Cspine, max/face, cardiac eval  Recommend admission for tele, cardiology c/s, possible PPM placement      ED Course         Critical Care Time  Procedures

## 2021-03-06 PROCEDURE — 99232 SBSQ HOSP IP/OBS MODERATE 35: CPT | Performed by: PHYSICIAN ASSISTANT

## 2021-03-06 PROCEDURE — 99232 SBSQ HOSP IP/OBS MODERATE 35: CPT | Performed by: INTERNAL MEDICINE

## 2021-03-06 RX ORDER — POLYETHYLENE GLYCOL 3350 17 G/17G
17 POWDER, FOR SOLUTION ORAL DAILY PRN
Status: DISCONTINUED | OUTPATIENT
Start: 2021-03-06 | End: 2021-03-09 | Stop reason: HOSPADM

## 2021-03-06 RX ORDER — SENNOSIDES 8.6 MG
1 TABLET ORAL
Status: DISCONTINUED | OUTPATIENT
Start: 2021-03-06 | End: 2021-03-09 | Stop reason: HOSPADM

## 2021-03-06 RX ADMIN — Medication 1 TABLET: at 08:26

## 2021-03-06 RX ADMIN — ACETAMINOPHEN 975 MG: 325 TABLET, FILM COATED ORAL at 05:22

## 2021-03-06 RX ADMIN — ASPIRIN 81 MG: 81 TABLET, COATED ORAL at 08:27

## 2021-03-06 RX ADMIN — ANASTROZOLE 1 MG: 1 TABLET, COATED ORAL at 21:11

## 2021-03-06 RX ADMIN — ENOXAPARIN SODIUM 40 MG: 40 INJECTION SUBCUTANEOUS at 09:16

## 2021-03-06 RX ADMIN — LOSARTAN POTASSIUM 25 MG: 25 TABLET, FILM COATED ORAL at 21:11

## 2021-03-06 RX ADMIN — ACETAMINOPHEN 975 MG: 325 TABLET, FILM COATED ORAL at 21:11

## 2021-03-06 RX ADMIN — AMOXICILLIN AND CLAVULANATE POTASSIUM 1 TABLET: 875; 125 TABLET, FILM COATED ORAL at 08:26

## 2021-03-06 RX ADMIN — MELATONIN 3 MG: at 21:11

## 2021-03-06 RX ADMIN — Medication 1 TABLET: at 05:22

## 2021-03-06 RX ADMIN — STANDARDIZED SENNA CONCENTRATE 8.6 MG: 8.6 TABLET ORAL at 21:11

## 2021-03-06 RX ADMIN — METOPROLOL SUCCINATE 25 MG: 25 TABLET, FILM COATED, EXTENDED RELEASE ORAL at 08:26

## 2021-03-06 RX ADMIN — AMOXICILLIN AND CLAVULANATE POTASSIUM 1 TABLET: 875; 125 TABLET, FILM COATED ORAL at 21:11

## 2021-03-06 RX ADMIN — ACETAMINOPHEN 975 MG: 325 TABLET, FILM COATED ORAL at 13:27

## 2021-03-06 RX ADMIN — OMEGA-3 FATTY ACIDS CAP 1000 MG 1000 MG: 1000 CAP at 08:26

## 2021-03-06 RX ADMIN — LEVOTHYROXINE SODIUM 50 MCG: 50 TABLET ORAL at 05:22

## 2021-03-06 NOTE — PROGRESS NOTES
[facility]  Progress Note - Geetha Hickey 1945, 66 y o  female MRN: [id number]    Unit/Bed#: -01 Encounter: [id number]    Primary Care Provider: Erma Desir MD   Date and time admitted to hospital: 3/4/2021  7:31 PM        * Syncope  Assessment & Plan  · Syncopal episode resulting in fall down 10-15 stairs - trauma evaluation appreciated  · EKG without marked abnormality, elevated troponin noted and patient to be on heparin GTT if epistaxis is controlled  · Patient with known history of hypertrophic cardiomyopathy and NSVT and evaluated 10/2020 for possible dual chamber ICD, concern this may be possible etiology  · Continue telemetry monitoring  · Electrophysiology consultation, plan for ICD placement next week    Left shoulder pain  Assessment & Plan  · XR left shoulder negative for acute findings  · PRN pain control, supportive care    Elevated troponin I level  Assessment & Plan  · Pt with syncope, fall down flight of stairs   · Troponin 1 06/1 14/1 04 - flat  · Likely type 2 MI due to syncope/fall  · Placed on heparin gtt on admission - will discontinue as not NSTEMI  · Cardiac cath 3/5 unremarkable, no CAD     Multiple facial fractures (HCC)  Assessment & Plan  · CT facial bones revealing "Comminuted fractures of the nasal bridge, nondiastatic fracture anterior nasal septum, roof and lateral wall of the left maxillary sinus "  · Patient with some persistent epistaxis  · Was seen by trauma in ED, recommendations noted  · OMFS consult appreciated    Recommend Augmentin x 7 days and outpt f/u in their office to re-evaluate need for surgery  · Monitor epistaxis    NSVT (nonsustained ventricular tachycardia) ([facility] [address] )  Assessment & Plan  · Known history of this, has been seen by EP and evaluated for Bi V ICD  · EP consulted, plan for ICD next week   · Monitor on telemetry     Hypothyroidism  Assessment & Plan  · TSH normal  · Continue home dose levothyroxine     Hypertrophic cardiomyopathy [facility])  Assessment & Plan  · Follows with Dr Lu Bragg as cardiologist, was seen by electrophysiology for possible BiV-ICD  · Stress echo 2021 reported unrevealing, had apparent echo as outpatient  with EF 40%  · Electrophysiology consultation appreciated, plan for ICD next week     Malignant neoplasm of upper-inner 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 breast [facility])  Assessment & Plan  · Follows with [facility]  · Continue anastrozole        VTE Pharmacologic Prophylaxis:   Pharmacologic: Enoxaparin (Lovenox)  Mechanical VTE Prophylaxis in Place: Yes    Patient Centered Rounds: I have performed bedside rounds with nursing staff today  Discussions with Specialists or Other Care Team Provider:     Education and Discussions with Family / Patient: patient, declined call to family    Time Spent for Care: 20 minutes  More than 50% of total time spent on counseling and coordination of care as described above  Current Length of Stay: 2 day(s)    Current Patient Status: Inpatient   Certification Statement: The patient will continue to require additional inpatient hospital stay due to plan for ICD monday    Discharge Plan: likely Tuesday    Code Status: Level 3 - DNAR and DNI      Subjective: The patient has no acute complaints today     Objective:     Vitals:   Temp (24hrs), Av 7 °F (36 5 °C), Min:97 2 °F (36 2 °C), Max:98 °F (36 7 °C)    Temp:  [97 2 °F (36 2 °C)-98 °F (36 7 °C)] 97 8 °F (36 6 °C)  HR:  [61-81] 65  Resp:  [16-18] 17  BP: (120-159)/(59-80) 142/66  SpO2:  [92 %-98 %] 96 %  Body mass index is 25 06 kg/m²  Input and Output Summary (last 24 hours):     No intake or output data in the 24 hours ending 21 0856    Physical Exam:     Physical Exam  Vitals signs reviewed  Constitutional:       General: She is not in acute distress  Appearance: She is not toxic-appearing  HENT:      Head: Normocephalic and atraumatic  Eyes:      General: No scleral icterus       Extraocular Movements: Extraocular movements intact  Comments: B/l periorbital ecchymosis   Neck:      Musculoskeletal: Normal range of motion  Cardiovascular:      Rate and Rhythm: Normal rate and regular rhythm  Pulmonary:      Effort: Pulmonary effort is normal  No respiratory distress  Breath sounds: Normal breath sounds  Abdominal:      General: Bowel sounds are normal  There is no distension  Palpations: Abdomen is soft  Musculoskeletal: Normal range of motion  Skin:     General: Skin is warm  Neurological:      General: No focal deficit present  Mental Status: She is alert and oriented to person, place, and time  Psychiatric:         Mood and Affect: Mood normal          Behavior: Behavior normal          Thought Content: Thought content normal          Additional Data:     Labs:    Results from last 7 days   Lab Units 03/05/21 0427 03/04/21 2031   WBC Thousand/uL 8 21 13 27*   HEMOGLOBIN g/dL 12 2 12 5   HEMATOCRIT % 37 6 39 9   PLATELETS Thousands/uL 165 174   NEUTROS PCT %  --  86*   LYMPHS PCT %  --  6*   MONOS PCT %  --  6   EOS PCT %  --  1     Results from last 7 days   Lab Units 03/05/21 0427 03/04/21 2031   SODIUM mmol/L 137 138   POTASSIUM mmol/L 4 1 4 1   CHLORIDE mmol/L 106 105   CO2 mmol/L 25 28   BUN mg/dL 21 24   CREATININE mg/dL 0 73 0 86   ANION GAP mmol/L 6 5   CALCIUM mg/dL 9 0 9 2   ALBUMIN g/dL  --  3 8   TOTAL BILIRUBIN mg/dL  --  0 52   ALK PHOS U/L  --  105   ALT U/L  --  22   AST U/L  --  27   GLUCOSE RANDOM mg/dL 123 117     Results from last 7 days   Lab Units 03/04/21  2234   INR  1 05     Results from last 7 days   Lab Units 03/05/21  1627   POC GLUCOSE mg/dl 112     Results from last 7 days   Lab Units 03/05/21  0427   HEMOGLOBIN A1C % 5 8*               * I Have Reviewed All Lab Data Listed Above  * Additional Pertinent Lab Tests Reviewed:  All Labs Within Last 24 Hours Reviewed    Imaging:    Imaging Reports Reviewed Today Include: none  Imaging Personally Reviewed by Myself Includes:  none    Recent Cultures (last 7 days):           Last 24 Hours Medication List:   Current Facility-Administered Medications   Medication Dose Route Frequency Provider Last Rate    acetaminophen  650 mg Oral Q4H PRN Arlin Cuevas MD      acetaminophen  975 mg Oral [facility] Mead Noble, DO      amoxicillin-clavulanate  1 tablet Oral Q12H [address] Charlie Adan PA-C      anastrozole  1 mg Oral HS Mead Noble, DO      aspirin  81 mg Oral Daily Mead Noble, DO      calcium carbonate-vitamin D  1 tablet Oral Daily With Breakfast Mead Noble, DO      chlorhexidine  15 mL Swish & Spit Once [place], [place]      fish oil  1,000 mg Oral Daily Mead Noble, DO      levothyroxine  50 mcg Oral Daily Mead Noble, DO      losartan  25 mg Oral HS Mead Noble, DO      melatonin  3 mg Oral HS Mead Noble, DO      metoprolol succinate  25 mg Oral QAM Mead Noble, DO      multivitamin-minerals  1 tablet Oral Daily Mead Noble, DO      ondansetron  4 mg Intravenous Q6H PRN Arlin Cuevas MD      oxyCODONE  2 5 mg Oral Q4H PRN Mead Noble, DO      oxyCODONE  5 mg Oral Q4H PRN Mead Noble, DO      polyethylene glycol  17 g Oral Daily PRN Chris Huffman PA-C      senna  1 tablet Oral HS Barbara Mishra PA-C      [START ON 3/8/2021] vancomycin  1,000 mg Intravenous Once Matty Acosta PA-C          Today, Patient Was Seen By: Chris Huffman PA-C    ** Please Note: Dictation voice to text software may have been used in the creation of this document   **

## 2021-03-06 NOTE — PLAN OF CARE
Problem: Potential for Falls  Goal: Patient will remain free of falls  Description: INTERVENTIONS:  - Assess patient frequently for physical needs  -  Identify cognitive and physical deficits and behaviors that affect risk of falls    -  [place] fall precautions as indicated by assessment   - Educate patient/family on patient safety including physical limitations  - Instruct patient to call for assistance with activity based on assessment  - Modify environment to reduce risk of injury  - Consider OT/PT consult to assist with strengthening/mobility  Outcome: Progressing     Problem: CARDIOVASCULAR - ADULT  Goal: Maintains optimal cardiac output and hemodynamic stability  Description: INTERVENTIONS:  - Monitor I/O, vital signs and rhythm  - Monitor for S/S and trends of decreased cardiac output  - Administer and titrate ordered vasoactive medications to optimize hemodynamic stability  - Assess quality of pulses, skin color and temperature  - Assess for signs of decreased coronary artery perfusion  - Instruct patient to report change in severity of symptoms  Outcome: Progressing  Goal: Absence of cardiac dysrhythmias or at baseline rhythm  Description: INTERVENTIONS:  - Continuous cardiac monitoring, vital signs, obtain 12 lead EKG if ordered  - Administer antiarrhythmic and heart rate control medications as ordered  - Monitor electrolytes and administer replacement therapy as ordered  Outcome: Progressing     Problem: SKIN/TISSUE INTEGRITY - ADULT  Goal: Skin integrity remains intact  Description: INTERVENTIONS  - Identify patients at risk for skin breakdown  - Assess and monitor skin integrity  - Assess and monitor nutrition and hydration status  - Monitor labs (i e  albumin)  - Assess for incontinence   - Turn and reposition patient  - Assist with mobility/ambulation  - Relieve pressure over bony prominences  - Avoid friction and shearing  - Provide appropriate hygiene as needed including keeping skin clean and dry  - Evaluate need for skin moisturizer/barrier cream  - Collaborate with interdisciplinary team (i e  Nutrition, Rehabilitation, etc )   - Patient/family teaching  Outcome: Progressing  Goal: Incision(s), wounds(s) or drain site(s) healing without S/S of infection  Description: INTERVENTIONS  - Assess and document risk factors for skin impairment   - Assess and document dressing, incision, wound bed, drain sites and surrounding tissue  - Consider nutrition services referral as needed  - Oral mucous membranes remain intact  - Provide patient/ family education  Outcome: Progressing  Goal: Oral mucous membranes remain intact  Description: INTERVENTIONS  - Assess oral mucosa and hygiene practices  - Implement preventative oral hygiene regimen  - Implement oral medicated treatments as ordered  - Initiate Nutrition services referral as needed  Outcome: Progressing     Problem: HEMATOLOGIC - ADULT  Goal: Maintains hematologic stability  Description: INTERVENTIONS  - Assess for signs and symptoms of bleeding or hemorrhage  - Monitor labs  - Administer supportive blood products/factors as ordered and appropriate  Outcome: Progressing

## 2021-03-06 NOTE — ASSESSMENT & PLAN NOTE
· Syncopal episode resulting in fall down 10-15 stairs - trauma evaluation appreciated  · EKG without marked abnormality, elevated troponin noted and patient to be on heparin GTT if epistaxis is controlled  · Patient with known history of hypertrophic cardiomyopathy and NSVT and evaluated 10/2020 for possible dual chamber ICD, concern this may be possible etiology  · Continue telemetry monitoring  · Electrophysiology consultation, plan for ICD placement next week

## 2021-03-06 NOTE — ASSESSMENT & PLAN NOTE
· Follows with Dr Lu Bragg as cardiologist, was seen by electrophysiology for possible BiV-ICD  · Stress echo 02/2021 reported unrevealing, had apparent echo as outpatient 2020 with EF 40%  · Electrophysiology consultation appreciated, plan for ICD next week

## 2021-03-06 NOTE — ASSESSMENT & PLAN NOTE
· Known history of this, has been seen by EP and evaluated for Bi V ICD  · EP consulted, plan for ICD next week   · Monitor on telemetry

## 2021-03-06 NOTE — PROGRESS NOTES
Cardiology Progress Note - Virginia Tanner 66 y o  female MRN: [id number]    Unit/Bed#: -01 Encounter: [id number]      Assessment/Recommendations:  1  Syncope with facial fractures:  Conservative management  2  Hypertrophic cardiomyopathy:  Ejection fraction 35% on echo in 2019  For likely right-sided ICD implant on Monday  3  Elevated troponin:  Unlikely to be coronary in etiology is cardiac catheterization revealed no significant CAD  4  Known nonsustained VT:  No significant events overnight on telemetry monitoring  For ICD implant on Monday  5  Hypothyroidism:  As per primary team   6  Left-sided breast cancer:  Maintained on anastrozole and status post mastectomy    Subjective:   Patient seen and examined  No significant events overnight   ; pertinent negatives - chest pain, chest pressure/discomfort, dyspnea, irregular heart beat, lower extremity edema and palpitations  Objective:     Vitals: Blood pressure 142/66, pulse 65, temperature 97 8 °F (36 6 °C), resp  rate 17, height 5' (1 524 m), weight 58 2 kg (128 lb 4 9 oz), SpO2 96 %  , Body mass index is 25 06 kg/m² ,   Orthostatic Blood Pressures      Most Recent Value   Blood Pressure  142/66 filed at 03/06/2021 9717   Patient Position - Orthostatic VS  Lying filed at 03/05/2021 2227          No intake or output data in the 24 hours ending 03/06/21 1000    TELE: No significant arrhythmias seen      Physical Exam:    GEN: Virginia Tanner appears well, alert and oriented x 3, pleasant and cooperative   HEENT: pupils equal, round, and reactive to light; extraocular muscles intact  NECK: supple, no carotid bruits   HEART: regular rhythm, normal S1 and S2, +systolic murmur, no clicks, gallops or rubs   LUNGS: clear to auscultation bilaterally; no wheezes, rales, or rhonchi   ABDOMEN: normal bowel sounds, soft, no tenderness, no distention  EXTREMITIES: peripheral pulses normal; no clubbing, cyanosis, or edema  NEURO: no focal findings   SKIN: normal without suspicious lesions on exposed skin    Medications:      Current Facility-Administered Medications:     acetaminophen (TYLENOL) tablet 650 mg, 650 mg, Oral, Q4H PRN, Karis Ibrahim MD    acetaminophen (TYLENOL) tablet 975 mg, 975 mg, Oral, Q8H [facility], March Suhail DO, 975 mg at 03/06/21 0522    amoxicillin-clavulanate (AUGMENTIN) 875-125 mg per tablet 1 tablet, 1 tablet, Oral, Q12H [facility], Trinidad Suarez PA-C, 1 tablet at 03/06/21 5842    anastrozole (ARIMIDEX) tablet 1 mg, 1 mg, Oral, HS, March Barbarar DO, 1 mg at 03/05/21 2122    aspirin (ECOTRIN LOW STRENGTH) EC tablet 81 mg, 81 mg, Oral, Daily, March DO Suhail, 81 mg at 03/06/21 0827    calcium carbonate-vitamin D (OSCAL-D) 500 mg-200 units per tablet 1 tablet, 1 tablet, Oral, Daily With Breakfast, March DO Suhail, 1 tablet at 03/06/21 0522    chlorhexidine (PERIDEX) 0 12 % oral rinse 15 mL, 15 mL, Swish & Spit, Once, Son Simmons PA-C    enoxaparin (LOVENOX) subcutaneous injection 40 mg, 40 mg, Subcutaneous, Q24H [facility], Barbara Mishra PA-C, 40 mg at 03/06/21 2173    fish oil capsule 1,000 mg, 1,000 mg, Oral, Daily, March Suhail DO, 1,000 mg at 03/06/21 6498    levothyroxine tablet 50 mcg, 50 mcg, Oral, Daily, March Gubler, DO, 50 mcg at 03/06/21 0522    losartan (COZAAR) tablet 25 mg, 25 mg, Oral, HS, March Barbarar, DO, 25 mg at 03/05/21 2122    melatonin tablet 3 mg, 3 mg, Oral, HS, March Gumanuelr, DO, 3 mg at 03/05/21 2122    metoprolol succinate (TOPROL-XL) 24 hr tablet 25 mg, 25 mg, Oral, QAM, March Gubler, DO, 25 mg at 03/06/21 0826    multivitamin-minerals (CENTRUM) tablet 1 tablet, 1 tablet, Oral, Daily, March DO Suhail, 1 tablet at 03/06/21 0826    ondansetron (ZOFRAN) injection 4 mg, 4 mg, Intravenous, Q6H PRN, Karis Ibrahim MD    oxyCODONE (ROXICODONE) IR tablet 2 5 mg, 2 5 mg, Oral, Q4H PRN, March DO Suhail, 2 5 mg at 03/05/21 0435    oxyCODONE (ROXICODONE) IR tablet 5 mg, 5 mg, Oral, Q4H PRN, Nel Pitt DO    polyethylene glycol (MIRALAX) packet 17 g, 17 g, Oral, Daily PRN, Barbara Mishra PA-C    senna (SENOKOT) tablet 8 6 mg, 1 tablet, Oral, HS, Barbara Mishra PA-C    [START ON 3/8/2021] vancomycin (VANCOCIN) IVPB (premix in dextrose) 1,000 mg 200 mL, 1,000 mg, Intravenous, Once, Kirsten Brantley PA-C     Labs & Results:    Results from last 7 days   Lab Units 03/05/21 0427 03/05/21  0045 03/04/21 2031   TROPONIN I ng/mL 1 04* 1 14* 1 06*     Results from last 7 days   Lab Units 03/05/21 0427 03/04/21 2031   WBC Thousand/uL 8 21 13 27*   HEMOGLOBIN g/dL 12 2 12 5   HEMATOCRIT % 37 6 39 9   PLATELETS Thousands/uL 165 174     Results from last 7 days   Lab Units 03/05/21 0427   TRIGLYCERIDES mg/dL 123   HDL mg/dL 55     Results from last 7 days   Lab Units 03/05/21 0427 03/04/21 2031   POTASSIUM mmol/L 4 1 4 1   CHLORIDE mmol/L 106 105   CO2 mmol/L 25 28   BUN mg/dL 21 24   CREATININE mg/dL 0 73 0 86   CALCIUM mg/dL 9 0 9 2   ALK PHOS U/L  --  105   ALT U/L  --  22   AST U/L  --  27     Results from last 7 days   Lab Units 03/05/21 0427 03/04/21  2234   INR   --  1 05   PTT seconds 34 28     Results from last 7 days   Lab Units 03/05/21 0427   MAGNESIUM mg/dL 1 9       EKG personally reviewed by Ria Gottron, MD

## 2021-03-06 NOTE — ASSESSMENT & PLAN NOTE
· CT facial bones revealing "Comminuted fractures of the nasal bridge, nondiastatic fracture anterior nasal septum, roof and lateral wall of the left maxillary sinus "  · Patient with some persistent epistaxis  · Was seen by trauma in ED, recommendations noted  · OMFS consult appreciated    Recommend Augmentin x 7 days and outpt f/u in their office to re-evaluate need for surgery  · Monitor epistaxis

## 2021-03-07 PROCEDURE — 99232 SBSQ HOSP IP/OBS MODERATE 35: CPT | Performed by: PHYSICIAN ASSISTANT

## 2021-03-07 PROCEDURE — 99232 SBSQ HOSP IP/OBS MODERATE 35: CPT | Performed by: INTERNAL MEDICINE

## 2021-03-07 RX ADMIN — ASPIRIN 81 MG: 81 TABLET, COATED ORAL at 08:11

## 2021-03-07 RX ADMIN — AMOXICILLIN AND CLAVULANATE POTASSIUM 1 TABLET: 875; 125 TABLET, FILM COATED ORAL at 08:12

## 2021-03-07 RX ADMIN — MELATONIN 3 MG: at 21:09

## 2021-03-07 RX ADMIN — STANDARDIZED SENNA CONCENTRATE 8.6 MG: 8.6 TABLET ORAL at 21:09

## 2021-03-07 RX ADMIN — ACETAMINOPHEN 975 MG: 325 TABLET, FILM COATED ORAL at 21:08

## 2021-03-07 RX ADMIN — LEVOTHYROXINE SODIUM 50 MCG: 50 TABLET ORAL at 05:20

## 2021-03-07 RX ADMIN — AMOXICILLIN AND CLAVULANATE POTASSIUM 1 TABLET: 875; 125 TABLET, FILM COATED ORAL at 21:09

## 2021-03-07 RX ADMIN — ANASTROZOLE 1 MG: 1 TABLET, COATED ORAL at 21:09

## 2021-03-07 RX ADMIN — ENOXAPARIN SODIUM 40 MG: 40 INJECTION SUBCUTANEOUS at 08:11

## 2021-03-07 RX ADMIN — Medication 1 TABLET: at 05:21

## 2021-03-07 RX ADMIN — Medication 1 TABLET: at 08:11

## 2021-03-07 RX ADMIN — METOPROLOL SUCCINATE 25 MG: 25 TABLET, FILM COATED, EXTENDED RELEASE ORAL at 08:11

## 2021-03-07 RX ADMIN — OMEGA-3 FATTY ACIDS CAP 1000 MG 1000 MG: 1000 CAP at 08:11

## 2021-03-07 RX ADMIN — LOSARTAN POTASSIUM 25 MG: 25 TABLET, FILM COATED ORAL at 21:09

## 2021-03-07 NOTE — PLAN OF CARE
Problem: Potential for Falls  Goal: Patient will remain free of falls  Description: INTERVENTIONS:  - Assess patient frequently for physical needs  -  Identify cognitive and physical deficits and behaviors that affect risk of falls    -  [place] fall precautions as indicated by assessment   - Educate patient/family on patient safety including physical limitations  - Instruct patient to call for assistance with activity based on assessment  - Modify environment to reduce risk of injury  - Consider OT/PT consult to assist with strengthening/mobility  Outcome: Progressing     Problem: CARDIOVASCULAR - ADULT  Goal: Maintains optimal cardiac output and hemodynamic stability  Description: INTERVENTIONS:  - Monitor I/O, vital signs and rhythm  - Monitor for S/S and trends of decreased cardiac output  - Administer and titrate ordered vasoactive medications to optimize hemodynamic stability  - Assess quality of pulses, skin color and temperature  - Assess for signs of decreased coronary artery perfusion  - Instruct patient to report change in severity of symptoms  Outcome: Progressing  Goal: Absence of cardiac dysrhythmias or at baseline rhythm  Description: INTERVENTIONS:  - Continuous cardiac monitoring, vital signs, obtain 12 lead EKG if ordered  - Administer antiarrhythmic and heart rate control medications as ordered  - Monitor electrolytes and administer replacement therapy as ordered  Outcome: Progressing     Problem: SKIN/TISSUE INTEGRITY - ADULT  Goal: Skin integrity remains intact  Description: INTERVENTIONS  - Identify patients at risk for skin breakdown  - Assess and monitor skin integrity  - Assess and monitor nutrition and hydration status  - Monitor labs (i e  albumin)  - Assess for incontinence   - Turn and reposition patient  - Assist with mobility/ambulation  - Relieve pressure over bony prominences  - Avoid friction and shearing  - Provide appropriate hygiene as needed including keeping skin clean and dry  - Evaluate need for skin moisturizer/barrier cream  - Collaborate with interdisciplinary team (i e  Nutrition, Rehabilitation, etc )   - Patient/family teaching  Outcome: Progressing  Goal: Incision(s), wounds(s) or drain site(s) healing without S/S of infection  Description: INTERVENTIONS  - Assess and document risk factors for skin impairment   - Assess and document dressing, incision, wound bed, drain sites and surrounding tissue  - Consider nutrition services referral as needed  - Oral mucous membranes remain intact  - Provide patient/ family education  Outcome: Progressing  Goal: Oral mucous membranes remain intact  Description: INTERVENTIONS  - Assess oral mucosa and hygiene practices  - Implement preventative oral hygiene regimen  - Implement oral medicated treatments as ordered  - Initiate Nutrition services referral as needed  Outcome: Progressing     Problem: HEMATOLOGIC - ADULT  Goal: Maintains hematologic stability  Description: INTERVENTIONS  - Assess for signs and symptoms of bleeding or hemorrhage  - Monitor labs  - Administer supportive blood products/factors as ordered and appropriate  Outcome: Progressing

## 2021-03-07 NOTE — PROGRESS NOTES
Cardiology Progress Note - Capri Dill 66 y o  female MRN: [id number]    Unit/Bed#: -01 Encounter: [id number]      Assessment/Recommendations:  1  Syncope with facial fractures:  Conservative management  2  Hypertrophic cardiomyopathy:  Ejection fraction 35% on echo in 2019  For likely right-sided ICD implant tomorrow  3  Elevated troponin:  Unlikely to be coronary in etiology is cardiac catheterization revealed no significant CAD  4  Known nonsustained VT:  Brief event overnight on telemetry monitoring  For ICD implant tomorrow  Continue beta-blocker  5  Hypothyroidism:  As per primary team   6  Left-sided breast cancer:  Maintained on anastrozole and status post mastectomy    Subjective:   Patient seen and examined  No significant events overnight   ; pertinent negatives - chest pain, chest pressure/discomfort, dyspnea, irregular heart beat, lower extremity edema and palpitations  Objective:     Vitals: Blood pressure 124/69, pulse 61, temperature (!) 97 4 °F (36 3 °C), resp  rate 17, height 5' (1 524 m), weight 58 2 kg (128 lb 4 9 oz), SpO2 95 %  , Body mass index is 25 06 kg/m² ,   Orthostatic Blood Pressures      Most Recent Value   Blood Pressure  124/69 filed at 03/07/2021 5737   Patient Position - Orthostatic VS  Lying filed at 03/05/2021 2227            Intake/Output Summary (Last 24 hours) at 3/7/2021 1103  Last data filed at 3/6/2021 1833  Gross per 24 hour   Intake 240 ml   Output --   Net 240 ml       TELE:  Brief nonsustained VT      Physical Exam:    GEN: Capri Dill appears well, alert and oriented x 3, pleasant and cooperative   HEENT: pupils equal, round, and reactive to light; extraocular muscles intact  NECK: supple, no carotid bruits   HEART: regular rhythm, normal S1 and S2, +systolic murmur, no clicks, gallops or rubs   LUNGS: clear to auscultation bilaterally; no wheezes, rales, or rhonchi   ABDOMEN: normal bowel sounds, soft, no tenderness, no distention  EXTREMITIES: peripheral pulses normal; no clubbing, cyanosis, or edema  NEURO: no focal findings   SKIN: normal without suspicious lesions on exposed skin      Medications:      Current Facility-Administered Medications:     acetaminophen (TYLENOL) tablet 650 mg, 650 mg, Oral, Q4H PRN, Kate Echavarria MD    acetaminophen (TYLENOL) tablet 975 mg, 975 mg, Oral, Q8H [address], Jetty Jubilee, DO, 975 mg at 03/06/21 2111    amoxicillin-clavulanate (AUGMENTIN) 875-125 mg per tablet 1 tablet, 1 tablet, Oral, Q12H [address], Ce Carmona PA-C, 1 tablet at 03/07/21 1314    anastrozole (ARIMIDEX) tablet 1 mg, 1 mg, Oral, HS, Jetty Jubilee, DO, 1 mg at 03/06/21 2111    aspirin (ECOTRIN LOW STRENGTH) EC tablet 81 mg, 81 mg, Oral, Daily, Jetty Jubilee, DO, 81 mg at 03/07/21 1842    calcium carbonate-vitamin D (OSCAL-D) 500 mg-200 units per tablet 1 tablet, 1 tablet, Oral, Daily With Breakfast, Jetty Jubilee, DO, 1 tablet at 03/07/21 0521    chlorhexidine (PERIDEX) 0 12 % oral rinse 15 mL, 15 mL, Swish & Spit, Once, Kirsten Brantley PA-C    enoxaparin (LOVENOX) subcutaneous injection 40 mg, 40 mg, Subcutaneous, Q24H [address], Barbara Mishra PA-C, 40 mg at 03/07/21 0589    fish oil capsule 1,000 mg, 1,000 mg, Oral, Daily, Jetty Jubilee, DO, 1,000 mg at 03/07/21 1216    levothyroxine tablet 50 mcg, 50 mcg, Oral, Daily, Jetty Jubilee, DO, 50 mcg at 03/07/21 0520    losartan (COZAAR) tablet 25 mg, 25 mg, Oral, HS, Jetty Jubilee, DO, 25 mg at 03/06/21 2111    melatonin tablet 3 mg, 3 mg, Oral, HS, Jetty Jubilee, DO, 3 mg at 03/06/21 2111    metoprolol succinate (TOPROL-XL) 24 hr tablet 25 mg, 25 mg, Oral, QAM, Jethao Jubilee, DO, 25 mg at 03/07/21 0811    multivitamin-minerals (CENTRUM) tablet 1 tablet, 1 tablet, Oral, Daily, Jethao Fongbiltiffanie, DO, 1 tablet at 03/07/21 0811    ondansetron (ZOFRAN) injection 4 mg, 4 mg, Intravenous, Q6H PRN, Kate Echavarria MD    oxyCODONE (ROXICODONE) IR tablet 2 5 mg, 2 5 mg, Oral, Q4H PRN, Ashley Juan DO Kaci, 2 5 mg at 03/05/21 0435    oxyCODONE (ROXICODONE) IR tablet 5 mg, 5 mg, Oral, Q4H PRN, Wesley Escoto DO    polyethylene glycol (MIRALAX) packet 17 g, 17 g, Oral, Daily PRN, Barbara Mishra PA-C    senna (SENOKOT) tablet 8 6 mg, 1 tablet, Oral, HS, Barbara Mishra PA-C, 8 6 mg at 03/06/21 2111    [START ON 3/8/2021] vancomycin (VANCOCIN) IVPB (premix in dextrose) 1,000 mg 200 mL, 1,000 mg, Intravenous, Once, Ally Levine PA-C     Labs & Results:    Results from last 7 days   Lab Units 03/05/21 0427 03/05/21  0045 03/04/21 2031   TROPONIN I ng/mL 1 04* 1 14* 1 06*     Results from last 7 days   Lab Units 03/05/21 0427 03/04/21 2031   WBC Thousand/uL 8 21 13 27*   HEMOGLOBIN g/dL 12 2 12 5   HEMATOCRIT % 37 6 39 9   PLATELETS Thousands/uL 165 174     Results from last 7 days   Lab Units 03/05/21 0427   TRIGLYCERIDES mg/dL 123   HDL mg/dL 55     Results from last 7 days   Lab Units 03/05/21 0427 03/04/21 2031   POTASSIUM mmol/L 4 1 4 1   CHLORIDE mmol/L 106 105   CO2 mmol/L 25 28   BUN mg/dL 21 24   CREATININE mg/dL 0 73 0 86   CALCIUM mg/dL 9 0 9 2   ALK PHOS U/L  --  105   ALT U/L  --  22   AST U/L  --  27     Results from last 7 days   Lab Units 03/05/21 0427 03/04/21  2234   INR   --  1 05   PTT seconds 34 28     Results from last 7 days   Lab Units 03/05/21 0427   MAGNESIUM mg/dL 1 9       EKG personally reviewed by Jannette Villanueva MD

## 2021-03-07 NOTE — ASSESSMENT & PLAN NOTE
· Pt with syncope, fall down flight of stairs   · Troponin 1 06/1 14/1 04 - flat  · Likely type 2 MI due to syncope/fall  · Placed on heparin gtt on admission - will discontinue as not NSTEMI  · Cardiac cath 3/5 unremarkable, no CAD

## 2021-03-07 NOTE — PROGRESS NOTES
[facility]  Progress Note - Marily Flores 1945, 66 y o  female MRN: [id number]    Unit/Bed#: -01 Encounter: [id number]    Primary Care Provider: Balbir Garcia MD   Date and time admitted to hospital: 3/4/2021  7:31 PM        * Syncope  Assessment & Plan  · Syncopal episode resulting in fall down 10-15 stairs - trauma evaluation appreciated  · EKG without marked abnormality, elevated troponin noted and patient to be on heparin GTT if epistaxis is controlled  · Patient with known history of hypertrophic cardiomyopathy and NSVT and evaluated 10/2020 for possible dual chamber ICD, concern this may be possible etiology  · Continue telemetry monitoring  · Electrophysiology consultation, plan for ICD placement next week    Left shoulder pain  Assessment & Plan  · XR left shoulder negative for acute findings  · PRN pain control, supportive care    Elevated troponin I level  Assessment & Plan  · Pt with syncope, fall down flight of stairs   · Troponin 1 06/1 14/1 04 - flat  · Likely type 2 MI due to syncope/fall  · Placed on heparin gtt on admission - will discontinue as not NSTEMI  · Cardiac cath 3/5 unremarkable, no CAD     Multiple facial fractures (HCC)  Assessment & Plan  · CT facial bones revealing "Comminuted fractures of the nasal bridge, nondiastatic fracture anterior nasal septum, roof and lateral wall of the left maxillary sinus "  · Patient with some persistent epistaxis  · Was seen by trauma in ED, recommendations noted  · OMFS consult appreciated    Recommend Augmentin x 7 days and outpt f/u in their office to re-evaluate need for surgery  · Monitor epistaxis    NSVT (nonsustained ventricular tachycardia) ([facility] [address] )  Assessment & Plan  · Known history of this, has been seen by EP and evaluated for Bi V ICD  · EP consulted, plan for ICD next week   · Monitor on telemetry     Hypothyroidism  Assessment & Plan  · TSH normal  · Continue home dose levothyroxine     Hypertrophic cardiomyopathy [facility])  Assessment & Plan  · Follows with Dr Leda Santillan as cardiologist, was seen by electrophysiology for possible BiV-ICD  · Stress echo 2021 reported unrevealing, had apparent echo as outpatient  with EF 40%  · Electrophysiology consultation appreciated, plan for ICD Monday    Malignant neoplasm of upper-inner 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 breast [facility])  Assessment & Plan  · [facility] with [facility]  · Continue anastrozole        VTE Pharmacologic Prophylaxis:   Pharmacologic: Enoxaparin (Lovenox)  Mechanical VTE Prophylaxis in Place: Yes    Patient Centered Rounds: I have performed bedside rounds with nursing staff today  Discussions with Specialists or Other Care Team Provider: RN    Education and Discussions with Family / Patient: patient, declined call    Time Spent for Care: 20 minutes  More than 50% of total time spent on counseling and coordination of care as described above  Current Length of Stay: 3 day(s)    Current Patient Status: Inpatient   Certification Statement: The patient will continue to require additional inpatient hospital stay due to ICD placement tomorrow    Discharge Plan: 48 hrs    Code Status: Level 3 - DNAR and DNI      Subjective: The patient has no acute complaints today, waiting for procedure tomorrow  Walked about 40 mins today no issue    Objective:     Vitals:   Temp (24hrs), Av 9 °F (36 6 °C), Min:97 3 °F (36 3 °C), Max:98 7 °F (37 1 °C)    Temp:  [97 3 °F (36 3 °C)-98 7 °F (37 1 °C)] 97 6 °F (36 4 °C)  HR:  [58-63] 58  Resp:  [19] 19  BP: (100-132)/(53-78) 132/78  SpO2:  [89 %-96 %] 96 %  Body mass index is 25 06 kg/m²  Input and Output Summary (last 24 hours): Intake/Output Summary (Last 24 hours) at 3/7/2021 1418  Last data filed at 3/7/2021 0800  Gross per 24 hour   Intake 420 ml   Output --   Net 420 ml       Physical Exam:     Physical Exam  Vitals signs reviewed  Constitutional:       General: She is not in acute distress  Appearance: She is not toxic-appearing  HENT:      Head: Normocephalic and atraumatic  Eyes:      General: No scleral icterus  Extraocular Movements: Extraocular movements intact  Comments: Periorbital ecchymosis   Neck:      Musculoskeletal: Normal range of motion and neck supple  Cardiovascular:      Rate and Rhythm: Normal rate and regular rhythm  Pulmonary:      Effort: Pulmonary effort is normal  No respiratory distress  Breath sounds: Normal breath sounds  Abdominal:      General: Bowel sounds are normal  There is no distension  Palpations: Abdomen is soft  Musculoskeletal: Normal range of motion  Skin:     General: Skin is warm and dry  Neurological:      General: No focal deficit present  Mental Status: She is alert and oriented to person, place, and time  Psychiatric:         Mood and Affect: Mood normal          Behavior: Behavior normal          Thought Content:  Thought content normal          Additional Data:     Labs:    Results from last 7 days   Lab Units 03/05/21 0427 03/04/21  2031   WBC Thousand/uL 8 21 13 27*   HEMOGLOBIN g/dL 12 2 12 5   HEMATOCRIT % 37 6 39 9   PLATELETS Thousands/uL 165 174   NEUTROS PCT %  --  86*   LYMPHS PCT %  --  6*   MONOS PCT %  --  6   EOS PCT %  --  1     Results from last 7 days   Lab Units 03/05/21 0427 03/04/21  2031   SODIUM mmol/L 137 138   POTASSIUM mmol/L 4 1 4 1   CHLORIDE mmol/L 106 105   CO2 mmol/L 25 28   BUN mg/dL 21 24   CREATININE mg/dL 0 73 0 86   ANION GAP mmol/L 6 5   CALCIUM mg/dL 9 0 9 2   ALBUMIN g/dL  --  3 8   TOTAL BILIRUBIN mg/dL  --  0 52   ALK PHOS U/L  --  105   ALT U/L  --  22   AST U/L  --  27   GLUCOSE RANDOM mg/dL 123 117     Results from last 7 days   Lab Units 03/04/21  2234   INR  1 05     Results from last 7 days   Lab Units 03/05/21  1627   POC GLUCOSE mg/dl 112     Results from last 7 days   Lab Units 03/05/21 0427   HEMOGLOBIN A1C % 5 8*               * I Have Reviewed All Lab Data Listed Above  * Additional Pertinent Lab Tests Reviewed: All Labs Within Last 24 Hours Reviewed    Imaging:    Imaging Reports Reviewed Today Include: none  Imaging Personally Reviewed by Myself Includes:  none    Recent Cultures (last 7 days):           Last 24 Hours Medication List:   Current Facility-Administered Medications   Medication Dose Route Frequency Provider Last Rate    acetaminophen  650 mg Oral Q4H PRN Daniel Watkins MD      acetaminophen  975 mg Oral [facility] Loistine Shearing, DO      amoxicillin-clavulanate  1 tablet Oral Q12H [facility], CAYLA      anastrozole  1 mg Oral HS Loistine Shearing, DO      aspirin  81 mg Oral Daily Loistine Shearing, DO      calcium carbonate-vitamin D  1 tablet Oral Daily With Breakfast Jose Eduardo Zamoraing, DO      chlorhexidine  15 mL Swish & Spit Once CAYLA Curry      enoxaparin  40 mg Subcutaneous Q24H [facility] Barbara Mishra PA-C      fish oil  1,000 mg Oral Daily Loistine Shearing, DO      levothyroxine  50 mcg Oral Daily Loistine Shearing, DO      losartan  25 mg Oral HS Loistine Shearing, DO      melatonin  3 mg Oral HS Loistine Shearing, DO      metoprolol succinate  25 mg Oral QAM Loistine Shearing, DO      multivitamin-minerals  1 tablet Oral Daily Loistine Shearing, DO      ondansetron  4 mg Intravenous Q6H PRN Daniel Watkins MD      oxyCODONE  2 5 mg Oral Q4H PRN Loistine Shearing, DO      oxyCODONE  5 mg Oral Q4H PRN Loistine Shearing, DO      polyethylene glycol  17 g Oral Daily PRN Barbara Mishra PA-C      senna  1 tablet Oral HS Barbara Mishra PA-C      [START ON 3/8/2021] vancomycin  1,000 mg Intravenous Once CAYLA Curry          Today, Patient Was Seen By: Pastor Genoveva PA-C    ** Please Note: Dictation voice to text software may have been used in the creation of this document   **

## 2021-03-07 NOTE — ASSESSMENT & PLAN NOTE
· Follows with Dr Jenelle Noyola as cardiologist, was seen by electrophysiology for possible BiV-ICD  · Stress echo 02/2021 reported unrevealing, had apparent echo as outpatient 2020 with EF 40%  · Electrophysiology consultation appreciated, plan for ICD Monday

## 2021-03-08 ENCOUNTER — APPOINTMENT (INPATIENT)
Dept: RADIOLOGY | Facility: HOSPITAL | Age: 76
DRG: 225 | End: 2021-03-08
Payer: COMMERCIAL

## 2021-03-08 ENCOUNTER — ANESTHESIA EVENT (INPATIENT)
Dept: NON INVASIVE DIAGNOSTICS | Facility: HOSPITAL | Age: 76
DRG: 225 | End: 2021-03-08
Payer: COMMERCIAL

## 2021-03-08 LAB
ANION GAP SERPL CALCULATED.3IONS-SCNC: 4 MMOL/L (ref 4–13)
BUN SERPL-MCNC: 23 MG/DL (ref 5–25)
CALCIUM SERPL-MCNC: 9.3 MG/DL (ref 8.3–10.1)
CHLORIDE SERPL-SCNC: 111 MMOL/L (ref 100–108)
CO2 SERPL-SCNC: 27 MMOL/L (ref 21–32)
CREAT SERPL-MCNC: 0.85 MG/DL (ref 0.6–1.3)
ERYTHROCYTE [DISTWIDTH] IN BLOOD BY AUTOMATED COUNT: 13.6 % (ref 11.6–15.1)
GFR SERPL CREATININE-BSD FRML MDRD: 66 ML/MIN/1.73SQ M
GLUCOSE SERPL-MCNC: 95 MG/DL (ref 65–140)
HCT VFR BLD AUTO: 36 % (ref 34.8–46.1)
HGB BLD-MCNC: 11.5 G/DL (ref 11.5–15.4)
MCH RBC QN AUTO: 27.3 PG (ref 26.8–34.3)
MCHC RBC AUTO-ENTMCNC: 31.9 G/DL (ref 31.4–37.4)
MCV RBC AUTO: 85 FL (ref 82–98)
PLATELET # BLD AUTO: 155 THOUSANDS/UL (ref 149–390)
PMV BLD AUTO: 9.7 FL (ref 8.9–12.7)
POTASSIUM SERPL-SCNC: 4.1 MMOL/L (ref 3.5–5.3)
RBC # BLD AUTO: 4.22 MILLION/UL (ref 3.81–5.12)
SODIUM SERPL-SCNC: 142 MMOL/L (ref 136–145)
WBC # BLD AUTO: 4.86 THOUSAND/UL (ref 4.31–10.16)

## 2021-03-08 PROCEDURE — 33249 INSJ/RPLCMT DEFIB W/LEAD(S): CPT | Performed by: PHYSICIAN ASSISTANT

## 2021-03-08 PROCEDURE — C1898 LEAD, PMKR, OTHER THAN TRANS: HCPCS

## 2021-03-08 PROCEDURE — C1892 INTRO/SHEATH,FIXED,PEEL-AWAY: HCPCS | Performed by: PHYSICIAN ASSISTANT

## 2021-03-08 PROCEDURE — C1898 LEAD, PMKR, OTHER THAN TRANS: HCPCS | Performed by: PHYSICIAN ASSISTANT

## 2021-03-08 PROCEDURE — C1777 LEAD, AICD, ENDO SINGLE COIL: HCPCS

## 2021-03-08 PROCEDURE — 85027 COMPLETE CBC AUTOMATED: CPT | Performed by: PHYSICIAN ASSISTANT

## 2021-03-08 PROCEDURE — C1722 AICD, SINGLE CHAMBER: HCPCS

## 2021-03-08 PROCEDURE — 93005 ELECTROCARDIOGRAM TRACING: CPT

## 2021-03-08 PROCEDURE — 71045 X-RAY EXAM CHEST 1 VIEW: CPT

## 2021-03-08 PROCEDURE — 0JH608Z INSERTION OF DEFIBRILLATOR GENERATOR INTO CHEST SUBCUTANEOUS TISSUE AND FASCIA, OPEN APPROACH: ICD-10-PCS | Performed by: INTERNAL MEDICINE

## 2021-03-08 PROCEDURE — 02H63KZ INSERTION OF DEFIBRILLATOR LEAD INTO RIGHT ATRIUM, PERCUTANEOUS APPROACH: ICD-10-PCS | Performed by: INTERNAL MEDICINE

## 2021-03-08 PROCEDURE — 02HK3KZ INSERTION OF DEFIBRILLATOR LEAD INTO RIGHT VENTRICLE, PERCUTANEOUS APPROACH: ICD-10-PCS | Performed by: INTERNAL MEDICINE

## 2021-03-08 PROCEDURE — 99232 SBSQ HOSP IP/OBS MODERATE 35: CPT | Performed by: PHYSICIAN ASSISTANT

## 2021-03-08 PROCEDURE — 33249 INSJ/RPLCMT DEFIB W/LEAD(S): CPT | Performed by: INTERNAL MEDICINE

## 2021-03-08 PROCEDURE — 80048 BASIC METABOLIC PNL TOTAL CA: CPT | Performed by: PHYSICIAN ASSISTANT

## 2021-03-08 RX ORDER — PROPOFOL 10 MG/ML
INJECTION, EMULSION INTRAVENOUS CONTINUOUS PRN
Status: DISCONTINUED | OUTPATIENT
Start: 2021-03-08 | End: 2021-03-08

## 2021-03-08 RX ORDER — FENTANYL CITRATE 50 UG/ML
INJECTION, SOLUTION INTRAMUSCULAR; INTRAVENOUS AS NEEDED
Status: DISCONTINUED | OUTPATIENT
Start: 2021-03-08 | End: 2021-03-08

## 2021-03-08 RX ORDER — SODIUM CHLORIDE 9 MG/ML
INJECTION, SOLUTION INTRAVENOUS CONTINUOUS PRN
Status: DISCONTINUED | OUTPATIENT
Start: 2021-03-08 | End: 2021-03-08

## 2021-03-08 RX ORDER — LIDOCAINE HYDROCHLORIDE 10 MG/ML
INJECTION, SOLUTION EPIDURAL; INFILTRATION; INTRACAUDAL; PERINEURAL AS NEEDED
Status: DISCONTINUED | OUTPATIENT
Start: 2021-03-08 | End: 2021-03-08

## 2021-03-08 RX ORDER — LIDOCAINE HYDROCHLORIDE 10 MG/ML
INJECTION, SOLUTION EPIDURAL; INFILTRATION; INTRACAUDAL; PERINEURAL CODE/TRAUMA/SEDATION MEDICATION
Status: COMPLETED | OUTPATIENT
Start: 2021-03-08 | End: 2021-03-08

## 2021-03-08 RX ORDER — PROPOFOL 10 MG/ML
INJECTION, EMULSION INTRAVENOUS AS NEEDED
Status: DISCONTINUED | OUTPATIENT
Start: 2021-03-08 | End: 2021-03-08

## 2021-03-08 RX ORDER — GENTAMICIN SULFATE 40 MG/ML
INJECTION, SOLUTION INTRAMUSCULAR; INTRAVENOUS CODE/TRAUMA/SEDATION MEDICATION
Status: COMPLETED | OUTPATIENT
Start: 2021-03-08 | End: 2021-03-08

## 2021-03-08 RX ORDER — EPHEDRINE SULFATE 50 MG/ML
INJECTION INTRAVENOUS AS NEEDED
Status: DISCONTINUED | OUTPATIENT
Start: 2021-03-08 | End: 2021-03-08

## 2021-03-08 RX ADMIN — EPHEDRINE SULFATE 5 MG: 50 INJECTION, SOLUTION INTRAVENOUS at 14:53

## 2021-03-08 RX ADMIN — SODIUM CHLORIDE: 0.9 INJECTION, SOLUTION INTRAVENOUS at 14:25

## 2021-03-08 RX ADMIN — Medication 1 TABLET: at 05:34

## 2021-03-08 RX ADMIN — ENOXAPARIN SODIUM 40 MG: 40 INJECTION SUBCUTANEOUS at 09:06

## 2021-03-08 RX ADMIN — PHENYLEPHRINE HYDROCHLORIDE 100 MCG: 10 INJECTION INTRAVENOUS at 15:32

## 2021-03-08 RX ADMIN — OMEGA-3 FATTY ACIDS CAP 1000 MG 1000 MG: 1000 CAP at 09:06

## 2021-03-08 RX ADMIN — PHENYLEPHRINE HYDROCHLORIDE 100 MCG: 10 INJECTION INTRAVENOUS at 15:06

## 2021-03-08 RX ADMIN — AMOXICILLIN AND CLAVULANATE POTASSIUM 1 TABLET: 875; 125 TABLET, FILM COATED ORAL at 09:06

## 2021-03-08 RX ADMIN — LOSARTAN POTASSIUM 25 MG: 25 TABLET, FILM COATED ORAL at 21:05

## 2021-03-08 RX ADMIN — PROPOFOL 40 MG: 10 INJECTION, EMULSION INTRAVENOUS at 16:04

## 2021-03-08 RX ADMIN — FENTANYL CITRATE 25 MCG: 50 INJECTION INTRAMUSCULAR; INTRAVENOUS at 14:48

## 2021-03-08 RX ADMIN — GENTAMICIN SULFATE 80 MG: 40 INJECTION, SOLUTION INTRAMUSCULAR; INTRAVENOUS at 15:48

## 2021-03-08 RX ADMIN — LIDOCAINE HYDROCHLORIDE 50 MG: 10 INJECTION, SOLUTION EPIDURAL; INFILTRATION; INTRACAUDAL; PERINEURAL at 14:37

## 2021-03-08 RX ADMIN — MELATONIN 3 MG: at 21:06

## 2021-03-08 RX ADMIN — STANDARDIZED SENNA CONCENTRATE 8.6 MG: 8.6 TABLET ORAL at 21:06

## 2021-03-08 RX ADMIN — EPHEDRINE SULFATE 5 MG: 50 INJECTION, SOLUTION INTRAVENOUS at 15:14

## 2021-03-08 RX ADMIN — ANASTROZOLE 1 MG: 1 TABLET, COATED ORAL at 21:05

## 2021-03-08 RX ADMIN — LIDOCAINE HYDROCHLORIDE 18 ML: 10 INJECTION, SOLUTION EPIDURAL; INFILTRATION; INTRACAUDAL; PERINEURAL at 15:03

## 2021-03-08 RX ADMIN — FENTANYL CITRATE 25 MCG: 50 INJECTION INTRAMUSCULAR; INTRAVENOUS at 16:04

## 2021-03-08 RX ADMIN — EPHEDRINE SULFATE 5 MG: 50 INJECTION, SOLUTION INTRAVENOUS at 15:23

## 2021-03-08 RX ADMIN — PROPOFOL 60 MCG/KG/MIN: 10 INJECTION, EMULSION INTRAVENOUS at 14:37

## 2021-03-08 RX ADMIN — ACETAMINOPHEN 975 MG: 325 TABLET, FILM COATED ORAL at 05:34

## 2021-03-08 RX ADMIN — PROPOFOL 20 MG: 10 INJECTION, EMULSION INTRAVENOUS at 14:53

## 2021-03-08 RX ADMIN — AMOXICILLIN AND CLAVULANATE POTASSIUM 1 TABLET: 875; 125 TABLET, FILM COATED ORAL at 21:04

## 2021-03-08 RX ADMIN — Medication 1 TABLET: at 09:06

## 2021-03-08 RX ADMIN — PROPOFOL 20 MG: 10 INJECTION, EMULSION INTRAVENOUS at 14:37

## 2021-03-08 RX ADMIN — ASPIRIN 81 MG: 81 TABLET, COATED ORAL at 09:06

## 2021-03-08 RX ADMIN — LEVOTHYROXINE SODIUM 50 MCG: 50 TABLET ORAL at 05:34

## 2021-03-08 RX ADMIN — VANCOMYCIN HYDROCHLORIDE 1000 MG: 1 INJECTION, SOLUTION INTRAVENOUS at 13:59

## 2021-03-08 RX ADMIN — IOHEXOL 10 ML: 350 INJECTION, SOLUTION INTRAVENOUS at 14:57

## 2021-03-08 RX ADMIN — PHENYLEPHRINE HYDROCHLORIDE 100 MCG: 10 INJECTION INTRAVENOUS at 15:56

## 2021-03-08 RX ADMIN — ACETAMINOPHEN 975 MG: 325 TABLET, FILM COATED ORAL at 21:05

## 2021-03-08 RX ADMIN — PROPOFOL 20 MG: 10 INJECTION, EMULSION INTRAVENOUS at 15:56

## 2021-03-08 RX ADMIN — PHENYLEPHRINE HYDROCHLORIDE 100 MCG: 10 INJECTION INTRAVENOUS at 15:39

## 2021-03-08 NOTE — PROGRESS NOTES
[facility]  Progress Note - Edmond García 1945, 66 y o  female MRN: [id number]    Unit/Bed#: -01 Encounter: [id number]    Primary Care Provider: Flora Raygoza MD   Date and time admitted to hospital: 3/4/2021  7:31 PM        * Syncope  Assessment & Plan  · Syncopal episode resulting in fall down 10-15 stairs - trauma evaluation appreciated  · EKG without marked abnormality, elevated troponin noted and patient to be on heparin GTT if epistaxis is controlled  · Patient with known history of hypertrophic cardiomyopathy and NSVT and evaluated 10/2020 for possible dual chamber ICD, concern this may be possible etiology  · Continue telemetry monitoring  · Electrophysiology consultation, plan for ICD placement today    Left shoulder pain  Assessment & Plan  · XR left shoulder negative for acute findings  · PRN pain control, supportive care    Elevated troponin I level  Assessment & Plan  · Pt with syncope, fall down flight of stairs   · Troponin 1 06/1 14/1 04 - flat  · Likely type 2 MI due to syncope/fall  · Placed on heparin gtt on admission - will discontinue as not NSTEMI  · Cardiac cath 3/5 unremarkable, no CAD     Multiple facial fractures (HCC)  Assessment & Plan  · CT facial bones revealing "Comminuted fractures of the nasal bridge, nondiastatic fracture anterior nasal septum, roof and lateral wall of the left maxillary sinus "  · Patient with some persistent epistaxis  · Was seen by trauma in ED, recommendations noted  · OMFS consult appreciated    Recommend Augmentin x 7 days and outpt f/u in their office to re-evaluate need for surgery  · Monitor epistaxis    NSVT (nonsustained ventricular tachycardia) ([facility] [address] )  Assessment & Plan  · Known history of this, has been seen by EP and evaluated for Bi V ICD  · EP consulted, plan for ICD today  · Monitor on telemetry     Hypothyroidism  Assessment & Plan  · TSH normal  · Continue home dose levothyroxine     Hypertrophic cardiomyopathy [facility])  Assessment & Plan  · Follows with Dr Cole Chacon as cardiologist, was seen by electrophysiology for possible BiV-ICD  · Stress echo 2021 reported unrevealing, had apparent echo as outpatient  with EF 40%  · Electrophysiology consultation appreciated, plan for ICD today    Malignant neoplasm of upper-inner 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 breast [facility])  Assessment & Plan  · Follows with [facility]  · Continue anastrozole        VTE Pharmacologic Prophylaxis:   Pharmacologic: Enoxaparin (Lovenox)  Mechanical VTE Prophylaxis in Place: Yes    Patient Centered Rounds: I have performed bedside rounds with nursing staff today  Discussions with Specialists or Other Care Team Provider: RN    Education and Discussions with Family / Patient: patient, declined call at this time  Time Spent for Care: 30 minutes  More than 50% of total time spent on counseling and coordination of care as described above  Current Length of Stay: 4 day(s)    Current Patient Status: Inpatient   Certification Statement: The patient will continue to require additional inpatient hospital stay due to ICD today    Discharge Plan: likely 24 hrs following ICD placement today and EP clearance    Code Status: Level 3 - DNAR and DNI      Subjective: The patient has no acute complaints today, she was seen ambulating the halls  She reports no difficulty with this no lightheadedness or dizziness, sob  Objective:     Vitals:   Temp (24hrs), Av 7 °F (36 5 °C), Min:96 3 °F (35 7 °C), Max:98 4 °F (36 9 °C)    Temp:  [96 3 °F (35 7 °C)-98 4 °F (36 9 °C)] 96 3 °F (35 7 °C)  HR:  [52-63] 52  Resp:  [17-19] 17  BP: (109-132)/(56-78) 114/60  SpO2:  [92 %-96 %] 95 %  Body mass index is 25 06 kg/m²  Input and Output Summary (last 24 hours):        Intake/Output Summary (Last 24 hours) at 3/8/2021 0913  Last data filed at 3/7/2021 1900  Gross per 24 hour   Intake 240 ml   Output --   Net 240 ml       Physical Exam:     Physical Exam  Vitals signs reviewed  Constitutional:       General: She is not in acute distress  Appearance: She is not toxic-appearing  HENT:      Head: Normocephalic and atraumatic  Eyes:      General: No scleral icterus  Extraocular Movements: Extraocular movements intact  Comments: Periorbital ecchymosis    Neck:      Musculoskeletal: Normal range of motion  Cardiovascular:      Rate and Rhythm: Normal rate and regular rhythm  Pulmonary:      Effort: Pulmonary effort is normal  No respiratory distress  Breath sounds: Normal breath sounds  Abdominal:      General: Bowel sounds are normal  There is no distension  Palpations: Abdomen is soft  Musculoskeletal: Normal range of motion  Skin:     General: Skin is warm  Neurological:      General: No focal deficit present  Mental Status: She is alert and oriented to person, place, and time  Psychiatric:         Mood and Affect: Mood normal          Behavior: Behavior normal          Thought Content: Thought content normal          Additional Data:     Labs:    Results from last 7 days   Lab Units 03/08/21 0530 03/04/21 2031   WBC Thousand/uL 4 86   < > 13 27*   HEMOGLOBIN g/dL 11 5   < > 12 5   HEMATOCRIT % 36 0   < > 39 9   PLATELETS Thousands/uL 155   < > 174   NEUTROS PCT %  --   --  86*   LYMPHS PCT %  --   --  6*   MONOS PCT %  --   --  6   EOS PCT %  --   --  1    < > = values in this interval not displayed       Results from last 7 days   Lab Units 03/08/21 0530 03/04/21 2031   SODIUM mmol/L 142   < > 138   POTASSIUM mmol/L 4 1   < > 4 1   CHLORIDE mmol/L 111*   < > 105   CO2 mmol/L 27   < > 28   BUN mg/dL 23   < > 24   CREATININE mg/dL 0 85   < > 0 86   ANION GAP mmol/L 4   < > 5   CALCIUM mg/dL 9 3   < > 9 2   ALBUMIN g/dL  --   --  3 8   TOTAL BILIRUBIN mg/dL  --   --  0 52   ALK PHOS U/L  --   --  105   ALT U/L  --   --  22   AST U/L  --   --  27   GLUCOSE RANDOM mg/dL 95   < > 117    < > = values in this interval not displayed  Results from last 7 days   Lab Units 03/04/21  2234   INR  1 05     Results from last 7 days   Lab Units 03/05/21  1627   POC GLUCOSE mg/dl 112     Results from last 7 days   Lab Units 03/05/21  0427   HEMOGLOBIN A1C % 5 8*               * I Have Reviewed All Lab Data Listed Above  * Additional Pertinent Lab Tests Reviewed:  All Labs Within Last 24 Hours Reviewed    Imaging:    Imaging Reports Reviewed Today Include: none  Imaging Personally Reviewed by Myself Includes:  none    Recent Cultures (last 7 days):           Last 24 Hours Medication List:   Current Facility-Administered Medications   Medication Dose Route Frequency Provider Last Rate    acetaminophen  650 mg Oral Q4H PRN Rox Floyd MD      acetaminophen  975 mg Oral [facility] Camille Locks, DO      amoxicillin-clavulanate  1 tablet Oral Q12H 160 [facility], [place]      anastrozole  1 mg Oral HS Camille Locks, DO      aspirin  81 mg Oral Daily Camille Locks, DO      calcium carbonate-vitamin D  1 tablet Oral Daily With Breakfast Camille Locks, DO      chlorhexidine  15 mL Swish & Spit Once Edwina Munoz PA-C      enoxaparin  40 mg Subcutaneous Q24H [facility] Barbara Mishra PA-C      fish oil  1,000 mg Oral Daily Camille Locks, DO      levothyroxine  50 mcg Oral Daily Camille Locks, DO      losartan  25 mg Oral HS Camille Locks, DO      melatonin  3 mg Oral HS Camille Locks, DO      metoprolol succinate  25 mg Oral QAM Camille Locks, DO      multivitamin-minerals  1 tablet Oral Daily Camille Locks, DO      ondansetron  4 mg Intravenous Q6H PRN Rox Floyd MD      oxyCODONE  2 5 mg Oral Q4H PRN Camille Locks, DO      oxyCODONE  5 mg Oral Q4H PRN Camille Locks, DO      polyethylene glycol  17 g Oral Daily PRN Barbara Mishra PA-C      senna  1 tablet Oral HS Barbara Mishra PA-C      vancomycin  1,000 mg Intravenous Once Edwina Munoz PA-C          Today, Patient Was Seen By: Mary Holbrook PA-C    ** Please Note: Dictation voice to text software may have been used in the creation of this document   **

## 2021-03-08 NOTE — ANESTHESIA POSTPROCEDURE EVALUATION
Post-Op Assessment Note    CV Status:  Stable  Pain Score: 0    Pain management: adequate     Mental Status:  Alert and awake   Hydration Status:  Euvolemic   PONV Controlled:  Controlled   Airway Patency:  Patent      Post Op Vitals Reviewed: Yes      Staff: Anesthesiologist, CRNA         No complications documented      BP   110/84   Temp 97 5   Pulse 110/72   Resp 12   SpO2 100%

## 2021-03-08 NOTE — ASSESSMENT & PLAN NOTE
· Syncopal episode resulting in fall down 10-15 stairs - trauma evaluation appreciated  · EKG without marked abnormality, elevated troponin noted and patient to be on heparin GTT if epistaxis is controlled  · Patient with known history of hypertrophic cardiomyopathy and NSVT and evaluated 10/2020 for possible dual chamber ICD, concern this may be possible etiology  · Continue telemetry monitoring  · Electrophysiology consultation, plan for ICD placement today

## 2021-03-08 NOTE — ASSESSMENT & PLAN NOTE
· Known history of this, has been seen by EP and evaluated for Bi V ICD  · EP consulted, plan for ICD today  · Monitor on telemetry

## 2021-03-08 NOTE — CASE MANAGEMENT
Met with pt, explained cm program/cm role: Info obtained from patient  [facility]  LOS: 3 day  Bundle:  Green           Unplanned Readmission Color  30 Day Readmission: No                           Resides with: Alone                 IF Alone who can help you? Daughters friends neighbors  Type Home: 2SH                    ASHER: 0 to enter, 13 to second fl  Do they have rails? yes  Bedroom Located: Second FL           Do you have a bathroom set up on the same floor yes  Primary Caregiver: Self  ADLs/Ambulations Independent with no devices  Drive:  Yes     PCP:  Sybil Lesch       Preferred Pharmacy: [facility] for dc meds: No  Afford Medication: Yes  HHC: hx 4 yrs ago after mastectomy unsure of agency  DME: none  IP Rehab: none  Did you ever receive Dialysis:  denies         MH Illness: denies meds? n/a  IP/OP Care denies  Drug Abuse: denies  Alcohol Abuse denies  Employed:retired  Primary Contact: Lizeth Astorga daughter [phone]  POA: yes not on file will take to pcp office to upload in system    LW yes  Transportation Home: family to transport home    CM reviewed d/c planning process including the following: identifying help at home, patient preference for d/c planning needs, Discharge Lounge, [facility] Meds to Bed program, availability of treatment team to discuss questions or concerns patient and/or family may have regarding understanding medications and recognizing signs and symptoms once discharged  CM also encouraged patient to follow up with all recommended appointments after discharge  Patient advised of importance for patient and family to participate in managing patients medical well being  Patient/caregiver received discharge checklist   Content reviewed  Patient/caregiver encouraged to participate in discharge plan of care prior to discharge home

## 2021-03-08 NOTE — ASSESSMENT & PLAN NOTE
· Follows with Dr Meghna Crain as cardiologist, was seen by electrophysiology for possible BiV-ICD  · Stress echo 02/2021 reported unrevealing, had apparent echo as outpatient 2020 with EF 40%  · Electrophysiology consultation appreciated, plan for ICD today

## 2021-03-08 NOTE — ANESTHESIA PREPROCEDURE EVALUATION
Procedure:  CARDIAC EPS/ICD IMPLANT    Relevant Problems   ANESTHESIA (within normal limits)      CARDIO   (+) Hypertension (well controlled; SBPs 110-120s recently)      ENDO   (+) Hypothyroidism      GYN   (+) Malignant neoplasm of upper-inner 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 breast ([address] )      PULMONARY (within normal limits)   (-) Smoking   (-) URI (upper respiratory infection)      Other   (+) Hypertrophic cardiomyopathy (HCC)   (+) Multiple facial fractures (HCC)   (+) NSVT (nonsustained ventricular tachycardia) (HCC)   (+) Syncope      Physical Exam    Airway    Mallampati score: II  TM Distance: >3 FB  Neck ROM: full     Dental   Comment: Small scab right upper lip from fall; upper incisors feel slightly "different" to patient but no pain, not loose, nothing chipped after fall, No notable dental hx     Cardiovascular      Pulmonary      Other Findings  Significant bruising periorbitally bilaterally     Lab Results   Component Value Date    WBC 4 86 03/08/2021    HGB 11 5 03/08/2021     03/08/2021     Lab Results   Component Value Date    SODIUM 142 03/08/2021    K 4 1 03/08/2021    BUN 23 03/08/2021    CREATININE 0 85 03/08/2021    EGFR 66 03/08/2021    GLUCOSE 105 02/21/2015     Lab Results   Component Value Date    PTT 34 03/05/2021      Lab Results   Component Value Date    INR 1 05 03/04/2021     Lab Results   Component Value Date    HGBA1C 5 8 (H) 03/05/2021     Cath 3/5/21 - no CAD    TTE 6/2020 At [facility] reportedly mild asymmetric septal hypertrophy with EF ~40% and mild to moderate diastolic dysfunction  Anesthesia Plan  ASA Score- 3     Anesthesia Type- IV sedation with anesthesia with ASA Monitors  Additional Monitors:   Airway Plan:           Plan Factors-Exercise tolerance (METS): >4 METS  Chart reviewed  EKG reviewed  Imaging results reviewed  Existing labs reviewed  Patient summary reviewed  Patient is not a current smoker  Induction- intravenous      Postoperative Plan- Informed Consent- Anesthetic plan and risks discussed with patient and daughter  I personally reviewed this patient with the CRNA  Discussed and agreed on the Anesthesia Plan with the CRNA  Rico Mix

## 2021-03-09 ENCOUNTER — APPOINTMENT (INPATIENT)
Dept: RADIOLOGY | Facility: HOSPITAL | Age: 76
DRG: 225 | End: 2021-03-09
Payer: COMMERCIAL

## 2021-03-09 VITALS
WEIGHT: 128.31 LBS | TEMPERATURE: 97.3 F | BODY MASS INDEX: 25.19 KG/M2 | HEIGHT: 60 IN | OXYGEN SATURATION: 95 % | SYSTOLIC BLOOD PRESSURE: 126 MMHG | DIASTOLIC BLOOD PRESSURE: 64 MMHG | RESPIRATION RATE: 18 BRPM | HEART RATE: 64 BPM

## 2021-03-09 LAB
ANION GAP SERPL CALCULATED.3IONS-SCNC: 6 MMOL/L (ref 4–13)
ATRIAL RATE: 64 BPM
BASOPHILS # BLD AUTO: 0.05 THOUSANDS/ΜL (ref 0–0.1)
BASOPHILS NFR BLD AUTO: 1 % (ref 0–1)
BUN SERPL-MCNC: 19 MG/DL (ref 5–25)
CALCIUM SERPL-MCNC: 8.9 MG/DL (ref 8.3–10.1)
CHLORIDE SERPL-SCNC: 107 MMOL/L (ref 100–108)
CO2 SERPL-SCNC: 25 MMOL/L (ref 21–32)
CREAT SERPL-MCNC: 0.7 MG/DL (ref 0.6–1.3)
EOSINOPHIL # BLD AUTO: 0.17 THOUSAND/ΜL (ref 0–0.61)
EOSINOPHIL NFR BLD AUTO: 3 % (ref 0–6)
ERYTHROCYTE [DISTWIDTH] IN BLOOD BY AUTOMATED COUNT: 13.9 % (ref 11.6–15.1)
GFR SERPL CREATININE-BSD FRML MDRD: 84 ML/MIN/1.73SQ M
GLUCOSE SERPL-MCNC: 104 MG/DL (ref 65–140)
HCT VFR BLD AUTO: 34 % (ref 34.8–46.1)
HGB BLD-MCNC: 11 G/DL (ref 11.5–15.4)
IMM GRANULOCYTES # BLD AUTO: 0.01 THOUSAND/UL (ref 0–0.2)
IMM GRANULOCYTES NFR BLD AUTO: 0 % (ref 0–2)
LYMPHOCYTES # BLD AUTO: 0.65 THOUSANDS/ΜL (ref 0.6–4.47)
LYMPHOCYTES NFR BLD AUTO: 12 % (ref 14–44)
MCH RBC QN AUTO: 27.4 PG (ref 26.8–34.3)
MCHC RBC AUTO-ENTMCNC: 32.4 G/DL (ref 31.4–37.4)
MCV RBC AUTO: 85 FL (ref 82–98)
MONOCYTES # BLD AUTO: 0.56 THOUSAND/ΜL (ref 0.17–1.22)
MONOCYTES NFR BLD AUTO: 10 % (ref 4–12)
NEUTROPHILS # BLD AUTO: 4.17 THOUSANDS/ΜL (ref 1.85–7.62)
NEUTS SEG NFR BLD AUTO: 74 % (ref 43–75)
NRBC BLD AUTO-RTO: 0 /100 WBCS
P AXIS: 56 DEGREES
PLATELET # BLD AUTO: 144 THOUSANDS/UL (ref 149–390)
PMV BLD AUTO: 10.1 FL (ref 8.9–12.7)
POTASSIUM SERPL-SCNC: 4.1 MMOL/L (ref 3.5–5.3)
PR INTERVAL: 190 MS
QRS AXIS: -22 DEGREES
QRSD INTERVAL: 128 MS
QT INTERVAL: 462 MS
QTC INTERVAL: 476 MS
RBC # BLD AUTO: 4.01 MILLION/UL (ref 3.81–5.12)
SODIUM SERPL-SCNC: 138 MMOL/L (ref 136–145)
T WAVE AXIS: 132 DEGREES
VENTRICULAR RATE: 64 BPM
WBC # BLD AUTO: 5.61 THOUSAND/UL (ref 4.31–10.16)

## 2021-03-09 PROCEDURE — 99024 POSTOP FOLLOW-UP VISIT: CPT | Performed by: PHYSICIAN ASSISTANT

## 2021-03-09 PROCEDURE — 80048 BASIC METABOLIC PNL TOTAL CA: CPT | Performed by: PHYSICIAN ASSISTANT

## 2021-03-09 PROCEDURE — 93010 ELECTROCARDIOGRAM REPORT: CPT | Performed by: INTERNAL MEDICINE

## 2021-03-09 PROCEDURE — 99239 HOSP IP/OBS DSCHRG MGMT >30: CPT | Performed by: INTERNAL MEDICINE

## 2021-03-09 PROCEDURE — 85025 COMPLETE CBC W/AUTO DIFF WBC: CPT | Performed by: PHYSICIAN ASSISTANT

## 2021-03-09 PROCEDURE — 71046 X-RAY EXAM CHEST 2 VIEWS: CPT

## 2021-03-09 RX ORDER — AMOXICILLIN AND CLAVULANATE POTASSIUM 875; 125 MG/1; MG/1
1 TABLET, FILM COATED ORAL EVERY 12 HOURS SCHEDULED
Qty: 4 TABLET | Refills: 0 | Status: SHIPPED | OUTPATIENT
Start: 2021-03-09 | End: 2021-03-11

## 2021-03-09 RX ORDER — LOSARTAN POTASSIUM 25 MG/1
25 TABLET ORAL
Qty: 30 TABLET | Refills: 0 | Status: CANCELLED | OUTPATIENT
Start: 2021-03-09

## 2021-03-09 RX ORDER — METOPROLOL SUCCINATE 50 MG/1
50 TABLET, EXTENDED RELEASE ORAL EVERY MORNING
Status: DISCONTINUED | OUTPATIENT
Start: 2021-03-10 | End: 2021-03-09 | Stop reason: HOSPADM

## 2021-03-09 RX ORDER — METOPROLOL SUCCINATE 50 MG/1
50 TABLET, EXTENDED RELEASE ORAL EVERY MORNING
Qty: 30 TABLET | Refills: 0 | Status: SHIPPED | OUTPATIENT
Start: 2021-03-10 | End: 2021-05-21

## 2021-03-09 RX ORDER — ANASTROZOLE 1 MG/1
1 TABLET ORAL
Qty: 30 TABLET | Refills: 0 | Status: SHIPPED | OUTPATIENT
Start: 2021-03-09 | End: 2021-04-09

## 2021-03-09 RX ORDER — B-COMPLEX WITH VITAMIN C
1 TABLET ORAL
Qty: 30 TABLET | Refills: 0 | Status: SHIPPED | OUTPATIENT
Start: 2021-03-10 | End: 2021-04-09

## 2021-03-09 RX ADMIN — AMOXICILLIN AND CLAVULANATE POTASSIUM 1 TABLET: 875; 125 TABLET, FILM COATED ORAL at 08:12

## 2021-03-09 RX ADMIN — ENOXAPARIN SODIUM 40 MG: 40 INJECTION SUBCUTANEOUS at 08:11

## 2021-03-09 RX ADMIN — POLYETHYLENE GLYCOL 3350 17 G: 17 POWDER, FOR SOLUTION ORAL at 08:19

## 2021-03-09 RX ADMIN — LEVOTHYROXINE SODIUM 50 MCG: 50 TABLET ORAL at 05:30

## 2021-03-09 RX ADMIN — ASPIRIN 81 MG: 81 TABLET, COATED ORAL at 08:11

## 2021-03-09 RX ADMIN — ACETAMINOPHEN 975 MG: 325 TABLET, FILM COATED ORAL at 05:30

## 2021-03-09 RX ADMIN — Medication 1 TABLET: at 05:30

## 2021-03-09 RX ADMIN — METOPROLOL SUCCINATE 25 MG: 25 TABLET, FILM COATED, EXTENDED RELEASE ORAL at 08:11

## 2021-03-09 RX ADMIN — OMEGA-3 FATTY ACIDS CAP 1000 MG 1000 MG: 1000 CAP at 08:11

## 2021-03-09 RX ADMIN — Medication 1 TABLET: at 08:11

## 2021-03-09 NOTE — ASSESSMENT & PLAN NOTE
· Syncopal episode resulting in fall down 10-15 stairs - trauma evaluation appreciated  · EKG without marked abnormality, elevated troponin noted and patient to be on heparin GTT if epistaxis is controlled  · Patient with known history of hypertrophic cardiomyopathy and NSVT and evaluated 10/2020 for possible dual chamber ICD, concern this may be possible etiology  · Continue telemetry monitoring  · Electrophysiology consultation, patient underwent ICD placement yesterday

## 2021-03-09 NOTE — ASSESSMENT & PLAN NOTE
· Follows with [facility]  · Continue anastrozole

## 2021-03-09 NOTE — DISCHARGE SUMMARY
[address] [facility]  Discharge- [place] 1945, 66 y o  female MRN: [id number]  Unit/Bed#: -01 Encounter: [id number]  Primary Care Provider: Phyllis Prabhakar MD   Date and time admitted to hospital: 3/4/2021  7:31 PM    * Syncope  Assessment & Plan  · Syncopal episode resulting in fall down 10-15 stairs - trauma evaluation appreciated  · EKG without marked abnormality, elevated troponin noted and patient to be on heparin GTT if epistaxis is controlled  · Patient with known history of hypertrophic cardiomyopathy and NSVT and evaluated 10/2020 for possible dual chamber ICD, concern this may be possible etiology  · Continue telemetry monitoring  · Electrophysiology consultation, patient underwent ICD placement yesterday  Left shoulder pain  Assessment & Plan  · XR left shoulder negative for acute findings  · PRN pain control, supportive care    Elevated troponin I level  Assessment & Plan  · Pt with syncope, fall down flight of stairs   · Troponin 1 06/1 14/1 04 - flat  · Likely type 2 MI due to syncope/fall  · Placed on heparin gtt on admission - will discontinue as not NSTEMI  · Cardiac cath 3/5 unremarkable, no CAD     Multiple facial fractures (HCC)  Assessment & Plan  · CT facial bones revealing "Comminuted fractures of the nasal bridge, nondiastatic fracture anterior nasal septum, roof and lateral wall of the left maxillary sinus "  · Patient with some persistent epistaxis  · Was seen by trauma in ED, recommendations noted  · OMFS consult appreciated  Recommend Augmentin x 7 days and outpt f/u in their office to re-evaluate need for surgery  · Monitor epistaxis    NSVT (nonsustained ventricular tachycardia) ([facility] [address] )  Assessment & Plan  · Known history of this, has been seen by EP and evaluated for Bi V ICD  · ICD placed yesterday    · Monitor on telemetry     Hypothyroidism  Assessment & Plan  · TSH normal  · Continue home dose levothyroxine     Hypertrophic cardiomyopathy [facility])  Assessment & Plan  · Follows with Dr Kizzy Cheema as cardiologist, patient underwent  ICD placement  · Stress echo 02/2021 reported unrevealing, had apparent echo as outpatient 2020 with EF 40%  · EP consult appreciated  Malignant neoplasm of upper-inner 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 breast ([facility] [address] )  Assessment & Plan  · Follow[facility]  · Continue anastrozole         Non-MI troponin elevation due to fall as evidenced by flat troponins, no EKG changes and no evidence of CAD on Cardiac Cath, treated with telemetry, VS and monitoring troponins as needed  Elevated troponin is secondary to fall  And MI type 2  was ruled out     Resolved Problems  Date Reviewed: 3/8/2021    None          Admission Date:   Admission Orders (From admission, onward)     Ordered        03/04/21 2153  Inpatient Admission  Once                     Admitting Diagnosis: Hypertrophic cardiomyopathy ([facility] [address] ) [I42 2]  Syncope [R55]  Elevated troponin [R77 8]  Closed fracture of nasal bone, initial encounter [S02  2XXA]    HPI: Haja Auguste is a 66 y o  female who has a past medical history significant for hypertrophic cardiomyopathy EF 40%, nonsustained V-tach, hypothyroidism, and history of breast cancer maintained on anastrozole  Notably has been evaluated by electrophysiology for NSVT and is currently being worked up for potential dual chamber ICD placement  Presented today after apparent syncopal episode where patient reports she fell down 10-15 steps at home with reported dizziness prior to fall and loss of consciousness  Denies any chest pain/pressure or palpitations immediately prior to the fall  Did note epistaxis following injury, along with some facial pain, and trauma evaluation was completed in ED which revealed several facial fractures for which patient was evaluated by trauma service who advised conservative management at this time and further workup of syncope    Additionally was noted with elevated troponin ED for which heparin drip was to be initiated  At the time my evaluation, patient lying in bed in no acute distress, noting no dizziness or lightheadedness at this time; does note recurrence of epistaxis however        Procedures Performed:   Orders Placed This Encounter   Procedures    Cardiac catheterization    Cardiac eps/icd implant    ED ECG Documentation Only       Summary of Hospital Course:   Patient was admitted with syncopal episode resulting in fall 10-15 stairs  Patient had elevated troponin and also CT of the facial bones showed multiple fractures committed fracture of the nasal bridge non-static fracture anterior nasal septum roof and lateral wall of the left maxillary sinus  Patient had epistaxis  OMFS was consulted as well  Patient was seen by Trauma in the E D  Patient had nonsustained ventricular tachycardia and EP cardiology was consulted and recommended by a V ICD which was placed yesterday  HEENT-PERRLA, moist oral mucosa, bruising noted around bilateral periorbital region  No further epistaxis noted  Neck-supple, no JVD elevation   Respiratory-equal air entry bilaterally, no rales or rhonchi  Cardiovascular system-S1, S2 heard, no murmur or gallops or rubs, right-sided chest wall bandaged  Abdomen-soft, nontender, no guarding or rigidity, bowel sounds heard  Extremities-no pedal edema  Peripheral pulses palpable  Musculoskeletal-no contractures  Central nervous system-no acute focal neurological deficit ,no sensory or motor deficit noted  Skin-no rash noted          Significant Findings, Care, Treatment and Services Provided:  Bi V ICD placement and Cardiology EP consult    Complications: nil    Condition at Discharge: fair         Discharge instructions/Information to patient and family:   See after visit summary for information provided to patient and family        Provisions for Follow-Up Care:  See after visit summary for information related to follow-up care and any pertinent home health orders  PCP: Liliana Jarrell MD    Disposition: Home    Planned Readmission: No    Discharge Statement   I spent 45 minutes discharging the patient  This time was spent on the day of discharge  I had direct contact with the patient on the day of discharge  Additional documentation is required if more than 30 minutes were spent on discharge  Discharge Medications:  See after visit summary for reconciled discharge medications provided to patient and family

## 2021-03-09 NOTE — ASSESSMENT & PLAN NOTE
· Known history of this, has been seen by EP and evaluated for Bi V ICD  · ICD placed yesterday    · Monitor on telemetry

## 2021-03-09 NOTE — PROGRESS NOTES
Progress Note - Electrophysiology-Cardiology (EP)   Cleo Beltrán 66 y o  female MRN: [id number]  Unit/Bed#: -01 Encounter: [id number]      Assessment:  1  Known nonsustained VT, status post [employer] dual chamber ICD implantation 3/8/2021  2  Hypertrophic cardiomyopathy, LVEF 35% per echo in 2019  3  Elevated troponin/type 2 on admission without evidence of ACS               A ) no obstructive CAD per cardiac cath earlier this admission  4  Syncope with facial fractures (being treated conservatively), left shoulder/right hand pain being worked up by trauma  5  Hypothyroidism  6  Left-sided breast cancer maintained on anastrozole, status post left mastectomy with sentinel node biopsy      Plan:  Device interrogation today shows appropriate device function, including lead sensing, thresholds, and impedances  Will turn on rate response prior to discharge  Incision is clean, dry, intact without swelling, hematoma, redness, bleeding, drainage, or signs of infection  Chest xray this morning shows appropriate lead placement without pneumothorax  All post implantation discharge instructions and restrictions were reviewed in detail, all questions were answered  For her known NSVT, will now up titrate her Toprol XL to 50 mg daily (or 25 mg twice daily if patient prefers)  Follow up will be arranged - she is already established with Dr Danni Nicole, and will follow up with him moving forward  OK to discharge from EP standpoint, please contact us with questions or concerns  Subjective/Objective   Chief Complaint: no acute complaints    Subjective:  Patient is feeling well, without significant issues or events overnight  She walked for over 30 minutes this morning without symptoms or limitations  She denies significant pain at device site  She otherwise denies shortness of breath, dizziness, lightheadedness, or palpitations      Objective:     Vitals: /64   Pulse 64   Temp (!) 97 3 °F (36 3 °C) Resp 18   Ht 5' (1 524 m)   Wt 58 2 kg (128 lb 4 9 oz)   SpO2 95%   BMI 25 06 kg/m²   Vitals:    03/05/21 1607   Weight: 58 2 kg (128 lb 4 9 oz)     Orthostatic Blood Pressures      Most Recent Value   Blood Pressure  126/64 filed at 03/09/2021 0806   Patient Position - Orthostatic VS  Sitting filed at 03/08/2021 1118            Intake/Output Summary (Last 24 hours) at 3/9/2021 0858  Last data filed at 3/8/2021 1800  Gross per 24 hour   Intake 740 ml   Output --   Net 740 ml       Invasive Devices     Peripheral Intravenous Line            Peripheral IV 03/05/21 Right 3 days                            Scheduled Meds:  Current Facility-Administered Medications   Medication Dose Route Frequency Provider Last Rate    acetaminophen  650 mg Oral Q4H PRN Javier Maya MD      acetaminophen  975 mg Oral [facility] Geneva Hylan, DO      amoxicillin-clavulanate  1 tablet Oral Q12H [address] Charlie Adan PA-C      anastrozole  1 mg Oral HS Geneva Hylan, DO      aspirin  81 mg Oral Daily Geneva Hylan, DO      calcium carbonate-vitamin D  1 tablet Oral Daily With Breakfast Geneva Capps, DO      chlorhexidine  15 mL Swish & Spit Once CAYLA Curry      enoxaparin  40 mg Subcutaneous Q24H [address] Barbara Mishra PA-C      fish oil  1,000 mg Oral Daily Geneva Hylan, DO      levothyroxine  50 mcg Oral Daily Geneva Hylan, DO      losartan  25 mg Oral HS Geneva Hylan, DO      melatonin  3 mg Oral HS Geneva Capps, DO      [START ON 3/10/2021] metoprolol succinate  50 mg Oral QAIRAM Vila PA-C      multivitamin-minerals  1 tablet Oral Daily Geneva Hylan, DO      ondansetron  4 mg Intravenous Q6H PRN Javier Maya MD      oxyCODONE  2 5 mg Oral Q4H PRN Geneva Capps, DO      oxyCODONE  5 mg Oral Q4H PRN Geneva Capps, DO      polyethylene glycol  17 g Oral Daily PRN Barbara Mishra PA-C      senna  1 tablet Oral HS Barbara Mishra PA-C       Continuous Infusions:   PRN Meds:   acetaminophen    ondansetron    oxyCODONE    oxyCODONE    polyethylene glycol    Review of Systems   Constitution: Negative for fever and malaise/fatigue  Cardiovascular: Negative for chest pain, dyspnea on exertion, irregular heartbeat, leg swelling, near-syncope, orthopnea, palpitations, paroxysmal nocturnal dyspnea and syncope  All other systems reviewed and are negative  Physical Exam:   GEN: NAD, alert and oriented x 3, well appearing  SKIN: dry without significant lesions or rashes; b/l orbital ecchymosis noted  HEENT: NCAT, PERRL, EOMs intact  NECK: No JVD appreciated  CARDIOVASCULAR: RRR, normal S1, S2 without murmurs, rubs, or gallops appreciated  LUNGS: Clear to auscultation bilaterally without wheezes, rhonchi, or rales  ABDOMEN: Soft, nontender, nondistended  EXTREMITIES/VASCULAR: perfused without clubbing, cyanosis, or LE edema b/l  PSYCH: Normal mood and affect  NEURO: CN ll-Xll grossly intact                Lab Results: I have personally reviewed pertinent lab results  Results from last 7 days   Lab Units 03/09/21  0544 03/08/21  0530 03/05/21  0427   WBC Thousand/uL 5 61 4 86 8 21   HEMOGLOBIN g/dL 11 0* 11 5 12 2   HEMATOCRIT % 34 0* 36 0 37 6   PLATELETS Thousands/uL 144* 155 165     Results from last 7 days   Lab Units 03/09/21  0544 03/08/21  0530 03/05/21  0427   POTASSIUM mmol/L 4 1 4 1 4 1   CHLORIDE mmol/L 107 111* 106   CO2 mmol/L 25 27 25   BUN mg/dL 19 23 21   CREATININE mg/dL 0 70 0 85 0 73   CALCIUM mg/dL 8 9 9 3 9 0     Results from last 7 days   Lab Units 03/05/21  0427 03/04/21  2234   INR   --  1 05   PTT seconds 34 28     Results from last 7 days   Lab Units 03/05/21  0427   MAGNESIUM mg/dL 1 9         Imaging: I have personally reviewed pertinent reports      Results for orders placed in visit on 02/24/16   Echo complete with contrast if indicated    Narrative 4/18/2011       EKG/telemetry: normal sinus rhythm; NSVT previously noted, no sustained arrhythmias    VTE Pharmacologic Prophylaxis: Reason for no pharmacologic prophylaxis post op hematoma risk  VTE Mechanical Prophylaxis: sequential compression device

## 2021-03-09 NOTE — PLAN OF CARE
Problem: Potential for Falls  Goal: Patient will remain free of falls  Description: INTERVENTIONS:  - Assess patient frequently for physical needs  -  Identify cognitive and physical deficits and behaviors that affect risk of falls    -  Campbell fall precautions as indicated by assessment   - Educate patient/family on patient safety including physical limitations  - Instruct patient to call for assistance with activity based on assessment  - Modify environment to reduce risk of injury  - Consider OT/PT consult to assist with strengthening/mobility  Outcome: Progressing     Problem: CARDIOVASCULAR - ADULT  Goal: Maintains optimal cardiac output and hemodynamic stability  Description: INTERVENTIONS:  - Monitor I/O, vital signs and rhythm  - Monitor for S/S and trends of decreased cardiac output  - Administer and titrate ordered vasoactive medications to optimize hemodynamic stability  - Assess quality of pulses, skin color and temperature  - Assess for signs of decreased coronary artery perfusion  - Instruct patient to report change in severity of symptoms  Outcome: Progressing  Goal: Absence of cardiac dysrhythmias or at baseline rhythm  Description: INTERVENTIONS:  - Continuous cardiac monitoring, vital signs, obtain 12 lead EKG if ordered  - Administer antiarrhythmic and heart rate control medications as ordered  - Monitor electrolytes and administer replacement therapy as ordered  Outcome: Progressing     Problem: SKIN/TISSUE INTEGRITY - ADULT  Goal: Skin integrity remains intact  Description: INTERVENTIONS  - Identify patients at risk for skin breakdown  - Assess and monitor skin integrity  - Assess and monitor nutrition and hydration status  - Monitor labs (i e  albumin)  - Assess for incontinence   - Turn and reposition patient  - Assist with mobility/ambulation  - Relieve pressure over bony prominences  - Avoid friction and shearing  - Provide appropriate hygiene as needed including keeping skin clean and dry  - Evaluate need for skin moisturizer/barrier cream  - Collaborate with interdisciplinary team (i e  Nutrition, Rehabilitation, etc )   - Patient/family teaching  Outcome: Progressing  Goal: Incision(s), wounds(s) or drain site(s) healing without S/S of infection  Description: INTERVENTIONS  - Assess and document risk factors for skin impairment   - Assess and document dressing, incision, wound bed, drain sites and surrounding tissue  - Consider nutrition services referral as needed  - Oral mucous membranes remain intact  - Provide patient/ family education  Outcome: Progressing  Goal: Oral mucous membranes remain intact  Description: INTERVENTIONS  - Assess oral mucosa and hygiene practices  - Implement preventative oral hygiene regimen  - Implement oral medicated treatments as ordered  - Initiate Nutrition services referral as needed  Outcome: Progressing     Problem: HEMATOLOGIC - ADULT  Goal: Maintains hematologic stability  Description: INTERVENTIONS  - Assess for signs and symptoms of bleeding or hemorrhage  - Monitor labs  - Administer supportive blood products/factors as ordered and appropriate  Outcome: Progressing     Problem: DISCHARGE PLANNING  Goal: Discharge to home or other facility with appropriate resources  Description: INTERVENTIONS:  - Identify barriers to discharge w/patient and caregiver  - Arrange for needed discharge resources and transportation as appropriate  - Identify discharge learning needs (meds, wound care, etc )  - Arrange for interpretive services to assist at discharge as needed  - Refer to Case Management Department for coordinating discharge planning if the patient needs post-hospital services based on physician/advanced practitioner order or complex needs related to functional status, cognitive ability, or social support system  Outcome: Progressing

## 2021-03-09 NOTE — DISCHARGE INSTRUCTIONS
Please increase Toprol XL to either 50 mg daily OR 25 mg twice daily  Contact Dr Garcia Garcia if you are unable to tolerate this change  Please refer to post ICD implantation discharge instructions and restrictions below and your ICD booklet/temporary card  Keep incision dry for one week  Do not use lotions/powders/creams on incision  Leave outer bandage in place for 1 week - it is water proof, and as long as it is fully adhered to your skin you may shower with it  If it appears as though the bandage is coming off and/or there is any communication to the area of device incision, please then keep the whole area dry for the remaining week  After 1 week, please remove by pulling all edges away from the center of the bandage  No overhead reaching/pushing/pulling/lifting greater than 5-10lbs with left arm for six weeks  Please call the office if you notice redness, swelling, bleeding, or drainage from incision or if you develop fevers    Implantable Cardioverter Defibrillator      If you have any questions, please call [phone] to speak with a nurse (8:30am-4pm, or [phone] after hours)  For appointments, please call [phone]  WHAT YOU SHOULD KNOW:    An implantable cardioverter defibrillator (ICD) is a small device that monitors your heart rate and rhythm  It is commonly placed inside your upper chest region  It may be used if you have a ventricular arrhythmia, which is an irregular, dangerous rhythm from the bottom chamber of your heart  Some arrhythmias may cause your heart to suddenly stop beating  An ICD can give a shock to your heart to make it start beating again, or it can give pacing therapy (also known as pain-free therapy) to return your heart to normal rhythm  It is also a pacemaker, so it will pace your heart if needed to prevent it from beating too slowly  AFTER YOU LEAVE:      Follow up with your primary healthcare provider or cardiologist as directed:  You will need to follow-up to have your ICD checked and make sure you are not having problems  Write down your questions so you remember to ask them during your visits  Driving: you are ok to drive 48 hours after device is implanted     Self-care:   · Ask about activity: Ask if you need to avoid moving your shoulder or arm, and for how long  Ask if you should avoid lifting heavy objects  Do not play any contact sports, such as football or wrestling, until your primary healthcare provider [facility] or cardiologist tells you it is okay  You may only be able to drive for a certain amount of time per day, or during certain hours  Ask when you can return to work  · Care for your skin over the ICD: see instructions above     When you get a shock from your ICD: A shock may feel like someone has hit you, or you may feel a thump in the chest  If someone is touching you when you get a shock, they may feel a tingling feeling  The first time you feel a shock, it may scare you  Sit or lie down and stay calm  Ask someone to stay with you if possible  Please either call your cardiologist or report to an emergency room  Safety instructions when you have an ICD:   · Carry an ID card for the ICD: This card has important information about your ICD  · Stay away from magnets or machines with electric fields: This includes MRI machines  Avoid leaning into a car engine or doing welding  These things can interfere with how your ICD works  · Tell airport security you have an ICD: You may need to be searched by hand when you go through a security gate  The security gate or handheld wand could harm your ICD  · Keep an ICD diary: Record when you get a shock and what you were doing before you got the shock  Keep track of how you felt before and after the shock, as well as how many shocks you received  Write down the day and time of each shock   Bring the diary with you when you see your PHP or cardiologist    · Carry medical alert identification: Wear medical alert jewelry or carry a card that says you have an ICD  Ask your PHP or cardiologist where to get these items  Contact your primary healthcare provider or cardiologist if:   · You have a fever  · You feel 1 or more shocks from your ICD and feel fine afterwards  · Your feet or ankles swell  · The area around your ICD is painful or tender after surgery  · The skin around your stitches or staples is red, swollen, or draining pus or fluid  · You have chills, a cough, and feel weak or achy  · You are sad or anxious and find it hard to do your usual activities  · You have questions or concerns about your condition or care  Seek care immediately or call 911 if:   · Your stitches or staples come apart  · Blood soaks through your bandage  · You feel more than 3 shocks in a row from your ICD  · You become weak, dizzy, or faint  · You feel your heart skip beats or beat very fast or slow, but you do not feel a shock from your ICD  · You have chest pain that does not go away with rest or medicine  © 2014 [employer] is for End User's use only and may not be sold, redistributed or otherwise used for commercial purposes  All illustrations and images included in CareNotes® are the copyrighted property of [employer] , [employer]  or Gunnar Guardado  The above information is an  only  It is not intended as medical advice for individual conditions or treatments  Talk to your doctor, nurse or pharmacist before following any medical regimen to see if it is safe and effective for you

## 2021-03-09 NOTE — ASSESSMENT & PLAN NOTE
· Follows with Dr Bar Friend as cardiologist, patient underwent  ICD placement  · Stress echo 02/2021 reported unrevealing, had apparent echo as outpatient 2020 with EF 40%  · EP consult appreciated

## 2021-03-10 ENCOUNTER — TRANSITIONAL CARE MANAGEMENT (OUTPATIENT)
Dept: FAMILY MEDICINE CLINIC | Facility: CLINIC | Age: 76
End: 2021-03-10

## 2021-03-10 NOTE — ASSESSMENT & PLAN NOTE
· Pt with syncope, fall down flight of stairs   · Troponin 1 06/1 14/1 04 - flat  · Non MI related troponin elevation , type2 MI was ruled out   ·   · Placed on heparin gtt on admission - will discontinue as not NSTEMI  · Cardiac cath 3/5 unremarkable, no CAD

## 2021-03-15 ENCOUNTER — OFFICE VISIT (OUTPATIENT)
Dept: FAMILY MEDICINE CLINIC | Facility: CLINIC | Age: 76
End: 2021-03-15
Payer: COMMERCIAL

## 2021-03-15 VITALS
TEMPERATURE: 97.8 F | DIASTOLIC BLOOD PRESSURE: 70 MMHG | OXYGEN SATURATION: 98 % | HEIGHT: 61 IN | BODY MASS INDEX: 25.9 KG/M2 | RESPIRATION RATE: 16 BRPM | HEART RATE: 83 BPM | SYSTOLIC BLOOD PRESSURE: 118 MMHG | WEIGHT: 137.2 LBS

## 2021-03-15 DIAGNOSIS — I42.2 HYPERTROPHIC CARDIOMYOPATHY (HCC): ICD-10-CM

## 2021-03-15 DIAGNOSIS — S02.92XA MULTIPLE CLOSED FRACTURES OF FACIAL BONE, INITIAL ENCOUNTER (HCC): ICD-10-CM

## 2021-03-15 DIAGNOSIS — E03.9 HYPOTHYROIDISM, UNSPECIFIED TYPE: Primary | ICD-10-CM

## 2021-03-15 DIAGNOSIS — I10 ESSENTIAL HYPERTENSION: ICD-10-CM

## 2021-03-15 PROCEDURE — 1111F DSCHRG MED/CURRENT MED MERGE: CPT | Performed by: FAMILY MEDICINE

## 2021-03-15 PROCEDURE — 99495 TRANSJ CARE MGMT MOD F2F 14D: CPT | Performed by: FAMILY MEDICINE

## 2021-03-15 RX ORDER — AZELASTINE HYDROCHLORIDE 0.5 MG/ML
1 SOLUTION/ DROPS OPHTHALMIC 2 TIMES DAILY
COMMUNITY
End: 2022-05-24

## 2021-03-15 NOTE — PROGRESS NOTES
FAMILY PRACTICE OFFICE VISIT       NAME: Ioana Flynn  AGE: 66 y o  SEX: female       : 1945        MRN: [id number]    DATE: 3/15/2021  TIME: 12:04 PM    Assessment and Plan     Problem List Items Addressed This Visit        Endocrine    Hypothyroidism - Primary       Hypothyroidism  TSH is stable on current dose of levothyroxine            Cardiovascular and Mediastinum    Hypertension      Hypertension  Patient blood pressure is stable on current regimen of medications  Hypertrophic cardiomyopathy ([facility] [address] )       Cardiomyopathy  Patient with recent ICD placement  She is scheduled to follow-up with her cardiologist next week            Musculoskeletal and Integument    Multiple facial fractures ([facility] [address] )      Facial fracture  According to patient maxillary facial surgeons would like patient to hold off on any surgery and allow fractures to heal on their own in 6-8 weeks             TCM Call (since 2021)     Date and time call was made  3/10/2021  4:08 PM    Hospital care reviewed  Records reviewed    Patient was hospitialized at  [facility]        Date of Admission  21    Date of discharge  21    Diagnosis  Syncope, Left shoulder pain, Elevated Troponin, Multiple facial Fx's  Disposition  Home    Were the patients medications reviewed and updated  No    Current Symptoms  None      TCM Call (since 2021)     Post hospital issues  Reduced activity    Should patient be enrolled in anticoag monitoring? No    Scheduled for follow up?   Yes    Did you obtain your prescribed medications  Yes    Do you need help managing your prescriptions or medications  No    Is transportation to your appointment needed  No    I have advised the patient to call PCP with any new or worsening symptoms  Lilian Farnsworth, [phone] [address] members    Support System  Family    Are you recieving any outpatient services  No    Are you recieving home care services  No    Have you fallen in the last 12 months  Yes    Interperter language line needed  No    Counseling  Patient    Counseling topics  Importance of RX compliance    Comments  Apt scheduled for 03/15/21 at 8:30  am        HPI: Kenia Turcios a 66 y  o  female who has a past medical history significant for hypertrophic cardiomyopathy EF 40%, nonsustained V-tach, hypothyroidism, and history of breast cancer maintained on anastrozole   Notably has been evaluated by electrophysiology for NSVT and is currently being worked up for potential dual chamber ICD placement   Presented today after apparent syncopal episode where patient reports she fell down 10-15 steps at home with reported dizziness prior to fall and loss of consciousness   Denies any chest pain/pressure or palpitations immediately prior to the fall   Did note epistaxis following injury, along with some facial pain, and trauma evaluation was completed in ED which revealed several facial fractures for which patient was evaluated by trauma service who advised conservative management at this time and further workup of syncope   Additionally was noted with elevated troponin ED for which heparin drip was to be initiated   At the time my evaluation, patient lying in bed in no acute distress, noting no dizziness or lightheadedness at this time; does note recurrence of epistaxis however  Chief Complaint     Chief Complaint   Patient presents with    Transition of Care Management     left shoulde pain , Multiple facial Fx's        History of Present Illness      I reviewed the patient's most recent discharge summary from her latest hospitalization  Patient sustained a mechanical fall down for a few steps which left her with a fracture of her and nasal bone  She did have an ICD placed but denies any palpitations or firing of monitor  She is scheduled to have follow-up with cardiologist next week    Patient does describe right arm discomfort where catheterization had been performed and patient had IVs and blood work obtained on right arm  She has been applying ice and heat to the affected area which has slowly been improving symptoms  Review of Systems   Review of Systems   Constitutional: Negative  HENT: Negative  Respiratory: Negative  Cardiovascular: Negative  Gastrointestinal: Negative  Musculoskeletal: Positive for arthralgias and myalgias  Neurological: Negative  Psychiatric/Behavioral: Negative          Active Problem List     Patient Active Problem List   Diagnosis    Malignant neoplasm of upper-inner 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 breast ([facility] [address] )    Hypertension    Hypertrophic cardiomyopathy ([facility] [address] )    Hypothyroidism    Ischemic cardiomyopathy    Osteopenia    Plantar fasciitis    Acquired trigger finger    Carpal tunnel syndrome    Locking finger joint    Numbness of hand    Use of anastrozole (Arimidex)    Acute pain of right knee    NSVT (nonsustained ventricular tachycardia) (HCC)    Syncope    Multiple facial fractures (HCC)    Elevated troponin I level    Right hand pain    Left shoulder pain       Past Medical History:  Past Medical History:   Diagnosis Date    Hypertrophic cardiomyopathy ([facility] [address] )     Hypothyroidism     Ischemic cardiomyopathy     Malignant neoplasm of left female breast ([facility] [address] ) 02/09/2017    stage IIA    Osteopenia     Trigger finger of all digits of right hand        Past Surgical History:  Past Surgical History:   Procedure Laterality Date    BREAST BIOPSY Right     years ago benign    BREAST RECONSTRUCTION Left 2/9/2017    Procedure: BREAST RECONSTRUCTION WITH TISSUE EXPANDERS AND ALLODERM ;  Surgeon: Richelle Arita MD;  [facility];  Service:     COLONOSCOPY  2009    KNEE ARTHROSCOPY Bilateral     MASTECTOMY Left 02/09/2017    NC BIOPSY/EXCISION, LYMPH NODE(S) Left 2/9/2017    Procedure: LYMPHOSCINTIGRAPHY; INTRAOPERATIVE LYMPHATIC MAPPING; SENTINEL LYMPH NODE BIOPSY (INJECT AT 0700 BY DR ALBA); Surgeon: Grover Conte MD;  [facility];  Service: Surgical Oncology    MA INSJ/RPLCMT BREAST IMPLANT SEP DAY MASTECTOMY Left 5/18/2017    Procedure: BREAST EXPANDER/IMPLANT EXCHANGE; CAPSULECTOMY ;  Surgeon: Grisel Barrett MD;  [facility];  Service: Plastics    MA MASTECTOMY, SIMPLE, COMPLETE Left 2/9/2017    Procedure: BREAST MASTECTOMY; FROZEN SECTION ;  Surgeon: Grover Conte MD;  [facility];  Service: Surgical Oncology    REFRACTIVE SURGERY Bilateral     US GUIDANCE BREAST BIOPSY LEFT EACH ADDITIONAL Left 12/1/2016    US GUIDED BREAST BIOPSY LEFT COMPLETE Left 12/1/2016       Family History:  Family History   Problem Relation Age of Onset    Heart disease Mother     Heart disease Father     Brain cancer Sister     Heart disease Brother     No Known Problems Brother     No Known Problems Daughter     No Known Problems Daughter        Social History:  Social History     Socioeconomic History    Marital status:       Spouse name: Not on file    Number of children: Not on file    Years of education: Not on file    Highest education level: Not on file   Occupational History    Not on file   Social Needs    Financial resource strain: Not on file    Food insecurity     Worry: Not on file     Inability: Not on file    Transportation needs     Medical: Not on file     Non-medical: Not on file   Tobacco Use    Smoking status: Never Smoker    Smokeless tobacco: Never Used   Substance and Sexual Activity    Alcohol use: No     Alcohol/week: 1 0 standard drinks     Types: 1 Glasses of wine per week    Drug use: No    Sexual activity: Not on file   Lifestyle    Physical activity     Days per week: Not on file     Minutes per session: Not on file    Stress: Not on file   Relationships    Social connections     Talks on phone: Not on file     Gets together: Not on file     Attends [employer] service: Not on file     Active member of club or organization: Not on file Attends meetings of clubs or organizations: Not on file     Relationship status: Not on file    Intimate partner violence     Fear of current or ex partner: Not on file     Emotionally abused: Not on file     Physically abused: Not on file     Forced sexual activity: Not on file   Other Topics Concern    Not on file   Social History Narrative    Not on file       Objective     Vitals:    03/15/21 0820   BP: 118/70   Pulse: 83   Resp: 16   Temp: 97 8 °F (36 6 °C)   SpO2: 98%     Wt Readings from Last 3 Encounters:   03/15/21 62 2 kg (137 lb 3 2 oz)   03/05/21 58 2 kg (128 lb 4 9 oz)   10/22/20 61 1 kg (134 lb 9 6 oz)       Physical Exam  Constitutional:       General: She is not in acute distress  Appearance: Normal appearance  She is not ill-appearing  HENT:      Head:      Comments: Patient with recovering ecchymoses along bilateral lower eyelids in cheek area of face  Right Ear: Tympanic membrane, ear canal and external ear normal  There is no impacted cerumen  Left Ear: Tympanic membrane and ear canal normal  There is no impacted cerumen  Eyes:      General:         Right eye: No discharge  Left eye: No discharge  Extraocular Movements: Extraocular movements intact  Conjunctiva/sclera: Conjunctivae normal       Pupils: Pupils are equal, round, and reactive to light  Cardiovascular:      Heart sounds: Normal heart sounds  Comments: Regular rate and rhythm with no murmurs  Pulmonary:      Effort: Pulmonary effort is normal       Comments: Lungs are clear to auscultation without wheezes,rales, or rhonchi  Abdominal:      Comments: Abdomen is soft, nontender with positive bowel sounds  There is no rebound or guarding  No masses palpated   Musculoskeletal:      Right lower leg: No edema  Left lower leg: No edema  Comments: Patient's right forearm is still showing some mild swelling and yellow green ecchymosis which appears to be healing    Patient has full range of motion of hands and arm  Muscle strength 5/5   Neurological:      General: No focal deficit present  Mental Status: She is alert and oriented to person, place, and time  Mental status is at baseline  Psychiatric:         Mood and Affect: Mood normal          Behavior: Behavior normal          Thought Content:  Thought content normal          Judgment: Judgment normal          Pertinent Laboratory/Diagnostic Studies:  Lab Results   Component Value Date    GLUCOSE 105 02/21/2015    BUN 19 03/09/2021    CREATININE 0 70 03/09/2021    CALCIUM 8 9 03/09/2021     02/21/2015    K 4 1 03/09/2021    CO2 25 03/09/2021     03/09/2021     Lab Results   Component Value Date    ALT 22 03/04/2021    AST 27 03/04/2021    ALKPHOS 105 03/04/2021    BILITOT 0 47 02/21/2015       Lab Results   Component Value Date    WBC 5 61 03/09/2021    HGB 11 0 (L) 03/09/2021    HCT 34 0 (L) 03/09/2021    MCV 85 03/09/2021     (L) 03/09/2021       No results found for: TSH    Lab Results   Component Value Date    CHOL 178 02/21/2015     Lab Results   Component Value Date    TRIG 123 03/05/2021     Lab Results   Component Value Date    HDL 55 03/05/2021     Lab Results   Component Value Date    LDLCALC 126 (H) 03/05/2021     Lab Results   Component Value Date    HGBA1C 5 8 (H) 03/05/2021       Results for orders placed or performed during the hospital encounter of 03/04/21   CBC and differential   Result Value Ref Range    WBC 13 27 (H) 4 31 - 10 16 Thousand/uL    RBC 4 61 3 81 - 5 12 Million/uL    Hemoglobin 12 5 11 5 - 15 4 g/dL    Hematocrit 39 9 34 8 - 46 1 %    MCV 87 82 - 98 fL    MCH 27 1 26 8 - 34 3 pg    MCHC 31 3 (L) 31 4 - 37 4 g/dL    RDW 13 8 11 6 - 15 1 %    MPV 11 2 8 9 - 12 7 fL    Platelets 515 092 - 317 Thousands/uL    nRBC 0 /100 WBCs    Neutrophils Relative 86 (H) 43 - 75 %    Immat GRANS % 1 0 - 2 %    Lymphocytes Relative 6 (L) 14 - 44 %    Monocytes Relative 6 4 - 12 %    Eosinophils Relative 1 0 - 6 % Basophils Relative 0 0 - 1 %    Neutrophils Absolute 11 57 (H) 1 85 - 7 62 Thousands/µL    Immature Grans Absolute 0 06 0 00 - 0 20 Thousand/uL    Lymphocytes Absolute 0 75 0 60 - 4 47 Thousands/µL    Monocytes Absolute 0 77 0 17 - 1 22 Thousand/µL    Eosinophils Absolute 0 07 0 00 - 0 61 Thousand/µL    Basophils Absolute 0 05 0 00 - 0 10 Thousands/µL   Comprehensive metabolic panel   Result Value Ref Range    Sodium 138 136 - 145 mmol/L    Potassium 4 1 3 5 - 5 3 mmol/L    Chloride 105 100 - 108 mmol/L    CO2 28 21 - 32 mmol/L    ANION GAP 5 4 - 13 mmol/L    BUN 24 5 - 25 mg/dL    Creatinine 0 86 0 60 - 1 30 mg/dL    Glucose 117 65 - 140 mg/dL    Calcium 9 2 8 3 - 10 1 mg/dL    AST 27 5 - 45 U/L    ALT 22 12 - 78 U/L    Alkaline Phosphatase 105 46 - 116 U/L    Total Protein 7 9 6 4 - 8 2 g/dL    Albumin 3 8 3 5 - 5 0 g/dL    Total Bilirubin 0 52 0 20 - 1 00 mg/dL    eGFR 65 ml/min/1 73sq m   Troponin I   Result Value Ref Range    Troponin I 1 06 (H) <=0 04 ng/mL   APTT six (6) hours after Heparin bolus/drip initiation or dosing change   Result Value Ref Range    PTT 28 23 - 37 seconds   Protime-INR   Result Value Ref Range    Protime 13 7 11 6 - 14 5 seconds    INR 1 05 0 84 - 1 19   Troponin I   Result Value Ref Range    Troponin I 1 14 (H) <=0 04 ng/mL   TSH, 3rd generation   Result Value Ref Range    TSH 3RD GENERATON 2 290 0 358 - 3 740 uIU/mL   Troponin I   Result Value Ref Range    Troponin I 1 04 (H) <=0 04 ng/mL   Basic metabolic panel   Result Value Ref Range    Sodium 137 136 - 145 mmol/L    Potassium 4 1 3 5 - 5 3 mmol/L    Chloride 106 100 - 108 mmol/L    CO2 25 21 - 32 mmol/L    ANION GAP 6 4 - 13 mmol/L    BUN 21 5 - 25 mg/dL    Creatinine 0 73 0 60 - 1 30 mg/dL    Glucose 123 65 - 140 mg/dL    Calcium 9 0 8 3 - 10 1 mg/dL    eGFR 80 ml/min/1 73sq m   Magnesium   Result Value Ref Range    Magnesium 1 9 1 6 - 2 6 mg/dL   CBC (With Platelets)   Result Value Ref Range    WBC 8 21 4 31 - 10 16 Thousand/uL    RBC 4 46 3 81 - 5 12 Million/uL    Hemoglobin 12 2 11 5 - 15 4 g/dL    Hematocrit 37 6 34 8 - 46 1 %    MCV 84 82 - 98 fL    MCH 27 4 26 8 - 34 3 pg    MCHC 32 4 31 4 - 37 4 g/dL    RDW 13 6 11 6 - 15 1 %    Platelets 843 309 - 831 Thousands/uL    MPV 10 3 8 9 - 12 7 fL   APTT   Result Value Ref Range    PTT 34 23 - 37 seconds   Lipid panel   Result Value Ref Range    Cholesterol 206 (H) 50 - 200 mg/dL    Triglycerides 123 <=150 mg/dL    HDL, Direct 55 >=40 mg/dL    LDL Calculated 126 (H) 0 - 100 mg/dL    Non-HDL-Chol (CHOL-HDL) 151 mg/dl   LDL cholesterol, direct   Result Value Ref Range    LDL Direct 131 (H) 0 - 100 mg/dl   Hemoglobin A1C w/ EAG Estimation   Result Value Ref Range    Hemoglobin A1C 5 8 (H) Normal 3 8-5 6%; PreDiabetic 5 7-6 4%;  Diabetic >=6 5%; Glycemic control for adults with diabetes <7 0% %     mg/dl   Basic metabolic panel   Result Value Ref Range    Sodium 142 136 - 145 mmol/L    Potassium 4 1 3 5 - 5 3 mmol/L    Chloride 111 (H) 100 - 108 mmol/L    CO2 27 21 - 32 mmol/L    ANION GAP 4 4 - 13 mmol/L    BUN 23 5 - 25 mg/dL    Creatinine 0 85 0 60 - 1 30 mg/dL    Glucose 95 65 - 140 mg/dL    Calcium 9 3 8 3 - 10 1 mg/dL    eGFR 66 ml/min/1 73sq m   CBC   Result Value Ref Range    WBC 4 86 4 31 - 10 16 Thousand/uL    RBC 4 22 3 81 - 5 12 Million/uL    Hemoglobin 11 5 11 5 - 15 4 g/dL    Hematocrit 36 0 34 8 - 46 1 %    MCV 85 82 - 98 fL    MCH 27 3 26 8 - 34 3 pg    MCHC 31 9 31 4 - 37 4 g/dL    RDW 13 6 11 6 - 15 1 %    Platelets 161 472 - 535 Thousands/uL    MPV 9 7 8 9 - 12 7 fL   Basic metabolic panel   Result Value Ref Range    Sodium 138 136 - 145 mmol/L    Potassium 4 1 3 5 - 5 3 mmol/L    Chloride 107 100 - 108 mmol/L    CO2 25 21 - 32 mmol/L    ANION GAP 6 4 - 13 mmol/L    BUN 19 5 - 25 mg/dL    Creatinine 0 70 0 60 - 1 30 mg/dL    Glucose 104 65 - 140 mg/dL    Calcium 8 9 8 3 - 10 1 mg/dL    eGFR 84 ml/min/1 73sq m   CBC and differential   Result Value Ref Range WBC 5 61 4 31 - 10 16 Thousand/uL    RBC 4 01 3 81 - 5 12 Million/uL    Hemoglobin 11 0 (L) 11 5 - 15 4 g/dL    Hematocrit 34 0 (L) 34 8 - 46 1 %    MCV 85 82 - 98 fL    MCH 27 4 26 8 - 34 3 pg    MCHC 32 4 31 4 - 37 4 g/dL    RDW 13 9 11 6 - 15 1 %    MPV 10 1 8 9 - 12 7 fL    Platelets 481 (L) 412 - 390 Thousands/uL    nRBC 0 /100 WBCs    Neutrophils Relative 74 43 - 75 %    Immat GRANS % 0 0 - 2 %    Lymphocytes Relative 12 (L) 14 - 44 %    Monocytes Relative 10 4 - 12 %    Eosinophils Relative 3 0 - 6 %    Basophils Relative 1 0 - 1 %    Neutrophils Absolute 4 17 1 85 - 7 62 Thousands/µL    Immature Grans Absolute 0 01 0 00 - 0 20 Thousand/uL    Lymphocytes Absolute 0 65 0 60 - 4 47 Thousands/µL    Monocytes Absolute 0 56 0 17 - 1 22 Thousand/µL    Eosinophils Absolute 0 17 0 00 - 0 61 Thousand/µL    Basophils Absolute 0 05 0 00 - 0 10 Thousands/µL   ECG 12 lead   Result Value Ref Range    Ventricular Rate 69 BPM    Atrial Rate 69 BPM    SC Interval 176 ms    QRSD Interval 126 ms    QT Interval 424 ms    QTC Interval 454 ms    P Axis 84 degrees    QRS Axis 42 degrees    T Wave Axis 120 degrees   ECG 12 lead   Result Value Ref Range    Ventricular Rate 64 BPM    Atrial Rate 64 BPM    SC Interval 190 ms    QRSD Interval 128 ms    QT Interval 462 ms    QTC Interval 476 ms    P Axis 56 degrees    QRS Axis -22 degrees    T Wave Axis 132 degrees   Fingerstick Glucose (POCT)   Result Value Ref Range    POC Glucose 112 65 - 140 mg/dl       No orders of the defined types were placed in this encounter        ALLERGIES:  No Known Allergies    Current Medications     Current Outpatient Medications   Medication Sig Dispense Refill    anastrozole (ARIMIDEX) 1 mg tablet Take 1 tablet (1 mg total) by mouth daily at bedtime 30 tablet 0    aspirin (ECOTRIN LOW STRENGTH) 81 mg EC tablet Take 81 mg by mouth daily      azelastine (OPTIVAR) 0 05 % ophthalmic solution 1 drop 2 (two) times a day      calcium carbonate-vitamin D (OSCAL-D) 500 mg-200 units per tablet Take 1 tablet by mouth daily with breakfast 30 tablet 0    candesartan (ATACAND) 4 mg tablet   0    cholecalciferol (VITAMIN D3) 1,000 units tablet Take 1,000 Units by mouth daily      levothyroxine 50 mcg tablet TAKE 1 TABLET BY MOUTH DAILY 90 tablet 1    metoprolol succinate (TOPROL-XL) 50 mg 24 hr tablet Take 1 tablet (50 mg total) by mouth every morning 30 tablet 0    Multiple Vitamin (MULTIVITAMIN) tablet Take 1 tablet by mouth daily      Omega-3 Fatty Acids (FISH OIL) 1,000 mg Take 1,000 mg by mouth daily      ketorolac (ACULAR) 0 5 % ophthalmic solution   0    ofloxacin (OCUFLOX) 0 3 % ophthalmic solution   0    prednisoLONE acetate (PRED FORTE) 1 % ophthalmic suspension   0     No current facility-administered medications for this visit            Health Maintenance     Health Maintenance   Topic Date Due    SLP PLAN OF CARE  1945    BMI: Followup Plan  09/27/1961    Fall Risk  09/29/2021    Depression Screening PHQ  09/29/2021    Medicare Annual Wellness Visit (AWV)  09/29/2021    BMI: Adult  03/15/2022    DTaP,Tdap,and Td Vaccines (2 - Td) 02/22/2026    Pneumococcal Vaccine: 65+ Years  Completed    Influenza Vaccine  Completed    COVID-19 Vaccine  Completed    HIB Vaccine  Aged Out    Hepatitis B Vaccine  Aged Out    IPV Vaccine  Aged Out    Hepatitis A Vaccine  Aged Out    Meningococcal ACWY Vaccine  Aged Out    HPV Vaccine  Aged Out     Immunization History   Administered Date(s) Administered    INFLUENZA 09/03/2014, 09/28/2015, 10/09/2016, 09/13/2017, 11/08/2018    Influenza Split High Dose Preservative Free IM 10/09/2016    Influenza, high dose seasonal 0 7 mL 10/11/2019, 09/29/2020    Influenza, seasonal, injectable 10/30/2003, 11/13/2006, 11/24/2008, 10/07/2011, 10/26/2012, 10/15/2013, 10/01/2014    Pneumococcal Conjugate 13-Valent 02/22/2016    Pneumococcal Polysaccharide PPV23 01/02/2018    SARS-CoV-2 / COVID-19 mRNA IM (Pfizer-PneumRx) 01/25/2021, 02/14/2021    Tdap 62/13/7795       Efrain Lazaro MD

## 2021-03-15 NOTE — ASSESSMENT & PLAN NOTE
Facial fracture    According to patient maxillary facial surgeons would like patient to hold off on any surgery and allow fractures to heal on their own in 6-8 weeks

## 2021-03-15 NOTE — ASSESSMENT & PLAN NOTE
Cardiomyopathy  Patient with recent ICD placement    She is scheduled to follow-up with her cardiologist next week

## 2021-03-24 ENCOUNTER — OFFICE VISIT (OUTPATIENT)
Dept: SURGICAL ONCOLOGY | Facility: CLINIC | Age: 76
End: 2021-03-24
Payer: COMMERCIAL

## 2021-03-24 VITALS
HEART RATE: 76 BPM | BODY MASS INDEX: 25.86 KG/M2 | RESPIRATION RATE: 16 BRPM | WEIGHT: 137 LBS | DIASTOLIC BLOOD PRESSURE: 70 MMHG | TEMPERATURE: 97.5 F | SYSTOLIC BLOOD PRESSURE: 120 MMHG | HEIGHT: 61 IN

## 2021-03-24 DIAGNOSIS — Z79.811 USE OF ANASTROZOLE (ARIMIDEX): ICD-10-CM

## 2021-03-24 DIAGNOSIS — C50.212 MALIGNANT NEOPLASM OF UPPER-INNER QUADRANT OF LEFT BREAST IN FEMALE, ESTROGEN RECEPTOR POSITIVE (HCC): Primary | ICD-10-CM

## 2021-03-24 DIAGNOSIS — Z17.0 MALIGNANT NEOPLASM OF UPPER-INNER QUADRANT OF LEFT BREAST IN FEMALE, ESTROGEN RECEPTOR POSITIVE (HCC): Primary | ICD-10-CM

## 2021-03-24 PROCEDURE — 99214 OFFICE O/P EST MOD 30 MIN: CPT | Performed by: NURSE PRACTITIONER

## 2021-03-24 PROCEDURE — 1111F DSCHRG MED/CURRENT MED MERGE: CPT | Performed by: NURSE PRACTITIONER

## 2021-03-24 PROCEDURE — 1036F TOBACCO NON-USER: CPT | Performed by: NURSE PRACTITIONER

## 2021-03-24 PROCEDURE — 1160F RVW MEDS BY RX/DR IN RCRD: CPT | Performed by: NURSE PRACTITIONER

## 2021-03-24 RX ORDER — SPIRONOLACTONE 25 MG/1
12.5 TABLET ORAL EVERY OTHER DAY
COMMUNITY
Start: 2021-03-22

## 2021-03-24 NOTE — PROGRESS NOTES
Surgical Oncology Follow Up       [address] [place] [address]   [facility]  [facility] [place] [address]    Jessica Powell  1945  [id number]  [address] [facility]  [facility] [place]  [address] [facility] [place] [address]    Chief Complaint   Patient presents with    Breast Cancer     Pt is here for 6 month f/u       Assessment/Plan:  1  Malignant neoplasm of upper-inner quadrant of left breast in female, estrogen receptor positive ([facility] [address] )  - Mammo diagnostic right w 3d & cad; Future  - 6 mo f/u visit    2  Use of anastrozole (Arimidex)  - Continue use per medical oncology    Discussion/Summary: Patient is a pleasant 49-year-old female that presents today for a six-month follow-up visit for left breast cancer diagnosed in 2017  Her pathology revealed invasive lobular carcinoma, ER 90%, AZ negative, her 2-   She underwent a left mastectomy and sentinel node biopsy by Dr Ellyn Kocher and immediate reconstruction by Dr Douglas Maradiaga tumor was low risk on Mammaprint   She is taking anastrozole  She had a right 3d diagnostic mammogram on June 4, 2020 which was BI-RADS 2, category 3 density  She offers no new complaints today  She experienced a fall down a flight of stairs three weeks ago and was admitted to the hospital with facial fractures and had a RCW ICD placed  She feels well now, but is still recovering  There are no worrisome findings on her breast exam today  I will order a right diagnostic mammogram for June and plan to see the patient back again in 6 months or sooner should the need arise  She was instructed to call with any new concerns or symptoms prior to that time  All of her questions were answered today      History of Present Illness:     Oncology History   Malignant neoplasm of upper-inner 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 breast ([facility] [address] )   2/9/2017 Surgery    Left mastectomy with SLN biopsy  Invasive lobular carcinoma  Grade 1  3 9 cm  0/1 lymph nodes  ER 90  AL <1  Her 2 negative  Stage IIA    Immediate reconstruction with Dr Kimberly Lundberg     3/3/2017 Genomic Testing    Mammaprint low risk     3/2017 -  Hormone Therapy    Anastrozole 1 mg daily  Dr Tootie Ramirez          -Interval History:  Patient suffered a fall 3 weeks ago and was admitted to the hospital with multiple facial fractures and had an ICD placed  She is recovering well and her ecchymosis is resolving  She notices no changes on her breast exam   Denies persistent headaches, back pain or bone pain, cough or shortness of breath, abdominal pain  She continues on anastrozole      Review of Systems:  Review of Systems    Patient Active Problem List   Diagnosis    Malignant neoplasm of upper-inner 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 breast ([facility] [address] )    Hypertension    Hypertrophic cardiomyopathy ([facility] [address] )    Hypothyroidism    Ischemic cardiomyopathy    Osteopenia    Plantar fasciitis    Acquired trigger finger    Carpal tunnel syndrome    Locking finger joint    Numbness of hand    Use of anastrozole (Arimidex)    Acute pain of right knee    NSVT (nonsustained ventricular tachycardia) (HCC)    Syncope    Multiple facial fractures (HCC)    Elevated troponin I level    Right hand pain    Left shoulder pain     Past Medical History:   Diagnosis Date    Hypertrophic cardiomyopathy ([facility] [address] )     Hypothyroidism     Ischemic cardiomyopathy     Malignant neoplasm of left female breast ([facility] [address] ) 02/09/2017    stage IIA    Osteopenia     Trigger finger of all digits of right hand      Past Surgical History:   Procedure Laterality Date    BREAST BIOPSY Right     years ago benign    BREAST RECONSTRUCTION Left 2/9/2017    Procedure: BREAST RECONSTRUCTION WITH TISSUE EXPANDERS AND ALLODERM ;  Surgeon: Tayler Wooten MD;  [facility];  Service:     COLONOSCOPY  2009    KNEE ARTHROSCOPY Bilateral     MASTECTOMY Left 02/09/2017    AL BIOPSY/EXCISION, LYMPH NODE(S) Left 2/9/2017    Procedure: LYMPHOSCINTIGRAPHY; INTRAOPERATIVE LYMPHATIC MAPPING; SENTINEL LYMPH NODE BIOPSY (INJECT AT 0700 BY DR ALBA); Surgeon: Ángel Antonio MD;  [facility];  Service: Surgical Oncology    WI INSJ/RPLCMT BREAST IMPLANT SEP DAY MASTECTOMY Left 5/18/2017    Procedure: BREAST EXPANDER/IMPLANT EXCHANGE; CAPSULECTOMY ;  Surgeon: Heavenly King MD;  [facility];  Service: Plastics    WI MASTECTOMY, SIMPLE, COMPLETE Left 2/9/2017    Procedure: BREAST MASTECTOMY; FROZEN SECTION ;  Surgeon: Ángel Antonio MD;  [facility];  Service: Surgical Oncology    REFRACTIVE SURGERY Bilateral     US GUIDANCE BREAST BIOPSY LEFT EACH ADDITIONAL Left 12/1/2016    US GUIDED BREAST BIOPSY LEFT COMPLETE Left 12/1/2016     Family History   Problem Relation Age of Onset    Heart disease Mother     Heart disease Father     Brain cancer Sister     Heart disease Brother     No Known Problems Brother     No Known Problems Daughter     No Known Problems Daughter      Social History     Socioeconomic History    Marital status:       Spouse name: Not on file    Number of children: Not on file    Years of education: Not on file    Highest education level: Not on file   Occupational History    Not on file   Social Needs    Financial resource strain: Not on file    Food insecurity     Worry: Not on file     Inability: Not on file    Transportation needs     Medical: Not on file     Non-medical: Not on file   Tobacco Use    Smoking status: Never Smoker    Smokeless tobacco: Never Used   Substance and Sexual Activity    Alcohol use: No     Alcohol/week: 1 0 standard drinks     Types: 1 Glasses of wine per week    Drug use: No    Sexual activity: Not on file   Lifestyle    Physical activity     Days per week: Not on file     Minutes per session: Not on file    Stress: Not on file   Relationships    Social connections     Talks on phone: Not on file     Gets together: Not on file     Attends Sikh service: Not on file     Active member of club or organization: Not on file     Attends meetings of clubs or organizations: Not on file     Relationship status: Not on file    Intimate partner violence     Fear of current or ex partner: Not on file     Emotionally abused: Not on file     Physically abused: Not on file     Forced sexual activity: Not on file   Other Topics Concern    Not on file   Social History Narrative    Not on file       Current Outpatient Medications:     anastrozole (ARIMIDEX) 1 mg tablet, Take 1 tablet (1 mg total) by mouth daily at bedtime, Disp: 30 tablet, Rfl: 0    aspirin (ECOTRIN LOW STRENGTH) 81 mg EC tablet, Take 81 mg by mouth daily, Disp: , Rfl:     azelastine (OPTIVAR) 0 05 % ophthalmic solution, 1 drop 2 (two) times a day, Disp: , Rfl:     calcium carbonate-vitamin D (OSCAL-D) 500 mg-200 units per tablet, Take 1 tablet by mouth daily with breakfast, Disp: 30 tablet, Rfl: 0    candesartan (ATACAND) 4 mg tablet, , Disp: , Rfl: 0    cholecalciferol (VITAMIN D3) 1,000 units tablet, Take 1,000 Units by mouth daily, Disp: , Rfl:     ketorolac (ACULAR) 0 5 % ophthalmic solution, , Disp: , Rfl: 0    levothyroxine 50 mcg tablet, TAKE 1 TABLET BY MOUTH DAILY, Disp: 90 tablet, Rfl: 1    metoprolol succinate (TOPROL-XL) 50 mg 24 hr tablet, Take 1 tablet (50 mg total) by mouth every morning, Disp: 30 tablet, Rfl: 0    Multiple Vitamin (MULTIVITAMIN) tablet, Take 1 tablet by mouth daily, Disp: , Rfl:     ofloxacin (OCUFLOX) 0 3 % ophthalmic solution, , Disp: , Rfl: 0    Omega-3 Fatty Acids (FISH OIL) 1,000 mg, Take 1,000 mg by mouth daily, Disp: , Rfl:     prednisoLONE acetate (PRED FORTE) 1 % ophthalmic suspension, , Disp: , Rfl: 0    spironolactone (ALDACTONE) 25 mg tablet, Take 12 5 mg by mouth every other day, Disp: , Rfl:   No Known Allergies  Vitals:    03/24/21 1413   BP: 120/70   Pulse: 76   Resp: 16   Temp: 97 5 °F (36 4 °C)       Physical Exam  Vitals signs reviewed  Constitutional:       General: She is not in acute distress  Appearance: Normal appearance  She is well-developed  She is not diaphoretic  HENT:      Head: Normocephalic and atraumatic  Neck:      Musculoskeletal: Normal range of motion  Cardiovascular:      Rate and Rhythm: Normal rate and regular rhythm  Heart sounds: Normal heart sounds  Pulmonary:      Effort: Pulmonary effort is normal       Breath sounds: Normal breath sounds  Chest:      Breasts:         Right: No swelling, bleeding, inverted nipple, mass, nipple discharge, skin change or tenderness  Comments: Left reconstructed breast with implant present  There are no masses, skin nodules, skin changes  New RCW ICD  Mild right medial breast/chest resolving ecchymosis  Abdominal:      Palpations: Abdomen is soft  There is no mass  Tenderness: There is no abdominal tenderness  Musculoskeletal: Normal range of motion  Lymphadenopathy:      Upper Body:      Right upper body: No supraclavicular or axillary adenopathy  Left upper body: No supraclavicular or axillary adenopathy  Skin:     General: Skin is warm and dry  Findings: Ecchymosis (facial, arms) present  No rash  Neurological:      Mental Status: She is alert and oriented to person, place, and time  Psychiatric:         Speech: Speech normal            Advance Care Planning/Advance Directives:  Discussed disease status, cancer treatment plans and/or cancer treatment goals with the patient

## 2021-04-05 ENCOUNTER — TRANSCRIBE ORDERS (OUTPATIENT)
Dept: LAB | Facility: CLINIC | Age: 76
End: 2021-04-05

## 2021-04-05 ENCOUNTER — APPOINTMENT (OUTPATIENT)
Dept: LAB | Facility: CLINIC | Age: 76
End: 2021-04-05
Payer: COMMERCIAL

## 2021-04-05 DIAGNOSIS — Z79.899 ON POTASSIUM SPARING DIURETIC THERAPY: ICD-10-CM

## 2021-04-05 DIAGNOSIS — Z79.899 ON POTASSIUM SPARING DIURETIC THERAPY: Primary | ICD-10-CM

## 2021-04-05 LAB
ANION GAP SERPL CALCULATED.3IONS-SCNC: 6 MMOL/L (ref 4–13)
BUN SERPL-MCNC: 21 MG/DL (ref 5–25)
CALCIUM SERPL-MCNC: 9.2 MG/DL (ref 8.3–10.1)
CHLORIDE SERPL-SCNC: 108 MMOL/L (ref 100–108)
CO2 SERPL-SCNC: 26 MMOL/L (ref 21–32)
CREAT SERPL-MCNC: 0.79 MG/DL (ref 0.6–1.3)
GFR SERPL CREATININE-BSD FRML MDRD: 72 ML/MIN/1.73SQ M
GLUCOSE P FAST SERPL-MCNC: 114 MG/DL (ref 65–99)
POTASSIUM SERPL-SCNC: 3.7 MMOL/L (ref 3.5–5.3)
SODIUM SERPL-SCNC: 140 MMOL/L (ref 136–145)

## 2021-04-05 PROCEDURE — 80048 BASIC METABOLIC PNL TOTAL CA: CPT

## 2021-04-05 PROCEDURE — 36415 COLL VENOUS BLD VENIPUNCTURE: CPT

## 2021-04-09 DIAGNOSIS — C50.212 MALIGNANT NEOPLASM OF UPPER-INNER QUADRANT OF LEFT BREAST IN FEMALE, ESTROGEN RECEPTOR POSITIVE (HCC): ICD-10-CM

## 2021-04-09 DIAGNOSIS — Z17.0 MALIGNANT NEOPLASM OF UPPER-INNER QUADRANT OF LEFT BREAST IN FEMALE, ESTROGEN RECEPTOR POSITIVE (HCC): ICD-10-CM

## 2021-04-09 DIAGNOSIS — S02.92XA MULTIPLE CLOSED FRACTURES OF FACIAL BONE, INITIAL ENCOUNTER (HCC): ICD-10-CM

## 2021-04-09 RX ORDER — B-COMPLEX WITH VITAMIN C
TABLET ORAL
Qty: 30 TABLET | Refills: 0 | Status: SHIPPED | OUTPATIENT
Start: 2021-04-09 | End: 2021-05-10

## 2021-04-09 RX ORDER — ANASTROZOLE 1 MG/1
TABLET ORAL
Qty: 30 TABLET | Refills: 0 | Status: SHIPPED | OUTPATIENT
Start: 2021-04-09 | End: 2021-05-06

## 2021-05-06 DIAGNOSIS — Z17.0 MALIGNANT NEOPLASM OF UPPER-INNER QUADRANT OF LEFT BREAST IN FEMALE, ESTROGEN RECEPTOR POSITIVE (HCC): ICD-10-CM

## 2021-05-06 DIAGNOSIS — C50.212 MALIGNANT NEOPLASM OF UPPER-INNER QUADRANT OF LEFT BREAST IN FEMALE, ESTROGEN RECEPTOR POSITIVE (HCC): ICD-10-CM

## 2021-05-06 RX ORDER — ANASTROZOLE 1 MG/1
TABLET ORAL
Qty: 30 TABLET | Refills: 0 | Status: SHIPPED | OUTPATIENT
Start: 2021-05-06 | End: 2021-06-07

## 2021-05-07 DIAGNOSIS — E03.9 HYPOTHYROIDISM, UNSPECIFIED TYPE: ICD-10-CM

## 2021-05-07 RX ORDER — LEVOTHYROXINE SODIUM 0.05 MG/1
TABLET ORAL
Qty: 90 TABLET | Refills: 1 | Status: SHIPPED | OUTPATIENT
Start: 2021-05-07 | End: 2021-10-27

## 2021-05-08 DIAGNOSIS — S02.92XA MULTIPLE CLOSED FRACTURES OF FACIAL BONE, INITIAL ENCOUNTER (HCC): ICD-10-CM

## 2021-05-10 RX ORDER — B-COMPLEX WITH VITAMIN C
TABLET ORAL
Qty: 30 TABLET | Refills: 0 | Status: SHIPPED | OUTPATIENT
Start: 2021-05-10

## 2021-05-21 DIAGNOSIS — I47.2 NSVT (NONSUSTAINED VENTRICULAR TACHYCARDIA) (HCC): ICD-10-CM

## 2021-05-21 RX ORDER — METOPROLOL SUCCINATE 50 MG/1
TABLET, EXTENDED RELEASE ORAL
Qty: 30 TABLET | Refills: 5 | Status: SHIPPED | OUTPATIENT
Start: 2021-05-21 | End: 2021-10-21 | Stop reason: SDUPTHER

## 2021-06-05 DIAGNOSIS — C50.212 MALIGNANT NEOPLASM OF UPPER-INNER QUADRANT OF LEFT BREAST IN FEMALE, ESTROGEN RECEPTOR POSITIVE (HCC): ICD-10-CM

## 2021-06-05 DIAGNOSIS — Z17.0 MALIGNANT NEOPLASM OF UPPER-INNER QUADRANT OF LEFT BREAST IN FEMALE, ESTROGEN RECEPTOR POSITIVE (HCC): ICD-10-CM

## 2021-06-07 ENCOUNTER — HOSPITAL ENCOUNTER (OUTPATIENT)
Dept: MAMMOGRAPHY | Facility: CLINIC | Age: 76
Discharge: HOME | End: 2021-06-07
Payer: COMMERCIAL

## 2021-06-07 VITALS — BODY MASS INDEX: 25.86 KG/M2 | WEIGHT: 137 LBS | HEIGHT: 61 IN

## 2021-06-07 DIAGNOSIS — C50.212 MALIGNANT NEOPLASM OF UPPER-INNER QUADRANT OF LEFT BREAST IN FEMALE, ESTROGEN RECEPTOR POSITIVE (HCC): ICD-10-CM

## 2021-06-07 DIAGNOSIS — Z17.0 MALIGNANT NEOPLASM OF UPPER-INNER QUADRANT OF LEFT BREAST IN FEMALE, ESTROGEN RECEPTOR POSITIVE (HCC): ICD-10-CM

## 2021-06-07 PROCEDURE — 77065 DX MAMMO INCL CAD UNI: CPT

## 2021-06-07 PROCEDURE — G0279 TOMOSYNTHESIS, MAMMO: HCPCS

## 2021-06-07 RX ORDER — ANASTROZOLE 1 MG/1
TABLET ORAL
Qty: 30 TABLET | Refills: 0 | Status: SHIPPED | OUTPATIENT
Start: 2021-06-07 | End: 2021-07-09

## 2021-07-09 DIAGNOSIS — C50.212 MALIGNANT NEOPLASM OF UPPER-INNER QUADRANT OF LEFT BREAST IN FEMALE, ESTROGEN RECEPTOR POSITIVE (HCC): ICD-10-CM

## 2021-07-09 DIAGNOSIS — Z17.0 MALIGNANT NEOPLASM OF UPPER-INNER QUADRANT OF LEFT BREAST IN FEMALE, ESTROGEN RECEPTOR POSITIVE (HCC): ICD-10-CM

## 2021-07-09 RX ORDER — ANASTROZOLE 1 MG/1
TABLET ORAL
Qty: 30 TABLET | Refills: 0 | Status: SHIPPED | OUTPATIENT
Start: 2021-07-09 | End: 2021-08-09

## 2021-08-09 DIAGNOSIS — C50.212 MALIGNANT NEOPLASM OF UPPER-INNER QUADRANT OF LEFT BREAST IN FEMALE, ESTROGEN RECEPTOR POSITIVE (HCC): ICD-10-CM

## 2021-08-09 DIAGNOSIS — Z17.0 MALIGNANT NEOPLASM OF UPPER-INNER QUADRANT OF LEFT BREAST IN FEMALE, ESTROGEN RECEPTOR POSITIVE (HCC): ICD-10-CM

## 2021-08-09 RX ORDER — ANASTROZOLE 1 MG/1
TABLET ORAL
Qty: 90 TABLET | Refills: 1 | Status: SHIPPED | OUTPATIENT
Start: 2021-08-09 | End: 2022-01-11

## 2021-09-07 ENCOUNTER — OFFICE VISIT (OUTPATIENT)
Dept: HEMATOLOGY ONCOLOGY | Facility: CLINIC | Age: 76
End: 2021-09-07
Payer: COMMERCIAL

## 2021-09-07 VITALS
WEIGHT: 133.5 LBS | DIASTOLIC BLOOD PRESSURE: 64 MMHG | BODY MASS INDEX: 25.2 KG/M2 | SYSTOLIC BLOOD PRESSURE: 116 MMHG | HEART RATE: 74 BPM | TEMPERATURE: 97.5 F | HEIGHT: 61 IN | OXYGEN SATURATION: 94 % | RESPIRATION RATE: 18 BRPM

## 2021-09-07 DIAGNOSIS — C50.212 MALIGNANT NEOPLASM OF UPPER-INNER QUADRANT OF LEFT BREAST IN FEMALE, ESTROGEN RECEPTOR POSITIVE (HCC): Primary | ICD-10-CM

## 2021-09-07 DIAGNOSIS — Z17.0 MALIGNANT NEOPLASM OF UPPER-INNER QUADRANT OF LEFT BREAST IN FEMALE, ESTROGEN RECEPTOR POSITIVE (HCC): Primary | ICD-10-CM

## 2021-09-07 PROCEDURE — 99214 OFFICE O/P EST MOD 30 MIN: CPT | Performed by: INTERNAL MEDICINE

## 2021-09-07 PROCEDURE — 1160F RVW MEDS BY RX/DR IN RCRD: CPT | Performed by: INTERNAL MEDICINE

## 2021-09-07 NOTE — PROGRESS NOTES
Hematology / Oncology Outpatient Follow Up Note    Lieutenant Mandel 66 y o  female :1945 CV:[id number]         Date:  2021    Assessment / Plan:  A 63 year old postmenopausal woman with stage IIA left breast cancer, invasive lobular histology, grade 1, ER strongly positive, AZ negative, HER-2 negative disease  She underwent mastectomy with sentinel lymph node biopsy, resulting in JENIFER  Her tumor was found to be low risk, based on the MammaPrint  Since 2017, she has been on adjuvant hormonal therapy with anastrozole with minimal side effects  She has no evidence recurrent disease, based on her symptoms and physical examination  I recommended her to continue with anastrozole until 2022 to complete 5 years of adjuvant hormonal therapy  She is in agreement with my recommendation  I will see her again in a year for routine follow-up         Subjective:      HPI: [de-identified] 66year old postmenopausal woman who has history of hypertrophic cardiomyopathy  She recently noticed a lump in the left breast  She did not have breast pain or abnormal discharge from the nipple  She brought this to medical attention  She was found to have radiographic abnormality in her left breast  Therefore, she underwent left breast biopsy in 2016, which showed invasive lobular carcinoma, grade 1  This was ER 90-95% positive, AZ negative, HER-2 negative disease  She subsequently underwent left mastectomy with sentinel lymph node biopsy by Dr Vilma Mccoy in 2017  Mastectomy specimen showed 3 9 cm of invasive lobular carcinoma, grade 1  There is no evidence of lymphovascular invasion  One sentinel lymph node was negative for metastatic disease  She had immediate reconstruction with tissue expander  She presented today with her daughter to discuss adjuvant treatment options  She feels well  She has no fever, chills or night sweats   She denied any pain, except minor skin tightness in the left reconstructed breast  She has no recent weight loss  Her performance status is normal  She has no family history of breast or ovarian cancer             Interval History:  A 38-year-old postmenopausal woman with stage IIA left breast cancer with invasive lobular histology, grade 1, ER strongly positive, OR negative, HER-2 negative disease  She underwent mastectomy with sentinel lymph node biopsy, resulting in JENIFER  Her tumor was found to be low risk, based on MammaPrint  Therefore, adjuvant chemotherapy was not indicated  She has been on adjuvant hormonal therapy with anastrozole since March 2017  She has osteoporosis  Larance Oiler, she does not wish to be on denosumab injection  She presents today for routine follow-up  She continued to do well  However, she has excessive sweating  In June 2021, she fell and broke the bone in her nose  This was thought to be due to the cardiac event  She underwent defibrillator placement  She has no respiratory symptoms  Her weight is stable  Her performance status is normal         Objective:      Primary Diagnosis:     Left breast cancer, stage IIA (pT2, pN0,M0) invasive lobular histology, grade 1, ER strongly positive, OR negative, HER-2 negative disease  MammaPrint low risk  Diagnosed in February 2017       Cancer Staging:  No matching staging information was found for the patient         Previous Hematologic/ Oncologic Treatment:            Current Hematologic/ Oncologic Treatment:       Adjuvant hormonal therapy with anastrozole one once a day since March 2017       Disease Status:      JENIFER status post mastectomy with sentinel lymph node biopsy      Test Results:     Pathology:     3 9 cm of invasive lobular carcinoma, grade 1  No evidence of lymphovascular invasion  One sentinel lymph node was negative for metastatic disease  ER 90-95% positive, OR negative, HER-2 negative disease  MammaPrint low risk  Stage IIA (pT2, pN0,M0)        Radiology:     DEXA scan in April 2017 showed T score -2 5, consistent with osteoporosis  Mammography in June 2021  was benign   BI-RADS 2      Laboratory:     See below     Physical Exam:        General Appearance:    Alert, oriented          Eyes:    PERRL   Ears:    Normal external ear canals, both ears   Nose:   Nares normal, septum midline   Throat:   Mucosa moist  Pharynx without injection  Neck:   Supple         Lungs:     Clear to auscultation bilaterally   Chest Wall:    No tenderness or deformity    Heart:    Regular rate and rhythm         Abdomen:     Soft, non-tender, bowel sounds +, no organomegaly               Extremities:   Extremities no cyanosis or edema         Skin:   no rash or icterus  Lymph nodes:   Cervical, supraclavicular, and axillary nodes normal   Neurologic:   CNII-XII intact, normal strength, sensation and reflexes     Throughout             Breast exam:   status post left mastectomy with reconstruction  No palpable abnormality on reconstructed breast  Right breast exam is negative              ROS: Review of Systems   All other systems reviewed and are negative  Imaging: No results found  Labs:   Lab Results   Component Value Date    WBC 5 61 03/09/2021    HGB 11 0 (L) 03/09/2021    HCT 34 0 (L) 03/09/2021    MCV 85 03/09/2021     (L) 03/09/2021     Lab Results   Component Value Date     02/21/2015    K 3 7 04/05/2021     04/05/2021    CO2 26 04/05/2021    ANIONGAP 6 02/21/2015    BUN 21 04/05/2021    CREATININE 0 79 04/05/2021    GLUCOSE 105 02/21/2015    GLUF 114 (H) 04/05/2021    CALCIUM 9 2 04/05/2021    AST 27 03/04/2021    ALT 22 03/04/2021    ALKPHOS 105 03/04/2021    PROT 7 2 02/21/2015    BILITOT 0 47 02/21/2015    EGFR 72 04/05/2021         Current Medications: Reviewed  Allergies: Reviewed  PMH/FH/SH:  Reviewed      Vital Sign:    Body surface area is 1 59 meters squared      Wt Readings from Last 3 Encounters:   09/07/21 60 6 kg (133 lb 8 oz)   06/07/21 62 1 kg (137 lb)   03/24/21 62 1 kg (137 lb)        Temp Readings from Last 3 Encounters:   09/07/21 97 5 °F (36 4 °C) (Tympanic Core)   03/24/21 97 5 °F (36 4 °C) (Temporal)   03/15/21 97 8 °F (36 6 °C)        BP Readings from Last 3 Encounters:   09/07/21 116/64   03/24/21 120/70   03/15/21 118/70         Pulse Readings from Last 3 Encounters:   09/07/21 74   03/24/21 76   03/15/21 83     @LASTSAO2(3)@

## 2021-09-22 ENCOUNTER — APPOINTMENT (OUTPATIENT)
Dept: LAB | Facility: CLINIC | Age: 76
End: 2021-09-22
Payer: COMMERCIAL

## 2021-10-05 ENCOUNTER — VBI (OUTPATIENT)
Dept: ADMINISTRATIVE | Facility: OTHER | Age: 76
End: 2021-10-05

## 2021-10-13 ENCOUNTER — RA CDI HCC (OUTPATIENT)
Dept: OTHER | Facility: HOSPITAL | Age: 76
End: 2021-10-13

## 2021-10-18 RX ORDER — PYRIDOXINE HCL (VITAMIN B6) 100 MG
TABLET ORAL DAILY
COMMUNITY
End: 2022-05-24

## 2021-10-21 ENCOUNTER — OFFICE VISIT (OUTPATIENT)
Dept: FAMILY MEDICINE CLINIC | Facility: CLINIC | Age: 76
End: 2021-10-21
Payer: COMMERCIAL

## 2021-10-21 VITALS
RESPIRATION RATE: 18 BRPM | SYSTOLIC BLOOD PRESSURE: 118 MMHG | TEMPERATURE: 97 F | DIASTOLIC BLOOD PRESSURE: 60 MMHG | OXYGEN SATURATION: 95 % | BODY MASS INDEX: 25.49 KG/M2 | WEIGHT: 135 LBS | HEART RATE: 67 BPM | HEIGHT: 61 IN

## 2021-10-21 DIAGNOSIS — I47.2 NSVT (NONSUSTAINED VENTRICULAR TACHYCARDIA) (HCC): ICD-10-CM

## 2021-10-21 DIAGNOSIS — E03.9 HYPOTHYROIDISM, UNSPECIFIED TYPE: ICD-10-CM

## 2021-10-21 DIAGNOSIS — Z23 FLU VACCINE NEED: Primary | ICD-10-CM

## 2021-10-21 DIAGNOSIS — I10 PRIMARY HYPERTENSION: ICD-10-CM

## 2021-10-21 PROBLEM — R77.8 ELEVATED TROPONIN I LEVEL: Status: RESOLVED | Noted: 2021-03-05 | Resolved: 2021-10-21

## 2021-10-21 PROBLEM — R79.89 ELEVATED TROPONIN I LEVEL: Status: RESOLVED | Noted: 2021-03-05 | Resolved: 2021-10-21

## 2021-10-21 PROBLEM — M24.849 LOCKING FINGER JOINT: Status: RESOLVED | Noted: 2018-06-01 | Resolved: 2021-10-21

## 2021-10-21 PROBLEM — M25.512 LEFT SHOULDER PAIN: Status: RESOLVED | Noted: 2021-03-05 | Resolved: 2021-10-21

## 2021-10-21 PROBLEM — M79.641 RIGHT HAND PAIN: Status: RESOLVED | Noted: 2021-03-05 | Resolved: 2021-10-21

## 2021-10-21 PROBLEM — S02.92XA MULTIPLE FACIAL FRACTURES (HCC): Status: RESOLVED | Noted: 2021-03-04 | Resolved: 2021-10-21

## 2021-10-21 PROCEDURE — 1160F RVW MEDS BY RX/DR IN RCRD: CPT | Performed by: FAMILY MEDICINE

## 2021-10-21 PROCEDURE — 1125F AMNT PAIN NOTED PAIN PRSNT: CPT | Performed by: FAMILY MEDICINE

## 2021-10-21 PROCEDURE — 3288F FALL RISK ASSESSMENT DOCD: CPT | Performed by: FAMILY MEDICINE

## 2021-10-21 PROCEDURE — 3725F SCREEN DEPRESSION PERFORMED: CPT | Performed by: FAMILY MEDICINE

## 2021-10-21 PROCEDURE — 99213 OFFICE O/P EST LOW 20 MIN: CPT | Performed by: FAMILY MEDICINE

## 2021-10-21 PROCEDURE — 1036F TOBACCO NON-USER: CPT | Performed by: FAMILY MEDICINE

## 2021-10-21 PROCEDURE — G0008 ADMIN INFLUENZA VIRUS VAC: HCPCS

## 2021-10-21 PROCEDURE — G0439 PPPS, SUBSEQ VISIT: HCPCS | Performed by: FAMILY MEDICINE

## 2021-10-21 PROCEDURE — 90662 IIV NO PRSV INCREASED AG IM: CPT

## 2021-10-21 PROCEDURE — 1170F FXNL STATUS ASSESSED: CPT | Performed by: FAMILY MEDICINE

## 2021-10-21 RX ORDER — METOPROLOL SUCCINATE 50 MG/1
50 TABLET, EXTENDED RELEASE ORAL EVERY MORNING
Qty: 90 TABLET | Refills: 3 | Status: SHIPPED | OUTPATIENT
Start: 2021-10-21

## 2021-10-27 ENCOUNTER — OFFICE VISIT (OUTPATIENT)
Dept: SURGICAL ONCOLOGY | Facility: CLINIC | Age: 76
End: 2021-10-27
Payer: COMMERCIAL

## 2021-10-27 VITALS
DIASTOLIC BLOOD PRESSURE: 64 MMHG | HEIGHT: 61 IN | BODY MASS INDEX: 25.58 KG/M2 | TEMPERATURE: 97.3 F | SYSTOLIC BLOOD PRESSURE: 120 MMHG | RESPIRATION RATE: 18 BRPM | HEART RATE: 76 BPM | OXYGEN SATURATION: 98 % | WEIGHT: 135.5 LBS

## 2021-10-27 DIAGNOSIS — C50.212 MALIGNANT NEOPLASM OF UPPER-INNER QUADRANT OF LEFT BREAST IN FEMALE, ESTROGEN RECEPTOR POSITIVE (HCC): Primary | ICD-10-CM

## 2021-10-27 DIAGNOSIS — E03.9 HYPOTHYROIDISM, UNSPECIFIED TYPE: ICD-10-CM

## 2021-10-27 DIAGNOSIS — Z79.811 USE OF ANASTROZOLE (ARIMIDEX): ICD-10-CM

## 2021-10-27 DIAGNOSIS — Z17.0 MALIGNANT NEOPLASM OF UPPER-INNER QUADRANT OF LEFT BREAST IN FEMALE, ESTROGEN RECEPTOR POSITIVE (HCC): Primary | ICD-10-CM

## 2021-10-27 PROCEDURE — 99214 OFFICE O/P EST MOD 30 MIN: CPT | Performed by: NURSE PRACTITIONER

## 2021-10-27 RX ORDER — LEVOTHYROXINE SODIUM 0.05 MG/1
TABLET ORAL
Qty: 90 TABLET | Refills: 1 | Status: SHIPPED | OUTPATIENT
Start: 2021-10-27 | End: 2022-04-21

## 2021-11-06 ENCOUNTER — IMMUNIZATIONS (OUTPATIENT)
Dept: FAMILY MEDICINE CLINIC | Facility: HOSPITAL | Age: 76
End: 2021-11-06

## 2021-11-06 DIAGNOSIS — Z23 ENCOUNTER FOR IMMUNIZATION: Primary | ICD-10-CM

## 2021-11-06 PROCEDURE — 0001A COVID-19 PFIZER VACC 0.3 ML: CPT

## 2021-11-06 PROCEDURE — 91300 COVID-19 PFIZER VACC 0.3 ML: CPT

## 2022-01-06 ENCOUNTER — APPOINTMENT (OUTPATIENT)
Dept: LAB | Facility: CLINIC | Age: 77
End: 2022-01-06
Payer: COMMERCIAL

## 2022-01-06 ENCOUNTER — TRANSCRIBE ORDERS (OUTPATIENT)
Dept: LAB | Facility: CLINIC | Age: 77
End: 2022-01-06

## 2022-01-06 DIAGNOSIS — Z79.899 ENCOUNTER FOR LONG-TERM (CURRENT) USE OF OTHER MEDICATIONS: Primary | ICD-10-CM

## 2022-01-06 DIAGNOSIS — Z79.899 ENCOUNTER FOR LONG-TERM (CURRENT) USE OF OTHER MEDICATIONS: ICD-10-CM

## 2022-01-06 LAB
ANION GAP SERPL CALCULATED.3IONS-SCNC: 5 MMOL/L (ref 4–13)
BUN SERPL-MCNC: 27 MG/DL (ref 5–25)
CALCIUM SERPL-MCNC: 9.4 MG/DL (ref 8.3–10.1)
CHLORIDE SERPL-SCNC: 109 MMOL/L (ref 100–108)
CO2 SERPL-SCNC: 25 MMOL/L (ref 21–32)
CREAT SERPL-MCNC: 0.92 MG/DL (ref 0.6–1.3)
GFR SERPL CREATININE-BSD FRML MDRD: 59 ML/MIN/1.73SQ M
GLUCOSE SERPL-MCNC: 129 MG/DL (ref 65–140)
POTASSIUM SERPL-SCNC: 4 MMOL/L (ref 3.5–5.3)
SODIUM SERPL-SCNC: 139 MMOL/L (ref 136–145)

## 2022-01-06 PROCEDURE — 36415 COLL VENOUS BLD VENIPUNCTURE: CPT

## 2022-01-06 PROCEDURE — 80048 BASIC METABOLIC PNL TOTAL CA: CPT

## 2022-01-11 DIAGNOSIS — Z17.0 MALIGNANT NEOPLASM OF UPPER-INNER QUADRANT OF LEFT BREAST IN FEMALE, ESTROGEN RECEPTOR POSITIVE (HCC): ICD-10-CM

## 2022-01-11 DIAGNOSIS — C50.212 MALIGNANT NEOPLASM OF UPPER-INNER QUADRANT OF LEFT BREAST IN FEMALE, ESTROGEN RECEPTOR POSITIVE (HCC): ICD-10-CM

## 2022-01-11 RX ORDER — ANASTROZOLE 1 MG/1
TABLET ORAL
Qty: 90 TABLET | Refills: 1 | Status: SHIPPED | OUTPATIENT
Start: 2022-01-11 | End: 2022-07-22

## 2022-03-04 ENCOUNTER — TELEPHONE (OUTPATIENT)
Dept: SURGICAL ONCOLOGY | Facility: CLINIC | Age: 77
End: 2022-03-04

## 2022-04-07 ENCOUNTER — RA CDI HCC (OUTPATIENT)
Dept: OTHER | Facility: HOSPITAL | Age: 77
End: 2022-04-07

## 2022-04-07 NOTE — PROGRESS NOTES
[facility] [address]  coding opportunities       Chart reviewed, no opportunity found:   [address]        Patients Insurance     Medicare Insurance: [employer]

## 2022-04-21 ENCOUNTER — OFFICE VISIT (OUTPATIENT)
Dept: FAMILY MEDICINE CLINIC | Facility: CLINIC | Age: 77
End: 2022-04-21
Payer: COMMERCIAL

## 2022-04-21 VITALS
SYSTOLIC BLOOD PRESSURE: 118 MMHG | HEIGHT: 61 IN | HEART RATE: 71 BPM | RESPIRATION RATE: 18 BRPM | OXYGEN SATURATION: 98 % | TEMPERATURE: 97.3 F | DIASTOLIC BLOOD PRESSURE: 60 MMHG | BODY MASS INDEX: 26.15 KG/M2 | WEIGHT: 138.5 LBS

## 2022-04-21 DIAGNOSIS — I25.5 ISCHEMIC CARDIOMYOPATHY: ICD-10-CM

## 2022-04-21 DIAGNOSIS — I42.2 CARDIOMYOPATHY, HYPERTROPHIC NONOBSTRUCTIVE (HCC): ICD-10-CM

## 2022-04-21 DIAGNOSIS — Z00.00 MEDICARE ANNUAL WELLNESS VISIT, SUBSEQUENT: ICD-10-CM

## 2022-04-21 DIAGNOSIS — I10 PRIMARY HYPERTENSION: Primary | ICD-10-CM

## 2022-04-21 DIAGNOSIS — E03.9 HYPOTHYROIDISM, UNSPECIFIED TYPE: ICD-10-CM

## 2022-04-21 PROBLEM — I20.89 EXERTIONAL ANGINA: Status: ACTIVE | Noted: 2022-04-21

## 2022-04-21 PROBLEM — I20.8 EXERTIONAL ANGINA (HCC): Status: ACTIVE | Noted: 2022-04-21

## 2022-04-21 PROCEDURE — 3074F SYST BP LT 130 MM HG: CPT | Performed by: FAMILY MEDICINE

## 2022-04-21 PROCEDURE — 1170F FXNL STATUS ASSESSED: CPT | Performed by: FAMILY MEDICINE

## 2022-04-21 PROCEDURE — 1160F RVW MEDS BY RX/DR IN RCRD: CPT | Performed by: FAMILY MEDICINE

## 2022-04-21 PROCEDURE — 3725F SCREEN DEPRESSION PERFORMED: CPT | Performed by: FAMILY MEDICINE

## 2022-04-21 PROCEDURE — 1125F AMNT PAIN NOTED PAIN PRSNT: CPT | Performed by: FAMILY MEDICINE

## 2022-04-21 PROCEDURE — 3078F DIAST BP <80 MM HG: CPT | Performed by: FAMILY MEDICINE

## 2022-04-21 PROCEDURE — 99214 OFFICE O/P EST MOD 30 MIN: CPT | Performed by: FAMILY MEDICINE

## 2022-04-21 PROCEDURE — 1036F TOBACCO NON-USER: CPT | Performed by: FAMILY MEDICINE

## 2022-04-21 PROCEDURE — G0439 PPPS, SUBSEQ VISIT: HCPCS | Performed by: FAMILY MEDICINE

## 2022-04-21 RX ORDER — LEVOTHYROXINE SODIUM 0.05 MG/1
TABLET ORAL
Qty: 90 TABLET | Refills: 1 | Status: SHIPPED | OUTPATIENT
Start: 2022-04-21

## 2022-04-21 NOTE — PROGRESS NOTES
FAMILY PRACTICE OFFICE VISIT       NAME: Bianca Taylor  AGE: 64 y o  SEX: female       : 1945        MRN: [id number]    DATE: 2022  TIME: 12:42 PM    Assessment and Plan     Problem List Items Addressed This Visit        Endocrine    Hypothyroidism     Hypothyroidism  Patient will check TSH blood work and continue with current dose of levothyroxine         Relevant Orders    CBC    Comprehensive metabolic panel    Lipid panel    TSH, 3rd generation       Cardiovascular and Mediastinum    Hypertension - Primary     Hypertension  Patient blood pressure is stable at this time she will continue current regimen of medications  Relevant Orders    CBC    Comprehensive metabolic panel    Lipid panel    TSH, 3rd generation    Ischemic cardiomyopathy     Cardiomyopathy  Patient denies any chest pain or shortness of breath  She does see her cardiologist every 6 months for routine monitoring of condition  Relevant Orders    CBC    Comprehensive metabolic panel    Lipid panel    TSH, 3rd generation      Other Visit Diagnoses     Cardiomyopathy, hypertrophic nonobstructive ([address] )            Patient was recommended to have 4th COVID vaccination in the near future due to her age and risk of cardiac condition      Chief Complaint     Chief Complaint   Patient presents with    Follow-up     6 month        History of Present Illness     Patient in the office to discuss chronic medical condition  She denies any recent illness  Patient is interested in obtaining her 4th COVID 19 vaccination  She does stay active around the house with walking indoors and outdoors  She sees her cardiologist every 6 months  She denies any chest pain or shortness of breath  Review of Systems   Review of Systems   Constitutional: Negative  HENT: Negative  Eyes: Negative  Respiratory: Negative  Cardiovascular: Negative  Gastrointestinal: Negative  Genitourinary: Negative      Musculoskeletal: Negative  Skin: Negative  Neurological: Negative  Psychiatric/Behavioral: Negative  Active Problem List     Patient Active Problem List   Diagnosis    Malignant neoplasm of upper-inner 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 breast ([facility]    Hypertension    Hypertrophic cardiomyopathy ([facility] )    Hypothyroidism    Ischemic cardiomyopathy    Osteopenia    Plantar fasciitis    Acquired trigger finger    Carpal tunnel syndrome    Numbness of hand    Use of anastrozole (Arimidex)    Acute pain of right knee    NSVT (nonsustained ventricular tachycardia) (HCC)    Syncope    Exertional angina ([facility] )       Past Medical History:  Past Medical History:   Diagnosis Date    Hypertrophic cardiomyopathy ([facility] )     Hypothyroidism     Ischemic cardiomyopathy     Malignant neoplasm of left female breast ([facility] [address] [facility] ) 02/09/2017    stage IIA    Osteopenia     Trigger finger of all digits of right hand        Past Surgical History:  Past Surgical History:   Procedure Laterality Date    BREAST BIOPSY Right     years ago benign    BREAST RECONSTRUCTION Left 2/9/2017    Procedure: BREAST RECONSTRUCTION WITH TISSUE EXPANDERS AND ALLODERM ;  Surgeon: Richelle Arita MD;  [facility];  Service:     COLONOSCOPY  2009    KNEE ARTHROSCOPY Bilateral     MASTECTOMY Left 02/09/2017    AL BIOPSY/EXCISION, LYMPH NODE(S) Left 2/9/2017    Procedure: LYMPHOSCINTIGRAPHY; INTRAOPERATIVE LYMPHATIC MAPPING; SENTINEL LYMPH NODE BIOPSY (INJECT AT 0700 BY DR ALBA);   Surgeon: Pippa Fox MD;  [facility];  Service: Surgical Oncology    AL INSJ/RPLCMT BREAST IMPLANT SEP DAY MASTECTOMY Left 5/18/2017    Procedure: BREAST EXPANDER/IMPLANT EXCHANGE; CAPSULECTOMY ;  Surgeon: Richelle Arita MD;  [facility];  Service: Plastics    AL MASTECTOMY, SIMPLE, COMPLETE Left 2/9/2017    Procedure: BREAST MASTECTOMY; FROZEN SECTION ;  Surgeon: Pippa Fox MD;  [facility];  Service: Surgical Oncology    REFRACTIVE SURGERY Bilateral  US GUIDANCE BREAST BIOPSY LEFT EACH ADDITIONAL Left 12/1/2016    US GUIDED BREAST BIOPSY LEFT COMPLETE Left 12/1/2016       Family History:  Family History   Problem Relation Age of Onset    Heart disease Mother     Heart disease Father     Brain cancer Sister     Heart disease Brother     No Known Problems Brother     No Known Problems Daughter     No Known Problems Daughter        Social History:  Social History     Socioeconomic History    Marital status:      Spouse name: Not on file    Number of children: Not on file    Years of education: Not on file    Highest education level: Not on file   Occupational History    Not on file   Tobacco Use    Smoking status: Never Smoker    Smokeless tobacco: Never Used   Vaping Use    Vaping Use: Never used   Substance and Sexual Activity    Alcohol use: No     Alcohol/week: 1 0 standard drink     Types: 1 Glasses of wine per week    Drug use: No    Sexual activity: Not Currently   Other Topics Concern    Not on file   Social History Narrative    Not on file     Social Determinants of Health     Financial Resource Strain: Not on file   Food Insecurity: Not on file   Transportation Needs: Not on file   Physical Activity: Not on file   Stress: Not on file   Social Connections: Not on file   Intimate Partner Violence: Not on file   Housing Stability: Not on file       Objective     Vitals:    04/21/22 1133   BP: 118/60   Pulse: 71   Resp: 18   Temp: (!) 97 3 °F (36 3 °C)   SpO2: 98%     Wt Readings from Last 3 Encounters:   04/21/22 62 8 kg (138 lb 8 oz)   10/27/21 61 5 kg (135 lb 8 oz)   10/21/21 61 2 kg (135 lb)       Physical Exam  Constitutional:       General: She is not in acute distress  Appearance: Normal appearance  She is not ill-appearing  HENT:      Head: Normocephalic and atraumatic  Eyes:      General:         Right eye: No discharge  Left eye: No discharge  Extraocular Movements: Extraocular movements intact  Conjunctiva/sclera: Conjunctivae normal       Pupils: Pupils are equal, round, and reactive to light  Neck:      Vascular: No carotid bruit  Cardiovascular:      Rate and Rhythm: Normal rate and regular rhythm  Heart sounds: Normal heart sounds  No murmur heard  Pulmonary:      Effort: Pulmonary effort is normal       Breath sounds: Normal breath sounds  No wheezing, rhonchi or rales  Abdominal:      General: Abdomen is flat  Bowel sounds are normal  There is no distension  Palpations: Abdomen is soft  Tenderness: There is no abdominal tenderness  There is no guarding or rebound  Musculoskeletal:      Right lower leg: No edema  Left lower leg: No edema  Lymphadenopathy:      Cervical: No cervical adenopathy  Skin:     Findings: No rash  Neurological:      General: No focal deficit present  Mental Status: She is alert and oriented to person, place, and time  Cranial Nerves: No cranial nerve deficit  Psychiatric:         Mood and Affect: Mood normal          Behavior: Behavior normal          Thought Content:  Thought content normal          Judgment: Judgment normal          Pertinent Laboratory/Diagnostic Studies:  Lab Results   Component Value Date    GLUCOSE 105 02/21/2015    BUN 27 (H) 01/06/2022    CREATININE 0 92 01/06/2022    CALCIUM 9 4 01/06/2022     02/21/2015    K 4 0 01/06/2022    CO2 25 01/06/2022     (H) 01/06/2022     Lab Results   Component Value Date    ALT 26 09/22/2021    AST 32 09/22/2021    ALKPHOS 95 09/22/2021    BILITOT 0 47 02/21/2015       Lab Results   Component Value Date    WBC 5 93 09/22/2021    HGB 12 3 09/22/2021    HCT 39 8 09/22/2021    MCV 89 09/22/2021     09/22/2021       No results found for: TSH    Lab Results   Component Value Date    CHOL 178 02/21/2015     Lab Results   Component Value Date    TRIG 107 09/22/2021     Lab Results   Component Value Date    HDL 50 09/22/2021     Lab Results   Component Value Date    LDLCALC 115 (H) 09/22/2021     Lab Results   Component Value Date    HGBA1C 5 8 (H) 03/05/2021       Results for orders placed or performed in visit on 00/66/94   Basic metabolic panel   Result Value Ref Range    Sodium 139 136 - 145 mmol/L    Potassium 4 0 3 5 - 5 3 mmol/L    Chloride 109 (H) 100 - 108 mmol/L    CO2 25 21 - 32 mmol/L    ANION GAP 5 4 - 13 mmol/L    BUN 27 (H) 5 - 25 mg/dL    Creatinine 0 92 0 60 - 1 30 mg/dL    Glucose 129 65 - 140 mg/dL    Calcium 9 4 8 3 - 10 1 mg/dL    eGFR 59 ml/min/1 73sq m       Orders Placed This Encounter   Procedures    CBC    Comprehensive metabolic panel    Lipid panel    TSH, 3rd generation       ALLERGIES:  Allergies   Allergen Reactions    Seasonal Ic [Cholestatin] Sneezing       Current Medications     Current Outpatient Medications   Medication Sig Dispense Refill    anastrozole (ARIMIDEX) 1 mg tablet take 1 tablet by mouth once daily at bedtime 90 tablet 1    aspirin (ECOTRIN LOW STRENGTH) 81 mg EC tablet Take 81 mg by mouth daily      calcium carbonate-vitamin D (OSCAL-D) 500 mg-200 units per tablet take 1 tablet by mouth once daily WITH BREAKFAST   30 tablet 0    candesartan (ATACAND) 4 mg tablet   0    cholecalciferol (VITAMIN D3) 1,000 units tablet Take 1,000 Units by mouth daily      levothyroxine 50 mcg tablet take 1 tablet by mouth once daily 90 tablet 1    metoprolol succinate (TOPROL-XL) 50 mg 24 hr tablet Take 1 tablet (50 mg total) by mouth every morning 90 tablet 3    Omega-3 Fatty Acids (FISH OIL) 1,000 mg Take 1,000 mg by mouth daily      spironolactone (ALDACTONE) 25 mg tablet Take 12 5 mg by mouth every other day      azelastine (OPTIVAR) 0 05 % ophthalmic solution 1 drop 2 (two) times a day (Patient not taking: Reported on 10/21/2021)      ketorolac (ACULAR) 0 5 % ophthalmic solution  (Patient not taking: Reported on 10/21/2021)  0    Multiple Vitamin (MULTIVITAMIN) tablet Take 1 tablet by mouth daily (Patient not taking: Reported on 10/21/2021)      ofloxacin (OCUFLOX) 0 3 % ophthalmic solution  (Patient not taking: Reported on 10/21/2021)  0    prednisoLONE acetate (PRED FORTE) 1 % ophthalmic suspension  (Patient not taking: Reported on 10/21/2021)  0    pyridoxine (B-6) 100 MG tablet Daily (Patient not taking: Reported on 10/21/2021)       No current facility-administered medications for this visit           Health Maintenance     Health Maintenance   Topic Date Due    Hepatitis C Screening  Never done    SLP PLAN OF CARE  Never done    Falls: Plan of Care  Never done    BMI: Followup Plan  10/21/2022    Fall Risk  10/21/2022    Medicare Annual Wellness Visit (AWV)  10/21/2022    Depression Screening  04/21/2023    BMI: Adult  04/21/2023    Breast Cancer Screening: Mammogram  06/07/2023    DTaP,Tdap,and Td Vaccines (2 - Td or Tdap) 02/22/2026    Osteoporosis Screening  Completed    Pneumococcal Vaccine: 65+ Years  Completed    Influenza Vaccine  Completed    COVID-19 Vaccine  Completed    HIB Vaccine  Aged Out    Hepatitis B Vaccine  Aged Out    IPV Vaccine  Aged Out    Hepatitis A Vaccine  Aged Out    Meningococcal ACWY Vaccine  Aged Out    HPV Vaccine  Aged Out     Immunization History   Administered Date(s) Administered    COVID-19 PFIZER VACCINE 0 3 ML IM 01/25/2021, 02/14/2021, 11/06/2021    INFLUENZA 09/03/2014, 09/28/2015, 10/09/2016, 09/13/2017, 11/08/2018    Influenza Split 01/04/2021    Influenza Split High Dose Preservative Free IM 10/09/2016    Influenza, high dose seasonal 0 7 mL 10/11/2019, 09/29/2020, 10/21/2021    Influenza, seasonal, injectable 10/30/2003, 11/13/2006, 11/24/2008, 10/07/2011, 10/26/2012, 10/15/2013, 10/01/2014    Pneumococcal Conjugate 13-Valent 02/22/2016    Pneumococcal Polysaccharide PPV23 01/02/2018    Tdap 76/22/5742       Richard Mukherjee MD    I spent 25 minutes with this patient which greater than 50% was spent counseling reviewing chart

## 2022-04-21 NOTE — ASSESSMENT & PLAN NOTE
Cardiomyopathy  Patient denies any chest pain or shortness of breath  She does see her cardiologist every 6 months for routine monitoring of condition

## 2022-04-21 NOTE — ASSESSMENT & PLAN NOTE
Hypertension  Patient blood pressure is stable at this time she will continue current regimen of medications

## 2022-04-29 PROCEDURE — 3288F FALL RISK ASSESSMENT DOCD: CPT | Performed by: FAMILY MEDICINE

## 2022-04-29 NOTE — PATIENT INSTRUCTIONS
Medicare Preventive Visit Patient Instructions  Thank you for completing your Welcome to Medicare Visit or Medicare Annual Wellness Visit today  Your next wellness visit will be due in one year (4/30/2023)  The screening/preventive services that you may require over the next 5-10 years are detailed below  Some tests may not apply to you based off risk factors and/or age  Screening tests ordered at today's visit but not completed yet may show as past due  Also, please note that scanned in results may not display below  Preventive Screenings:  Service Recommendations Previous Testing/Comments   Colorectal Cancer Screening  * Colonoscopy    * Fecal Occult Blood Test (FOBT)/Fecal Immunochemical Test (FIT)  * Fecal DNA/Cologuard Test  * Flexible Sigmoidoscopy Age: 54-65 years old   Colonoscopy: every 10 years (may be performed more frequently if at higher risk)  OR  FOBT/FIT: every 1 year  OR  Cologuard: every 3 years  OR  Sigmoidoscopy: every 5 years  Screening may be recommended earlier than age 48 if at higher risk for colorectal cancer  Also, an individualized decision between you and your healthcare provider will decide whether screening between the ages of 74-80 would be appropriate  Colonoscopy: 09/09/2019  FOBT/FIT: Not on file  Cologuard: Not on file  Sigmoidoscopy: Not on file          Breast Cancer Screening Age: 36 years old  Frequency: every 1-2 years  Not required if history of left and right mastectomy Mammogram: 06/07/2021    History Breast Cancer   Cervical Cancer Screening Between the ages of 21-29, pap smear recommended once every 3 years  Between the ages of 33-67, can perform pap smear with HPV co-testing every 5 years     Recommendations may differ for women with a history of total hysterectomy, cervical cancer, or abnormal pap smears in past  Pap Smear: Not on file    Screening Not Indicated   Hepatitis C Screening Once for adults born between 1945 and 1965  More frequently in patients at high risk for Hepatitis C Hep C Antibody: Not on file        Diabetes Screening 1-2 times per year if you're at risk for diabetes or have pre-diabetes Fasting glucose: 90 mg/dL   A1C: 5 8 %    Screening Current   Cholesterol Screening Once every 5 years if you don't have a lipid disorder  May order more often based on risk factors  Lipid panel: 09/22/2021    Screening Not Indicated  History Lipid Disorder     Other Preventive Screenings Covered by Medicare:  1  Abdominal Aortic Aneurysm (AAA) Screening: covered once if your at risk  You're considered to be at risk if you have a family history of AAA  2  Lung Cancer Screening: covers low dose CT scan once per year if you meet all of the following conditions: (1) Age 50-69; (2) No signs or symptoms of lung cancer; (3) Current smoker or have quit smoking within the last 15 years; (4) You have a tobacco smoking history of at least 30 pack years (packs per day multiplied by number of years you smoked); (5) You get a written order from a healthcare provider  3  Glaucoma Screening: covered annually if you're considered high risk: (1) You have diabetes OR (2) Family history of glaucoma OR (3)  aged 48 and older OR (3)  American aged 72 and older  3  Osteoporosis Screening: covered every 2 years if you meet one of the following conditions: (1) You're estrogen deficient and at risk for osteoporosis based off medical history and other findings; (2) Have a vertebral abnormality; (3) On glucocorticoid therapy for more than 3 months; (4) Have primary hyperparathyroidism; (5) On osteoporosis medications and need to assess response to drug therapy  · Last bone density test (DXA Scan): 04/19/2017  5  HIV Screening: covered annually if you're between the age of 12-76  Also covered annually if you are younger than 13 and older than 72 with risk factors for HIV infection   For pregnant patients, it is covered up to 3 times per pregnancy  Immunizations:  Immunization Recommendations   Influenza Vaccine Annual influenza vaccination during flu season is recommended for all persons aged >= 6 months who do not have contraindications   Pneumococcal Vaccine (Prevnar and Pneumovax)  * Prevnar = PCV13  * Pneumovax = PPSV23   Adults 25-60 years old: 1-3 doses may be recommended based on certain risk factors  Adults 72 years old: Prevnar (PCV13) vaccine recommended followed by Pneumovax (PPSV23) vaccine  If already received PPSV23 since turning 65, then PCV13 recommended at least one year after PPSV23 dose  Hepatitis B Vaccine 3 dose series if at intermediate or high risk (ex: diabetes, end stage renal disease, liver disease)   Tetanus (Td) Vaccine - COST NOT COVERED BY MEDICARE PART B Following completion of primary series, a booster dose should be given every 10 years to maintain immunity against tetanus  Td may also be given as tetanus wound prophylaxis  Tdap Vaccine - COST NOT COVERED BY MEDICARE PART B Recommended at least once for all adults  For pregnant patients, recommended with each pregnancy  Shingles Vaccine (Shingrix) - COST NOT COVERED BY MEDICARE PART B  2 shot series recommended in those aged 48 and above     Health Maintenance Due:      Topic Date Due    Hepatitis C Screening  Never done    Breast Cancer Screening: Mammogram  06/07/2023     Immunizations Due:  There are no preventive care reminders to display for this patient  Advance Directives   What are advance directives? Advance directives are legal documents that state your wishes and plans for medical care  These plans are made ahead of time in case you lose your ability to make decisions for yourself  Advance directives can apply to any medical decision, such as the treatments you want, and if you want to donate organs  What are the types of advance directives? There are many types of advance directives, and each state has rules about how to use them   You may choose a combination of any of the following:  · Living will: This is a written record of the treatment you want  You can also choose which treatments you do not want, which to limit, and which to stop at a certain time  This includes surgery, medicine, IV fluid, and tube feedings  · Durable power of  for healthcare [facility]): This is a written record that states who you want to make healthcare choices for you when you are unable to make them for yourself  This person, called a proxy, is usually a family member or a friend  You may choose more than 1 proxy  · Do not resuscitate (DNR) order:  A DNR order is used in case your heart stops beating or you stop breathing  It is a request not to have certain forms of treatment, such as CPR  A DNR order may be included in other types of advance directives  · Medical directive: This covers the care that you want if you are in a coma, near death, or unable to make decisions for yourself  You can list the treatments you want for each condition  Treatment may include pain medicine, surgery, blood transfusions, dialysis, IV or tube feedings, and a ventilator (breathing machine)  · Values history: This document has questions about your views, beliefs, and how you feel and think about life  This information can help others choose the care that you would choose  Why are advance directives important? An advance directive helps you control your care  Although spoken wishes may be used, it is better to have your wishes written down  Spoken wishes can be misunderstood, or not followed  Treatments may be given even if you do not want them  An advance directive may make it easier for your family to make difficult choices about your care  Weight Management   Why it is important to manage your weight:  Being overweight increases your risk of health conditions such as heart disease, high blood pressure, type 2 diabetes, and certain types of cancer   It can also increase your risk for osteoarthritis, sleep apnea, and other respiratory problems  Aim for a slow, steady weight loss  Even a small amount of weight loss can lower your risk of health problems  How to lose weight safely:  A safe and healthy way to lose weight is to eat fewer calories and get regular exercise  You can lose up about 1 pound a week by decreasing the number of calories you eat by 500 calories each day  Healthy meal plan for weight management:  A healthy meal plan includes a variety of foods, contains fewer calories, and helps you stay healthy  A healthy meal plan includes the following:  · Eat whole-grain foods more often  A healthy meal plan should contain fiber  Fiber is the part of grains, fruits, and vegetables that is not broken down by your body  Whole-grain foods are healthy and provide extra fiber in your diet  Some examples of whole-grain foods are whole-wheat breads and pastas, oatmeal, brown rice, and bulgur  · Eat a variety of vegetables every day  Include dark, leafy greens such as spinach, kale, evan greens, and mustard greens  Eat yellow and orange vegetables such as carrots, sweet potatoes, and winter squash  · Eat a variety of fruits every day  Choose fresh or canned fruit (canned in its own juice or light syrup) instead of juice  Fruit juice has very little or no fiber  · Eat low-fat dairy foods  Drink fat-free (skim) milk or 1% milk  Eat fat-free yogurt and low-fat cottage cheese  Try low-fat cheeses such as mozzarella and other reduced-fat cheeses  · Choose meat and other protein foods that are low in fat  Choose beans or other legumes such as split peas or lentils  Choose fish, skinless poultry (chicken or turkey), or lean cuts of red meat (beef or pork)  Before you cook meat or poultry, cut off any visible fat  · Use less fat and oil  Try baking foods instead of frying them   Add less fat, such as margarine, sour cream, regular salad dressing and mayonnaise to foods  Eat fewer high-fat foods  Some examples of high-fat foods include french fries, doughnuts, ice cream, and cakes  · Eat fewer sweets  Limit foods and drinks that are high in sugar  This includes candy, cookies, regular soda, and sweetened drinks  Exercise:  Exercise at least 30 minutes per day on most days of the week  Some examples of exercise include walking, biking, dancing, and swimming  You can also fit in more physical activity by taking the stairs instead of the elevator or parking farther away from stores  Ask your healthcare provider about the best exercise plan for you  © Copyright [employer] 2018 Information is for End User's use only and may not be sold, redistributed or otherwise used for commercial purposes   All illustrations and images included in CareNotes® are the copyrighted property of A [employer] M , [employer]  or [address] [place], [place] [address]paCarondelet [facility]

## 2022-04-29 NOTE — PROGRESS NOTES
Assessment and Plan:     Problem List Items Addressed This Visit        Endocrine    Hypothyroidism     Hypothyroidism  Patient will check TSH blood work and continue with current dose of levothyroxine         Relevant Orders    CBC    Comprehensive metabolic panel    Lipid panel    TSH, 3rd generation       Cardiovascular and Mediastinum    Hypertension - Primary     Hypertension  Patient blood pressure is stable at this time she will continue current regimen of medications  Relevant Orders    CBC    Comprehensive metabolic panel    Lipid panel    TSH, 3rd generation    Ischemic cardiomyopathy     Cardiomyopathy  Patient denies any chest pain or shortness of breath  She does see her cardiologist every 6 months for routine monitoring of condition  Relevant Orders    CBC    Comprehensive metabolic panel    Lipid panel    TSH, 3rd generation      Other Visit Diagnoses     Cardiomyopathy, hypertrophic nonobstructive ([facility] [address] )               Preventive health issues were discussed with patient, and age appropriate screening tests were ordered as noted in patient's After Visit Summary  Personalized health advice and appropriate referrals for health education or preventive services given if needed, as noted in patient's After Visit Summary       History of Present Illness:     Patient presents for Medicare Annual Wellness visit    Patient Care Team:  Anju Sellers MD as PCP - General  Dorice Seaman, MD Ria Gilford, MD Debbie Barges, MD as Cardiologist (Cardiology)     Problem List:     Patient Active Problem List   Diagnosis    Malignant neoplasm of upper-inner 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 breast ([facility] [address] )    Hypertension    Hypertrophic cardiomyopathy ([facility] [address] )    Hypothyroidism    Ischemic cardiomyopathy    Osteopenia    Plantar fasciitis    Acquired trigger finger    Carpal tunnel syndrome    Numbness of hand    Use of anastrozole (Arimidex)    Acute pain of right knee    NSVT (nonsustained ventricular tachycardia) ([facility] [address] )    Syncope    Exertional angina [facility])      Past Medical and Surgical History:     Past Medical History:   Diagnosis Date    Hypertrophic cardiomyopathy ([facility] [address] )     Hypothyroidism     Ischemic cardiomyopathy     Malignant neoplasm of left female breast ([facility] [address] ) 02/09/2017    stage IIA    Osteopenia     Trigger finger of all digits of right hand      Past Surgical History:   Procedure Laterality Date    BREAST BIOPSY Right     years ago benign    BREAST RECONSTRUCTION Left 2/9/2017    Procedure: BREAST RECONSTRUCTION WITH TISSUE EXPANDERS AND ALLODERM ;  Surgeon: Gabriella Cassidy MD;  [facility];  Service:     COLONOSCOPY  2009    KNEE ARTHROSCOPY Bilateral     MASTECTOMY Left 02/09/2017    OK BIOPSY/EXCISION, LYMPH NODE(S) Left 2/9/2017    Procedure: LYMPHOSCINTIGRAPHY; INTRAOPERATIVE LYMPHATIC MAPPING; SENTINEL LYMPH NODE BIOPSY (INJECT AT 0700 BY DR ALBA); Surgeon: Reno Kelley MD;  [facility];  Service: Surgical Oncology    OK INSJ/RPLCMT BREAST IMPLANT SEP DAY MASTECTOMY Left 5/18/2017    Procedure: BREAST EXPANDER/IMPLANT EXCHANGE; CAPSULECTOMY ;  Surgeon: Gabriella Cassidy MD;  [facility];  Service: Plastics    OK MASTECTOMY, SIMPLE, COMPLETE Left 2/9/2017    Procedure: BREAST MASTECTOMY; FROZEN SECTION ;  Surgeon: Reno Kelley MD;  [facility];  Service: Surgical Oncology    REFRACTIVE SURGERY Bilateral     US GUIDANCE BREAST BIOPSY LEFT EACH ADDITIONAL Left 12/1/2016    US GUIDED BREAST BIOPSY LEFT COMPLETE Left 12/1/2016      Family History:     Family History   Problem Relation Age of Onset    Heart disease Mother     Heart disease Father     Brain cancer Sister     Heart disease Brother     No Known Problems Brother     No Known Problems Daughter     No Known Problems Daughter       Social History:     Social History     Socioeconomic History    Marital status:       Spouse name: None    Number of children: None    Years of education: None    Highest education level: None   Occupational History    None   Tobacco Use    Smoking status: Never Smoker    Smokeless tobacco: Never Used   Vaping Use    Vaping Use: Never used   Substance and Sexual Activity    Alcohol use: No     Alcohol/week: 1 0 standard drink     Types: 1 Glasses of wine per week    Drug use: No    Sexual activity: Not Currently   Other Topics Concern    None   Social History Narrative    None     Social Determinants of Health     Financial Resource Strain: Not on file   Food Insecurity: Not on file   Transportation Needs: Not on file   Physical Activity: Not on file   Stress: Not on file   Social Connections: Not on file   Intimate Partner Violence: Not on file   Housing Stability: Not on file      Medications and Allergies:     Current Outpatient Medications   Medication Sig Dispense Refill    anastrozole (ARIMIDEX) 1 mg tablet take 1 tablet by mouth once daily at bedtime 90 tablet 1    aspirin (ECOTRIN LOW STRENGTH) 81 mg EC tablet Take 81 mg by mouth daily      calcium carbonate-vitamin D (OSCAL-D) 500 mg-200 units per tablet take 1 tablet by mouth once daily WITH BREAKFAST   30 tablet 0    candesartan (ATACAND) 4 mg tablet   0    cholecalciferol (VITAMIN D3) 1,000 units tablet Take 1,000 Units by mouth daily      levothyroxine 50 mcg tablet take 1 tablet by mouth once daily 90 tablet 1    metoprolol succinate (TOPROL-XL) 50 mg 24 hr tablet Take 1 tablet (50 mg total) by mouth every morning 90 tablet 3    Omega-3 Fatty Acids (FISH OIL) 1,000 mg Take 1,000 mg by mouth daily      spironolactone (ALDACTONE) 25 mg tablet Take 12 5 mg by mouth every other day      azelastine (OPTIVAR) 0 05 % ophthalmic solution 1 drop 2 (two) times a day (Patient not taking: Reported on 10/21/2021)      ketorolac (ACULAR) 0 5 % ophthalmic solution  (Patient not taking: Reported on 10/21/2021)  0    Multiple Vitamin (MULTIVITAMIN) tablet Take 1 tablet by mouth daily (Patient not taking: Reported on 10/21/2021)      ofloxacin (OCUFLOX) 0 3 % ophthalmic solution  (Patient not taking: Reported on 10/21/2021)  0    prednisoLONE acetate (PRED FORTE) 1 % ophthalmic suspension  (Patient not taking: Reported on 10/21/2021)  0    pyridoxine (B-6) 100 MG tablet Daily (Patient not taking: Reported on 10/21/2021)       No current facility-administered medications for this visit  Allergies   Allergen Reactions    Seasonal Ic [Cholestatin] Sneezing      Immunizations:     Immunization History   Administered Date(s) Administered    COVID-19 PFIZER VACCINE 0 3 ML IM 01/25/2021, 02/14/2021, 11/06/2021    INFLUENZA 09/03/2014, 09/28/2015, 10/09/2016, 09/13/2017, 11/08/2018    Influenza Split 01/04/2021    Influenza Split High Dose Preservative Free IM 10/09/2016    Influenza, high dose seasonal 0 7 mL 10/11/2019, 09/29/2020, 10/21/2021    Influenza, seasonal, injectable 10/30/2003, 11/13/2006, 11/24/2008, 10/07/2011, 10/26/2012, 10/15/2013, 10/01/2014    Pneumococcal Conjugate 13-Valent 02/22/2016    Pneumococcal Polysaccharide PPV23 01/02/2018    Tdap 02/22/2016      Health Maintenance:         Topic Date Due    Hepatitis C Screening  Never done    Breast Cancer Screening: Mammogram  06/07/2023     There are no preventive care reminders to display for this patient  Medicare Health Risk Assessment:     /60   Pulse 71   Temp (!) 97 3 °F (36 3 °C)   Resp 18   Ht 5' 1" (1 549 m)   Wt 62 8 kg (138 lb 8 oz)   SpO2 98%   BMI 26 17 kg/m²          Health Risk Assessment:   Patient rates overall health as good  Patient feels that their physical health rating is same  Patient is satisfied with their life  Eyesight was rated as same  Hearing was rated as same  Patient feels that their emotional and mental health rating is same  Patients states they are never, rarely angry  Patient states they are never, rarely unusually tired/fatigued   Pain experienced in the last 7 days has been none  Patient states that she has experienced no weight loss or gain in last 6 months  Depression Screening:   PHQ-2 Score: 1      Fall Risk Screening: In the past year, patient has experienced: no history of falling in past year      Urinary Incontinence Screening:   Patient has not leaked urine accidently in the last six months  Home Safety:  Patient does not have trouble with stairs inside or outside of their home  Patient has working smoke alarms and has working carbon monoxide detector  Home safety hazards include: none  Nutrition:   Current diet is Regular  Medications:   Patient is currently taking over-the-counter supplements  OTC medications include: see medication list  Patient is able to manage medications  Activities of Daily Living (ADLs)/Instrumental Activities of Daily Living (IADLs):   Walk and transfer into and out of bed and chair?: Yes  Dress and groom yourself?: Yes    Bathe or shower yourself?: Yes    Feed yourself?  Yes  Do your laundry/housekeeping?: Yes  Manage your money, pay your bills and track your expenses?: Yes  Make your own meals?: Yes    Do your own shopping?: Yes    Advance Care Planning:   Living will: No      PREVENTIVE SCREENINGS      Cardiovascular Screening:    General: Screening Not Indicated and History Lipid Disorder      Diabetes Screening:     General: Screening Current      Breast Cancer Screening:     General: History Breast Cancer      Cervical Cancer Screening:    General: Screening Not Indicated      Lung Cancer Screening:     General: Screening Not Indicated    Screening, Brief Intervention, and Referral to Treatment (SBIRT)    Screening    Typical number of drinks in a week: 0    Single Item Drug Screening:  How often have you used an illegal drug (including marijuana) or a prescription medication for non-medical reasons in the past year? never    Single Item Drug Screen Score: 0  Interpretation: Negative screen for possible drug use disorder      Sunday MD Poppy

## 2022-05-02 ENCOUNTER — APPOINTMENT (OUTPATIENT)
Dept: LAB | Facility: CLINIC | Age: 77
End: 2022-05-02
Payer: COMMERCIAL

## 2022-05-02 ENCOUNTER — TELEPHONE (OUTPATIENT)
Dept: FAMILY MEDICINE CLINIC | Facility: CLINIC | Age: 77
End: 2022-05-02

## 2022-05-02 DIAGNOSIS — I10 PRIMARY HYPERTENSION: ICD-10-CM

## 2022-05-02 DIAGNOSIS — I25.5 ISCHEMIC CARDIOMYOPATHY: ICD-10-CM

## 2022-05-02 DIAGNOSIS — E03.9 HYPOTHYROIDISM, UNSPECIFIED TYPE: ICD-10-CM

## 2022-05-02 LAB
ALBUMIN SERPL BCP-MCNC: 3.7 G/DL (ref 3.5–5)
ALP SERPL-CCNC: 78 U/L (ref 46–116)
ALT SERPL W P-5'-P-CCNC: 25 U/L (ref 12–78)
ANION GAP SERPL CALCULATED.3IONS-SCNC: 2 MMOL/L (ref 4–13)
AST SERPL W P-5'-P-CCNC: 31 U/L (ref 5–45)
BILIRUB SERPL-MCNC: 1.04 MG/DL (ref 0.2–1)
BUN SERPL-MCNC: 29 MG/DL (ref 5–25)
CALCIUM SERPL-MCNC: 9.7 MG/DL (ref 8.3–10.1)
CHLORIDE SERPL-SCNC: 106 MMOL/L (ref 100–108)
CHOLEST SERPL-MCNC: 203 MG/DL
CO2 SERPL-SCNC: 27 MMOL/L (ref 21–32)
CREAT SERPL-MCNC: 0.96 MG/DL (ref 0.6–1.3)
ERYTHROCYTE [DISTWIDTH] IN BLOOD BY AUTOMATED COUNT: 13.7 % (ref 11.6–15.1)
GFR SERPL CREATININE-BSD FRML MDRD: 56 ML/MIN/1.73SQ M
GLUCOSE P FAST SERPL-MCNC: 100 MG/DL (ref 65–99)
HCT VFR BLD AUTO: 40.4 % (ref 34.8–46.1)
HDLC SERPL-MCNC: 45 MG/DL
HGB BLD-MCNC: 13 G/DL (ref 11.5–15.4)
LDLC SERPL CALC-MCNC: 127 MG/DL (ref 0–100)
MCH RBC QN AUTO: 28.1 PG (ref 26.8–34.3)
MCHC RBC AUTO-ENTMCNC: 32.2 G/DL (ref 31.4–37.4)
MCV RBC AUTO: 87 FL (ref 82–98)
NONHDLC SERPL-MCNC: 158 MG/DL
PLATELET # BLD AUTO: 165 THOUSANDS/UL (ref 149–390)
PMV BLD AUTO: 10.3 FL (ref 8.9–12.7)
POTASSIUM SERPL-SCNC: 4.7 MMOL/L (ref 3.5–5.3)
PROT SERPL-MCNC: 7.3 G/DL (ref 6.4–8.2)
RBC # BLD AUTO: 4.62 MILLION/UL (ref 3.81–5.12)
SODIUM SERPL-SCNC: 135 MMOL/L (ref 136–145)
TRIGL SERPL-MCNC: 153 MG/DL
TSH SERPL DL<=0.05 MIU/L-ACNC: 2.12 UIU/ML (ref 0.45–4.5)
WBC # BLD AUTO: 6.41 THOUSAND/UL (ref 4.31–10.16)

## 2022-05-02 PROCEDURE — 36415 COLL VENOUS BLD VENIPUNCTURE: CPT

## 2022-05-02 PROCEDURE — 84443 ASSAY THYROID STIM HORMONE: CPT

## 2022-05-02 PROCEDURE — 80053 COMPREHEN METABOLIC PANEL: CPT

## 2022-05-02 PROCEDURE — 80061 LIPID PANEL: CPT

## 2022-05-02 PROCEDURE — 85027 COMPLETE CBC AUTOMATED: CPT

## 2022-05-02 NOTE — TELEPHONE ENCOUNTER
----- Message from Dominique Gray MD sent at 4/9/3572  2:12 PM EDT -----  All recent blood work stable with no significant changes compared to prior blood work

## 2022-05-03 ENCOUNTER — OFFICE VISIT (OUTPATIENT)
Dept: SURGICAL ONCOLOGY | Facility: CLINIC | Age: 77
End: 2022-05-03
Payer: COMMERCIAL

## 2022-05-03 VITALS
HEIGHT: 61 IN | OXYGEN SATURATION: 98 % | RESPIRATION RATE: 16 BRPM | SYSTOLIC BLOOD PRESSURE: 120 MMHG | BODY MASS INDEX: 25.86 KG/M2 | HEART RATE: 84 BPM | TEMPERATURE: 98.5 F | WEIGHT: 137 LBS | DIASTOLIC BLOOD PRESSURE: 60 MMHG

## 2022-05-03 DIAGNOSIS — Z12.31 VISIT FOR SCREENING MAMMOGRAM: ICD-10-CM

## 2022-05-03 DIAGNOSIS — C50.212 MALIGNANT NEOPLASM OF UPPER-INNER QUADRANT OF LEFT FEMALE BREAST, UNSPECIFIED ESTROGEN RECEPTOR STATUS (HCC): Primary | ICD-10-CM

## 2022-05-03 PROBLEM — Z79.811 USE OF ANASTROZOLE (ARIMIDEX): Status: RESOLVED | Noted: 2018-06-15 | Resolved: 2022-05-03

## 2022-05-03 PROCEDURE — 99213 OFFICE O/P EST LOW 20 MIN: CPT

## 2022-05-03 NOTE — PROGRESS NOTES
Surgical Oncology Follow Up       [address]  [facility]  [address] [facility] [place] [address]    Edmond De Leonradha  1945  [id number]  [address] [facility]  [facility]  [address]  [place] [address]    Chief Complaint   Patient presents with    Other     office visit       Assessment/Plan:  1  Malignant neoplasm of upper-inner 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 breast, unspecified estrogen receptor status ([facility] [address] )  - 1 year follow up    2  Visit for screening mammogram  - Mammo screening right w 3d & cad; Future      Discussion/Summary:  Patient is a 58-year-old female presenting today for six-month follow-up for left breast cancer diagnosed in February 2017  Pathology revealed invasive lobular carcinoma ER 90%, MT negative, HER2 negative  She was stage II A  She underwent a left mastectomy with sentinel node biopsy with Dr Alex Lopes and had immediate reconstruction with Dr Lilo Dover   She was low risk on mammaprint  She completed a 5 year course of anastrozole hormone therapy in March 2022  There are no concerns on her clinical breast exam   I ordered her screening mammogram to be performed June of this year  I will see her back in 1 year sooner should the need arise  She was instructed to call with any questions or concerns prior to that time  All questions were answered today      History of Present Illness:     Oncology History   Malignant neoplasm of upper-inner 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 breast ([facility] [address] )   2/9/2017 Surgery    Left mastectomy with SLN biopsy  Invasive lobular carcinoma  Grade 1  3 9 cm  0/1 lymph nodes  ER 90  MT <1  Her 2 negative  Stage IIA    Immediate reconstruction with Dr Lilo Dover     3/3/2017 Genomic Testing    Mammaprint low risk     3/2017 - 3/2022 Hormone Therapy    Anastrozole 1 mg daily  Dr Hermila Hurtado          -Interval History: Patient is a 58-year-old female presenting today for six-month follow-up for left breast cancer diagnosed in February 2017  She completed a 5 year course of anastrozole hormone therapy in March 2022  She is no on observation  Patient denies changes on her breast exam  She denies persistent headaches, bone pain, back pain, shortness of breath, or abdominal pain  Review of Systems:  Review of Systems   Constitutional: Negative for activity change, appetite change, fatigue and unexpected weight change  Respiratory: Negative for cough and shortness of breath  Cardiovascular: Negative for chest pain  Gastrointestinal: Negative for abdominal pain, diarrhea, nausea and vomiting  Endocrine: Negative for heat intolerance  Musculoskeletal: Negative for arthralgias, back pain and myalgias  Skin: Negative for rash  Neurological: Negative for weakness and headaches  Hematological: Negative for adenopathy         Patient Active Problem List   Diagnosis    Malignant neoplasm of upper-inner 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 breast ([facility] [address] )    Hypertension    Hypertrophic cardiomyopathy ([facility] [address] )    Hypothyroidism    Ischemic cardiomyopathy    Osteopenia    Plantar fasciitis    Acquired trigger finger    Carpal tunnel syndrome    Numbness of hand    Use of anastrozole (Arimidex)    Acute pain of right knee    [facility]ust[facility]    Syncope    Exertional angina ([address] )     Past Medical History:   Diagnosis Date    Hypertrophic cardiomyopathy ([facility] [address] )     Hypothyroidism     Ischemic cardiomyopathy     Malignant neoplasm of left female breast ([facility] [address] ) 02/09/2017    stage IIA    Osteopenia     Trigger finger of all digits of right hand      Past Surgical History:   Procedure Laterality Date    BREAST BIOPSY Right     years ago benign    BREAST RECONSTRUCTION Left 2/9/2017    Procedure: BREAST RECONSTRUCTION WITH TISSUE EXPANDERS AND ALLODERM ;  Surgeon: Ricky Hays MD;  [facility];  Service:     COLONOSCOPY  2009    KNEE ARTHROSCOPY Bilateral     MASTECTOMY Left 02/09/2017    TX BIOPSY/EXCISION, LYMPH NODE(S) Left 2/9/2017    Procedure: LYMPHOSCINTIGRAPHY; INTRAOPERATIVE LYMPHATIC MAPPING; SENTINEL LYMPH NODE BIOPSY (INJECT AT 0700 BY DR ALBA); Surgeon: Anshul Palacios MD;  [facility];  Service: Surgical Oncology    TX INSJ/RPLCMT BREAST IMPLANT SEP DAY MASTECTOMY Left 5/18/2017    Procedure: BREAST EXPANDER/IMPLANT EXCHANGE; CAPSULECTOMY ;  Surgeon: Shivam Hollis MD;  [facility];  Service: Plastics    TX MASTECTOMY, SIMPLE, COMPLETE Left 2/9/2017    Procedure: BREAST MASTECTOMY; FROZEN SECTION ;  Surgeon: Anshul Palacios MD;  [facility];  Service: Surgical Oncology    REFRACTIVE SURGERY Bilateral     US GUIDANCE BREAST BIOPSY LEFT EACH ADDITIONAL Left 12/1/2016    US GUIDED BREAST BIOPSY LEFT COMPLETE Left 12/1/2016     Family History   Problem Relation Age of Onset    Heart disease Mother     Heart disease Father     Brain cancer Sister     Heart disease Brother     No Known Problems Brother     No Known Problems Daughter     No Known Problems Daughter      Social History     Socioeconomic History    Marital status:       Spouse name: Not on file    Number of children: Not on file    Years of education: Not on file    Highest education level: Not on file   Occupational History    Not on file   Tobacco Use    Smoking status: Never Smoker    Smokeless tobacco: Never Used   Vaping Use    Vaping Use: Never used   Substance and Sexual Activity    Alcohol use: No     Alcohol/week: 1 0 standard drink     Types: 1 Glasses of wine per week    Drug use: No    Sexual activity: Not Currently   Other Topics Concern    Not on file   Social History Narrative    Not on file     Social Determinants of Health     Financial Resource Strain: Not on file   Food Insecurity: Not on file   Transportation Needs: Not on file   Physical Activity: Not on file   Stress: Not on file   Social Connections: Not on file Intimate Partner Violence: Not on file   Housing Stability: Not on file       Current Outpatient Medications:     anastrozole (ARIMIDEX) 1 mg tablet, take 1 tablet by mouth once daily at bedtime, Disp: 90 tablet, Rfl: 1    aspirin (ECOTRIN LOW STRENGTH) 81 mg EC tablet, Take 81 mg by mouth daily, Disp: , Rfl:     calcium carbonate-vitamin D (OSCAL-D) 500 mg-200 units per tablet, take 1 tablet by mouth once daily WITH BREAKFAST , Disp: 30 tablet, Rfl: 0    candesartan (ATACAND) 4 mg tablet, , Disp: , Rfl: 0    cholecalciferol (VITAMIN D3) 1,000 units tablet, Take 1,000 Units by mouth daily, Disp: , Rfl:     levothyroxine 50 mcg tablet, take 1 tablet by mouth once daily, Disp: 90 tablet, Rfl: 1    metoprolol succinate (TOPROL-XL) 50 mg 24 hr tablet, Take 1 tablet (50 mg total) by mouth every morning, Disp: 90 tablet, Rfl: 3    Omega-3 Fatty Acids (FISH OIL) 1,000 mg, Take 1,000 mg by mouth daily, Disp: , Rfl:     spironolactone (ALDACTONE) 25 mg tablet, Take 12 5 mg by mouth every other day, Disp: , Rfl:     azelastine (OPTIVAR) 0 05 % ophthalmic solution, 1 drop 2 (two) times a day (Patient not taking: Reported on 10/21/2021), Disp: , Rfl:     ketorolac (ACULAR) 0 5 % ophthalmic solution, , Disp: , Rfl: 0    Multiple Vitamin (MULTIVITAMIN) tablet, Take 1 tablet by mouth daily (Patient not taking: Reported on 10/21/2021), Disp: , Rfl:     ofloxacin (OCUFLOX) 0 3 % ophthalmic solution, , Disp: , Rfl: 0    prednisoLONE acetate (PRED FORTE) 1 % ophthalmic suspension, , Disp: , Rfl: 0    pyridoxine (B-6) 100 MG tablet, Daily (Patient not taking: Reported on 10/21/2021 ), Disp: , Rfl:   Allergies   Allergen Reactions    Seasonal Ic [Cholestatin] Sneezing     Vitals:    05/03/22 1359   Resp: 16       Physical Exam  Constitutional:       General: She is not in acute distress  Appearance: Normal appearance  Cardiovascular:      Rate and Rhythm: Normal rate and regular rhythm        Pulses: Normal pulses  Heart sounds: Normal heart sounds  Pulmonary:      Effort: Pulmonary effort is normal       Breath sounds: Normal breath sounds  Chest:      Chest wall: No mass  Breasts:      Right: No swelling, bleeding, inverted nipple, mass, nipple discharge, skin change, tenderness, axillary adenopathy or supraclavicular adenopathy  Left: No swelling, mass, skin change, tenderness, axillary adenopathy or supraclavicular adenopathy  Comments: Left breast mastectomy scar with implant  No masses, nodularity, skin changes, nipple changes or discharge (for right breast), or adenopathy appreciated on physical exam      Right chest wall defibrillator  Abdominal:      General: Abdomen is flat  Palpations: Abdomen is soft  Lymphadenopathy:      Upper Body:      Right upper body: No supraclavicular, axillary or pectoral adenopathy  Left upper body: No supraclavicular, axillary or pectoral adenopathy  Skin:     General: Skin is warm  Neurological:      General: No focal deficit present  Mental Status: She is alert and oriented to person, place, and time  Psychiatric:         Mood and Affect: Mood normal          Behavior: Behavior normal            Results:    Imaging  No results found  I reviewed the above imaging data  Advance Care Planning/Advance Directives:  Discussed disease status, cancer treatment plans and/or cancer treatment goals with the patient

## 2022-05-24 ENCOUNTER — OFFICE VISIT (OUTPATIENT)
Dept: FAMILY MEDICINE CLINIC | Facility: CLINIC | Age: 77
End: 2022-05-24
Payer: COMMERCIAL

## 2022-05-24 VITALS
SYSTOLIC BLOOD PRESSURE: 144 MMHG | RESPIRATION RATE: 16 BRPM | DIASTOLIC BLOOD PRESSURE: 68 MMHG | OXYGEN SATURATION: 98 % | BODY MASS INDEX: 25.71 KG/M2 | HEART RATE: 65 BPM | HEIGHT: 61 IN | TEMPERATURE: 97.7 F | WEIGHT: 136.2 LBS

## 2022-05-24 DIAGNOSIS — S50.361A TICK BITE OF RIGHT ELBOW, INITIAL ENCOUNTER: Primary | ICD-10-CM
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
## 2022-05-24 PROCEDURE — 3725F SCREEN DEPRESSION PERFORMED: CPT | Performed by: FAMILY MEDICINE

## 2022-05-24 PROCEDURE — 1160F RVW MEDS BY RX/DR IN RCRD: CPT | Performed by: FAMILY MEDICINE

## 2022-05-24 PROCEDURE — 1036F TOBACCO NON-USER: CPT | Performed by: FAMILY MEDICINE

## 2022-05-24 PROCEDURE — 3078F DIAST BP <80 MM HG: CPT | Performed by: FAMILY MEDICINE

## 2022-05-24 PROCEDURE — 99213 OFFICE O/P EST LOW 20 MIN: CPT | Performed by: FAMILY MEDICINE

## 2022-05-24 PROCEDURE — 3077F SYST BP >= 140 MM HG: CPT | Performed by: FAMILY MEDICINE

## 2022-05-24 NOTE — PROGRESS NOTES
Chief Complaint   Patient presents with    Tick bite on right arm     Occurred last Friday  HPI   Here because she had a tick bite on her right arm  It was removed by a friend 3 days ago  She is not sure how long it was on her arm  Currently, no symptoms  Past Medical History:   Diagnosis Date    Hypertrophic cardiomyopathy ([facility] [address] )     Hypothyroidism     Ischemic cardiomyopathy     Malignant neoplasm of left female breast ([facility] [address] ) 02/09/2017    stage IIA    Osteopenia     Trigger finger of all digits of right hand         Past Surgical History:   Procedure Laterality Date    BREAST BIOPSY Right     years ago benign    BREAST RECONSTRUCTION Left 2/9/2017    Procedure: BREAST RECONSTRUCTION WITH TISSUE EXPANDERS AND ALLODERM ;  Surgeon: Gail Dudley MD;  [facility];  Service:     COLONOSCOPY  2009    KNEE ARTHROSCOPY Bilateral     MASTECTOMY Left 02/09/2017    NY BIOPSY/EXCISION, LYMPH NODE(S) Left 2/9/2017    Procedure: LYMPHOSCINTIGRAPHY; INTRAOPERATIVE LYMPHATIC MAPPING; SENTINEL LYMPH NODE BIOPSY (INJECT AT 0700 BY DR ALBA);   Surgeon: Misti Viera MD;  [facility];  Service: Surgical Oncology    NY INSJ/RPLCMT BREAST IMPLANT SEP DAY MASTECTOMY Left 5/18/2017    Procedure: BREAST EXPANDER/IMPLANT EXCHANGE; CAPSULECTOMY ;  Surgeon: Gail Dudley MD;  [facility];  Service: Plastics    NY MASTECTOMY, SIMPLE, COMPLETE Left 2/9/2017    Procedure: BREAST MASTECTOMY; FROZEN SECTION ;  Surgeon: Misti Viera MD;  [facility];  Service: Surgical Oncology    REFRACTIVE SURGERY Bilateral     US GUIDANCE BREAST BIOPSY LEFT EACH ADDITIONAL Left 12/1/2016    US GUIDED BREAST BIOPSY LEFT COMPLETE Left 12/1/2016       Social History     Tobacco Use    Smoking status: Never Smoker    Smokeless tobacco: Never Used   Substance Use Topics    Alcohol use: No     Alcohol/week: 1 0 standard drink     Types: 1 Glasses of wine per week       Social History     Social History Narrative     since 2018  Was  for 50 years  Lives alone  Has 2 daughters  One in [place] who is a nephrologist   The other in [facility]  Enjoys gardening and being outside  The following portions of the patient's history were reviewed and updated as appropriate: allergies, current medications, past family history, past medical history, past social history, past surgical history and problem list       Review of Systems       /68 (BP Location: Left arm, Patient Position: Sitting, Cuff Size: Standard)   Pulse 65   Temp 97 7 °F (36 5 °C) (Temporal)   Resp 16   Ht 5' 1" (1 549 m)   Wt 61 8 kg (136 lb 3 2 oz)   SpO2 98%   BMI 25 73 kg/m²      Physical Exam   There is a small residual red spot just distal to the right antecubital area measuring about 4 mm  No rash any place else  She has the tick which is not engorged  Current Outpatient Medications:     anastrozole (ARIMIDEX) 1 mg tablet, take 1 tablet by mouth once daily at bedtime, Disp: 90 tablet, Rfl: 1    aspirin (ECOTRIN LOW STRENGTH) 81 mg EC tablet, Take 81 mg by mouth daily, Disp: , Rfl:     calcium carbonate-vitamin D (OSCAL-D) 500 mg-200 units per tablet, take 1 tablet by mouth once daily WITH BREAKFAST , Disp: 30 tablet, Rfl: 0    candesartan (ATACAND) 4 mg tablet, , Disp: , Rfl: 0    cholecalciferol (VITAMIN D3) 1,000 units tablet, Take 1,000 Units by mouth daily, Disp: , Rfl:     levothyroxine 50 mcg tablet, take 1 tablet by mouth once daily, Disp: 90 tablet, Rfl: 1    metoprolol succinate (TOPROL-XL) 50 mg 24 hr tablet, Take 1 tablet (50 mg total) by mouth every morning, Disp: 90 tablet, Rfl: 3    Omega-3 Fatty Acids (FISH OIL) 1,000 mg, Take 1,000 mg by mouth daily, Disp: , Rfl:     spironolactone (ALDACTONE) 25 mg tablet, Take 12 5 mg by mouth every other day, Disp: , Rfl:      No problem-specific Assessment & Plan notes found for this encounter         Diagnoses and all orders for this visit: Tick bite of right elbow, initial encounter        Patient Instructions   It appears that the tick was not attached for more than 72 hours and the takes certainly not engorged of the risk of Lyme disease extremely low  Antibiotic prophylaxis is not necessary at the present time  Continue usual medication and may continue in her garden  Suggest an insect repellent that contains Deet with concentrations between 10-35%  Return as scheduled

## 2022-05-24 NOTE — PATIENT INSTRUCTIONS
It appears that the tick was not attached for more than 72 hours and the takes certainly not engorged of the risk of Lyme disease extremely low  Antibiotic prophylaxis is not necessary at the present time  Continue usual medication and may continue in her garden  Suggest an insect repellent that contains Deet with concentrations between 10-35%  Return as scheduled

## 2022-06-15 ENCOUNTER — HOSPITAL ENCOUNTER (OUTPATIENT)
Dept: RADIOLOGY | Age: 77
Discharge: HOME | End: 2022-06-15
Payer: COMMERCIAL

## 2022-06-15 VITALS — WEIGHT: 136 LBS | HEIGHT: 61 IN | BODY MASS INDEX: 25.68 KG/M2

## 2022-06-15 DIAGNOSIS — Z12.31 VISIT FOR SCREENING MAMMOGRAM: ICD-10-CM

## 2022-06-15 PROCEDURE — 77067 SCR MAMMO BI INCL CAD: CPT

## 2022-06-15 PROCEDURE — 77063 BREAST TOMOSYNTHESIS BI: CPT

## 2022-06-16 ENCOUNTER — OFFICE VISIT (OUTPATIENT)
Dept: FAMILY MEDICINE CLINIC | Facility: CLINIC | Age: 77
End: 2022-06-16
Payer: COMMERCIAL

## 2022-06-16 ENCOUNTER — APPOINTMENT (OUTPATIENT)
Dept: LAB | Facility: CLINIC | Age: 77
End: 2022-06-16
Payer: COMMERCIAL

## 2022-06-16 VITALS
WEIGHT: 133.5 LBS | DIASTOLIC BLOOD PRESSURE: 70 MMHG | OXYGEN SATURATION: 98 % | HEART RATE: 67 BPM | BODY MASS INDEX: 25.2 KG/M2 | RESPIRATION RATE: 18 BRPM | HEIGHT: 61 IN | TEMPERATURE: 97.6 F | SYSTOLIC BLOOD PRESSURE: 118 MMHG

## 2022-06-16 DIAGNOSIS — L03.90 CELLULITIS, UNSPECIFIED CELLULITIS SITE: Primary | ICD-10-CM

## 2022-06-16 DIAGNOSIS — L03.90 CELLULITIS, UNSPECIFIED CELLULITIS SITE: ICD-10-CM

## 2022-06-16 PROCEDURE — 3074F SYST BP LT 130 MM HG: CPT | Performed by: FAMILY MEDICINE

## 2022-06-16 PROCEDURE — 36415 COLL VENOUS BLD VENIPUNCTURE: CPT

## 2022-06-16 PROCEDURE — 86618 LYME DISEASE ANTIBODY: CPT

## 2022-06-16 PROCEDURE — 1160F RVW MEDS BY RX/DR IN RCRD: CPT | Performed by: FAMILY MEDICINE

## 2022-06-16 PROCEDURE — 99213 OFFICE O/P EST LOW 20 MIN: CPT | Performed by: FAMILY MEDICINE

## 2022-06-16 PROCEDURE — 86617 LYME DISEASE ANTIBODY: CPT

## 2022-06-16 PROCEDURE — 3078F DIAST BP <80 MM HG: CPT | Performed by: FAMILY MEDICINE

## 2022-06-16 PROCEDURE — 1036F TOBACCO NON-USER: CPT | Performed by: FAMILY MEDICINE

## 2022-06-16 RX ORDER — DOXYCYCLINE HYCLATE 100 MG/1
100 CAPSULE ORAL EVERY 12 HOURS SCHEDULED
Qty: 42 CAPSULE | Refills: 0 | Status: SHIPPED | OUTPATIENT
Start: 2022-06-16 | End: 2022-07-07

## 2022-06-16 NOTE — PROGRESS NOTES
FAMILY PRACTICE OFFICE VISIT       NAME: Kayode Antonio  AGE: 64 y o  SEX: female       : 1945        MRN: [id number]    DATE: 2022  TIME: 11:14 AM    Assessment and Plan     Problem List Items Addressed This Visit        Other    Cellulitis - Primary     Cellulitis  Patient with new target lesion on right antecubital area after tick bite  Patient was given doxycycline 100 mg b i d  to take for 3 weeks for suspected Lyme disease  Patient will also obtain a Lyme titer at this time  Relevant Medications    doxycycline hyclate (VIBRAMYCIN) 100 mg capsule    Other Relevant Orders    Lyme Antibody Profile with reflex to [facility]              Chief Complaint     Chief Complaint   Patient presents with    Tick Removal     R arm , Red and sore x 3 week        History of Present Illness     Patient having follow-up visit after having a tick bite on May 20, 2022  Patient was evaluated in the office and was recommended to observe for any interval changes  Over the past week she has noticed a large red oval ring over the initial tick bite area  She denies any flu-like symptoms or arthralgias or fevers  Review of Systems   Review of Systems   Constitutional: Negative  Skin: Positive for color change and rash          As per HPI       Active Problem List     Patient Active Problem List   Diagnosis    Malignant neoplasm of upper-inner 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 breast ([address] )    Hypertension    Hypertrophic cardiomyopathy ([address]    Hypothyroidism    Ischemic cardiomyopathy    Osteopenia    Plantar fasciitis    Acquired trigger finger    Carpal tunnel syndrome    Numbness of hand    Acute pain of right knee    NSVT (nonsustained ventricular tachycardia) (HCC)    Syncope    Exertional angina (HCC)    Cellulitis       Past Medical History:  Past Medical History:   Diagnosis Date    Hypertrophic cardiomyopathy ([address] )     Hypothyroidism     Ischemic cardiomyopathy     Malignant neoplasm of left female breast ([facility] [address] ) 02/09/2017    stage IIA    Osteopenia     Trigger finger of all digits of right hand        Past Surgical History:  Past Surgical History:   Procedure Laterality Date    BREAST BIOPSY Right     years ago benign    BREAST RECONSTRUCTION Left 2/9/2017    Procedure: BREAST RECONSTRUCTION WITH TISSUE EXPANDERS AND ALLODERM ;  Surgeon: Caridad Casey MD;  [facility];  Service:     COLONOSCOPY  2009    KNEE ARTHROSCOPY Bilateral     MASTECTOMY Left 02/09/2017    MD BIOPSY/EXCISION, LYMPH NODE(S) Left 2/9/2017    Procedure: LYMPHOSCINTIGRAPHY; INTRAOPERATIVE LYMPHATIC MAPPING; SENTINEL LYMPH NODE BIOPSY (INJECT AT 0700 BY DR ALBA);   Surgeon: Beth Gunn MD;  [facility];  Service: Surgical Oncology    MD INSJ/RPLCMT BREAST IMPLANT SEP DAY MASTECTOMY Left 5/18/2017    Procedure: BREAST EXPANDER/IMPLANT EXCHANGE; CAPSULECTOMY ;  Surgeon: Caridad Casey MD;  [facility];  Service: Plastics    MD MASTECTOMY, SIMPLE, COMPLETE Left 2/9/2017    Procedure: BREAST MASTECTOMY; FROZEN SECTION ;  Surgeon: Beth Gunn MD;  [facility];  Service: Surgical Oncology    REFRACTIVE SURGERY Bilateral     US GUIDANCE BREAST BIOPSY LEFT EACH ADDITIONAL Left 12/1/2016    US GUIDED BREAST BIOPSY LEFT COMPLETE Left 12/1/2016       Family History:  Family History   Problem Relation Age of Onset    Heart disease Mother     Heart disease Father    Ceciliatrey Laura Brain cancer Sister     No Known Problems Daughter     No Known Problems Daughter     No Known Problems Maternal Grandmother     No Known Problems Maternal Grandfather     No Known Problems Paternal Grandmother     No Known Problems Paternal Grandfather     Heart disease Brother     No Known Problems Brother     No Known Problems Paternal Aunt     No Known Problems Paternal Aunt     No Known Problems Paternal Aunt     No Known Problems Paternal Aunt     No Known Problems Paternal Aunt        Social History:  Social History Socioeconomic History    Marital status:      Spouse name: Not on file    Number of children: Not on file    Years of education: Not on file    Highest education level: Not on file   Occupational History    Not on file   Tobacco Use    Smoking status: Never Smoker    Smokeless tobacco: Never Used   Vaping Use    Vaping Use: Never used   Substance and Sexual Activity    Alcohol use: No     Alcohol/week: 1 0 standard drink     Types: 1 Glasses of wine per week    Drug use: No    Sexual activity: Not Currently   Other Topics Concern    Not on file   Social History Narrative     since 2018  Was  for 50 years  Lives alone  Has 2 daughters  One in [place] who is a nephrologist   The other in [facility]  Enjoys gardening and being outside  Social Determinants of Health     Financial Resource Strain: Not on file   Food Insecurity: Not on file   Transportation Needs: Not on file   Physical Activity: Not on file   Stress: Not on file   Social Connections: Not on file   Intimate Partner Violence: Not on file   Housing Stability: Not on file       Objective     Vitals:    06/16/22 1030   BP: 118/70   Pulse: 67   Resp: 18   Temp: 97 6 °F (36 4 °C)   SpO2: 98%     Wt Readings from Last 3 Encounters:   06/16/22 60 6 kg (133 lb 8 oz)   06/15/22 61 7 kg (136 lb)   05/24/22 61 8 kg (136 lb 3 2 oz)       Physical Exam  Constitutional:       General: She is not in acute distress  Appearance: Normal appearance  She is not ill-appearing  Skin:     Comments: Patient with an oval red macular lesion on right antecubital area approximately 5 x 7 cm with central clearing area  There is no discharge from initial tick bite area  There is no tenderness to palpation  Neurological:      Mental Status: She is alert           Pertinent Laboratory/Diagnostic Studies:  Lab Results   Component Value Date    GLUCOSE 105 02/21/2015    BUN 29 (H) 05/02/2022    CREATININE 0 96 05/02/2022    CALCIUM 9 7 05/02/2022     02/21/2015    K 4 7 05/02/2022    CO2 27 05/02/2022     05/02/2022     Lab Results   Component Value Date    ALT 25 05/02/2022    AST 31 05/02/2022    ALKPHOS 78 05/02/2022    BILITOT 0 47 02/21/2015       Lab Results   Component Value Date    WBC 6 41 05/02/2022    HGB 13 0 05/02/2022    HCT 40 4 05/02/2022    MCV 87 05/02/2022     05/02/2022       No results found for: TSH    Lab Results   Component Value Date    CHOL 178 02/21/2015     Lab Results   Component Value Date    TRIG 153 (H) 05/02/2022     Lab Results   Component Value Date    HDL 45 (L) 05/02/2022     Lab Results   Component Value Date    LDLCALC 127 (H) 05/02/2022     Lab Results   Component Value Date    HGBA1C 5 8 (H) 03/05/2021       Results for orders placed or performed in visit on 05/02/22   CBC   Result Value Ref Range    WBC 6 41 4 31 - 10 16 Thousand/uL    RBC 4 62 3 81 - 5 12 Million/uL    Hemoglobin 13 0 11 5 - 15 4 g/dL    Hematocrit 40 4 34 8 - 46 1 %    MCV 87 82 - 98 fL    MCH 28 1 26 8 - 34 3 pg    MCHC 32 2 31 4 - 37 4 g/dL    RDW 13 7 11 6 - 15 1 %    Platelets 718 498 - 235 Thousands/uL    MPV 10 3 8 9 - 12 7 fL   Comprehensive metabolic panel   Result Value Ref Range    Sodium 135 (L) 136 - 145 mmol/L    Potassium 4 7 3 5 - 5 3 mmol/L    Chloride 106 100 - 108 mmol/L    CO2 27 21 - 32 mmol/L    ANION GAP 2 (L) 4 - 13 mmol/L    BUN 29 (H) 5 - 25 mg/dL    Creatinine 0 96 0 60 - 1 30 mg/dL    Glucose, Fasting 100 (H) 65 - 99 mg/dL    Calcium 9 7 8 3 - 10 1 mg/dL    AST 31 5 - 45 U/L    ALT 25 12 - 78 U/L    Alkaline Phosphatase 78 46 - 116 U/L    Total Protein 7 3 6 4 - 8 2 g/dL    Albumin 3 7 3 5 - 5 0 g/dL    Total Bilirubin 1 04 (H) 0 20 - 1 00 mg/dL    eGFR 56 ml/min/1 73sq m   Lipid panel   Result Value Ref Range    Cholesterol 203 (H) See Comment mg/dL    Triglycerides 153 (H) See Comment mg/dL    HDL, Direct 45 (L) >=50 mg/dL    LDL Calculated 127 (H) 0 - 100 mg/dL    Non-HDL-Chol (CHOL-HDL) 158 mg/dl   TSH, 3rd generation   Result Value Ref Range    TSH 3RD GENERATON 2 120 0 450 - 4 500 uIU/mL       Orders Placed This Encounter   Procedures    Lyme Antibody Profile with reflex to WB       ALLERGIES:  Allergies   Allergen Reactions    Seasonal Ic [Cholestatin] Sneezing       Current Medications     Current Outpatient Medications   Medication Sig Dispense Refill    anastrozole (ARIMIDEX) 1 mg tablet take 1 tablet by mouth once daily at bedtime 90 tablet 1    aspirin (ECOTRIN LOW STRENGTH) 81 mg EC tablet Take 81 mg by mouth daily      calcium carbonate-vitamin D (OSCAL-D) 500 mg-200 units per tablet take 1 tablet by mouth once daily WITH BREAKFAST  30 tablet 0    candesartan (ATACAND) 4 mg tablet   0    cholecalciferol (VITAMIN D3) 1,000 units tablet Take 1,000 Units by mouth daily      doxycycline hyclate (VIBRAMYCIN) 100 mg capsule Take 1 capsule (100 mg total) by mouth every 12 (twelve) hours for 21 days 42 capsule 0    levothyroxine 50 mcg tablet take 1 tablet by mouth once daily 90 tablet 1    metoprolol succinate (TOPROL-XL) 50 mg 24 hr tablet Take 1 tablet (50 mg total) by mouth every morning 90 tablet 3    Omega-3 Fatty Acids (FISH OIL) 1,000 mg Take 1,000 mg by mouth daily      spironolactone (ALDACTONE) 25 mg tablet Take 12 5 mg by mouth every other day       No current facility-administered medications for this visit           Health Maintenance     Health Maintenance   Topic Date Due    Hepatitis C Screening  Never done    SLP PLAN OF CARE  Never done    COVID-19 Vaccine (4 - Booster for Pfizer series) 03/06/2022    BMI: Followup Plan  10/21/2022    Medicare Annual Wellness Visit (AWV)  04/21/2023    Fall Risk  04/29/2023    Depression Screening  05/24/2023    Breast Cancer Screening: Mammogram  06/07/2023    BMI: Adult  06/16/2023    DTaP,Tdap,and Td Vaccines (2 - Td or Tdap) 02/22/2026    Osteoporosis Screening  Completed    Pneumococcal Vaccine: 65+ Years  Completed  Influenza Vaccine  Completed    HIB Vaccine  Aged Out    Hepatitis B Vaccine  Aged Out    IPV Vaccine  Aged Out    Hepatitis A Vaccine  Aged Out    Meningococcal ACWY Vaccine  Aged Out    HPV Vaccine  Aged Out     Immunization History   Administered Date(s) Administered    COVID-19 PFIZER VACCINE 0 3 ML IM 01/25/2021, 02/14/2021, 11/06/2021    INFLUENZA 09/03/2014, 09/28/2015, 10/09/2016, 09/13/2017, 11/08/2018    Influenza Split 01/04/2021    Influenza Split High Dose Preservative Free IM 10/09/2016    Influenza, high dose seasonal 0 7 mL 10/11/2019, 09/29/2020, 10/21/2021    Influenza, seasonal, injectable 10/30/2003, 11/13/2006, 11/24/2008, 10/07/2011, 10/26/2012, 10/15/2013, 10/01/2014    Pneumococcal Conjugate 13-Valent 02/22/2016    Pneumococcal Polysaccharide PPV23 01/02/2018    Tdap 19/87/1697       Luca Vo MD

## 2022-06-16 NOTE — ASSESSMENT & PLAN NOTE
Cellulitis  Patient with new target lesion on right antecubital area after tick bite  Patient was given doxycycline 100 mg b i d  to take for 3 weeks for suspected Lyme disease  Patient will also obtain a Lyme titer at this time

## 2022-06-17 LAB — B BURGDOR IGG+IGM SER-ACNC: 7.3 AI

## 2022-06-19 LAB

## 2022-06-20 ENCOUNTER — TELEPHONE (OUTPATIENT)
Dept: FAMILY MEDICINE CLINIC | Facility: CLINIC | Age: 77
End: 2022-06-20

## 2022-06-20 NOTE — TELEPHONE ENCOUNTER
----- Message from Josie Red MD sent at 9/50/5788  6:10 AM EDT -----  Initial Lyme titer came back positive however Western blot test to confirm diagnosis came back negative however if she is tolerating medication I would recommend completing doxycycline as prescribed

## 2022-07-22 DIAGNOSIS — Z17.0 MALIGNANT NEOPLASM OF UPPER-INNER QUADRANT OF LEFT BREAST IN FEMALE, ESTROGEN RECEPTOR POSITIVE (HCC): ICD-10-CM

## 2022-07-22 DIAGNOSIS — C50.212 MALIGNANT NEOPLASM OF UPPER-INNER QUADRANT OF LEFT BREAST IN FEMALE, ESTROGEN RECEPTOR POSITIVE (HCC): ICD-10-CM

## 2022-07-22 RX ORDER — ANASTROZOLE 1 MG/1
TABLET ORAL
Qty: 90 TABLET | Refills: 1 | Status: SHIPPED | OUTPATIENT
Start: 2022-07-22 | End: 2022-09-07 | Stop reason: ALTCHOICE

## 2022-09-07 ENCOUNTER — OFFICE VISIT (OUTPATIENT)
Dept: HEMATOLOGY ONCOLOGY | Facility: CLINIC | Age: 77
End: 2022-09-07
Payer: COMMERCIAL

## 2022-09-07 VITALS
HEIGHT: 61 IN | DIASTOLIC BLOOD PRESSURE: 76 MMHG | TEMPERATURE: 98 F | RESPIRATION RATE: 16 BRPM | OXYGEN SATURATION: 96 % | SYSTOLIC BLOOD PRESSURE: 120 MMHG | BODY MASS INDEX: 25.68 KG/M2 | WEIGHT: 136 LBS | HEART RATE: 75 BPM

## 2022-09-07 DIAGNOSIS — Z17.0 MALIGNANT NEOPLASM OF UPPER-INNER QUADRANT OF LEFT BREAST IN FEMALE, ESTROGEN RECEPTOR POSITIVE (HCC): Primary | ICD-10-CM

## 2022-09-07 DIAGNOSIS — C50.212 MALIGNANT NEOPLASM OF UPPER-INNER QUADRANT OF LEFT BREAST IN FEMALE, ESTROGEN RECEPTOR POSITIVE (HCC): Primary | ICD-10-CM

## 2022-09-07 PROCEDURE — 99214 OFFICE O/P EST MOD 30 MIN: CPT | Performed by: INTERNAL MEDICINE

## 2022-09-07 PROCEDURE — 3078F DIAST BP <80 MM HG: CPT | Performed by: INTERNAL MEDICINE

## 2022-09-07 PROCEDURE — 1160F RVW MEDS BY RX/DR IN RCRD: CPT | Performed by: INTERNAL MEDICINE

## 2022-09-07 PROCEDURE — 3074F SYST BP LT 130 MM HG: CPT | Performed by: INTERNAL MEDICINE

## 2022-09-07 NOTE — PROGRESS NOTES
Hematology / Oncology Outpatient Follow Up Note    Bobbi Iqbal 64 y o  [place] :1945 [facility] [place][id number]         Date:  2022    Assessment / Plan:  A 63 year old postmenopausal woman with stage IIA left breast cancer, invasive lobular histology, grade 1, ER strongly positive, UT negative, HER-2 negative disease  She underwent mastectomy with sentinel lymph node biopsy, resulting in JENIFER  Her tumor was found to be low risk, based on the MammaPrint  Since 2017, she was on adjuvant hormonal therapy with anastrozole until 2022  Clinically, she has no evidence recurrent disease  Since she completed 5 years of adjuvant hormonal therapy, she may see me p r n  basis from now on  She is in agreement with my recommendations         Subjective:      HPI: [de-identified] 66year old postmenopausal woman who has history of hypertrophic cardiomyopathy  She recently noticed a lump in the left breast  She did not have breast pain or abnormal discharge from the nipple  She brought this to medical attention  She was found to have radiographic abnormality in her left breast  Therefore, she underwent left breast biopsy in 2016, which showed invasive lobular carcinoma, grade 1  This was ER 90-95% positive, UT negative, HER-2 negative disease  She subsequently underwent left mastectomy with sentinel lymph node biopsy by Dr Luz Marina De La Paz in 2017  Mastectomy specimen showed 3 9 cm of invasive lobular carcinoma, grade 1  There is no evidence of lymphovascular invasion  One sentinel lymph node was negative for metastatic disease  She had immediate reconstruction with tissue expander  She presented today with her daughter to discuss adjuvant treatment options  She feels well  She has no fever, chills or night sweats  She denied any pain, except minor skin tightness in the left reconstructed breast  She has no recent weight loss  Her performance status is normal  She has no family history of breast or ovarian cancer           Interval History:  A 20-year-old postmenopausal woman with stage IIA left breast cancer with invasive lobular histology, grade 1, ER strongly positive, TN negative, HER-2 negative disease  She underwent mastectomy with sentinel lymph node biopsy, resulting in JENIFER  Her tumor was found to be low risk, based on MammaPrint  Therefore, adjuvant chemotherapy was not indicated  She completed 5 years of adjuvant hormonal therapy with anastrozole in July 2022  She presents today for routine follow-up  She has not noticed any symptomatic change after she discontinued anastrozole  She feels well  She has no complaint of bone pain  Her weight is stable  She has no respiratory symptoms  Her performance status is normal         Objective:      Primary Diagnosis:     Left breast cancer, stage IIA (pT2, pN0,M0) invasive lobular histology, grade 1, ER strongly positive, TN negative, HER-2 negative disease  MammaPrint low risk  Diagnosed in February 2017       Cancer Staging:  No matching staging information was found for the patient         Previous Hematologic/ Oncologic Treatment:       Adjuvant hormonal therapy with anastrozole from March 2017 through July 2022      Current Hematologic/ Oncologic Treatment:       Observation      Disease Status:      JENIFER status post mastectomy with sentinel lymph node biopsy      Test Results:     Pathology:     3 9 cm of invasive lobular carcinoma, grade 1  No evidence of lymphovascular invasion  One sentinel lymph node was negative for metastatic disease  ER 90-95% positive, TN negative, HER-2 negative disease  MammaPrint low risk  Stage IIA (pT2, pN0,M0)     Radiology:     DEXA scan in April 2017 showed T score -2 5, consistent with osteoporosis     Mammography in June 2022  was benign   BI-RADS 2      Laboratory:     See below     Physical Exam:        General Appearance:    Alert, oriented          Eyes:    PERRL   Ears:    Normal external ear canals, both ears   Nose:   Nares normal, septum midline   Throat:   Mucosa moist  Pharynx without injection  Neck:   Supple         Lungs:     Clear to auscultation bilaterally   Chest Wall:    No tenderness or deformity    Heart:    Regular rate and rhythm         Abdomen:     Soft, non-tender, bowel sounds +, no organomegaly               Extremities:   Extremities no cyanosis or edema         Skin:   no rash or icterus  Lymph nodes:   Cervical, supraclavicular, and axillary nodes normal   Neurologic:   CNII-XII intact, normal strength, sensation and reflexes     Throughout             Breast exam:   status post left mastectomy with reconstruction  No palpable abnormality on reconstructed breast  Right breast exam is negative                ROS: Review of Systems   All other systems reviewed and are negative  Imaging: No results found  Labs:   Lab Results   Component Value Date    WBC 6 41 05/02/2022    HGB 13 0 05/02/2022    HCT 40 4 05/02/2022    MCV 87 05/02/2022     05/02/2022     Lab Results   Component Value Date     02/21/2015    K 4 7 05/02/2022     05/02/2022    CO2 27 05/02/2022    ANIONGAP 6 02/21/2015    BUN 29 (H) 05/02/2022    CREATININE 0 96 05/02/2022    GLUCOSE 105 02/21/2015    GLUF 100 (H) 05/02/2022    CALCIUM 9 7 05/02/2022    AST 31 05/02/2022    ALT 25 05/02/2022    ALKPHOS 78 05/02/2022    PROT 7 2 02/21/2015    BILITOT 0 47 02/21/2015    EGFR 56 05/02/2022         Current Medications: Reviewed  Allergies: Reviewed  PMH/FH/SH:  Reviewed      Vital Sign:    Body surface area is 1 6 meters squared      Wt Readings from Last 3 Encounters:   09/07/22 61 7 kg (136 lb)   06/16/22 60 6 kg (133 lb 8 oz)   06/15/22 61 7 kg (136 lb)        Temp Readings from Last 3 Encounters:   09/07/22 98 °F (36 7 °C) (Temporal)   06/16/22 97 6 °F (36 4 °C)   05/24/22 97 7 °F (36 5 °C) (Temporal)        BP Readings from Last 3 Encounters:   09/07/22 120/76   06/16/22 118/70   05/24/22 144/68 Pulse Readings from Last 3 Encounters:   09/07/22 75   06/16/22 67   05/24/22 65     @LASTSAO2(3)@

## 2022-10-24 ENCOUNTER — OFFICE VISIT (OUTPATIENT)
Dept: FAMILY MEDICINE CLINIC | Facility: CLINIC | Age: 77
End: 2022-10-24
Payer: COMMERCIAL

## 2022-10-24 VITALS
RESPIRATION RATE: 18 BRPM | DIASTOLIC BLOOD PRESSURE: 70 MMHG | HEIGHT: 61 IN | HEART RATE: 63 BPM | BODY MASS INDEX: 26.06 KG/M2 | WEIGHT: 138 LBS | SYSTOLIC BLOOD PRESSURE: 110 MMHG | OXYGEN SATURATION: 95 % | TEMPERATURE: 97.5 F

## 2022-10-24 DIAGNOSIS — I42.2 HYPERTROPHIC CARDIOMYOPATHY (HCC): ICD-10-CM

## 2022-10-24 DIAGNOSIS — E03.9 HYPOTHYROIDISM, UNSPECIFIED TYPE: ICD-10-CM

## 2022-10-24 DIAGNOSIS — I10 PRIMARY HYPERTENSION: ICD-10-CM

## 2022-10-24 DIAGNOSIS — K21.9 GASTROESOPHAGEAL REFLUX DISEASE WITHOUT ESOPHAGITIS: ICD-10-CM

## 2022-10-24 DIAGNOSIS — Z23 FLU VACCINE NEED: Primary | ICD-10-CM

## 2022-10-24 PROCEDURE — 99213 OFFICE O/P EST LOW 20 MIN: CPT | Performed by: FAMILY MEDICINE

## 2022-10-24 PROCEDURE — 90662 IIV NO PRSV INCREASED AG IM: CPT

## 2022-10-24 PROCEDURE — G0008 ADMIN INFLUENZA VIRUS VAC: HCPCS

## 2022-10-24 RX ORDER — LEVOTHYROXINE SODIUM 0.05 MG/1
TABLET ORAL
Qty: 90 TABLET | Refills: 1 | Status: SHIPPED | OUTPATIENT
Start: 2022-10-24

## 2022-10-24 NOTE — ASSESSMENT & PLAN NOTE
GERD   Patient possibly with GERD symptoms accounting for increased mucus in the morning  She was given recommendation to try over-the-counter famotidine 20 mg at bedtime to see if this would help with her morning symptoms

## 2022-10-24 NOTE — ASSESSMENT & PLAN NOTE
Cardiomyopathy  Patient will continue follow-up with her cardiologist to review chronic condition  Patient did have decrease in ejection fraction over the last 1-2 years

## 2022-10-24 NOTE — PROGRESS NOTES
FAMILY PRACTICE OFFICE VISIT       NAME: Ju Blackwell  AGE: 76 y o  SEX: female       : 1945        MRN: [id number]    DATE: 10/24/2022  TIME: 11:42 AM    Assessment and Plan     Problem List Items Addressed This Visit        Digestive    Gastroesophageal reflux disease without esophagitis     GERD  Patient possibly with GERD symptoms accounting for increased mucus in the morning  She was given recommendation to try over-the-counter famotidine 20 mg at bedtime to see if this would help with her morning symptoms  Endocrine    Hypothyroidism     Hypothyroidism  Latest TSH blood work is stable on current dose of levothyroxine            Cardiovascular and Mediastinum    Hypertension     Hypertension  Patient blood pressure is stable on current regimen of medications         Hypertrophic cardiomyopathy ([facility] [address] )     Cardiomyopathy  Patient will continue follow-up with her cardiologist to review chronic condition  Patient did have decrease in ejection fraction over the last 1-2 years  Other Visit Diagnoses     Flu vaccine need    -  Primary    Relevant Orders    influenza vaccine, high-dose, PF 0 7 mL (FLUZONE HIGH-DOSE) (Completed)              Chief Complaint     Chief Complaint   Patient presents with   • Follow-up     After tick bite        History of Present Illness     Patient the office to review chronic medical condition  She does see her cardiologist every 6 months  Her most recent echocardiogram showed ejection fraction 35% from 2022  She does occasionally feel increased shortness of breath with walking up an incline however symptoms improve when she slows down her pace  She denies any chest pain  Patient does complain of chronic intermittent paresthesias of her right anterior thigh area which has been present over the past year  Symptoms are more noticeable when she is standing for prolonged periods of time however seem to resolve when she sits down    She is able to walk 3 miles a day without any discomfort  Patient also describes excessive mucus in her throat area every morning when she awakens however after several minutes symptoms resolved and she is feeling well throughout the day  She denies any sore throat but does describe is as an annoying feeling every morning      Review of Systems   Review of Systems   Constitutional: Negative  HENT:        As per HPI   Eyes: Negative  Respiratory: Negative  Cardiovascular: Negative  Gastrointestinal: Negative  Genitourinary: Negative  Musculoskeletal: Negative  Skin: Negative  Neurological:        As per HPI   Psychiatric/Behavioral: Negative          Active Problem List     Patient Active Problem List   Diagnosis   • Malignant neoplasm of upper-inner 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 breast ([facility] [address] )   • Hypertension   • Hypertrophic cardiomyopathy ([facility] )   • Hypothyroidism   • Ischemic cardiomyopathy   • Osteopenia   • Plantar fasciitis   • Acquired trigger finger   • Carpal tunnel syndrome   • Numbness of hand   • Acute pain of right knee   • NSVT (nonsustained ventricular tachycardia)   • Syncope   • Exertional angina (HCC)   • Cellulitis   • Gastroesophageal reflux disease without esophagitis       Past Medical History:  Past Medical History:   Diagnosis Date   • Hypertrophic cardiomyopathy ([facility] )    • Hypothyroidism    • Ischemic cardiomyopathy    • Malignant neoplasm of left female breast ([facility] ) 02/09/2017    stage IIA   • Osteopenia    • Trigger finger of all digits of right hand        Past Surgical History:  Past Surgical History:   Procedure Laterality Date   • BREAST BIOPSY Right     years ago benign   • BREAST RECONSTRUCTION Left 2/9/2017    Procedure: BREAST RECONSTRUCTION WITH TISSUE EXPANDERS AND ALLODERM ;  Surgeon: Susannah Grove MD;  [facility];  Service:    • COLONOSCOPY  2009   • KNEE ARTHROSCOPY Bilateral    • MASTECTOMY Left 02/09/2017   • TN BIOPSY/EXCISION, LYMPH NODE(S) Left 2/9/2017 Procedure: LYMPHOSCINTIGRAPHY; INTRAOPERATIVE LYMPHATIC MAPPING; SENTINEL LYMPH NODE BIOPSY (INJECT AT 0700 BY DR ALBA); Surgeon: Kinjal Brooke MD;  [facility];  Service: Surgical Oncology   • OK INSJ/RPLCMT BREAST IMPLANT SEP DAY MASTECTOMY Left 5/18/2017    Procedure: BREAST EXPANDER/IMPLANT EXCHANGE; CAPSULECTOMY ;  Surgeon: Jacek Orosco MD;  [facility];  Service: Plastics   • OK MASTECTOMY, SIMPLE, COMPLETE Left 2/9/2017    Procedure: BREAST MASTECTOMY; FROZEN SECTION ;  Surgeon: Kinjal Brooke MD;  [facility];  Service: Surgical Oncology   • REFRACTIVE SURGERY Bilateral    • US GUIDANCE BREAST BIOPSY LEFT EACH ADDITIONAL Left 12/1/2016   • US GUIDED BREAST BIOPSY LEFT COMPLETE Left 12/1/2016       Family History:  Family History   Problem Relation Age of Onset   • Heart disease Mother    • Heart disease Father    • Brain cancer Sister    • No Known Problems Daughter    • No Known Problems Daughter    • No Known Problems Maternal Grandmother    • No Known Problems Maternal Grandfather    • No Known Problems Paternal Grandmother    • No Known Problems Paternal Grandfather    • Heart disease Brother    • No Known Problems Brother    • No Known Problems Paternal Aunt    • No Known Problems Paternal Aunt    • No Known Problems Paternal Aunt    • No Known Problems Paternal Aunt    • No Known Problems Paternal Aunt        Social History:  Social History     Socioeconomic History   • Marital status:       Spouse name: Not on file   • Number of children: Not on file   • Years of education: Not on file   • Highest education level: Not on file   Occupational History   • Not on file   Tobacco Use   • Smoking status: Never Smoker   • Smokeless tobacco: Never Used   Vaping Use   • Vaping Use: Never used   Substance and Sexual Activity   • Alcohol use: No     Alcohol/week: 1 0 standard drink     Types: 1 Glasses of wine per week   • Drug use: No   • Sexual activity: Not Currently   Other Topics Concern   • Not on file   Social History Narrative     since 2018  Was  for 50 years  Lives alone  Has 2 daughters  One in [place] who is a nephrologist   The other in Hospitals in [place]  Enjoys gardening and being outside  Social Determinants of Health     Financial Resource Strain: Not on file   Food Insecurity: Not on file   Transportation Needs: Not on file   Physical Activity: Not on file   Stress: Not on file   Social Connections: Not on file   Intimate Partner Violence: Not on file   Housing Stability: Not on file       Objective     Vitals:    10/24/22 1050   BP: 110/70   Pulse: 63   Resp: 18   Temp: 97 5 °F (36 4 °C)   SpO2: 95%     Wt Readings from Last 3 Encounters:   10/24/22 62 6 kg (138 lb)   09/07/22 61 7 kg (136 lb)   06/16/22 60 6 kg (133 lb 8 oz)       Physical Exam  Constitutional:       General: She is not in acute distress  Appearance: Normal appearance  She is not ill-appearing  HENT:      Head: Normocephalic and atraumatic  Nose: No congestion or rhinorrhea  Mouth/Throat:      Mouth: Mucous membranes are moist       Pharynx: No oropharyngeal exudate or posterior oropharyngeal erythema  Eyes:      General:         Right eye: No discharge  Left eye: No discharge  Extraocular Movements: Extraocular movements intact  Conjunctiva/sclera: Conjunctivae normal       Pupils: Pupils are equal, round, and reactive to light  Neck:      Vascular: No carotid bruit  Cardiovascular:      Rate and Rhythm: Normal rate and regular rhythm  Heart sounds: Normal heart sounds  No murmur heard  Pulmonary:      Effort: Pulmonary effort is normal       Breath sounds: Normal breath sounds  No wheezing, rhonchi or rales  Musculoskeletal:      Right lower leg: No edema  Left lower leg: No edema  Neurological:      General: No focal deficit present  Mental Status: She is alert and oriented to person, place, and time        Cranial Nerves: No cranial nerve deficit  Psychiatric:         Mood and Affect: Mood normal          Behavior: Behavior normal          Thought Content: Thought content normal          Judgment: Judgment normal          Pertinent Laboratory/Diagnostic Studies:  Lab Results   Component Value Date    GLUCOSE 105 02/21/2015    BUN 29 (H) 05/02/2022    CREATININE 0 96 05/02/2022    CALCIUM 9 7 05/02/2022     02/21/2015    K 4 7 05/02/2022    CO2 27 05/02/2022     05/02/2022     Lab Results   Component Value Date    ALT 25 05/02/2022    AST 31 05/02/2022    ALKPHOS 78 05/02/2022    BILITOT 0 47 02/21/2015       Lab Results   Component Value Date    WBC 6 41 05/02/2022    HGB 13 0 05/02/2022    HCT 40 4 05/02/2022    MCV 87 05/02/2022     05/02/2022       No results found for: TSH    Lab Results   Component Value Date    CHOL 178 02/21/2015     Lab Results   Component Value Date    TRIG 153 (H) 05/02/2022     Lab Results   Component Value Date    HDL 45 (L) 05/02/2022     Lab Results   Component Value Date    LDLCALC 127 (H) 05/02/2022     Lab Results   Component Value Date    HGBA1C 5 8 (H) 03/05/2021       Results for orders placed or performed in visit on 06/16/22   Lyme Antibody Profile with reflex to WB   Result Value Ref Range    Lyme Total Antibodies 7 3 (H) 0 2 - 8 0 AI   Lyme Western Blot, Serum   Result Value Ref Range    Lyme 18 kD IgG Absent     Lyme 23 kD IgG Absent     Lyme 28 kD IgG Present (A)     Lyme 30 kD IgG Absent     Lyme 39 kD IgG Absent     Lyme 41 kD IgG Present (A)     Lyme 45 kD IgG Absent     Lyme 58 kD IgG Present (A)     Lyme 66 kD IgG Absent     Lyme 93 kD IgG Absent     Lyme 23 kD IgM Absent     Lyme 39 kD IgM Absent     Lyme 41 kD IgM Absent     Lyme IgG WB Interp  Negative     Lyme IgM WB Interp   Negative        Orders Placed This Encounter   Procedures   • influenza vaccine, high-dose, PF 0 7 mL (FLUZONE HIGH-DOSE)       ALLERGIES:  Allergies   Allergen Reactions   • Seasonal Ic [Cholestatin] Sneezing       Current Medications     Current Outpatient Medications   Medication Sig Dispense Refill   • aspirin (ECOTRIN LOW STRENGTH) 81 mg EC tablet Take 81 mg by mouth daily     • calcium carbonate-vitamin D (OSCAL-D) 500 mg-200 units per tablet take 1 tablet by mouth once daily WITH BREAKFAST  30 tablet 0   • candesartan (ATACAND) 4 mg tablet   0   • cholecalciferol (VITAMIN D3) 1,000 units tablet Take 1,000 Units by mouth daily     • levothyroxine 50 mcg tablet take 1 tablet by mouth once daily 90 tablet 1   • metoprolol succinate (TOPROL-XL) 50 mg 24 hr tablet Take 1 tablet (50 mg total) by mouth every morning 90 tablet 3   • Omega-3 Fatty Acids (FISH OIL) 1,000 mg Take 1,000 mg by mouth daily     • spironolactone (ALDACTONE) 25 mg tablet Take 12 5 mg by mouth every other day       No current facility-administered medications for this visit           Health Maintenance     Health Maintenance   Topic Date Due   • Hepatitis C Screening  Never done   • [facility] PLAN OF CARE  Never done   • COVID-19 Vaccine (4 - Booster for [place] series) 03/06/2022   • BMI: Followup Plan  10/21/2022   • Medicare Annual Wellness Visit (AWV)  04/21/2023   • Fall Risk  04/29/2023   • Urinary Incontinence Screening  04/29/2023   • Depression Screening  05/24/2023   • BMI: Adult  10/24/2023   • Breast Cancer Screening: Mammogram  06/15/2024   • Osteoporosis Screening  Completed   • Pneumococcal Vaccine: 65+ Years  Completed   • Influenza Vaccine  Completed   • HIB Vaccine  Aged Out   • Hepatitis B Vaccine  Aged Out   • IPV Vaccine  Aged Out   • Hepatitis A Vaccine  Aged Out   • Meningococcal ACWY Vaccine  Aged Out   • HPV Vaccine  Aged Out     Immunization History   Administered Date(s) Administered   • COVID-19 PFIZER VACCINE 0 3 ML IM 01/25/2021, 02/14/2021, 11/06/2021   • INFLUENZA 09/03/2014, 09/28/2015, 10/09/2016, 09/13/2017, 11/08/2018   • Influenza Split 01/04/2021   • Influenza Split High Dose Preservative Free IM 10/09/2016   • Influenza, high dose seasonal 0 7 mL 10/11/2019, 09/29/2020, 10/21/2021, 10/24/2022   • Influenza, seasonal, injectable 10/30/2003, 11/13/2006, 11/24/2008, 10/07/2011, 10/26/2012, 10/15/2013, 10/01/2014   • Pneumococcal Conjugate 13-Valent 02/22/2016   • Pneumococcal Polysaccharide PPV23 01/02/2018   • Tdap 45/29/2515       Joanna Albarran

## 2022-11-15 DIAGNOSIS — I47.29 NSVT (NONSUSTAINED VENTRICULAR TACHYCARDIA): ICD-10-CM

## 2022-11-15 RX ORDER — METOPROLOL SUCCINATE 50 MG/1
TABLET, EXTENDED RELEASE ORAL
Qty: 90 TABLET | Refills: 3 | Status: SHIPPED | OUTPATIENT
Start: 2022-11-15

## 2023-01-26 ENCOUNTER — TELEPHONE (OUTPATIENT)
Dept: HEMATOLOGY ONCOLOGY | Facility: CLINIC | Age: 78
End: 2023-01-26

## 2023-01-26 NOTE — TELEPHONE ENCOUNTER
Confirmed with patient appt  On 5/8/23 with Calderon Metcalf which has been rescheduled from 5/4/23

## 2023-02-16 ENCOUNTER — OFFICE VISIT (OUTPATIENT)
Dept: CARDIOLOGY CLINIC | Facility: CLINIC | Age: 78
End: 2023-02-16

## 2023-02-16 VITALS
SYSTOLIC BLOOD PRESSURE: 112 MMHG | HEART RATE: 62 BPM | BODY MASS INDEX: 25.83 KG/M2 | OXYGEN SATURATION: 98 % | WEIGHT: 136.7 LBS | DIASTOLIC BLOOD PRESSURE: 62 MMHG

## 2023-02-16 DIAGNOSIS — I42.0 DILATED CARDIOMYOPATHY (HCC): Primary | ICD-10-CM

## 2023-02-16 DIAGNOSIS — I50.22 CHRONIC SYSTOLIC HEART FAILURE (HCC): ICD-10-CM

## 2023-02-16 DIAGNOSIS — I34.0 NONRHEUMATIC MITRAL VALVE REGURGITATION: ICD-10-CM

## 2023-02-16 DIAGNOSIS — I47.29 NSVT (NONSUSTAINED VENTRICULAR TACHYCARDIA): ICD-10-CM

## 2023-02-16 DIAGNOSIS — E78.2 MIXED HYPERLIPIDEMIA: ICD-10-CM

## 2023-02-16 RX ORDER — SACUBITRIL AND VALSARTAN 24; 26 MG/1; MG/1
1 TABLET, FILM COATED ORAL 2 TIMES DAILY
Qty: 60 TABLET | Refills: 3 | Status: SHIPPED | OUTPATIENT
Start: 2023-02-16

## 2023-02-16 NOTE — PROGRESS NOTES
Cardiology Clinic Note    Sonya Serrano 76 y o  female   MRN: [id number]  Encounter: [id number]        Assessment / Plan:    #  Non-ischemic Cardiomyopathy  Etiology: The cardiomyopathy is non-ischemic by [facility]  There is some mention of non-obstructive HCM in some of the notes, although in talking with her cardiologist, this may be more of an upper septal hypertrophy type picture which can be seen in older age  Has hx of carpal tunnel  EKG is not low voltage  Would get cardiac MRI to further clarify phenotype  would check iron studies (not ordered today)  Family members should have clinical screening  Depending on MRI results, consider genetics eval if appropriate    #  Chronic Systolic Heart Failure  Class and Stage:  NYHA class: II-III  ACC/AHA stage: C    Volume Management:  Volume assessment:   Euvolemic on exam   Diuretic:  not currently requiring a loop diuretic  K+ supplementation and lab monitoring: Reports getting BMP Monday with her primary cardiologists office  Neurohormonal Blockade / GDMT:  Beta blocker:  toprol XL 50mg QD (she takes 25mg BID)  ACEi/ARB/ARNI:  entresto 24-26 (recently switched from ARB)  She is actually only taking QD and I recommend BID dosing  MRA:  david 12 5 QOD (eventually consider moving to QD)  SGLT2i:  Consider adding if tolerates entresto    Sudden cardiac death risk reduction:  S/p dual chamber ICD 2021  (had syncope and NSVT on tele)  Last interrogation  AP 90%,  <0 1%  Non-specific IVCB  Dr Suma Medina has reviewed EKG  Not recommending CRT at this time  Other heart failure considerations:  Mitral clip, IV iron, RICH eval, clinical trial, cardiac rehab, palliative care, advanced therapies:  not at this time    # NSVT  Had syncope 2021  Found to have NSVT  ICD placed 2021  On BB      # Hx PACs, PVCs  Continue BB    # Aortic valve disease  Aortic sclerosis, mild AI  Serial exam and echo follow up    # Moderate MR  Serial exam and echo follow up    # HTN  See GDMT above    # HLD  Lipid panel 2022:    HDL 45    Elevated ASCVD risk score  Consider starting statin (and could stop aspirin)  Defer to primary cardiologist     # Hypothyroidism  On synthroid  Most recent TSH has been normal     # Hx of breast cancer  Mastectomy 2017  Tx with anastrazole x 5 years  Today's Plan Summary:  See above assessment/plan for full details of today's plan  Briefly,     Taking entresto only qhs   rec taking the entresto BID  Cardiac MRI (she wants to talk to Dr Gregg Yarbrough first)/    CC - Dr Karyn Tran For Visit / Chief Complaint:  Referred by Dr Gurmeet Dominguez (cardiology - [facility]) for a new patient visit for heart failure consultation for declining LVEF     HPI:   Farhan Escobar is a 76 y o   female with history as noted in the problem list and further detailed in the above assessment and plan  Asymetric upper septal hypertrophy on echo  Longstanding hx of systolic dysfunction  Now with declining EF on serial echo  40% --> 35% --> 30%  Dr Gregg Yarbrough recently changed ARB to [facility] and requested heart failure consult for second opinion  Patient overall feels pretty well  She walks 3 miles almost everyday  If she walks fast, can get some SOB -- otherwise feels good on these walks  No edema  No orthopnea (except "when I have a cold")  No PND  No chest pain  Rare palpitations, "if I get excited about something"  No syncope since ICD placement  No ICD shocks  Non smoker  Rare ETOH  Likes to do gardening when the weather is nicer  Lives by herself (  5 years ago)  Has two daughters  One daughter is a nephrologist (Director of Kidney transplant at [address] [place] [address]), unable to come today because of being sick  Cardiac Imaging personally reviewed:  EKG Nonspecific IVCB, QRS around 142ms      Holter or event monitor    Echo 23  Mild left ventricular hypertrophy with moderately severe LV dysfunction, EF ~30% with moderate diastolic dysfunction  Mild left atrial enlargement  Mild aortic sclerosis with mild aortic insufficiency  Mildly thickened mitral valve with early annular calcification and moderate mitral regurgitation  Compared to prior report of January 27, 2022, LV EF is less and mitral regurgitation has increased  2022  Mild-mod asymmetric hypertrophy  EF 35%  Mild MR  Mild aortic sclerosis, trace AI  RAMONA    Cardiac MRI    Stress testing Stress echo - 2/2021  No ischemia   Coronary CTA or [facility] Cardiac catheterization March 5, 2021: Normal coronaries  [address]    CPET              Patient Active Problem List    Diagnosis Date Noted   • Gastroesophageal reflux disease without esophagitis 10/24/2022   • Cellulitis 06/16/2022   • Exertional angina ([facility] [address] ) 04/21/2022   • Syncope 03/04/2021   • NSVT (nonsustained ventricular tachycardia) 10/29/2020   • Acute pain of right knee 10/11/2019   • Acquired trigger finger 06/01/2018   • Carpal tunnel syndrome 06/01/2018   • Numbness of hand 06/01/2018   • Malignant neoplasm of upper-inner 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 breast ([facility] [address] ) 01/26/2017   • Hypertrophic cardiomyopathy ([facility] [address] ) 11/09/2016   • Hypothyroidism 03/01/2016   • Plantar fasciitis 02/22/2016   • Hypertension 04/18/2014   • Ischemic cardiomyopathy 12/23/2013   • Osteopenia 06/18/2013       Past Medical History:   Diagnosis Date   • Hypertrophic cardiomyopathy ([facility] [address] )    • Hypothyroidism    • Ischemic cardiomyopathy    • Malignant neoplasm of left female breast ([facility] [address] ) 02/09/2017    stage IIA   • Osteopenia    • Trigger finger of all digits of right hand        Review of Systems   Constitutional: Negative for chills and fever  HENT: Negative for nosebleeds  Gastrointestinal: Negative for abdominal distention, abdominal pain and blood in stool  Neurological: Negative for dizziness and syncope  Psychiatric/Behavioral: Negative for confusion     14-point ROS completed and negative except as stated above and/or in the HPI  Allergies   Allergen Reactions   • Seasonal Ic [Cholestatin] Sneezing       Current Outpatient Medications   Medication Instructions   • aspirin (ECOTRIN LOW STRENGTH) 81 mg, Oral, Daily   • calcium carbonate-vitamin D (OSCAL-D) 500 mg-200 units per tablet take 1 tablet by mouth once daily WITH BREAKFAST  • cholecalciferol (VITAMIN D3) 1,000 Units, Oral, Daily   • fish oil 1,000 mg, Oral, Daily   • levothyroxine 50 mcg tablet take 1 tablet by mouth once daily   • metoprolol succinate (TOPROL-XL) 50 mg 24 hr tablet take 1 tablet by mouth every morning   • sacubitril-valsartan (Entresto) 24-26 MG TABS 1 tablet, Oral, 2 times daily   • spironolactone (ALDACTONE) 12 5 mg, Oral, Every other day       Social History     Socioeconomic History   • Marital status:      Spouse name: Not on file   • Number of children: Not on file   • Years of education: Not on file   • Highest education level: Not on file   Occupational History   • Not on file   Tobacco Use   • Smoking status: Never   • Smokeless tobacco: Never   Vaping Use   • Vaping Use: Never used   Substance and Sexual Activity   • Alcohol use: No     Alcohol/week: 1 0 standard drink     Types: 1 Glasses of wine per week   • Drug use: No   • Sexual activity: Not Currently   Other Topics Concern   • Not on file   Social History Narrative     since 2018  Was  for 50 years  Lives alone  Has 2 daughters  One in [place] who is a nephrologist   The other in [facility]  Enjoys gardening and being outside         Social Determinants of Health     Financial Resource Strain: Not on file   Food Insecurity: Not on file   Transportation Needs: Not on file   Physical Activity: Not on file   Stress: Not on file   Social Connections: Not on file   Intimate Partner Violence: Not on file   Housing Stability: Not on file       Family History   Problem Relation Age of Onset   • Heart disease Mother         had angina   • Heart disease Father • Brain cancer Sister    • Heart disease Brother         had CABG   • No Known Problems Brother    • No Known Problems Maternal Grandmother    • No Known Problems Maternal Grandfather    • No Known Problems Paternal Grandmother    • No Known Problems Paternal Grandfather    • No Known Problems Daughter    • No Known Problems Daughter    • No Known Problems Paternal Aunt    • No Known Problems Paternal Aunt    • No Known Problems Paternal Aunt    • No Known Problems Paternal Aunt    • No Known Problems Paternal Aunt        Physical Exam:  Blood pressure 112/62, pulse 62, weight 62 kg (136 lb 11 2 oz), SpO2 98 %  Body mass index is 25 83 kg/m²  Wt Readings from Last 3 Encounters:   02/16/23 62 kg (136 lb 11 2 oz)   10/24/22 62 6 kg (138 lb)   09/07/22 61 7 kg (136 lb)     Physical Exam  Vitals reviewed  Constitutional:       General: She is not in acute distress  Appearance: She is not toxic-appearing  HENT:      Head: Normocephalic and atraumatic  Eyes:      General: No scleral icterus  Conjunctiva/sclera: Conjunctivae normal    Neck:      Vascular: No carotid bruit  Comments: JVP is not elevated  Cardiovascular:      Rate and Rhythm: Normal rate and regular rhythm  Heart sounds: Murmur (very soft HSM at apex) heard  No friction rub  No gallop  Pulmonary:      Breath sounds: Normal breath sounds  No wheezing, rhonchi or rales  Abdominal:      General: There is no distension  Palpations: Abdomen is soft  Tenderness: There is no abdominal tenderness  There is no guarding  Musculoskeletal:      Right lower leg: No edema  Left lower leg: No edema  Skin:     Coloration: Skin is not jaundiced or pale  Findings: No erythema  Neurological:      Mental Status: She is alert  Mental status is at baseline     Psychiatric:         Mood and Affect: Mood normal          Behavior: Behavior normal          Labs & Results:  Lab Results   Component Value Date    SODIUM 135 (L) 05/02/2022    K 4 7 05/02/2022     05/02/2022    CO2 27 05/02/2022    BUN 29 (H) 05/02/2022    CREATININE 0 96 05/02/2022    GLUC 129 01/06/2022    CALCIUM 9 7 05/02/2022       Thank you for the opportunity to participate in the care of this patient      Kelvin Orozco MD, [facility]  Advanced Heart Failure and Transplant Cardiologist  Director of [facility] Tejeda

## 2023-02-16 NOTE — PATIENT INSTRUCTIONS
Increase the Entresto to 1 pill twice per day  Talk to your cardiologist about what he thinks about getting the cardiac MRI

## 2023-02-16 NOTE — LETTER
February 16, 2023     Arnold Stubbs, [address]  [address] [address]    Patient: Rajwinder Cordero   YOB: 1945   Date of Visit: 2/16/2023       Dear Dr Judith Au: Thank you for referring Dexter Matos to me for evaluation  Below are my notes for this consultation  If you have questions, please do not hesitate to call me  I look forward to following your patient along with you  Sincerely,        Slava Love MD        CC: No Recipients  Slava Love MD  2/16/2023  1:55 PM  Sign when Signing Visit  Cardiology Clinic Note    Rajwinder Cordero 76 y o  female   MRN: [id number]  Encounter: [id number]        Assessment / Plan:    #  Non-ischemic Cardiomyopathy  • Etiology:  o The cardiomyopathy is non-ischemic by [facility]  o There is some mention of non-obstructive HCM in some of the notes, although in talking with her cardiologist, this may be more of an upper septal hypertrophy type picture which can be seen in older age  o Has hx of carpal tunnel  EKG is not low voltage  o Would get cardiac MRI to further clarify phenotype  o would check iron studies (not ordered today)  o Family members should have clinical screening  o Depending on MRI results, consider genetics eval if appropriate    #  Chronic Systolic Heart Failure  • Class and Stage:  o NYHA class: II-III  o ACC/AHA stage: C    • Volume Management:  o Volume assessment:   Euvolemic on exam   o Diuretic:  not currently requiring a loop diuretic   o K+ supplementation and lab monitoring: Reports getting BMP Monday with her primary cardiologists office  • Neurohormonal Blockade / GDMT:  o Beta blocker:  toprol XL 50mg QD (she takes 25mg BID)  o ACEi/ARB/ARNI:  entresto 24-26 (recently switched from ARB)  She is actually only taking QD and I recommend BID dosing    o MRA:  david 12 5 QOD (eventually consider moving to QD)  o SGLT2i:  Consider adding if tolerates entresto    • Sudden cardiac death risk reduction:  o S/p dual chamber ICD 2021  (had syncope and NSVT on tele)  o Last interrogation  AP 90%,  <0 1%  o Non-specific IVCB  Dr Fransisco Crowe has reviewed EKG  Not recommending CRT at this time  • Other heart failure considerations:  o Mitral clip, IV iron, RICH eval, clinical trial, cardiac rehab, palliative care, advanced therapies:  not at this time    # NSVT  • Had syncope 2021  Found to have NSVT  ICD placed 2021  • On BB  # Hx PACs, PVCs  · Continue BB    # Aortic valve disease  · Aortic sclerosis, mild AI  · Serial exam and echo follow up    # Moderate MR  · Serial exam and echo follow up    # HTN  · See GDMT above    # HLD  · Lipid panel 5/2022:    HDL 45    · Elevated ASCVD risk score  · Consider starting statin (and could stop aspirin)  Defer to primary cardiologist     # Hypothyroidism  • On synthroid  • Most recent TSH has been normal     # Hx of breast cancer  • Mastectomy 2017  Tx with anastrazole x 5 years  Today's Plan Summary:  See above assessment/plan for full details of today's plan  Briefly,     Taking entresto only qhs   rec taking the entresto BID  Cardiac MRI (she wants to talk to Dr Mani Irwin first)/    CC - Dr Shantell Mandel For Visit / Chief Complaint:  Referred by Dr Hanna Aase (cardiology - [facility], now [facility]) for a new patient visit for heart failure consultation for declining LVEF     HPI:   Maxim Erwin is a 76 y o   female with history as noted in the problem list and further detailed in the above assessment and plan  Asymetric upper septal hypertrophy on echo  Longstanding hx of systolic dysfunction  Now with declining EF on serial echo  40% --> 35% --> 30%  Dr Mani Irwin recently changed AR[facility] to [facility] and requested heart failure consult for second opinion  Patient overall feels pretty well  She walks 3 miles almost everyday  If she walks fast, can get some SOB -- otherwise feels good on these walks    No edema  No orthopnea (except "when I have a cold")  No PND  No chest pain  Rare palpitations, "if I get excited about something"  No syncope since ICD placement  No ICD shocks  Non smoker  Rare ETOH  Likes to do gardening when the weather is nicer  Lives by herself (  5 years ago)  Has two daughters  One daughter is a nephrologist (Director of Kidney transplant at [address]), unable to come today because of being sick  Cardiac Imaging personally reviewed:  EKG Nonspecific IVCB, QRS around 142ms  Holter or event monitor    Echo 23  Mild left ventricular hypertrophy with moderately severe LV dysfunction, EF ~30% with moderate diastolic dysfunction  Mild left atrial enlargement  Mild aortic sclerosis with mild aortic insufficiency  Mildly thickened mitral valve with early annular calcification and moderate mitral regurgitation  Compared to prior report of 2022, LV EF is less and mitral regurgitation has increased    Mild-mod asymmetric hypertrophy  EF 35%  Mild MR  Mild aortic sclerosis, trace AI  RAMONA    Cardiac MRI    Stress testing Stress echo - 2021  No ischemia   Coronary CTA or [facility] Cardiac catheterization 2021: Normal coronaries      [address]    [facility]              Patient Active Problem List    Diagnosis Date Noted   • Gastroesophageal reflux disease without esophagitis 10/24/2022   • Cellulitis 2022   • Exertional angina ([facility] ) 2022   • Syncope 2021   • NSVT (nonsustained ventricular tachycardia) 10/29/2020   • Acute pain of right knee 10/11/2019   • Acquired trigger finger 2018   • Carpal tunnel syndrome 2018   • Numbness of hand 2018   • Malignant neoplasm of upper-inner 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 breast ([facility] ) 2017   • Hypertrophic cardiomyopathy ([facility] ) 2016   • Hypothyroidism 2016   • Plantar fasciitis 2016   • Hypertension 2014   • Ischemic cardiomyopathy 2013   • Osteopenia 06/18/2013       Past Medical History:   Diagnosis Date   • [facility] )    • Hypothyroidism    • Ischemic cardiomyopathy    • Malignant neoplasm of left female breast ([facility] ) 02/09/2017    stage IIA   • Osteopenia    • Trigger finger of all digits of right hand        Review of Systems   Constitutional: Negative for chills and fever  HENT: Negative for nosebleeds  Gastrointestinal: Negative for abdominal distention, abdominal pain and blood in stool  Neurological: Negative for dizziness and syncope  Psychiatric/Behavioral: Negative for confusion  14-point ROS completed and negative except as stated above and/or in the HPI  Allergies   Allergen Reactions   • Seasonal Ic [Cholestatin] Sneezing       Current Outpatient Medications   Medication Instructions   • aspirin (ECOTRIN LOW STRENGTH) 81 mg, Oral, Daily   • calcium carbonate-vitamin D (OSCAL-D) 500 mg-200 units per tablet take 1 tablet by mouth once daily WITH BREAKFAST  • cholecalciferol (VITAMIN D3) 1,000 Units, Oral, Daily   • fish oil 1,000 mg, Oral, Daily   • levothyroxine 50 mcg tablet take 1 tablet by mouth once daily   • metoprolol succinate (TOPROL-XL) 50 mg 24 hr tablet take 1 tablet by mouth every morning   • sacubitril-valsartan (Entresto) 24-26 MG TABS 1 tablet, Oral, 2 times daily   • spironolactone (ALDACTONE) 12 5 mg, Oral, Every other day       Social History     Socioeconomic History   • Marital status:       Spouse name: Not on file   • Number of children: Not on file   • Years of education: Not on file   • Highest education level: Not on file   Occupational History   • Not on file   Tobacco Use   • Smoking status: Never   • Smokeless tobacco: Never   Vaping Use   • Vaping Use: Never used   Substance and Sexual Activity   • Alcohol use: No     Alcohol/week: 1 0 standard drink     Types: 1 Glasses of wine per week   • Drug use: No   • Sexual activity: Not Currently   Other Topics Concern   • Not on file   Social History Narrative     since 2018  Was  for 50 years  Lives alone  Has 2 daughters  One in [place] who is a nephrologist   The other in [facility]  Enjoys gardening and being outside  Social Determinants of Health     Financial Resource Strain: Not on file   Food Insecurity: Not on file   Transportation Needs: Not on file   Physical Activity: Not on file   Stress: Not on file   Social Connections: Not on file   Intimate Partner Violence: Not on file   Housing Stability: Not on file       Family History   Problem Relation Age of Onset   • Heart disease Mother         had angina   • Heart disease Father    • Brain cancer Sister    • Heart disease Brother         had CABG   • No Known Problems Brother    • No Known Problems Maternal Grandmother    • No Known Problems Maternal Grandfather    • No Known Problems Paternal Grandmother    • No Known Problems Paternal Grandfather    • No Known Problems Daughter    • No Known Problems Daughter    • No Known Problems Paternal Aunt    • No Known Problems Paternal Aunt    • No Known Problems Paternal Aunt    • No Known Problems Paternal Aunt    • No Known Problems Paternal Aunt        Physical Exam:  Blood pressure 112/62, pulse 62, weight 62 kg (136 lb 11 2 oz), SpO2 98 %  Body mass index is 25 83 kg/m²  Wt Readings from Last 3 Encounters:   02/16/23 62 kg (136 lb 11 2 oz)   10/24/22 62 6 kg (138 lb)   09/07/22 61 7 kg (136 lb)     Physical Exam  Vitals reviewed  Constitutional:       General: She is not in acute distress  Appearance: She is not toxic-appearing  HENT:      Head: Normocephalic and atraumatic  Eyes:      General: No scleral icterus  Conjunctiva/sclera: Conjunctivae normal    Neck:      Vascular: No carotid bruit  Comments: JVP is not elevated  Cardiovascular:      Rate and Rhythm: Normal rate and regular rhythm  Heart sounds: Murmur (very soft HSM at apex) heard  No friction rub  No gallop  Pulmonary:      Breath sounds: Normal breath sounds  No wheezing, rhonchi or rales  Abdominal:      General: There is no distension  Palpations: Abdomen is soft  Tenderness: There is no abdominal tenderness  There is no guarding  Musculoskeletal:      Right lower leg: No edema  Left lower leg: No edema  Skin:     Coloration: Skin is not jaundiced or pale  Findings: No erythema  Neurological:      Mental Status: She is alert  Mental status is at baseline  Psychiatric:         Mood and Affect: Mood normal          Behavior: Behavior normal          Labs & Results:  Lab Results   Component Value Date    SODIUM 135 (L) 05/02/2022    K 4 7 05/02/2022     05/02/2022    CO2 27 05/02/2022    BUN 29 (H) 05/02/2022    CREATININE 0 96 05/02/2022    GLUC 129 01/06/2022    CALCIUM 9 7 05/02/2022       Thank you for the opportunity to participate in the care of this patient      Myra Rodríguez MD, [facility]  Advanced Heart Failure and Transplant Cardiologist  Director of [facility] Tejeda

## 2023-02-20 ENCOUNTER — TRANSCRIBE ORDERS (OUTPATIENT)
Dept: LAB | Facility: CLINIC | Age: 78
End: 2023-02-20

## 2023-02-20 ENCOUNTER — APPOINTMENT (OUTPATIENT)
Dept: LAB | Facility: CLINIC | Age: 78
End: 2023-02-20

## 2023-02-20 DIAGNOSIS — I42.8 OBSCURE CARDIOMYOPATHY OF AFRICA (HCC): ICD-10-CM

## 2023-02-20 DIAGNOSIS — I42.8 OBSCURE CARDIOMYOPATHY OF AFRICA (HCC): Primary | ICD-10-CM

## 2023-02-20 LAB
ANION GAP SERPL CALCULATED.3IONS-SCNC: 6 MMOL/L (ref 4–13)
BASOPHILS # BLD AUTO: 0.04 THOUSANDS/ÂΜL (ref 0–0.1)
BASOPHILS NFR BLD AUTO: 1 % (ref 0–1)
BUN SERPL-MCNC: 32 MG/DL (ref 5–25)
CALCIUM SERPL-MCNC: 9.4 MG/DL (ref 8.3–10.1)
CHLORIDE SERPL-SCNC: 109 MMOL/L (ref 96–108)
CO2 SERPL-SCNC: 23 MMOL/L (ref 21–32)
CREAT SERPL-MCNC: 0.9 MG/DL (ref 0.6–1.3)
EOSINOPHIL # BLD AUTO: 0.16 THOUSAND/ÂΜL (ref 0–0.61)
EOSINOPHIL NFR BLD AUTO: 3 % (ref 0–6)
ERYTHROCYTE [DISTWIDTH] IN BLOOD BY AUTOMATED COUNT: 13.5 % (ref 11.6–15.1)
GFR SERPL CREATININE-BSD FRML MDRD: 61 ML/MIN/1.73SQ M
GLUCOSE SERPL-MCNC: 136 MG/DL (ref 65–140)
HCT VFR BLD AUTO: 39.9 % (ref 34.8–46.1)
HGB BLD-MCNC: 12.6 G/DL (ref 11.5–15.4)
IMM GRANULOCYTES # BLD AUTO: 0.01 THOUSAND/UL (ref 0–0.2)
IMM GRANULOCYTES NFR BLD AUTO: 0 % (ref 0–2)
LYMPHOCYTES # BLD AUTO: 1 THOUSANDS/ÂΜL (ref 0.6–4.47)
LYMPHOCYTES NFR BLD AUTO: 16 % (ref 14–44)
MCH RBC QN AUTO: 28.3 PG (ref 26.8–34.3)
MCHC RBC AUTO-ENTMCNC: 31.6 G/DL (ref 31.4–37.4)
MCV RBC AUTO: 90 FL (ref 82–98)
MONOCYTES # BLD AUTO: 0.45 THOUSAND/ÂΜL (ref 0.17–1.22)
MONOCYTES NFR BLD AUTO: 7 % (ref 4–12)
NEUTROPHILS # BLD AUTO: 4.5 THOUSANDS/ÂΜL (ref 1.85–7.62)
NEUTS SEG NFR BLD AUTO: 73 % (ref 43–75)
NRBC BLD AUTO-RTO: 0 /100 WBCS
PLATELET # BLD AUTO: 152 THOUSANDS/UL (ref 149–390)
PMV BLD AUTO: 10 FL (ref 8.9–12.7)
POTASSIUM SERPL-SCNC: 4.6 MMOL/L (ref 3.5–5.3)
RBC # BLD AUTO: 4.45 MILLION/UL (ref 3.81–5.12)
SODIUM SERPL-SCNC: 138 MMOL/L (ref 135–147)
WBC # BLD AUTO: 6.16 THOUSAND/UL (ref 4.31–10.16)

## 2023-03-14 ENCOUNTER — HOSPITAL ENCOUNTER (OUTPATIENT)
Dept: RADIOLOGY | Facility: HOSPITAL | Age: 78
Discharge: HOME | End: 2023-03-14

## 2023-03-14 DIAGNOSIS — I42.0 DILATED CARDIOMYOPATHY (HCC): ICD-10-CM

## 2023-03-14 RX ADMIN — GADOBUTROL 12 ML: 604.72 INJECTION INTRAVENOUS at 09:07

## 2023-03-14 NOTE — NURSING NOTE
Device interrogation for MRI  Normal device function prior to MRI  Leads and device meet all requirements per policy for MRI  Device programmed AOO 80bpm per Cardiology for MRI  Patient has no complaints  Vital signs monitored throughout by LINDSEY Benedict RN  Normal device function post MRI  Device reprogrammed to prior settings per Cardiology

## 2023-03-14 NOTE — NURSING NOTE
Device interrogation for MRI done by Janice Hernandes, JULIETTE clinical specialist  Device set to MRI safe mode @ 80 bpm     MRI cardiac w/without contrast complete  Pt tolerated well  VSS throughout scan  Pt alert and oriented x4 on room air  No complaints or visible signs of distress  Device reprogrammed to prior settings per Cardiology by Janice Hernandes, RN

## 2023-03-24 NOTE — PROGRESS NOTES
Cardiology Clinic Note    Lara Perez 76 y o  female   MRN: [id number]  Encounter: [id number]        Assessment / Plan:    #  Non-ischemic Cardiomyopathy  • Etiology:  o non-ischemic by [facility]  o Cardiac MRI suggests old myocarditis  Also at least raises question of cardiac sarcoidosis  Does not appear suggestive of amyloid or HCM  o Check iron studies to be complete (ordered today)  o We discussed the possibility of a cardiac PET scan to eval for any inflammation that might suggest sarcoidosis  She does not think she wants to do this  I also talked to her daughter who is a pediatric nephrologist and we decided we will not pursue this   o Family members should have clinical screening  We are going to look into genetic testing next visit, as this could be actionable information for her children  #  Chronic Systolic Heart Failure  • Class and Stage:  o NYHA class: II-III  o ACC/AHA stage: C    • Volume Management:  o Volume assessment:   Euvolemic on exam   o Diuretic:  not requiring a loop diuretic   o K+ supplementation and lab monitoring: recent labs reviewed  • Neurohormonal Blockade / GDMT:  o Beta blocker:  toprol XL 50mg QD (she takes 25mg BID)  o ACEi/ARB/ARNI:  entresto 24-26 - half in AM, full pill in PM   o MRA:  david 12 5mg QOD (eventually consider moving to daily dosing, discussed today but she seems hesitant to do this)  o SGLT2i:  Consider adding if tolerates entresto (need to make sure stays hydrated if we ultimately start this)    • Sudden cardiac death risk reduction:  o S/p dual chamber ICD 2021  (had syncope and NSVT on tele)  o Last interrogation  AP 90%,  <0 1%  o Non-specific IVCB  Dr Clay Estrada has reviewed EKG  Not recommending CRT at this time  • Other heart failure considerations:  o Mitral clip, IV iron, RICH eval, clinical trial, cardiac rehab, palliative care, advanced therapies:  not at this time    # NSVT  • Had syncope 2021  Found to have NSVT    ICD placed    • On BB  # Hx PACs, PVCs  · Continue BB    # Aortic valve disease  · Aortic sclerosis, mild AI  · Serial exam and echo follow up    # Moderate MR  · Serial exam and echo follow up    # HTN  · See GDMT above    # HLD  · Lipid panel 2022:    HDL 45    · Elevated ASCVD risk score  · Consider starting statin (and could stop aspirin)  Defer to primary cardiologist     # Hypothyroidism  • On synthroid  • Most recent TSH has been normal     # Hx of breast cancer  • Mastectomy 2017  Tx with anastrazole x 5 years  Today's Plan Summary:  See above assessment/plan for full details of today's plan  Briefly,     Check iron studies  Will try to connect with her daughter who is a physician    CC - Dr Radha Vaughn            Reason For Visit / Chief Complaint:  F/u cardiomyopathy    HPI:   Clover See is a 76 y o   female with history as noted in the problem list and further detailed in the above assessment and plan  Initial:  2023  Referral from Dr Yenni Bruce  Asymetric upper septal hypertrophy on echo  Longstanding hx of systolic dysfunction  Now with declining EF on serial echo  40% --> 35% --> 30%  Dr Hyman Parents requested heart failure consult for second opinion  Patient overall feels pretty well  She walks 3 miles almost everyday  If she walks fast, can get some SOB -- otherwise feels good on these walks  No syncope since ICD placement  No ICD shocks  Likes to do gardening when the weather is nicer  Lives by herself (  5 years ago)  Has two daughters  One daughter is a nephrologist (Director of Kidney transplant at [address] [place] [address])  Interval:  At the initial visit, I recommended increasing entresto from hs --> BID  I also recommended cardiac MRI, but she wanted to talk to Dr Hyman Parents first     Saw Dr Hyman Parents 23  Checked LP(a)  Agreed with entresto BID  Agreed with MRI  Had cardiac MRI 3-14-23  LVIDd 5 7cm  EF 30%    Mild upper septal hypertrophy, up to 1 3cm   Mildly dilated RV with mod reduced function  LGE - epicardial in anterior and anterolateral   LGE - transmural inf-septum, mid to distal     Today, reports feeling overall stable  A little dizziness with entresto so takes a half dose in the AM and a full dose in the PM    No syncope  No edema  No orthopnea or PND  Does get SHARMA going up a hill  Cardiac Imaging personally reviewed:  EKG Nonspecific IVCB, QRS around 142ms  Holter or event monitor    Echo 1-11-23  Mild left ventricular hypertrophy with moderately severe LV dysfunction, EF ~30% with moderate diastolic dysfunction  Mild left atrial enlargement  Mild aortic sclerosis with mild aortic insufficiency  Mildly thickened mitral valve with early annular calcification and moderate mitral regurgitation  Compared to prior report of January 27, 2022, LV EF is less and mitral regurgitation has increased  2022  Mild-mod asymmetric hypertrophy  EF 35%  Mild MR  Mild aortic sclerosis, trace AI  RAMONA    Cardiac MRI 3-14-23  LVIDd 5 7cm  EF 30%  Mild upper septal hypertrophy, up to 1 3cm  Mildly dilated RV with mod reduced function  LGE - epicardial in anterior and anterolateral   LGE - transmural inf-septum, mid to distal        Stress testing Stress echo - 2/2021  No ischemia   Coronary CTA or [facility] Cardiac catheterization March 5, 2021: Normal coronaries      [address]    [facility]              Patient Active Problem List    Diagnosis Date Noted   • Gastroesophageal reflux disease without esophagitis 10/24/2022   • Cellulitis 06/16/2022   • Exertional angina ([facility] ) 04/21/2022   • Syncope 03/04/2021   • NSVT (nonsustained ventricular tachycardia) 10/29/2020   • Acute pain of right knee 10/11/2019   • Acquired trigger finger 06/01/2018   • Carpal tunnel syndrome 06/01/2018   • Numbness of hand 06/01/2018   • Malignant neoplasm of upper-inner 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 breast ([address] ) 01/26/2017   • Hypertrophic cardiomyopathy ([address] ) 11/09/2016   • Hypothyroidism 03/01/2016   • Plantar fasciitis 02/22/2016   • Hypertension 04/18/2014   • Ischemic cardiomyopathy 12/23/2013   • Osteopenia 06/18/2013       Past Medical History:   Diagnosis Date   • Hypertrophic cardiomyopathy ([facility] )    • Hypothyroidism    • Ischemic cardiomyopathy    • Malignant neoplasm of left female breast ([facility] ) 02/09/2017    stage IIA   • Osteopenia    • Trigger finger of all digits of right hand        Allergies   Allergen Reactions   • Cholestatin Sneezing       Current Outpatient Medications   Medication Instructions   • aspirin (ECOTRIN LOW STRENGTH) 81 mg, Oral, Daily   • calcium carbonate-vitamin D (OSCAL-D) 500 mg-200 units per tablet take 1 tablet by mouth once daily WITH BREAKFAST  • cholecalciferol (VITAMIN D3) 1,000 Units, Oral, Daily   • fish oil 1,000 mg, Oral, Daily   • levothyroxine 50 mcg tablet take 1 tablet by mouth once daily   • metoprolol succinate (TOPROL-XL) 50 mg 24 hr tablet take 1 tablet by mouth every morning   • sacubitril-valsartan (Entresto) 24-26 MG TABS 1 tablet, Oral, 2 times daily   • spironolactone (ALDACTONE) 12 5 mg, Oral, Every other day       Social History     Socioeconomic History   • Marital status:      Spouse name: Not on file   • Number of children: Not on file   • Years of education: Not on file   • Highest education level: Not on file   Occupational History   • Not on file   Tobacco Use   • Smoking status: Never   • Smokeless tobacco: Never   Vaping Use   • Vaping Use: Never used   Substance and Sexual Activity   • Alcohol use: No     Alcohol/week: 1 0 standard drink     Types: 1 Glasses of wine per week   • Drug use: No   • Sexual activity: Not Currently   Other Topics Concern   • Not on file   Social History Narrative     since 2018  Was  for 50 years  Lives alone  Has 2 daughters  One in [place] who is a nephrologist   The other in [facility]  Enjoys gardening and being outside         Social Determinants of Health "    Financial Resource Strain: Not on file   Food Insecurity: Not on file   Transportation Needs: Not on file   Physical Activity: Not on file   Stress: Not on file   Social Connections: Not on file   Intimate Partner Violence: Not on file   Housing Stability: Not on file       Family History   Problem Relation Age of Onset   • Heart disease Mother         had angina   • Heart disease Father    • Brain cancer Sister    • Heart disease Brother         had CABG   • No Known Problems Brother    • No Known Problems Maternal Grandmother    • No Known Problems Maternal Grandfather    • No Known Problems Paternal Grandmother    • No Known Problems Paternal Grandfather    • No Known Problems Daughter    • No Known Problems Daughter    • No Known Problems Paternal Aunt    • No Known Problems Paternal Aunt    • No Known Problems Paternal Aunt    • No Known Problems Paternal Aunt    • No Known Problems Paternal Aunt        Physical Exam:  Blood pressure 128/70, pulse 72, height 5' 1\" (1 549 m), weight 63 6 kg (140 lb 4 8 oz), SpO2 99 %  Body mass index is 26 51 kg/m²  Wt Readings from Last 3 Encounters:   03/27/23 63 6 kg (140 lb 4 8 oz)   02/16/23 62 kg (136 lb 11 2 oz)   10/24/22 62 6 kg (138 lb)     Physical Exam  Vitals reviewed  Constitutional:       General: She is not in acute distress  Appearance: She is not toxic-appearing  Neck:      Comments: No JVP elevation  Cardiovascular:      Rate and Rhythm: Normal rate and regular rhythm  Heart sounds: Murmur (very soft HSM at apex) heard  No friction rub  No gallop  Pulmonary:      Breath sounds: Normal breath sounds  No wheezing, rhonchi or rales  Abdominal:      General: There is no distension  Palpations: Abdomen is soft  Tenderness: There is no abdominal tenderness  There is no guarding  Musculoskeletal:      Right lower leg: No edema  Left lower leg: No edema  Neurological:      Mental Status: She is alert           Labs & " Results:  Lab Results   Component Value Date    SODIUM 138 02/20/2023    K 4 6 02/20/2023     (H) 02/20/2023    CO2 23 02/20/2023    BUN 32 (H) 02/20/2023    CREATININE 0 90 02/20/2023    GLUC 136 02/20/2023    CALCIUM 9 4 02/20/2023       Thank you for the opportunity to participate in the care of this patient      Carla Gregg MD, [facility]  Advanced Heart Failure and Transplant Cardiologist  Director of [facility] Tejeda

## 2023-03-27 ENCOUNTER — OFFICE VISIT (OUTPATIENT)
Dept: CARDIOLOGY CLINIC | Facility: CLINIC | Age: 78
End: 2023-03-27

## 2023-03-27 VITALS
DIASTOLIC BLOOD PRESSURE: 70 MMHG | WEIGHT: 140.3 LBS | BODY MASS INDEX: 26.49 KG/M2 | HEIGHT: 61 IN | SYSTOLIC BLOOD PRESSURE: 128 MMHG | OXYGEN SATURATION: 99 % | HEART RATE: 72 BPM

## 2023-03-27 DIAGNOSIS — I42.0 DILATED CARDIOMYOPATHY (HCC): Primary | ICD-10-CM

## 2023-03-27 DIAGNOSIS — I47.29 NSVT (NONSUSTAINED VENTRICULAR TACHYCARDIA) (HCC): ICD-10-CM

## 2023-03-27 DIAGNOSIS — I50.22 CHRONIC SYSTOLIC HEART FAILURE (HCC): ICD-10-CM

## 2023-03-27 DIAGNOSIS — E78.2 MIXED HYPERLIPIDEMIA: ICD-10-CM

## 2023-05-03 ENCOUNTER — TELEPHONE (OUTPATIENT)
Dept: HEMATOLOGY ONCOLOGY | Facility: CLINIC | Age: 78
End: 2023-05-03

## 2023-05-03 NOTE — TELEPHONE ENCOUNTER
Appointment Confirmation   Who are you speaking with? Patient   If it is not the patient, are they listed on an active communication consent form? N/A   Which provider is the appointment scheduled with? JONATHAN Tran   When is the appointment scheduled? Please list date and time 5/08/23 @ 3 pm   At which location is the appointment scheduled to take place? [place]   Did caller verbalize understanding of appointment details?  yes

## 2023-05-08 ENCOUNTER — OFFICE VISIT (OUTPATIENT)
Dept: SURGICAL ONCOLOGY | Facility: CLINIC | Age: 78
End: 2023-05-08

## 2023-05-08 VITALS
TEMPERATURE: 97.1 F | BODY MASS INDEX: 26.15 KG/M2 | HEIGHT: 61 IN | DIASTOLIC BLOOD PRESSURE: 68 MMHG | HEART RATE: 67 BPM | OXYGEN SATURATION: 98 % | SYSTOLIC BLOOD PRESSURE: 118 MMHG | RESPIRATION RATE: 20 BRPM | WEIGHT: 138.5 LBS

## 2023-05-08 DIAGNOSIS — Z12.31 VISIT FOR SCREENING MAMMOGRAM: ICD-10-CM

## 2023-05-08 DIAGNOSIS — C50.212 MALIGNANT NEOPLASM OF UPPER-INNER QUADRANT OF LEFT FEMALE BREAST, UNSPECIFIED ESTROGEN RECEPTOR STATUS (HCC): Primary | ICD-10-CM

## 2023-05-08 NOTE — PROGRESS NOTES
Surgical Oncology Follow Up       [address] [facility],[address]  [facility]  [address] [id number]    Sabrina Naylor  1945  [id number]  [address] [facility] [facility]  [facility] [place]  [address] [facility] [address] [phone]    Chief Complaint   Patient presents with   • office visit       Assessment/Plan:  1  Malignant neoplasm of upper-inner 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 breast, unspecified estrogen receptor status ([facility] )  - 1 year follow up    2  Visit for screening mammogram  - Mammo screening right w 3d & cad; Future       Discussion/Summary: Patient is a 58-year-old female presenting today for 1 year follow-up for left breast cancer diagnosed in February 2017  Pathology revealed invasive lobular carcinoma ER 90%, TN/HER2 negative  She underwent a left mastectomy with sentinel node biopsy with Dr Tania Cotton and had immediate reconstruction with Dr Angélica Gray  She completed a 5 year course of anastrozole hormone therapy in March 2022  She had a screening mammogram of the right breast on 6/16/2022 which was BIRADS 2 category 3 density  There were no concerns on her cbe  I will see the patient back in 1 year or sooner should the need arise  She was instructed to call with any questions or concerns prior to this time  All questions were answered today  History of Present Illness:     Oncology History   Malignant neoplasm of upper-inner 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 breast ([facility] [address] )   2/9/2017 Surgery    Left mastectomy with SLN biopsy  Invasive lobular carcinoma  Grade 1  3 9 cm  0/1 lymph nodes  ER 90  TN <1  Her 2 negative  Stage IIA    Immediate reconstruction with Dr Angélica Gray     3/3/2017 Genomic Testing    Mammaprint low risk     3/2017 -  Hormone Therapy    Anastrozole 1 mg daily  Dr Jm Clay          -Interval History: Patient is a 58-year-old female presenting today for 1 year follow-up for left breast cancer diagnosed in February 2017   She is on observation  She had a screening mammogram of the right breast on 6/16/2022 which was BIRADS 2 category 3 density  Patient denies changes on her breast exam  She denies persistent headaches, bone pain, back pain, shortness of breath, or abdominal pain  Review of Systems:  Review of Systems   Constitutional: Negative for activity change, appetite change, fatigue and unexpected weight change  Respiratory: Negative for cough and shortness of breath  Cardiovascular: Negative for chest pain  Gastrointestinal: Negative for abdominal pain, diarrhea, nausea and vomiting  Endocrine: Negative for heat intolerance  Musculoskeletal: Negative for arthralgias, back pain and myalgias  Skin: Negative for rash  Neurological: Negative for weakness and headaches  Hematological: Negative for adenopathy         Patient Active Problem List   Diagnosis   • Malignant neoplasm of upper-inner 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 breast ([facility] )   • Hypertension   • Hypertrophic cardiomyopathy ([facility] )   • Hypothyroidism   • Ischemic cardiomyopathy   • Osteopenia   • Plantar fasciitis   • Acquired trigger finger   • Carpal tunnel syndrome   • Numbness of hand   • Acute pain of right knee   • NSVT (nonsustained ventricular tachycardia) ([facility])   • Syncope   • Exertional angina ([facility])   • Cellulitis   • Gastroesophageal reflux disease without esophagitis     Past Medical History:   Diagnosis Date   • Hypertrophic cardiomyopathy ([facility] )    • Hypothyroidism    • Ischemic cardiomyopathy    • Malignant neoplasm of left female breast ([facility] ) 02/09/2017    stage IIA   • Osteopenia    • Trigger finger of all digits of right hand      Past Surgical History:   Procedure Laterality Date   • BREAST BIOPSY Right     years ago benign   • BREAST RECONSTRUCTION Left 2/9/2017    Procedure: BREAST RECONSTRUCTION WITH TISSUE EXPANDERS AND ALLODERM ;  Surgeon: Mark Jenkins MD;  [facility];  Service:    • COLONOSCOPY  2009   • KNEE ARTHROSCOPY Bilateral    • MASTECTOMY Left 02/09/2017   • TN BX/EXC LYMPH NODE OPEN SUPERFICIAL Left 2/9/2017    Procedure: LYMPHOSCINTIGRAPHY; INTRAOPERATIVE LYMPHATIC MAPPING; SENTINEL LYMPH NODE BIOPSY (INJECT AT 0700 BY DR ALBA); Surgeon: Britney Fernando MD;  [facility];  Service: Surgical Oncology   • TN INSJ/RPLCMT BREAST IMPLANT SEP DAY MASTECTOMY Left 5/18/2017    Procedure: BREAST EXPANDER/IMPLANT EXCHANGE; CAPSULECTOMY ;  Surgeon: Octaviano Bojorquez MD;  [facility];  Service: Plastics   • TN MASTECTOMY SIMPLE COMPLETE Left 2/9/2017    Procedure: BREAST MASTECTOMY; FROZEN SECTION ;  Surgeon: Britney Fernando MD;  [facility];  Service: Surgical Oncology   • REFRACTIVE SURGERY Bilateral    • US GUIDANCE BREAST BIOPSY LEFT EACH ADDITIONAL Left 12/1/2016   • US GUIDED BREAST BIOPSY LEFT COMPLETE Left 12/1/2016     Family History   Problem Relation Age of Onset   • Heart disease Mother         had angina   • Heart disease Father    • Brain cancer Sister    • Heart disease Brother         had CABG   • No Known Problems Brother    • No Known Problems Maternal Grandmother    • No Known Problems Maternal Grandfather    • No Known Problems Paternal Grandmother    • No Known Problems Paternal Grandfather    • No Known Problems Daughter    • No Known Problems Daughter    • No Known Problems Paternal Aunt    • No Known Problems Paternal Aunt    • No Known Problems Paternal Aunt    • No Known Problems Paternal Aunt    • No Known Problems Paternal Aunt      Social History     Socioeconomic History   • Marital status:       Spouse name: Not on file   • Number of children: Not on file   • Years of education: Not on file   • Highest education level: Not on file   Occupational History   • Not on file   Tobacco Use   • Smoking status: Never   • Smokeless tobacco: Never   Vaping Use   • Vaping Use: Never used   Substance and Sexual Activity   • Alcohol use: No     Alcohol/week: 1 0 standard drink     Types: 1 Glasses of wine per week • Drug use: No   • Sexual activity: Not Currently   Other Topics Concern   • Not on file   Social History Narrative     since 2018  Was  for 50 years  Lives alone  Has 2 daughters  One in [place] who is a nephrologist   The other in [facility]  Enjoys gardening and being outside  Social Determinants of Health     Financial Resource Strain: Not on file   Food Insecurity: Not on file   Transportation Needs: Not on file   Physical Activity: Not on file   Stress: Not on file   Social Connections: Not on file   Intimate Partner Violence: Not on file   Housing Stability: Not on file       Current Outpatient Medications:   •  aspirin (ECOTRIN LOW STRENGTH) 81 mg EC tablet, Take 81 mg by mouth daily, Disp: , Rfl:   •  calcium carbonate-vitamin D (OSCAL-D) 500 mg-200 units per tablet, take 1 tablet by mouth once daily WITH BREAKFAST , Disp: 30 tablet, Rfl: 0  •  cholecalciferol (VITAMIN D3) 1,000 units tablet, Take 1,000 Units by mouth daily, Disp: , Rfl:   •  levothyroxine 50 mcg tablet, take 1 tablet by mouth once daily, Disp: 90 tablet, Rfl: 1  •  metoprolol succinate (TOPROL-XL) 50 mg 24 hr tablet, take 1 tablet by mouth every morning, Disp: 90 tablet, Rfl: 3  •  Omega-3 Fatty Acids (FISH OIL) 1,000 mg, Take 1,000 mg by mouth daily, Disp: , Rfl:   •  sacubitril-valsartan (Entresto) 24-26 MG TABS, Take 1 tablet by mouth 2 (two) times a day (Patient taking differently: Take 1 tablet by mouth 2 (two) times a day Takes one and a half tablets), Disp: 60 tablet, Rfl: 3  •  spironolactone (ALDACTONE) 25 mg tablet, Take 12 5 mg by mouth every other day, Disp: , Rfl:   Allergies   Allergen Reactions   • Seasonal Ic [Cholestatin] Sneezing     There were no vitals filed for this visit  Physical Exam  Constitutional:       General: She is not in acute distress  Appearance: Normal appearance  Cardiovascular:      Rate and Rhythm: Normal rate and regular rhythm  Pulses: Normal pulses        Heart sounds: Normal heart sounds  Pulmonary:      Effort: Pulmonary effort is normal       Breath sounds: Normal breath sounds  Chest:      Chest wall: No mass  Breasts:     Right: No swelling, bleeding, inverted nipple, mass, nipple discharge, skin change or tenderness  Left: No swelling, bleeding, inverted nipple, mass, nipple discharge, skin change or tenderness  Abdominal:      General: Abdomen is flat  Palpations: Abdomen is soft  Lymphadenopathy:      Upper Body:      Right upper body: No supraclavicular, axillary or pectoral adenopathy  Left upper body: No supraclavicular, axillary or pectoral adenopathy  Skin:     General: Skin is warm  Neurological:      General: No focal deficit present  Mental Status: She is alert and oriented to person, place, and time  Psychiatric:         Mood and Affect: Mood normal          Behavior: Behavior normal            Results:    Imaging  No results found  I reviewed the above imaging data  Advance Care Planning/Advance Directives:  Discussed disease status, cancer treatment plans and/or cancer treatment goals with the patient

## 2023-05-16 ENCOUNTER — RA CDI HCC (OUTPATIENT)
Dept: OTHER | Facility: HOSPITAL | Age: 78
End: 2023-05-16

## 2023-05-16 NOTE — PROGRESS NOTES
I11 0  [facility]  coding opportunities          Chart Reviewed number of suggestions sent to Provider: 1     Patients Insurance     Medicare Insurance: Roma Garcia

## 2023-05-24 ENCOUNTER — OFFICE VISIT (OUTPATIENT)
Dept: FAMILY MEDICINE CLINIC | Facility: CLINIC | Age: 78
End: 2023-05-24

## 2023-05-24 VITALS
BODY MASS INDEX: 26.01 KG/M2 | WEIGHT: 137.8 LBS | OXYGEN SATURATION: 96 % | DIASTOLIC BLOOD PRESSURE: 64 MMHG | SYSTOLIC BLOOD PRESSURE: 118 MMHG | HEIGHT: 61 IN | TEMPERATURE: 98 F | HEART RATE: 62 BPM | RESPIRATION RATE: 16 BRPM

## 2023-05-24 DIAGNOSIS — Z00.00 MEDICARE ANNUAL WELLNESS VISIT, SUBSEQUENT: Primary | ICD-10-CM

## 2023-05-24 DIAGNOSIS — I25.5 ISCHEMIC CARDIOMYOPATHY: ICD-10-CM

## 2023-05-24 DIAGNOSIS — E03.9 HYPOTHYROIDISM, UNSPECIFIED TYPE: ICD-10-CM

## 2023-05-24 DIAGNOSIS — I47.20 VENTRICULAR TACHYCARDIA (HCC): ICD-10-CM

## 2023-05-24 DIAGNOSIS — I10 PRIMARY HYPERTENSION: ICD-10-CM

## 2023-05-24 RX ORDER — MOMETASONE FUROATE 1 MG/G
CREAM TOPICAL
COMMUNITY
Start: 2023-05-23

## 2023-05-24 RX ORDER — AMPICILLIN TRIHYDRATE 250 MG
50 CAPSULE ORAL
COMMUNITY

## 2023-05-24 NOTE — ASSESSMENT & PLAN NOTE
Cardiomyopathy  Patient with chronic condition for which she sees cardiologist   She stays very active despite this condition

## 2023-05-24 NOTE — PROGRESS NOTES
Assessment and Plan:     Problem List Items Addressed This Visit        Endocrine    Hypothyroidism     Hypothyroidism  Patient will check TSH blood work and continue with current dose of levothyroxine         Relevant Orders    CBC    Comprehensive metabolic panel    Lipid panel    TSH, 3rd generation       Cardiovascular and Mediastinum    Hypertension     Hypertension  The patient's blood pressure is stable at this time and he will continue current regimen of medications         Relevant Orders    CBC    Comprehensive metabolic panel    Lipid panel    TSH, 3rd generation    Ischemic cardiomyopathy     Cardiomyopathy  Patient with chronic condition for which she sees cardiologist   She stays very active despite this condition  Other Visit Diagnoses     Medicare annual wellness visit, subsequent    -  Primary    Ventricular tachycardia [facility])               Preventive health issues were discussed with patient, and age appropriate screening tests were ordered as noted in patient's After Visit Summary  Personalized health advice and appropriate referrals for health education or preventive services given if needed, as noted in patient's After Visit Summary  History of Present Illness:     Patient presents for a Medicare Wellness Visit    Patient denies any recent illness  She does see her cardiologist on a regular basis as well as her oncologist   She denies any chest pain but does at times get shortness of breath if she is too active with outdoor activities  She likes to walk on a regular basis as well as stay entertained in her yard weather permitting  She does have a history of cardiomyopathy with an ejection fraction of 30%  Patient Care Team:  Eliecer Conn MD as PCP - General  MD Chanelle Porter MD Lamond Actis, MD as Cardiologist (Cardiology)     Review of Systems:     Review of Systems   Constitutional: Positive for fatigue  HENT: Negative  Eyes: Negative  Respiratory: Positive for shortness of breath  As per HPI   Cardiovascular: Negative  Gastrointestinal: Negative  Genitourinary: Negative  Musculoskeletal: Negative  Skin: Negative  Allergic/Immunologic: Negative  Neurological: Negative  Psychiatric/Behavioral: Negative  Problem List:     Patient Active Problem List   Diagnosis   • Malignant neoplasm of upper-inner 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 breast ([facility] [address] )   • Hypertension   • Hypertrophic cardiomyopathy ([facility] )   • Hypothyroidism   • Ischemic cardiomyopathy   • Osteopenia   • Plantar fasciitis   • Acquired trigger finger   • Carpal tunnel syndrome   • Numbness of hand   • Acute pain of right knee   • NSVT (nonsustained ventricular tachycardia) ([facility])   • Syncope   • Exertional angina ([facility])   • Cellulitis   • Gastroesophageal reflux disease without esophagitis      Past Medical and Surgical History:     Past Medical History:   Diagnosis Date   • Hypertrophic cardiomyopathy ([facility] )    • Hypothyroidism    • Ischemic cardiomyopathy    • Malignant neoplasm of left female breast ([facility] ) 02/09/2017    stage IIA   • Osteopenia    • Trigger finger of all digits of right hand      Past Surgical History:   Procedure Laterality Date   • BREAST BIOPSY Right     years ago benign   • BREAST RECONSTRUCTION Left 2/9/2017    Procedure: BREAST RECONSTRUCTION WITH TISSUE EXPANDERS AND ALLODERM ;  Surgeon: Blas Conner MD;  [facility];  Service:    • COLONOSCOPY  2009   • KNEE ARTHROSCOPY Bilateral    • MASTECTOMY Left 02/09/2017   • NE BX/EXC LYMPH NODE OPEN SUPERFICIAL Left 2/9/2017    Procedure: LYMPHOSCINTIGRAPHY; INTRAOPERATIVE LYMPHATIC MAPPING; SENTINEL LYMPH NODE BIOPSY (INJECT AT 0700 BY DR ALBA);   Surgeon: Kelechi Gray MD;  [facility];  Service: Surgical Oncology   • NE INSJ/RPLCMT BREAST IMPLANT SEP DAY MASTECTOMY Left 5/18/2017    Procedure: BREAST EXPANDER/IMPLANT EXCHANGE; CAPSULECTOMY ;  Surgeon: Blas Conner MD;  [facility];  Service: Plastics   • MS MASTECTOMY SIMPLE COMPLETE Left 2/9/2017    Procedure: BREAST MASTECTOMY; FROZEN SECTION ;  Surgeon: Carin Márquez MD;  [facility];  Service: Surgical Oncology   • REFRACTIVE SURGERY Bilateral    • US GUIDANCE BREAST BIOPSY LEFT EACH ADDITIONAL Left 12/1/2016   • US GUIDED BREAST BIOPSY LEFT COMPLETE Left 12/1/2016      Family History:     Family History   Problem Relation Age of Onset   • Heart disease Mother         had angina   • Heart disease Father    • Brain cancer Sister    • Heart disease Brother         had CABG   • No Known Problems Brother    • No Known Problems Maternal Grandmother    • No Known Problems Maternal Grandfather    • No Known Problems Paternal Grandmother    • No Known Problems Paternal Grandfather    • No Known Problems Daughter    • No Known Problems Daughter    • No Known Problems Paternal Aunt    • No Known Problems Paternal Aunt    • No Known Problems Paternal Aunt    • No Known Problems Paternal Aunt    • No Known Problems Paternal Aunt       Social History:     Social History     Socioeconomic History   • Marital status:      Spouse name: None   • Number of children: None   • Years of education: None   • Highest education level: None   Occupational History   • None   Tobacco Use   • Smoking status: Never   • Smokeless tobacco: Never   Vaping Use   • Vaping Use: Never used   Substance and Sexual Activity   • Alcohol use: No     Alcohol/week: 1 0 standard drink of alcohol     Types: 1 Glasses of wine per week   • Drug use: No   • Sexual activity: Not Currently   Other Topics Concern   • None   Social History Narrative     since 2018  Was  for 50 years  Lives alone  Has 2 daughters  One in [place] who is a nephrologist   The other in [facility]  Enjoys gardening and being outside         Social Determinants of Health     Financial Resource Strain: Low Risk  (5/24/2023)    Overall Financial Resource Strain (CARDIA)    • Difficulty of Paying Living Expenses: Not hard at all   Food Insecurity: Not on file   Transportation Needs: No Transportation Needs (5/24/2023)    PRAPARE - Transportation    • Lack of Transportation (Medical): No    • Lack of Transportation (Non-Medical): No   Physical Activity: Not on file   Stress: Not on file   Social Connections: Not on file   Intimate Partner Violence: Not on file   Housing Stability: Not on file      Medications and Allergies:     Current Outpatient Medications   Medication Sig Dispense Refill   • aspirin (ECOTRIN LOW STRENGTH) 81 mg EC tablet Take 81 mg by mouth daily     • calcium carbonate-vitamin D (OSCAL-D) 500 mg-200 units per tablet take 1 tablet by mouth once daily WITH BREAKFAST  30 tablet 0   • cholecalciferol (VITAMIN D3) 1,000 units tablet Take 1,000 Units by mouth daily     • Co Q-10 50 MG Take 50 mg by mouth     • levothyroxine 50 mcg tablet take 1 tablet by mouth once daily 90 tablet 1   • metoprolol succinate (TOPROL-XL) 50 mg 24 hr tablet take 1 tablet by mouth every morning 90 tablet 3   • mometasone (ELOCON) 0 1 % cream      • Omega-3 Fatty Acids (FISH OIL) 1,000 mg Take 1,000 mg by mouth daily     • sacubitril-valsartan (Entresto) 24-26 MG TABS Take 1 tablet by mouth 2 (two) times a day (Patient taking differently: Take 1 tablet by mouth 2 (two) times a day Takes one and a half tablets) 60 tablet 3   • spironolactone (ALDACTONE) 25 mg tablet Take 12 5 mg by mouth every other day       No current facility-administered medications for this visit       Allergies   Allergen Reactions   • Seasonal Ic [Cholestatin] Sneezing      Immunizations:     Immunization History   Administered Date(s) Administered   • COVID-19 PFIZER VACCINE 0 3 ML IM 01/25/2021, 02/14/2021, 11/06/2021   • INFLUENZA 09/03/2014, 09/28/2015, 10/09/2016, 09/13/2017, 11/08/2018   • Influenza Split 01/04/2021   • Influenza Split High Dose Preservative Free IM 10/09/2016   • Influenza, high dose seasonal 0 7 mL 10/11/2019, 09/29/2020, 10/21/2021, 10/24/2022   • Influenza, seasonal, injectable 10/30/2003, 11/13/2006, 11/24/2008, 10/07/2011, 10/26/2012, 10/15/2013, 10/01/2014   • Pneumococcal Conjugate 13-Valent 02/22/2016   • Pneumococcal Polysaccharide PPV23 01/02/2018   • Tdap 02/22/2016      Health Maintenance:         Topic Date Due   • Hepatitis C Screening  Never done   • Breast Cancer Screening: Mammogram  06/15/2024         Topic Date Due   • COVID-19 Vaccine (4 - Pfizer series) 01/01/2022      Medicare Screening Tests and Risk Assessments:     Eva Ackerman is here for her Subsequent Wellness visit  Last Medicare Wellness visit information reviewed, patient interviewed and updates made to the record today  Health Risk Assessment:   Patient rates overall health as good  Patient feels that their physical health rating is same  Patient is satisfied with their life  Eyesight was rated as same  Hearing was rated as same  Patient feels that their emotional and mental health rating is same  Patients states they are never, rarely angry  Patient states they are never, rarely unusually tired/fatigued  Pain experienced in the last 7 days has been none  Patient states that she has experienced no weight loss or gain in last 6 months  Depression Screening:   PHQ-2 Score: 0      Fall Risk Screening: In the past year, patient has experienced: no history of falling in past year      Urinary Incontinence Screening:   Patient has not leaked urine accidently in the last six months  Home Safety:  Patient does not have trouble with stairs inside or outside of their home  Patient has working smoke alarms and has working carbon monoxide detector  Home safety hazards include: none  Nutrition:   Current diet is Regular  Medications:   Patient is currently taking over-the-counter supplements  OTC medications include: see medication list  Patient is able to manage medications       Activities of Daily Living (ADLs)/Instrumental "Activities of Daily Living (IADLs):   Walk and transfer into and out of bed and chair?: Yes  Dress and groom yourself?: Yes    Bathe or shower yourself?: Yes    Feed yourself? Yes  Do your laundry/housekeeping?: Yes  Manage your money, pay your bills and track your expenses?: Yes  Make your own meals?: Yes    Do your own shopping?: Yes    Advance Care Planning:   Living will: No      PREVENTIVE SCREENINGS      Cardiovascular Screening:    General: Screening Not Indicated and History Lipid Disorder      Diabetes Screening:     General: Screening Current      Colorectal Cancer Screening:     General: Screening Not Indicated      Breast Cancer Screening:     General: History Breast Cancer      Cervical Cancer Screening:    General: Screening Not Indicated      Lung Cancer Screening:     General: Screening Not Indicated    Screening, Brief Intervention, and Referral to Treatment (SBIRT)    Screening    Typical number of drinks in a week: 0    Single Item Drug Screening:  How often have you used an illegal drug (including marijuana) or a prescription medication for non-medical reasons in the past year? never    Single Item Drug Screen Score: 0  Interpretation: Negative screen for possible drug use disorder    No results found  Physical Exam:     /64 (BP Location: Right arm, Patient Position: Sitting, Cuff Size: Standard)   Pulse 62   Temp 98 °F (36 7 °C) (Temporal)   Resp 16   Ht 5' 1\" (1 549 m)   Wt 62 5 kg (137 lb 12 8 oz)   SpO2 96%   BMI 26 04 kg/m²     Physical Exam  Vitals and nursing note reviewed  Constitutional:       General: She is not in acute distress  Appearance: Normal appearance  She is well-developed  She is not ill-appearing  HENT:      Head: Normocephalic and atraumatic  Eyes:      General:         Right eye: No discharge  Left eye: No discharge  Extraocular Movements: Extraocular movements intact        Conjunctiva/sclera: Conjunctivae normal       Pupils: Pupils " are equal, round, and reactive to light  Neck:      Vascular: No carotid bruit  Cardiovascular:      Rate and Rhythm: Normal rate and regular rhythm  Heart sounds: No murmur heard  Pulmonary:      Effort: Pulmonary effort is normal  No respiratory distress  Breath sounds: Normal breath sounds  Abdominal:      General: Abdomen is flat  Bowel sounds are normal       Palpations: Abdomen is soft  Tenderness: There is no abdominal tenderness  There is no guarding or rebound  Musculoskeletal:      Right lower leg: No edema  Left lower leg: No edema  Lymphadenopathy:      Cervical: No cervical adenopathy  Skin:     General: Skin is warm and dry  Capillary Refill: Capillary refill takes less than 2 seconds  Neurological:      Mental Status: She is alert  Mental status is at baseline  Psychiatric:         Mood and Affect: Mood normal          Behavior: Behavior normal          Thought Content:  Thought content normal          Judgment: Judgment normal           Sowmya Jolly MD

## 2023-05-24 NOTE — PATIENT INSTRUCTIONS
Medicare Preventive Visit Patient Instructions  Thank you for completing your Welcome to Medicare Visit or Medicare Annual Wellness Visit today  Your next wellness visit will be due in one year (5/24/2024)  The screening/preventive services that you may require over the next 5-10 years are detailed below  Some tests may not apply to you based off risk factors and/or age  Screening tests ordered at today's visit but not completed yet may show as past due  Also, please note that scanned in results may not display below  Preventive Screenings:  Service Recommendations Previous Testing/Comments   Colorectal Cancer Screening  * Colonoscopy    * Fecal Occult Blood Test (FOBT)/Fecal Immunochemical Test (FIT)  * Fecal DNA/Cologuard Test  * Flexible Sigmoidoscopy Age: 39-70 years old   Colonoscopy: every 10 years (may be performed more frequently if at higher risk)  OR  FOBT/FIT: every 1 year  OR  Cologuard: every 3 years  OR  Sigmoidoscopy: every 5 years  Screening may be recommended earlier than age 39 if at higher risk for colorectal cancer  Also, an individualized decision between you and your healthcare provider will decide whether screening between the ages of 74-80 would be appropriate  Colonoscopy: 09/09/2019  FOBT/FIT: Not on file  Cologuard: Not on file  Sigmoidoscopy: Not on file          Breast Cancer Screening Age: 36 years old  Frequency: every 1-2 years  Not required if history of left and right mastectomy Mammogram: 06/15/2022    History Breast Cancer   Cervical Cancer Screening Between the ages of 21-29, pap smear recommended once every 3 years  Between the ages of 33-67, can perform pap smear with HPV co-testing every 5 years     Recommendations may differ for women with a history of total hysterectomy, cervical cancer, or abnormal pap smears in past  Pap Smear: Not on file    Screening Not Indicated   Hepatitis C Screening Once for adults born between 1945 and 1965  More frequently in patients at high risk for Hepatitis C Hep C Antibody: Not on file        Diabetes Screening 1-2 times per year if you're at risk for diabetes or have pre-diabetes Fasting glucose: 100 mg/dL (5/2/2022)  A1C: 5 8 % (3/5/2021)  Screening Current   Cholesterol Screening Once every 5 years if you don't have a lipid disorder  May order more often based on risk factors  Lipid panel: 05/02/2022    Screening Not Indicated  History Lipid Disorder     Other Preventive Screenings Covered by Medicare:  1  Abdominal Aortic Aneurysm (AAA) Screening: covered once if your at risk  You're considered to be at risk if you have a family history of AAA  2  Lung Cancer Screening: covers low dose CT scan once per year if you meet all of the following conditions: (1) Age 50-69; (2) No signs or symptoms of lung cancer; (3) Current smoker or have quit smoking within the last 15 years; (4) You have a tobacco smoking history of at least 20 pack years (packs per day multiplied by number of years you smoked); (5) You get a written order from a healthcare provider  3  Glaucoma Screening: covered annually if you're considered high risk: (1) You have diabetes OR (2) Family history of glaucoma OR (3)  aged 48 and older OR (3)  American aged 72 and older  3  Osteoporosis Screening: covered every 2 years if you meet one of the following conditions: (1) You're estrogen deficient and at risk for osteoporosis based off medical history and other findings; (2) Have a vertebral abnormality; (3) On glucocorticoid therapy for more than 3 months; (4) Have primary hyperparathyroidism; (5) On osteoporosis medications and need to assess response to drug therapy  · Last bone density test (DXA Scan): 04/19/2017  5  HIV Screening: covered annually if you're between the age of 12-76  Also covered annually if you are younger than 13 and older than 72 with risk factors for HIV infection   For pregnant patients, it is covered up to 3 times per pregnancy  Immunizations:  Immunization Recommendations   Influenza Vaccine Annual influenza vaccination during flu season is recommended for all persons aged >= 6 months who do not have contraindications   Pneumococcal Vaccine   * Pneumococcal conjugate vaccine = PCV13 (Prevnar 13), PCV15 (Vaxneuvance), PCV20 (Prevnar 20)  * Pneumococcal polysaccharide vaccine = PPSV23 (Pneumovax) Adults 25-60 years old: 1-3 doses may be recommended based on certain risk factors  Adults 72 years old: 1-2 doses may be recommended based off what pneumonia vaccine you previously received   Hepatitis B Vaccine 3 dose series if at intermediate or high risk (ex: diabetes, end stage renal disease, liver disease)   Tetanus (Td) Vaccine - COST NOT COVERED BY MEDICARE PART B Following completion of primary series, a booster dose should be given every 10 years to maintain immunity against tetanus  Td may also be given as tetanus wound prophylaxis  Tdap Vaccine - COST NOT COVERED BY MEDICARE PART B Recommended at least once for all adults  For pregnant patients, recommended with each pregnancy  Shingles Vaccine (Shingrix) - COST NOT COVERED BY MEDICARE PART B  2 shot series recommended in those aged 48 and above     Health Maintenance Due:      Topic Date Due   • Hepatitis C Screening  Never done   • Breast Cancer Screening: Mammogram  06/15/2024     Immunizations Due:      Topic Date Due   • COVID-19 Vaccine (4 - Pfizer series) 01/01/2022     Advance Directives   What are advance directives? Advance directives are legal documents that state your wishes and plans for medical care  These plans are made ahead of time in case you lose your ability to make decisions for yourself  Advance directives can apply to any medical decision, such as the treatments you want, and if you want to donate organs  What are the types of advance directives? There are many types of advance directives, and each state has rules about how to use them   You may choose a combination of any of the following:  · Living will: This is a written record of the treatment you want  You can also choose which treatments you do not want, which to limit, and which to stop at a certain time  This includes surgery, medicine, IV fluid, and tube feedings  · Durable power of  for healthcare [facility]): This is a written record that states who you want to make healthcare choices for you when you are unable to make them for yourself  This person, called a proxy, is usually a family member or a friend  You may choose more than 1 proxy  · Do not resuscitate (DNR) order:  A DNR order is used in case your heart stops beating or you stop breathing  It is a request not to have certain forms of treatment, such as CPR  A DNR order may be included in other types of advance directives  · Medical directive: This covers the care that you want if you are in a coma, near death, or unable to make decisions for yourself  You can list the treatments you want for each condition  Treatment may include pain medicine, surgery, blood transfusions, dialysis, IV or tube feedings, and a ventilator (breathing machine)  · Values history: This document has questions about your views, beliefs, and how you feel and think about life  This information can help others choose the care that you would choose  Why are advance directives important? An advance directive helps you control your care  Although spoken wishes may be used, it is better to have your wishes written down  Spoken wishes can be misunderstood, or not followed  Treatments may be given even if you do not want them  An advance directive may make it easier for your family to make difficult choices about your care  Weight Management   Why it is important to manage your weight:  Being overweight increases your risk of health conditions such as heart disease, high blood pressure, type 2 diabetes, and certain types of cancer   It can also increase your risk for osteoarthritis, sleep apnea, and other respiratory problems  Aim for a slow, steady weight loss  Even a small amount of weight loss can lower your risk of health problems  How to lose weight safely:  A safe and healthy way to lose weight is to eat fewer calories and get regular exercise  You can lose up about 1 pound a week by decreasing the number of calories you eat by 500 calories each day  Healthy meal plan for weight management:  A healthy meal plan includes a variety of foods, contains fewer calories, and helps you stay healthy  A healthy meal plan includes the following:  · Eat whole-grain foods more often  A healthy meal plan should contain fiber  Fiber is the part of grains, fruits, and vegetables that is not broken down by your body  Whole-grain foods are healthy and provide extra fiber in your diet  Some examples of whole-grain foods are whole-wheat breads and pastas, oatmeal, brown rice, and bulgur  · Eat a variety of vegetables every day  Include dark, leafy greens such as spinach, kale, evan greens, and mustard greens  Eat yellow and orange vegetables such as carrots, sweet potatoes, and winter squash  · Eat a variety of fruits every day  Choose fresh or canned fruit (canned in its own juice or light syrup) instead of juice  Fruit juice has very little or no fiber  · Eat low-fat dairy foods  Drink fat-free (skim) milk or 1% milk  Eat fat-free yogurt and low-fat cottage cheese  Try low-fat cheeses such as mozzarella and other reduced-fat cheeses  · Choose meat and other protein foods that are low in fat  Choose beans or other legumes such as split peas or lentils  Choose fish, skinless poultry (chicken or turkey), or lean cuts of red meat (beef or pork)  Before you cook meat or poultry, cut off any visible fat  · Use less fat and oil  Try baking foods instead of frying them   Add less fat, such as margarine, sour cream, regular salad dressing and mayonnaise to foods  Eat fewer high-fat foods  Some examples of high-fat foods include french fries, doughnuts, ice cream, and cakes  · Eat fewer sweets  Limit foods and drinks that are high in sugar  This includes candy, cookies, regular soda, and sweetened drinks  Exercise:  Exercise at least 30 minutes per day on most days of the week  Some examples of exercise include walking, biking, dancing, and swimming  You can also fit in more physical activity by taking the stairs instead of the elevator or parking farther away from stores  Ask your healthcare provider about the best exercise plan for you  © Copyright  2018 Information is for End User's use only and may not be sold, redistributed or otherwise used for commercial purposes   All illustrations and images included in CareNotes® are the copyrighted property of A D A M , Inc  or [address] [place], [place] [address]paD[facility]

## 2023-05-30 ENCOUNTER — LAB (OUTPATIENT)
Dept: LAB | Facility: CLINIC | Age: 78
End: 2023-05-30

## 2023-05-30 ENCOUNTER — TRANSCRIBE ORDERS (OUTPATIENT)
Dept: LAB | Facility: CLINIC | Age: 78
End: 2023-05-30

## 2023-05-30 DIAGNOSIS — I10 PRIMARY HYPERTENSION: ICD-10-CM

## 2023-05-30 DIAGNOSIS — I50.22 CHRONIC SYSTOLIC HEART FAILURE (HCC): ICD-10-CM

## 2023-05-30 DIAGNOSIS — E03.9 HYPOTHYROIDISM, UNSPECIFIED TYPE: ICD-10-CM

## 2023-05-30 DIAGNOSIS — I42.0 DILATED CARDIOMYOPATHY (HCC): ICD-10-CM

## 2023-05-30 DIAGNOSIS — E78.00 PURE HYPERCHOLESTEROLEMIA: ICD-10-CM

## 2023-05-30 DIAGNOSIS — E78.00 PURE HYPERCHOLESTEROLEMIA: Primary | ICD-10-CM

## 2023-05-30 LAB
ALBUMIN SERPL BCP-MCNC: 3.7 G/DL (ref 3.5–5)
ALP SERPL-CCNC: 66 U/L (ref 46–116)
ALT SERPL W P-5'-P-CCNC: 25 U/L (ref 12–78)
ANION GAP SERPL CALCULATED.3IONS-SCNC: 1 MMOL/L (ref 4–13)
AST SERPL W P-5'-P-CCNC: 30 U/L (ref 5–45)
BILIRUB SERPL-MCNC: 0.8 MG/DL (ref 0.2–1)
BUN SERPL-MCNC: 25 MG/DL (ref 5–25)
CALCIUM SERPL-MCNC: 9.1 MG/DL (ref 8.3–10.1)
CHLORIDE SERPL-SCNC: 111 MMOL/L (ref 96–108)
CHOLEST SERPL-MCNC: 185 MG/DL
CO2 SERPL-SCNC: 26 MMOL/L (ref 21–32)
CREAT SERPL-MCNC: 0.88 MG/DL (ref 0.6–1.3)
ERYTHROCYTE [DISTWIDTH] IN BLOOD BY AUTOMATED COUNT: 13.5 % (ref 11.6–15.1)
FERRITIN SERPL-MCNC: 83 NG/ML (ref 11–307)
GFR SERPL CREATININE-BSD FRML MDRD: 62 ML/MIN/1.73SQ M
GLUCOSE P FAST SERPL-MCNC: 101 MG/DL (ref 65–99)
HCT VFR BLD AUTO: 36.3 % (ref 34.8–46.1)
HDLC SERPL-MCNC: 49 MG/DL
HGB BLD-MCNC: 11.4 G/DL (ref 11.5–15.4)
IRON SATN MFR SERPL: 24 % (ref 15–50)
IRON SERPL-MCNC: 64 UG/DL (ref 50–170)
LDLC SERPL CALC-MCNC: 107 MG/DL (ref 0–100)
MCH RBC QN AUTO: 28.2 PG (ref 26.8–34.3)
MCHC RBC AUTO-ENTMCNC: 31.4 G/DL (ref 31.4–37.4)
MCV RBC AUTO: 90 FL (ref 82–98)
NONHDLC SERPL-MCNC: 136 MG/DL
PLATELET # BLD AUTO: 137 THOUSANDS/UL (ref 149–390)
PMV BLD AUTO: 10.6 FL (ref 8.9–12.7)
POTASSIUM SERPL-SCNC: 4.2 MMOL/L (ref 3.5–5.3)
PROT SERPL-MCNC: 6.8 G/DL (ref 6.4–8.4)
RBC # BLD AUTO: 4.04 MILLION/UL (ref 3.81–5.12)
SODIUM SERPL-SCNC: 138 MMOL/L (ref 135–147)
TIBC SERPL-MCNC: 267 UG/DL (ref 250–450)
TRIGL SERPL-MCNC: 144 MG/DL
TSH SERPL DL<=0.05 MIU/L-ACNC: 1.51 UIU/ML (ref 0.45–4.5)
WBC # BLD AUTO: 5.55 THOUSAND/UL (ref 4.31–10.16)

## 2023-05-31 LAB — LPA SERPL-SCNC: 27.2 NMOL/L

## 2023-06-03 NOTE — PROGRESS NOTES
Cardiology Clinic Note    Esther Chanel 76 y o  female   MRN: [id number]  Encounter: [id number]        Assessment / Plan:    #  Non-ischemic Cardiomyopathy  Etiology:  non-ischemic by [facility]  Cardiac MRI suggests old myocarditis  Also at least raises question of cardiac sarcoidosis  Does not appear suggestive of amyloid or HCM  We discussed the possibility of a cardiac PET scan to eval for any inflammation that might suggest sarcoidosis  She declines  I also talked to her daughter who is a pediatric nephrologist and we decided we will not pursue this  Family members should have clinical screening  We discussed option of genetic testing, as this could be actionable information for her children  She declines today (wants to talk to her daughter first)  #  Chronic Systolic Heart Failure  Class and Stage:  NYHA class:  II  (walks 3 miles per day)  ACC/AHA stage: C    Volume Management:  Volume assessment:   Euvolemic on exam   Diuretic:  not requiring a loop diuretic  K+ supplementation and lab monitoring: recent labs reviewed  Neurohormonal Blockade / GDMT:  Beta blocker:  toprol XL 25mg BID  ACEi/ARB/ARNI:  entresto 24-26 - half in AM, full pill in PM   MRA:  david 12 5mg QOD (eventually consider moving to daily dosing, wants to defer until after her upcoming trip)  SGLT2i:  Consider adding but she wants to defer until after trip (need to make sure stays hydrated if we ultimately start this)    Sudden cardiac death risk reduction:  S/p dual chamber ICD 2021  (had syncope and NSVT on tele)  Last interrogation  AP 90%,  <0 1%  Non-specific IVCB  Dr Javier Garcia has reviewed EKG  Not recommending CRT at this time  Other heart failure considerations:  Mitral clip, IV iron, RICH eval, clinical trial, cardiac rehab, palliative care, advanced therapies:  not at this time    # NSVT  Had syncope 2021  Found to have NSVT  ICD placed 2021  On BB      # Hx PACs, PVCs  Continue BB    # Aortic valve disease  Aortic sclerosis, mild AI  Serial exam and echo follow up    # Moderate MR  Serial exam and echo follow up    # HTN  See GDMT above    # HLD  Lipid panel 2023 -   Elevated ASCVD risk score  Could consider starting statin (and could stop aspirin)  Defer to primary cardiologist     # Hypothyroidism  On synthroid  Most recent TSH has been normal     # Hx of breast cancer  Mastectomy 2017  Tx with anastrazole x 5 years  Today's Plan Summary:  See above assessment/plan for full details of today's plan  Briefly,     Declines med changes today as above  Will discuss genetic testing with her family  CC - Dr Nadeen Leija          Reason For Visit / Chief Complaint:  F/u cardiomyopathy    HPI:   Andra Charles is a 76 y o   female with history as noted in the problem list and further detailed in the above assessment and plan  Initial:  2023  Referral from Dr Jameson Sharma  Asymetric upper septal hypertrophy on echo  Longstanding hx of systolic dysfunction  Now with declining EF on serial echo  40% --> 35% --> 30%  Dr Jameson Sharma requested heart failure consult for second opinion  Patient overall feels pretty well  She walks 3 miles almost everyday  If she walks fast, can get some SOB -- otherwise feels good on these walks  No syncope since ICD placement  No ICD shocks  Likes to do gardening when the weather is nicer  Lives by herself (  5 years ago)  Has two daughters  One daughter is a nephrologist (Director of Kidney transplant at [address] [place] [address])  Interval:  Last visit - reviewed the cardiac MRI  A little dizziness with entresto so takes a half dose in the AM and a full dose in the PM      Plan at last visit --> check iron studies  BMP -  stable  Iron studies - normal    Today - reports fatigue from doing too much work in the garden  Otherwise feels ok  No leg edema  No SOB  SHARMA stable  No chest pain or pressure  In addition to garden work - also walks 3 miles every day  Upcoming big trip to [place] with kids and grandkids  Cardiac Imaging personally reviewed:  EKG Nonspecific IVCB, QRS around 142ms  Holter or event monitor    Echo 1-11-23  Mild left ventricular hypertrophy with moderately severe LV dysfunction, EF ~30% with moderate diastolic dysfunction  Mild left atrial enlargement  Mild aortic sclerosis with mild aortic insufficiency  Mildly thickened mitral valve with early annular calcification and moderate mitral regurgitation  Compared to prior report of January 27, 2022, LV EF is less and mitral regurgitation has increased  2022  Mild-mod asymmetric hypertrophy  EF 35%  Mild MR  Mild aortic sclerosis, trace AI  RAMONA    Cardiac MRI 3-14-23  LVIDd 5 7cm  EF 30%  Mild upper septal hypertrophy, up to 1 3cm  Mildly dilated RV with mod reduced function  LGE - epicardial in anterior and anterolateral   LGE - transmural inf-septum, mid to distal        Stress testing Stress echo - 2/2021  No ischemia   Coronary CTA or [facility] Cardiac catheterization March 5, 2021: Normal coronaries      [address]    CP[facility]              Patient Active Problem List    Diagnosis Date Noted   • Gastroesophageal reflux disease without esophagitis 10/24/2022   • Cellulitis 06/16/2022   • Exertional angina ([address] ) 04/21/2022   • Syncope 03/04/2021   • NSVT (nonsustained ventricular tachycardia) ([address] ) 10/29/2020   • Acute pain of right knee 10/11/2019   • Acquired trigger finger 06/01/2018   • Carpal tunnel syndrome 06/01/2018   • Numbness of hand 06/01/2018   • Malignant neoplasm of upper-inner 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 breast ([address] ) 01/26/2017   • Hypertrophic cardiomyopathy ([address] ) 11/09/2016   • Hypothyroidism 03/01/2016   • Plantar fasciitis 02/22/2016   • Hypertension 04/18/2014   • Ischemic cardiomyopathy 12/23/2013   • Osteopenia 06/18/2013       Past Medical History:   Diagnosis Date   • Hypertrophic cardiomyopathy ([address] )    • Hypothyroidism    • Ischemic cardiomyopathy    • Malignant neoplasm of left female breast ([facility] [address] ) 02/09/2017    stage IIA   • Osteopenia    • Trigger finger of all digits of right hand        Allergies   Allergen Reactions   • Seasonal Ic [Cholestatin] Sneezing       Current Outpatient Medications   Medication Instructions   • aspirin (ECOTRIN LOW STRENGTH) 81 mg, Oral, Daily   • calcium carbonate-vitamin D (OSCAL-D) 500 mg-200 units per tablet take 1 tablet by mouth once daily WITH BREAKFAST  • cholecalciferol (VITAMIN D3) 1,000 Units, Oral, Daily   • Co Q-10 50 mg, Oral   • fish oil 1,000 mg, Oral, Daily   • levothyroxine 50 mcg tablet take 1 tablet by mouth once daily   • metoprolol succinate (TOPROL-XL) 50 mg 24 hr tablet take 1 tablet by mouth every morning   • mometasone (ELOCON) 0 1 % cream No dose, route, or frequency recorded  • sacubitril-valsartan (Entresto) 24-26 MG TABS 1 tablet, Oral, 2 times daily   • spironolactone (ALDACTONE) 12 5 mg, Oral, Every other day       Social History     Socioeconomic History   • Marital status:      Spouse name: Not on file   • Number of children: Not on file   • Years of education: Not on file   • Highest education level: Not on file   Occupational History   • Not on file   Tobacco Use   • Smoking status: Never   • Smokeless tobacco: Never   Vaping Use   • Vaping Use: Never used   Substance and Sexual Activity   • Alcohol use: No     Alcohol/week: 1 0 standard drink of alcohol     Types: 1 Glasses of wine per week   • Drug use: No   • Sexual activity: Not Currently   Other Topics Concern   • Not on file   Social History Narrative     since 2018  Was  for 50 years  Lives alone  Has 2 daughters  One in [place] who is a nephrologist   The other in [facility]  Enjoys gardening and being outside         Social Determinants of Health     Financial Resource Strain: Low Risk  (5/24/2023)    Overall Financial Resource Strain (CARDIA)    • Difficulty of Paying Living Expenses: Not hard at all   Food Insecurity: "Not on file   Transportation Needs: No Transportation Needs (5/24/2023)    PRAPARE - Transportation    • Lack of Transportation (Medical): No    • Lack of Transportation (Non-Medical): No   Physical Activity: Not on file   Stress: Not on file   Social Connections: Not on file   Intimate Partner Violence: Not on file   Housing Stability: Not on file       Family History   Problem Relation Age of Onset   • Heart disease Mother         had angina   • Heart disease Father    • Brain cancer Sister    • Heart disease Brother         had CABG   • No Known Problems Brother    • No Known Problems Maternal Grandmother    • No Known Problems Maternal Grandfather    • No Known Problems Paternal Grandmother    • No Known Problems Paternal Grandfather    • No Known Problems Daughter    • No Known Problems Daughter    • No Known Problems Paternal Aunt    • No Known Problems Paternal Aunt    • No Known Problems Paternal Aunt    • No Known Problems Paternal Aunt    • No Known Problems Paternal Aunt        Physical Exam:  Blood pressure 114/58, pulse 64, height 5' 1\" (1 549 m), weight 62 6 kg (138 lb), SpO2 98 %  Body mass index is 26 07 kg/m²  Wt Readings from Last 3 Encounters:   06/05/23 62 6 kg (138 lb)   05/24/23 62 5 kg (137 lb 12 8 oz)   05/08/23 62 8 kg (138 lb 8 oz)     Physical Exam  Vitals reviewed  Constitutional:       General: She is not in acute distress  Appearance: She is not toxic-appearing  Neck:      Comments: The JVP is not elevated  Cardiovascular:      Rate and Rhythm: Normal rate and regular rhythm  Heart sounds: Murmur (very soft HSM at apex) heard  No friction rub  No gallop  Pulmonary:      Breath sounds: Normal breath sounds  No wheezing, rhonchi or rales  Abdominal:      General: There is no distension  Palpations: Abdomen is soft  Tenderness: There is no abdominal tenderness  There is no guarding     Musculoskeletal:      Comments: Trace LE edema bilaterally " Neurological:      Mental Status: She is alert  Labs & Results:  Lab Results   Component Value Date    BUN 25 05/30/2023    CALCIUM 9 1 05/30/2023     (H) 05/30/2023    CO2 26 05/30/2023    CREATININE 0 88 05/30/2023    GLUC 136 02/20/2023    K 4 2 05/30/2023    SODIUM 138 05/30/2023       Thank you for the opportunity to participate in the care of this patient      Charline Verde MD, [facility]  Advanced Heart Failure and Transplant Cardiologist  Director of [facility] Merle Tejeda

## 2023-06-05 ENCOUNTER — OFFICE VISIT (OUTPATIENT)
Dept: CARDIOLOGY CLINIC | Facility: CLINIC | Age: 78
End: 2023-06-05
Payer: COMMERCIAL

## 2023-06-05 VITALS
WEIGHT: 138 LBS | HEIGHT: 61 IN | HEART RATE: 64 BPM | BODY MASS INDEX: 26.06 KG/M2 | OXYGEN SATURATION: 98 % | SYSTOLIC BLOOD PRESSURE: 114 MMHG | DIASTOLIC BLOOD PRESSURE: 58 MMHG

## 2023-06-05 DIAGNOSIS — I47.29 NSVT (NONSUSTAINED VENTRICULAR TACHYCARDIA) (HCC): ICD-10-CM

## 2023-06-05 DIAGNOSIS — I42.0 DILATED CARDIOMYOPATHY (HCC): Primary | ICD-10-CM

## 2023-06-05 DIAGNOSIS — I34.0 NONRHEUMATIC MITRAL VALVE REGURGITATION: ICD-10-CM

## 2023-06-05 DIAGNOSIS — E78.2 MIXED HYPERLIPIDEMIA: ICD-10-CM

## 2023-06-05 DIAGNOSIS — I50.22 CHRONIC SYSTOLIC HEART FAILURE (HCC): ICD-10-CM

## 2023-06-05 PROCEDURE — 99214 OFFICE O/P EST MOD 30 MIN: CPT | Performed by: INTERNAL MEDICINE

## 2023-06-05 NOTE — PATIENT INSTRUCTIONS
No medication changes  Talk to your family about genetic testing for non-ischemic cardiomyopathy  There would be no benefit to you, but the results could potentially affect family members

## 2023-06-19 ENCOUNTER — HOSPITAL ENCOUNTER (OUTPATIENT)
Dept: RADIOLOGY | Age: 78
Discharge: HOME | End: 2023-06-19
Payer: COMMERCIAL

## 2023-06-19 VITALS — WEIGHT: 138 LBS | BODY MASS INDEX: 26.06 KG/M2 | HEIGHT: 61 IN

## 2023-06-19 DIAGNOSIS — Z12.31 VISIT FOR SCREENING MAMMOGRAM: ICD-10-CM

## 2023-06-19 PROCEDURE — 77063 BREAST TOMOSYNTHESIS BI: CPT

## 2023-06-19 PROCEDURE — 77067 SCR MAMMO BI INCL CAD: CPT

## 2023-07-17 DIAGNOSIS — I42.0 DILATED CARDIOMYOPATHY (HCC): ICD-10-CM

## 2023-07-17 RX ORDER — SACUBITRIL AND VALSARTAN 24; 26 MG/1; MG/1
TABLET, FILM COATED ORAL
Qty: 60 TABLET | Refills: 3 | Status: SHIPPED | OUTPATIENT
Start: 2023-07-17

## 2023-09-14 ENCOUNTER — OFFICE VISIT (OUTPATIENT)
Dept: CARDIOLOGY CLINIC | Facility: CLINIC | Age: 78
End: 2023-09-14
Payer: COMMERCIAL

## 2023-09-14 VITALS
DIASTOLIC BLOOD PRESSURE: 62 MMHG | BODY MASS INDEX: 26.01 KG/M2 | WEIGHT: 137.8 LBS | SYSTOLIC BLOOD PRESSURE: 110 MMHG | OXYGEN SATURATION: 96 % | HEART RATE: 61 BPM | HEIGHT: 61 IN

## 2023-09-14 DIAGNOSIS — I50.22 CHRONIC SYSTOLIC HEART FAILURE (HCC): ICD-10-CM

## 2023-09-14 DIAGNOSIS — I42.0 DILATED CARDIOMYOPATHY (HCC): Primary | ICD-10-CM

## 2023-09-14 DIAGNOSIS — I34.0 NONRHEUMATIC MITRAL VALVE REGURGITATION: ICD-10-CM

## 2023-09-14 DIAGNOSIS — I47.29 NSVT (NONSUSTAINED VENTRICULAR TACHYCARDIA) (HCC): ICD-10-CM

## 2023-09-14 DIAGNOSIS — E78.2 MIXED HYPERLIPIDEMIA: ICD-10-CM

## 2023-09-14 PROCEDURE — 99214 OFFICE O/P EST MOD 30 MIN: CPT | Performed by: INTERNAL MEDICINE

## 2023-09-14 NOTE — PROGRESS NOTES
Cardiology Clinic Note    Corona Hair 76 y.o. female   MRN: [id number]  Encounter: [id number]        Assessment / Plan:    #  Non-ischemic Cardiomyopathy  Etiology:  non-ischemic by [facility]  Cardiac MRI suggests old myocarditis. Also at least raises question of cardiac sarcoidosis. Does not appear suggestive of amyloid or HCM. We discussed the possibility of a cardiac PET scan to eval for any inflammation that might suggest sarcoidosis. She declines. I also talked to her daughter after a prior visit who is a pediatric nephrologist and we decided we will not pursue this. Family members should have clinical screening  We discussed option of genetic testing, as this could be actionable information for her children. She is agreeable. Will order. #  Chronic Systolic Heart Failure  Class and Stage:  NYHA class:   2  (walks 3 miles per day)  ACC/AHA stage: C    Volume Management:  Volume assessment:   Euvolemic on exam.  Diuretic:  not requiring a loop diuretic. K+ supplementation and lab monitoring:  Has labs ordered. Neurohormonal Blockade / GDMT:  Beta blocker:  toprol XL 25mg BID  ACEi/ARB/ARNI:  entresto 24-26 - half in AM, full pill in PM.  MRA:  david 12.5mg QOD (eventually consider moving to daily dosing)  SGLT2i:  Consider (she wants to avoid any new meds at this time)    Sudden cardiac death risk reduction:  S/p dual chamber ICD 2021  (had syncope and NSVT on tele)  Last interrogation. AP 90%,  <0.1%  Non-specific IVCB. Dr. Manny Nunn has reviewed EKG. Not recommending CRT at this time. Other heart failure considerations:  Mitral clip, IV iron, RICH eval, clinical trial, cardiac rehab, palliative care:  not at this time    # NSVT  Had syncope 2021. Found to have NSVT. ICD placed 2021. On BB.     # Hx PACs, PVCs  Continue BB    # Aortic valve disease  Aortic sclerosis, mild AI  Serial exam and echo follow up    # Moderate MR  Serial exam and echo follow up    # HTN  See GDMT above    # HLD  Lipid panel 2023 -   Elevated ASCVD risk score  Could consider starting statin (and could stop aspirin). Defer to primary cardiologist.    # Hypothyroidism  On synthroid  Most recent TSH has been normal.    # Hx of breast cancer  Mastectomy 2017. Tx with anastrazole x 5 years. Today's Plan Summary:  See above assessment/plan for full details of today's plan. Briefly,     We discussed option of genetic testing, as this could be actionable information for her children. She is agreeable. Will order. CC - Dr. Suly Lacy          Reason For Visit / Chief Complaint:  F/u cardiomyopathy    HPI:   Ethan Bran is a 76 y.o.  female with history as noted in the problem list and further detailed in the above assessment and plan. Initial:  2023  Referral from Dr. Bay Curtis. Asymetric upper septal hypertrophy on echo. Longstanding hx of systolic dysfunction. Now with declining EF on serial echo. 40% --> 35% --> 30%. Dr. Bay Curtis requested heart failure consult for second opinion. Patient overall feels pretty well. She walks 3 miles almost everyday. If she walks fast, can get some SOB -- otherwise feels good on these walks. No syncope since ICD placement. No ICD shocks. Likes to do gardening when the weather is nicer. Lives by herself (  5 years ago). Has two daughters. One daughter is a nephrologist (Director of Kidney transplant at [employer]). Interval:  Last visit -->  [place] stable. No SOB. No edema. SHARMA was stable. Plan last visit --> Declines med changes today as above. Will discuss genetic testing with her family. Today - had a great family trip to [place] [place]  3 miles every day. No chest pain. No SOB. No leg edema. No syncope. Cardiac Imaging personally reviewed:  EKG Nonspecific IVCB, QRS around 142ms.     Holter or event monitor    Echo 23  Mild left ventricular hypertrophy with moderately severe LV dysfunction, EF ~30% with moderate diastolic dysfunction  Mild left atrial enlargement  Mild aortic sclerosis with mild aortic insufficiency  Mildly thickened mitral valve with early annular calcification and moderate mitral regurgitation  Compared to prior report of January 27, 2022, LV EF is less and mitral regurgitation has increased. 2022  Mild-mod asymmetric hypertrophy. EF 35%. Mild MR. Mild aortic sclerosis, trace AI. RAMONA    Cardiac MRI 3-14-23  LVIDd 5.7cm. EF 30%. Mild upper septal hypertrophy, up to 1.3cm. Mildly dilated RV with mod reduced function. LGE - epicardial in anterior and anterolateral.  LGE - transmural inf-septum, mid to distal.       Stress testing Stress echo - 2/2021  No ischemia   Coronary CTA or [facility] Cardiac catheterization March 5, 2021: Normal coronaries.     [facility]    CPET              Patient Active Problem List    Diagnosis Date Noted   • Gastroesophageal reflux disease without esophagitis 10/24/2022   • Cellulitis 06/16/2022   • Exertional angina ([address]) 04/21/2022   • Syncope 03/04/2021   • NSVT (nonsustained ventricular tachycardia) ([address]) 10/29/2020   • Acute pain of right knee 10/11/2019   • Acquired trigger finger 06/01/2018   • Carpal tunnel syndrome 06/01/2018   • Numbness of hand 06/01/2018   • Malignant neoplasm of upper-inner 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 breast ([address]) 01/26/2017   • Hypertrophic cardiomyopathy ([address]) 11/09/2016   • Hypothyroidism 03/01/2016   • Plantar fasciitis 02/22/2016   • Hypertension 04/18/2014   • Ischemic cardiomyopathy 12/23/2013   • Osteopenia 06/18/2013       Past Medical History:   Diagnosis Date   • Hypertrophic cardiomyopathy ([address])    • Hypothyroidism    • Ischemic cardiomyopathy    • Malignant neoplasm of left female breast ([address]) 02/09/2017    stage IIA   • Osteopenia    • Trigger finger of all digits of right hand        Allergies   Allergen Reactions   • Seasonal Ic [Cholestatin] Sneezing       Current Outpatient Medications   Medication Instructions   • aspirin (Kenn Rosario LOW STRENGTH) 81 mg, Oral, Daily   • calcium carbonate-vitamin D (OSCAL-D) 500 mg-200 units per tablet take 1 tablet by mouth once daily WITH BREAKFAST. • cholecalciferol (VITAMIN D3) 1,000 Units, Oral, Daily   • Co Q-10 50 mg, Oral   • Entresto 24-26 MG TABS take 1 tablet by mouth twice a day   • fish oil 1,000 mg, Oral, Daily   • levothyroxine 50 mcg tablet take 1 tablet by mouth once daily   • metoprolol succinate (TOPROL-XL) 50 mg 24 hr tablet take 1 tablet by mouth every morning   • mometasone (ELOCON) 0.1 % cream No dose, route, or frequency recorded. • spironolactone (ALDACTONE) 12.5 mg, Oral, Every other day       Social History     Socioeconomic History   • Marital status:      Spouse name: Not on file   • Number of children: Not on file   • Years of education: Not on file   • Highest education level: Not on file   Occupational History   • Not on file   Tobacco Use   • Smoking status: Never   • Smokeless tobacco: Never   Vaping Use   • Vaping Use: Never used   Substance and Sexual Activity   • Alcohol use: No     Alcohol/week: 1.0 standard drink of alcohol     Types: 1 Glasses of wine per week   • Drug use: No   • Sexual activity: Not Currently   Other Topics Concern   • Not on file   Social History Narrative     since 2018. Was  for 50 years. Lives alone. Has 2 daughters. One in [place] who is a nephrologist.  The other in [address] [place] [address]. Enjoys gardening and being outside. Social Determinants of Health     Financial Resource Strain: Low Risk  (5/24/2023)    Overall Financial Resource Strain (CARDIA)    • Difficulty of Paying Living Expenses: Not hard at all   Food Insecurity: Not on file   Transportation Needs: No Transportation Needs (5/24/2023)    PRAPARE - Transportation    • Lack of Transportation (Medical): No    • Lack of Transportation (Non-Medical):  No   Physical Activity: Not on file   Stress: Not on file   Social Connections: Not on file   Intimate Partner Violence: Not on file   Housing Stability: Not on file       Family History   Problem Relation Age of Onset   • Heart disease Mother         had angina   • Heart disease Father    • Brain cancer Sister    • Heart disease Brother         had CABG   • No Known Problems Brother    • No Known Problems Maternal Grandmother    • No Known Problems Maternal Grandfather    • No Known Problems Paternal Grandmother    • No Known Problems Paternal Grandfather    • No Known Problems Daughter    • No Known Problems Daughter    • No Known Problems Paternal Aunt    • No Known Problems Paternal Aunt    • No Known Problems Paternal Aunt    • No Known Problems Paternal Aunt    • No Known Problems Paternal Aunt        Physical Exam:  Blood pressure 110/62, pulse 61, height 5' 1" (1.549 m), weight 62.5 kg (137 lb 12.8 oz), SpO2 96 %. Body mass index is 26.04 kg/m². Wt Readings from Last 3 Encounters:   09/14/23 62.5 kg (137 lb 12.8 oz)   06/19/23 62.6 kg (138 lb)   06/05/23 62.6 kg (138 lb)     Physical Exam  Vitals reviewed. Constitutional:       General: She is not in acute distress. Appearance: She is not toxic-appearing. Neck:      Comments: No JVD  Cardiovascular:      Rate and Rhythm: Normal rate and regular rhythm. Heart sounds: Murmur (very soft HSM at apex) heard. No friction rub. No gallop. Pulmonary:      Breath sounds: Normal breath sounds. No wheezing, rhonchi or rales. Abdominal:      General: There is no distension. Palpations: Abdomen is soft. Tenderness: There is no abdominal tenderness. There is no guarding. Musculoskeletal:      Comments: Trace LE edema bilaterally   Neurological:      Mental Status: She is alert.          Labs & Results:  Lab Results   Component Value Date    SODIUM 138 05/30/2023    K 4.2 05/30/2023     (H) 05/30/2023    CO2 26 05/30/2023    BUN 25 05/30/2023    CREATININE 0.88 05/30/2023    GLUC 136 02/20/2023    CALCIUM 9.1 05/30/2023       Thank you for the opportunity to participate in the care of this patient.     Karen Parker MD, [facility]  Advanced Heart Failure and Transplant Cardiologist  Director of [address] [place] [address]

## 2023-09-15 ENCOUNTER — DOCUMENTATION (OUTPATIENT)
Dept: CARDIOLOGY CLINIC | Facility: CLINIC | Age: 78
End: 2023-09-15

## 2023-10-05 ENCOUNTER — DOCUMENTATION (OUTPATIENT)
Dept: CARDIOLOGY CLINIC | Facility: CLINIC | Age: 78
End: 2023-10-05

## 2023-10-05 NOTE — PROGRESS NOTES
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 This gene has a known association with HCM and dilated cardiomyopathy. I did discuss this with the patient by phone tonight. I offered to call her daughter who is a physician but she prefers to hold off on this at this time. Family members will need to be screened clinically and possibly with genetic testing depending on preference.     Allen Guerrier MD

## 2023-10-06 ENCOUNTER — DOCUMENTATION (OUTPATIENT)
Dept: CARDIOLOGY CLINIC | Facility: CLINIC | Age: 78
End: 2023-10-06

## 2023-10-20 DIAGNOSIS — E03.9 HYPOTHYROIDISM, UNSPECIFIED TYPE: ICD-10-CM

## 2023-10-20 RX ORDER — LEVOTHYROXINE SODIUM 0.05 MG/1
TABLET ORAL
Qty: 90 TABLET | Refills: 1 | Status: SHIPPED | OUTPATIENT
Start: 2023-10-20

## 2023-10-30 ENCOUNTER — TRANSCRIBE ORDERS (OUTPATIENT)
Dept: LAB | Facility: CLINIC | Age: 78
End: 2023-10-30

## 2023-10-30 ENCOUNTER — APPOINTMENT (OUTPATIENT)
Dept: LAB | Facility: CLINIC | Age: 78
End: 2023-10-30
Payer: COMMERCIAL

## 2023-10-30 ENCOUNTER — CLINICAL SUPPORT (OUTPATIENT)
Dept: FAMILY MEDICINE CLINIC | Facility: CLINIC | Age: 78
End: 2023-10-30
Payer: COMMERCIAL

## 2023-10-30 DIAGNOSIS — E05.00 TOXIC DIFFUSE GOITER WITH PRETIBIAL MYXEDEMA: ICD-10-CM

## 2023-10-30 DIAGNOSIS — I48.91 ATRIAL FIBRILLATION, UNSPECIFIED TYPE (HCC): Primary | ICD-10-CM

## 2023-10-30 DIAGNOSIS — E78.5 HYPERLIPIDEMIA, UNSPECIFIED HYPERLIPIDEMIA TYPE: ICD-10-CM

## 2023-10-30 DIAGNOSIS — I42.8 CARDIOMYOPATHY, NONISCHEMIC (HCC): ICD-10-CM

## 2023-10-30 DIAGNOSIS — E03.8 TOXIC DIFFUSE GOITER WITH PRETIBIAL MYXEDEMA: ICD-10-CM

## 2023-10-30 DIAGNOSIS — I48.91 ATRIAL FIBRILLATION, UNSPECIFIED TYPE (HCC): ICD-10-CM

## 2023-10-30 DIAGNOSIS — Z23 ENCOUNTER FOR IMMUNIZATION: Primary | ICD-10-CM

## 2023-10-30 LAB
ALBUMIN SERPL BCP-MCNC: 4.1 G/DL (ref 3.5–5)
ALP SERPL-CCNC: 57 U/L (ref 34–104)
ALT SERPL W P-5'-P-CCNC: 13 U/L (ref 7–52)
ANION GAP SERPL CALCULATED.3IONS-SCNC: 5 MMOL/L
AST SERPL W P-5'-P-CCNC: 21 U/L (ref 13–39)
BASOPHILS # BLD AUTO: 0.05 THOUSANDS/ÂΜL (ref 0–0.1)
BASOPHILS NFR BLD AUTO: 1 % (ref 0–1)
BILIRUB SERPL-MCNC: 0.78 MG/DL (ref 0.2–1)
BUN SERPL-MCNC: 24 MG/DL (ref 5–25)
CALCIUM SERPL-MCNC: 9.4 MG/DL (ref 8.4–10.2)
CHLORIDE SERPL-SCNC: 107 MMOL/L (ref 96–108)
CHOLEST SERPL-MCNC: 192 MG/DL
CO2 SERPL-SCNC: 27 MMOL/L (ref 21–32)
CREAT SERPL-MCNC: 0.87 MG/DL (ref 0.6–1.3)
EOSINOPHIL # BLD AUTO: 0.19 THOUSAND/ÂΜL (ref 0–0.61)
EOSINOPHIL NFR BLD AUTO: 3 % (ref 0–6)
ERYTHROCYTE [DISTWIDTH] IN BLOOD BY AUTOMATED COUNT: 13.7 % (ref 11.6–15.1)
GFR SERPL CREATININE-BSD FRML MDRD: 63 ML/MIN/1.73SQ M
GLUCOSE P FAST SERPL-MCNC: 99 MG/DL (ref 65–99)
HCT VFR BLD AUTO: 39.4 % (ref 34.8–46.1)
HDLC SERPL-MCNC: 46 MG/DL
HGB BLD-MCNC: 12.4 G/DL (ref 11.5–15.4)
IMM GRANULOCYTES # BLD AUTO: 0.01 THOUSAND/UL (ref 0–0.2)
IMM GRANULOCYTES NFR BLD AUTO: 0 % (ref 0–2)
LDLC SERPL CALC-MCNC: 119 MG/DL (ref 0–100)
LDLC SERPL DIRECT ASSAY-MCNC: 121 MG/DL (ref 0–100)
LYMPHOCYTES # BLD AUTO: 0.92 THOUSANDS/ÂΜL (ref 0.6–4.47)
LYMPHOCYTES NFR BLD AUTO: 14 % (ref 14–44)
MAGNESIUM SERPL-MCNC: 1.9 MG/DL (ref 1.9–2.7)
MCH RBC QN AUTO: 28.5 PG (ref 26.8–34.3)
MCHC RBC AUTO-ENTMCNC: 31.5 G/DL (ref 31.4–37.4)
MCV RBC AUTO: 91 FL (ref 82–98)
MONOCYTES # BLD AUTO: 0.58 THOUSAND/ÂΜL (ref 0.17–1.22)
MONOCYTES NFR BLD AUTO: 9 % (ref 4–12)
NEUTROPHILS # BLD AUTO: 4.75 THOUSANDS/ÂΜL (ref 1.85–7.62)
NEUTS SEG NFR BLD AUTO: 73 % (ref 43–75)
NONHDLC SERPL-MCNC: 146 MG/DL
NRBC BLD AUTO-RTO: 0 /100 WBCS
PLATELET # BLD AUTO: 168 THOUSANDS/UL (ref 149–390)
PMV BLD AUTO: 10.4 FL (ref 8.9–12.7)
POTASSIUM SERPL-SCNC: 4.4 MMOL/L (ref 3.5–5.3)
PROT SERPL-MCNC: 7 G/DL (ref 6.4–8.4)
RBC # BLD AUTO: 4.35 MILLION/UL (ref 3.81–5.12)
SODIUM SERPL-SCNC: 139 MMOL/L (ref 135–147)
TRIGL SERPL-MCNC: 137 MG/DL
TSH SERPL DL<=0.05 MIU/L-ACNC: 2.01 UIU/ML (ref 0.45–4.5)
WBC # BLD AUTO: 6.5 THOUSAND/UL (ref 4.31–10.16)

## 2023-10-30 PROCEDURE — 83721 ASSAY OF BLOOD LIPOPROTEIN: CPT

## 2023-10-30 PROCEDURE — 36415 COLL VENOUS BLD VENIPUNCTURE: CPT

## 2023-10-30 PROCEDURE — 80053 COMPREHEN METABOLIC PANEL: CPT

## 2023-10-30 PROCEDURE — 84443 ASSAY THYROID STIM HORMONE: CPT

## 2023-10-30 PROCEDURE — 90662 IIV NO PRSV INCREASED AG IM: CPT

## 2023-10-30 PROCEDURE — G0008 ADMIN INFLUENZA VIRUS VAC: HCPCS

## 2023-10-30 PROCEDURE — 85025 COMPLETE CBC W/AUTO DIFF WBC: CPT

## 2023-10-30 PROCEDURE — 80061 LIPID PANEL: CPT

## 2023-10-30 PROCEDURE — 83735 ASSAY OF MAGNESIUM: CPT

## 2023-11-13 ENCOUNTER — OFFICE VISIT (OUTPATIENT)
Dept: CARDIOLOGY CLINIC | Facility: CLINIC | Age: 78
End: 2023-11-13
Payer: COMMERCIAL

## 2023-11-13 VITALS
WEIGHT: 140 LBS | HEIGHT: 61 IN | DIASTOLIC BLOOD PRESSURE: 64 MMHG | BODY MASS INDEX: 26.43 KG/M2 | SYSTOLIC BLOOD PRESSURE: 116 MMHG | HEART RATE: 60 BPM

## 2023-11-13 DIAGNOSIS — I25.5 ISCHEMIC CARDIOMYOPATHY: ICD-10-CM

## 2023-11-13 DIAGNOSIS — I10 HYPERTENSION, UNSPECIFIED TYPE: ICD-10-CM

## 2023-11-13 DIAGNOSIS — I47.29 NSVT (NONSUSTAINED VENTRICULAR TACHYCARDIA) (HCC): ICD-10-CM

## 2023-11-13 DIAGNOSIS — I42.2 HYPERTROPHIC CARDIOMYOPATHY (HCC): ICD-10-CM

## 2023-11-13 DIAGNOSIS — I48.91 ATRIAL FIBRILLATION, UNSPECIFIED TYPE (HCC): Primary | ICD-10-CM

## 2023-11-13 PROBLEM — I48.0 PAROXYSMAL ATRIAL FIBRILLATION (HCC): Status: ACTIVE | Noted: 2023-11-13

## 2023-11-13 PROCEDURE — 93000 ELECTROCARDIOGRAM COMPLETE: CPT | Performed by: INTERNAL MEDICINE

## 2023-11-13 PROCEDURE — 99205 OFFICE O/P NEW HI 60 MIN: CPT | Performed by: INTERNAL MEDICINE

## 2023-11-13 NOTE — PROGRESS NOTES
EPS Consultation/New Patient Evaluation - Deirdre Chiang 78 y.o. female MRN: [id number]           ASSESSMENT:  1. Atrial fibrillation, unspecified type ([address])  POCT ECG      2. Hypertrophic cardiomyopathy ([address])        3. Ischemic cardiomyopathy        4. NSVT (nonsustained ventricular tachycardia) ([address])        5. Hypertension, unspecified type                PLAN:  New onset atrial fibrillation in the setting of nonischemic cardiomyopathy she had one 3-1/2-hour episode. We discussed the possibility of low-dose amiodarone however her  had a very difficult time with this drug and she does not want to take it. I think it would be very reasonable to wait for another episode however if she has another episode in the next 6 months to 1 year I would recommend starting dofetilide. This could be started as an outpatient given that she has a dual-chamber ICD in place. She could be placed on either 125 mcg twice daily or 250 mcg twice daily as she has normal renal function. She has been started on Eliquis 2.5 mg twice daily which is very reasonable    Brief episodes of nonsustained VT stored on the device     I will follow-up with her in 6 months    CC/HPI:     Outpatient consultation for new onset paroxysmal atrial fibrillation. She has a history of nonischemic cardiomyopathy she is followed by Dr. Suzanne Schwarz and by our advanced heart failure service on October 23 she had an episode of fatigue weakness and chest tightness. It occurred after she was working outside she states maybe she overdid it that day she is not sure personal review of her ICD today demonstrated atrial fibrillation with rapid ventricular response she had approximately a 3-1/2-hour episode. I performed and interrogate all and this was the only episode that she had stored on the device.             ROS   Occasional shortness of breath and palpitations all other 12 point review of systems negative for complaints today    Objective:     Vitals: Blood pressure 116/64, pulse 60, height 5' 1" (1.549 m), weight 63.5 kg (140 lb). , Body mass index is 26.45 kg/m². ,        Physical Exam:    GEN: Cleotilde Nyhan appears well, alert and oriented x 3, pleasant and cooperative   HEENT: pupils equal, round, and reactive to light; extraocular muscles intact  NECK: supple, no carotid bruits   HEART: regular rhythm, normal S1 and S2, no murmurs, clicks, gallops or rubs   LUNGS: clear to auscultation bilaterally; no wheezes, rales, or rhonchi   ABDOMEN: normal bowel sounds, soft, no tenderness, no distention  EXTREMITIES: peripheral pulses normal; no clubbing, cyanosis, or edema  NEURO: no focal findings   SKIN: normal without suspicious lesions on exposed skin    Medications:      Current Outpatient Medications:     apixaban (ELIQUIS) 2.5 mg, Take 2.5 mg by mouth 2 (two) times a day, Disp: , Rfl:     calcium carbonate-vitamin D (OSCAL-D) 500 mg-200 units per tablet, take 1 tablet by mouth once daily WITH BREAKFAST., Disp: 30 tablet, Rfl: 0    cholecalciferol (VITAMIN D3) 1,000 units tablet, Take 1,000 Units by mouth daily, Disp: , Rfl:     Co Q-10 50 MG, Take 50 mg by mouth, Disp: , Rfl:     Entresto 24-26 MG TABS, take 1 tablet by mouth twice a day (Patient taking differently: Pt taking 1/2 tab in the morning, 1 full tab at night), Disp: 60 tablet, Rfl: 3    levothyroxine 50 mcg tablet, take 1 tablet by mouth once daily, Disp: 90 tablet, Rfl: 1    metoprolol succinate (TOPROL-XL) 50 mg 24 hr tablet, take 1 tablet by mouth every morning (Patient taking differently: Take 50 mg by mouth daily 1/2 tablet in the AM, 1/2 tablet in the PM), Disp: 90 tablet, Rfl: 3    mometasone (ELOCON) 0.1 % cream, , Disp: , Rfl:     Omega-3 Fatty Acids (FISH OIL) 1,000 mg, Take 1,000 mg by mouth daily, Disp: , Rfl:     spironolactone (ALDACTONE) 25 mg tablet, Take 12.5 mg by mouth every other day, Disp: , Rfl:      Family History   Problem Relation Age of Onset    Heart disease Mother had angina    Heart disease Father     Brain cancer Sister     Heart disease Brother         had CABG    No Known Problems Brother     No Known Problems Maternal Grandmother     No Known Problems Maternal Grandfather     No Known Problems Paternal Grandmother     No Known Problems Paternal Grandfather     No Known Problems Daughter     No Known Problems Daughter     No Known Problems Paternal Aunt     No Known Problems Paternal Aunt     No Known Problems Paternal Aunt     No Known Problems Paternal Aunt     No Known Problems Paternal Aunt      Social History     Socioeconomic History    Marital status:      Spouse name: Not on file    Number of children: Not on file    Years of education: Not on file    Highest education level: Not on file   Occupational History    Not on file   Tobacco Use    Smoking status: Never    Smokeless tobacco: Never   Vaping Use    Vaping Use: Never used   Substance and Sexual Activity    Alcohol use: No     Alcohol/week: 1.0 standard drink of alcohol     Types: 1 Glasses of wine per week    Drug use: No    Sexual activity: Not Currently   Other Topics Concern    Not on file   Social History Narrative     since 2018. Was  for 50 years. Lives alone. Has 2 daughters. One in [place] who is a nephrologist.  The other in [address] [place] [address]. Enjoys gardening and being outside. Social Determinants of Health     Financial Resource Strain: Low Risk  (5/24/2023)    Overall Financial Resource Strain (CARDIA)     Difficulty of Paying Living Expenses: Not hard at all   Food Insecurity: Not on file   Transportation Needs: No Transportation Needs (5/24/2023)    PRAPARE - Transportation     Lack of Transportation (Medical): No     Lack of Transportation (Non-Medical):  No   Physical Activity: Not on file   Stress: Not on file   Social Connections: Not on file   Intimate Partner Violence: Not on file   Housing Stability: Not on file     Social History     Tobacco Use   Smoking Status Never   Smokeless Tobacco Never     Social History     Substance and Sexual Activity   Alcohol Use No    Alcohol/week: 1.0 standard drink of alcohol    Types: 1 Glasses of wine per week       Labs & Results:  Below is the patient's most recent value for Albumin, ALT, AST, BUN, Calcium, Chloride, Cholesterol, CO2, Creatinine, GFR, Glucose, HDL, Hematocrit, Hemoglobin, Hemoglobin A1C, LDL, Magnesium, Phosphorus, Platelets, Potassium, PSA, Sodium, Triglycerides, and WBC. Lab Results   Component Value Date    ALT 13 10/30/2023    AST 21 10/30/2023    BUN 24 10/30/2023    CALCIUM 9.4 10/30/2023     10/30/2023    CHOL 178 02/21/2015    CO2 27 10/30/2023    CREATININE 0.87 10/30/2023    HDL 46 (L) 10/30/2023    HCT 39.4 10/30/2023    HGB 12.4 10/30/2023    HGBA1C 5.8 (H) 03/05/2021    MG 1.9 10/30/2023     10/30/2023    K 4.4 10/30/2023     02/21/2015    TRIG 137 10/30/2023    WBC 6.50 10/30/2023     Note: for a comprehensive list of the patient's lab results, access the Results Review activity. Cardiac testing:   Results for orders placed in visit on 02/24/16    Echo complete with contrast if indicated    Narrative  4/18/2011    No results found for this or any previous visit. No results found for this or any previous visit. No results found for this or any previous visit.

## 2023-11-14 DIAGNOSIS — I47.29 NSVT (NONSUSTAINED VENTRICULAR TACHYCARDIA) (HCC): ICD-10-CM

## 2023-11-14 RX ORDER — METOPROLOL SUCCINATE 50 MG/1
TABLET, EXTENDED RELEASE ORAL
Qty: 90 TABLET | Refills: 3 | Status: SHIPPED | OUTPATIENT
Start: 2023-11-14

## 2023-11-16 ENCOUNTER — OFFICE VISIT (OUTPATIENT)
Dept: CARDIOLOGY CLINIC | Facility: CLINIC | Age: 78
End: 2023-11-16
Payer: COMMERCIAL

## 2023-11-16 VITALS
SYSTOLIC BLOOD PRESSURE: 120 MMHG | OXYGEN SATURATION: 98 % | DIASTOLIC BLOOD PRESSURE: 68 MMHG | HEIGHT: 61 IN | BODY MASS INDEX: 26.47 KG/M2 | WEIGHT: 140.2 LBS | HEART RATE: 63 BPM

## 2023-11-16 DIAGNOSIS — R89.8 ABNORMAL GENETIC TEST: Primary | ICD-10-CM

## 2023-11-16 DIAGNOSIS — E78.2 MIXED HYPERLIPIDEMIA: ICD-10-CM

## 2023-11-16 DIAGNOSIS — I50.20 HFREF (HEART FAILURE WITH REDUCED EJECTION FRACTION) (HCC): ICD-10-CM

## 2023-11-16 DIAGNOSIS — I48.0 PAROXYSMAL A-FIB (HCC): ICD-10-CM

## 2023-11-16 DIAGNOSIS — I10 PRIMARY HYPERTENSION: ICD-10-CM

## 2023-11-16 DIAGNOSIS — I42.9 FAMILIAL CARDIOMYOPATHY (HCC): ICD-10-CM

## 2023-11-16 DIAGNOSIS — I42.8 NICM (NONISCHEMIC CARDIOMYOPATHY) (HCC): ICD-10-CM

## 2023-11-16 PROCEDURE — 99215 OFFICE O/P EST HI 40 MIN: CPT | Performed by: INTERNAL MEDICINE

## 2023-11-16 NOTE — PROGRESS NOTES
Cardiology Clinic Note    Corona Hair 78 y.o. female   MRN: [id number]  Encounter: [id number]        Assessment / Plan:    #  Non-ischemic Cardiomyopathy  Etiology:  non-ischemic by [facility]  Familial.  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 This gene has a known association with HCM and dilated cardiomyopathy. Family members will need to be screened clinically and possibly with genetic testing depending on preference. #  Chronic Systolic Heart Failure  Class and Stage:  NYHA class:   2  (walks 3 miles per day)  ACC/AHA stage: C    Volume Management:  Volume:   Euvolemic on exam.  Diuretic:  not requiring a loop diuretic. K+ supplementation and lab monitoring:  CMP 10-30-23 stable. Neurohormonal Blockade / GDMT:  Beta blocker:  toprol XL 25mg BID  ARNI:  entresto 24-26 - half in AM, full pill in PM.  MRA:  david 12.5mg QOD (eventually consider moving to daily dosing)  SGLT2i:  Consider (she wants to avoid any new meds at this time)    Sudden cardiac death risk reduction:  S/p dual chamber ICD 2021  (had syncope and NSVT on tele)  Non-specific IVCB. Dr. Manny Nunn has reviewed EKG. Not recommending CRT at this time. Other heart failure considerations:  Mitral clip, IV iron, RICH eval, clinical trial, cardiac rehab, palliative care:  not at this time    # NSVT  Had syncope 2021. Found to have NSVT. ICD placed 2021. On BB. # paroxysmal afib  Parosxysmal on device checks. Follows with Dr Trever Yanez, who feels if she has any other episodes it would be reasonable to start dofetilide. Anticoag:  eliquis 2.5 BID  Rate:  toprol  Rhythm:  None. See plan per EP above. # Hx PACs, PVCs  Continue BB    # Aortic valve disease  Aortic sclerosis, mild AI  Serial exam and echo follow up    # Moderate MR  Serial exam and echo follow up    # HTN  See GDMT above    # HLD  Recent . Elevated ASCVD risk score  Could consider starting statin. Pt declines.      # Hypothyroidism  On synthroid  Most recent TSH has been normal.    # Hx of breast cancer  Mastectomy 2017. Tx with anastrazole x 5 years. Today's Plan Summary:  See above assessment/plan for full details of today's plan. Briefly,     Discussed the genetic testing results. We discussed all family members should be screened clinically and be considered for genetic testing if they are interested. CC - Dr. Saida Maradiaga          Reason For Visit / Chief Complaint:  F/u cardiomyopathy    HPI:   Rosmery Feliz is a 78 y.o.  female with history as noted in the problem list and further detailed in the above assessment and plan. Initial:  2023  Referral from Dr. Anshu Nowak. Asymetric upper septal hypertrophy on echo. Longstanding hx of systolic dysfunction. Now with declining EF on serial echo. 40% --> 35% --> 30%. Dr. Anshu Nowak requested heart failure consult for second opinion. Patient overall feels pretty well. She walks 3 miles almost everyday. Likes to do gardening when the weather is nicer. Lives by herself (  5 years ago). Has two daughters. One daughter is a nephrologist (Director of Kidney transplant at [employer]). Interval:  Last visit -->  had a great family trip to [address] [place] [address] 3 miles every day. Plan last visit --> genetic testing. Genetic testing (cardiomyopathy panel) resulted: The patient has a pathogenic variant in MYH7 gene. Had afib on device check. Started on eliquis. Saw Dr Nasrin Zeng. Today, feeling about the same. No syncope. Perhaps a bit more fatigue. Still doing gardening and cleaning. Cardiac Imaging personally reviewed:  EKG Nonspecific IVCB, QRS around 142ms.     Holter or event monitor    Echo 23  Mild left ventricular hypertrophy with moderately severe LV dysfunction, EF ~30% with moderate diastolic dysfunction  Mild left atrial enlargement  Mild aortic sclerosis with mild aortic insufficiency  Mildly thickened mitral valve with early annular calcification and moderate mitral regurgitation  Compared to prior report of January 27, 2022, LV EF is less and mitral regurgitation has increased. 2022  Mild-mod asymmetric hypertrophy. EF 35%. Mild MR. Mild aortic sclerosis, trace AI. RAMONA    Cardiac MRI 3-14-23  LVIDd 5.7cm. EF 30%. Mild upper septal hypertrophy, up to 1.3cm. Mildly dilated RV with mod reduced function. LGE - epicardial in anterior and anterolateral.  LGE - transmural inf-septum, mid to distal.       Stress testing Stress echo - 2/2021  No ischemia   Coronary CTA or [facility] Cardiac catheterization March 5, 2021: Normal coronaries.     [facility]    CPET              Patient Active Problem List    Diagnosis Date Noted   • Paroxysmal atrial fibrillation ([address]) 11/13/2023   • Gastroesophageal reflux disease without esophagitis 10/24/2022   • Cellulitis 06/16/2022   • Exertional angina 04/21/2022   • Syncope 03/04/2021   • NSVT (nonsustained ventricular tachycardia) ([address]) 10/29/2020   • Acute pain of right knee 10/11/2019   • Acquired trigger finger 06/01/2018   • Carpal tunnel syndrome 06/01/2018   • Numbness of hand 06/01/2018   • Malignant neoplasm of upper-inner 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 breast ([address]) 01/26/2017   • Hypertrophic cardiomyopathy ([address]) 11/09/2016   • Hypothyroidism 03/01/2016   • Plantar fasciitis 02/22/2016   • Hypertension 04/18/2014   • Ischemic cardiomyopathy 12/23/2013   • Osteopenia 06/18/2013       Past Medical History:   Diagnosis Date   • Hypertrophic cardiomyopathy ([address])    • Hypothyroidism    • Ischemic cardiomyopathy    • Malignant neoplasm of left female breast ([address]) 02/09/2017    stage IIA   • Osteopenia    • Trigger finger of all digits of right hand        Allergies   Allergen Reactions   • Seasonal Ic [Cholestatin] Sneezing       Current Outpatient Medications   Medication Instructions   • apixaban (ELIQUIS) 2.5 mg, Oral, 2 times daily   • calcium carbonate-vitamin D (OSCAL-D) 500 mg-200 units per tablet take 1 tablet by mouth once daily WITH BREAKFAST. • cholecalciferol (VITAMIN D3) 1,000 Units, Oral, Daily   • Co Q-10 50 mg, Oral   • Entresto 24-26 MG TABS take 1 tablet by mouth twice a day   • fish oil 1,000 mg, Oral, Daily   • levothyroxine 50 mcg tablet take 1 tablet by mouth once daily   • metoprolol succinate (TOPROL-XL) 50 mg 24 hr tablet take 1 tablet by mouth every morning   • mometasone (ELOCON) 0.1 % cream No dose, route, or frequency recorded. • spironolactone (ALDACTONE) 12.5 mg, Oral, Every other day       Social History     Socioeconomic History   • Marital status:      Spouse name: Not on file   • Number of children: Not on file   • Years of education: Not on file   • Highest education level: Not on file   Occupational History   • Not on file   Tobacco Use   • Smoking status: Never   • Smokeless tobacco: Never   Vaping Use   • Vaping Use: Never used   Substance and Sexual Activity   • Alcohol use: No     Alcohol/week: 1.0 standard drink of alcohol     Types: 1 Glasses of wine per week   • Drug use: No   • Sexual activity: Not Currently   Other Topics Concern   • Not on file   Social History Narrative     since 2018. Was  for 50 years. Lives alone. Has 2 daughters. One in [place] who is a nephrologist.  The other in [address] [place] [address]. Enjoys gardening and being outside. Social Determinants of Health     Financial Resource Strain: Low Risk  (5/24/2023)    Overall Financial Resource Strain (CARDIA)    • Difficulty of Paying Living Expenses: Not hard at all   Food Insecurity: Not on file   Transportation Needs: No Transportation Needs (5/24/2023)    PRAPARE - Transportation    • Lack of Transportation (Medical): No    • Lack of Transportation (Non-Medical):  No   Physical Activity: Not on file   Stress: Not on file   Social Connections: Not on file   Intimate Partner Violence: Not on file   Housing Stability: Not on file       Family History   Problem Relation Age of Onset   • Heart disease Mother         had angina • Heart disease Father    • Brain cancer Sister    • Heart disease Brother         had CABG   • No Known Problems Brother    • No Known Problems Maternal Grandmother    • No Known Problems Maternal Grandfather    • No Known Problems Paternal Grandmother    • No Known Problems Paternal Grandfather    • No Known Problems Daughter    • No Known Problems Daughter    • No Known Problems Paternal Aunt    • No Known Problems Paternal Aunt    • No Known Problems Paternal Aunt    • No Known Problems Paternal Aunt    • No Known Problems Paternal Aunt        Physical Exam:  Blood pressure 120/68, pulse 63, height 5' 1" (1.549 m), weight 63.6 kg (140 lb 3.2 oz), SpO2 98 %. Body mass index is 26.49 kg/m². Wt Readings from Last 3 Encounters:   11/16/23 63.6 kg (140 lb 3.2 oz)   11/13/23 63.5 kg (140 lb)   09/14/23 62.5 kg (137 lb 12.8 oz)     Physical Exam  Vitals reviewed. Constitutional:       General: She is not in acute distress. Appearance: She is not toxic-appearing. Neck:      Comments: JVP is not elevated  Cardiovascular:      Rate and Rhythm: Normal rate and regular rhythm. Heart sounds: Murmur (very soft HSM at apex) heard. No friction rub. No gallop. Pulmonary:      Breath sounds: Normal breath sounds. No wheezing, rhonchi or rales. Abdominal:      General: There is no distension. Palpations: Abdomen is soft. Tenderness: There is no abdominal tenderness. There is no guarding. Musculoskeletal:      Comments: Trace LE edema bilaterally   Neurological:      Mental Status: She is alert. Labs & Results:  Lab Results   Component Value Date    SODIUM 139 10/30/2023    K 4.4 10/30/2023     10/30/2023    CO2 27 10/30/2023    BUN 24 10/30/2023    CREATININE 0.87 10/30/2023    GLUC 136 02/20/2023    CALCIUM 9.4 10/30/2023     Time Statement:  Total time spent by me on evaluation and management for this visit was 47 minutes.   This only includes time spent today (date of encounter) and does NOT include any additional time that was spent prior to the date of encounter. Significant time spent today discussing the implications of her positive genetic testing for cardiomyopathy. Thank you for the opportunity to participate in the care of this patient.     Misti Colon MD, [facility]  Advanced Heart Failure and Transplant Cardiologist  Director of [address] [place] [address]

## 2023-11-17 ENCOUNTER — RA CDI HCC (OUTPATIENT)
Dept: OTHER | Facility: HOSPITAL | Age: 78
End: 2023-11-17

## 2023-11-17 NOTE — PROGRESS NOTES
[address] [facility] coding opportunities          Chart Reviewed number of suggestions sent to Provider: 1   I11.0    Patients Insurance     Medicare Insurance: [employer]

## 2023-11-24 ENCOUNTER — OFFICE VISIT (OUTPATIENT)
Dept: FAMILY MEDICINE CLINIC | Facility: CLINIC | Age: 78
End: 2023-11-24
Payer: COMMERCIAL

## 2023-11-24 VITALS
WEIGHT: 139 LBS | HEART RATE: 63 BPM | HEIGHT: 61 IN | RESPIRATION RATE: 16 BRPM | SYSTOLIC BLOOD PRESSURE: 108 MMHG | BODY MASS INDEX: 26.24 KG/M2 | OXYGEN SATURATION: 97 % | DIASTOLIC BLOOD PRESSURE: 50 MMHG | TEMPERATURE: 97.8 F

## 2023-11-24 DIAGNOSIS — M72.2 PLANTAR FASCIITIS: ICD-10-CM

## 2023-11-24 DIAGNOSIS — E03.9 HYPOTHYROIDISM, UNSPECIFIED TYPE: ICD-10-CM

## 2023-11-24 DIAGNOSIS — I48.0 PAROXYSMAL ATRIAL FIBRILLATION (HCC): ICD-10-CM

## 2023-11-24 DIAGNOSIS — I10 PRIMARY HYPERTENSION: Primary | ICD-10-CM

## 2023-11-24 PROBLEM — R55 SYNCOPE: Status: RESOLVED | Noted: 2021-03-04 | Resolved: 2023-11-24

## 2023-11-24 PROBLEM — L03.90 CELLULITIS: Status: RESOLVED | Noted: 2022-06-16 | Resolved: 2023-11-24

## 2023-11-24 PROCEDURE — 99214 OFFICE O/P EST MOD 30 MIN: CPT | Performed by: FAMILY MEDICINE

## 2023-11-24 NOTE — ASSESSMENT & PLAN NOTE
Paroxysmal atrial fibrillation. Patient continues on Eliquis 2.5 mg twice daily.   She will follow-up with her cardiologist for monitoring of chronic condition

## 2023-11-24 NOTE — PROGRESS NOTES
FAMILY PRACTICE OFFICE VISIT       NAME: Larisa Hernandez  AGE: 78 y.o. SEX: female       : 1945        MRN: [id number]    DATE: 2023  TIME: 9:57 AM    Assessment and Plan     Problem List Items Addressed This Visit       Hypertension - Primary     Hypertension. The patient's blood pressure is stable at this time and he will continue current regimen of medications         Hypothyroidism     Hypothyroidism. TSH is stable on current dose of levothyroxine         Plantar fasciitis     Planter fasciitis. Patient with suspected fasciitis causing heel pain. She will start to wear her insoles for her various shoe wear. If symptoms persist without improvement over the next 2 weeks she will call the office to consider physical therapy consultation for this condition         Paroxysmal atrial fibrillation (HCC)     Paroxysmal atrial fibrillation. Patient continues on Eliquis 2.5 mg twice daily. She will follow-up with her cardiologist for monitoring of chronic condition          Patient will be obtaining latest COVID-19 vaccination in the near future at her local pharmacy. Patient is up-to-date with most recent influenza vaccine      Chief Complaint     Chief Complaint   Patient presents with    Follow-up     6 month        History of Present Illness     Patient in the office to review chronic medical condition. She denies any recent illness. She was started on Eliquis by her cardiologist for cardiac arrhythmia noted on ICD monitor. Patient tries to walk on a regular basis several days a week indoors or outdoors. She does at times experience some shortness of breath which resolves quickly after resting. Patient has noticed increased right heel pain of her foot since she visited [facility] this past summer and did an extensive amount of walking. Review of Systems   Review of Systems   Constitutional: Negative. HENT: Negative. Respiratory:  Positive for shortness of breath. Cardiovascular: Negative. Gastrointestinal: Negative. Genitourinary: Negative. Musculoskeletal:  Positive for arthralgias and gait problem. Psychiatric/Behavioral: Negative. Active Problem List     Patient Active Problem List   Diagnosis    Malignant neoplasm of upper-inner 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 breast ([address])    Hypertension    Hypertrophic cardiomyopathy ([address])    Hypothyroidism    Ischemic cardiomyopathy    Osteopenia    Plantar fasciitis    Acquired trigger finger    Carpal tunnel syndrome    Numbness of hand    Acute pain of right knee    NSVT (nonsustained ventricular tachycardia) ([facility])    Exertional angina    Gastroesophageal reflux disease without esophagitis    Paroxysmal atrial fibrillation ([address])       Past Medical History:  Past Medical History:   Diagnosis Date    Hypertrophic cardiomyopathy ([address])     Hypothyroidism     Ischemic cardiomyopathy     Malignant neoplasm of left female breast ([address]) 02/09/2017    stage IIA    Osteopenia     Trigger finger of all digits of right hand        Past Surgical History:  Past Surgical History:   Procedure Laterality Date    BREAST BIOPSY Right     years ago benign    BREAST RECONSTRUCTION Left 2/9/2017    Procedure: BREAST RECONSTRUCTION WITH TISSUE EXPANDERS AND ALLODERM ;  Surgeon: Sam Booker MD;  [facility];  Service:     COLONOSCOPY  2009    KNEE ARTHROSCOPY Bilateral     MASTECTOMY Left 02/09/2017    IA BX/EXC LYMPH NODE OPEN SUPERFICIAL Left 2/9/2017    Procedure: LYMPHOSCINTIGRAPHY; INTRAOPERATIVE LYMPHATIC MAPPING; SENTINEL LYMPH NODE BIOPSY (INJECT AT 0700 BY DR ALBA);   Surgeon: Roque Ascencio MD;  [facility];  Service: Surgical Oncology    IA INSJ/RPLCMT BREAST IMPLANT SEP DAY MASTECTOMY Left 5/18/2017    Procedure: BREAST EXPANDER/IMPLANT EXCHANGE; CAPSULECTOMY ;  Surgeon: Sam Booker MD;  [facility];  Service: Plastics    IA MASTECTOMY SIMPLE COMPLETE Left 2/9/2017    Procedure: BREAST MASTECTOMY; FROZEN SECTION ;  Surgeon: Patricia Carter MD;  [facility];  Service: Surgical Oncology    REFRACTIVE SURGERY Bilateral     US GUIDANCE BREAST BIOPSY LEFT EACH ADDITIONAL Left 12/1/2016    US GUIDED BREAST BIOPSY LEFT COMPLETE Left 12/1/2016       Family History:  Family History   Problem Relation Age of Onset    Heart disease Mother         had angina    Heart disease Father     Brain cancer Sister     Heart disease Brother         had CABG    No Known Problems Brother     No Known Problems Maternal Grandmother     No Known Problems Maternal Grandfather     No Known Problems Paternal Grandmother     No Known Problems Paternal Grandfather     No Known Problems Daughter     No Known Problems Daughter     No Known Problems Paternal Aunt     No Known Problems Paternal Aunt     No Known Problems Paternal Aunt     No Known Problems Paternal Aunt     No Known Problems Paternal Aunt        Social History:  Social History     Socioeconomic History    Marital status:      Spouse name: Not on file    Number of children: Not on file    Years of education: Not on file    Highest education level: Not on file   Occupational History    Not on file   Tobacco Use    Smoking status: Never    Smokeless tobacco: Never   Vaping Use    Vaping Use: Never used   Substance and Sexual Activity    Alcohol use: No     Alcohol/week: 1.0 standard drink of alcohol     Types: 1 Glasses of wine per week    Drug use: No    Sexual activity: Not Currently   Other Topics Concern    Not on file   Social History Narrative     since 2018. Was  for 50 years. Lives alone. Has 2 daughters. One in [place] who is a nephrologist.  The other in [address] [place] [address]. Enjoys gardening and being outside.        Social Determinants of Health     Financial Resource Strain: Low Risk  (5/24/2023)    Overall Financial Resource Strain (CARDIA)     Difficulty of Paying Living Expenses: Not hard at all   Food Insecurity: Not on file   Transportation Needs: No Transportation Needs (5/24/2023)    PRAPARE - Transportation     Lack of Transportation (Medical): No     Lack of Transportation (Non-Medical): No   Physical Activity: Not on file   Stress: Not on file   Social Connections: Not on file   Intimate Partner Violence: Not on file   Housing Stability: Not on file       Objective     Vitals:    11/24/23 0907   BP: 108/50   Pulse: 63   Resp: 16   Temp: 97.8 °F (36.6 °C)   SpO2: 97%     Wt Readings from Last 3 Encounters:   11/24/23 63 kg (139 lb)   11/16/23 63.6 kg (140 lb 3.2 oz)   11/13/23 63.5 kg (140 lb)       Physical Exam  Vitals and nursing note reviewed. Constitutional:       General: She is not in acute distress. Appearance: Normal appearance. She is well-developed. She is not ill-appearing. HENT:      Head: Normocephalic and atraumatic. Right Ear: External ear normal.      Left Ear: External ear normal.      Nose: Nose normal.   Eyes:      General:         Right eye: No discharge. Left eye: No discharge. Extraocular Movements: Extraocular movements intact. Conjunctiva/sclera: Conjunctivae normal.      Pupils: Pupils are equal, round, and reactive to light. Neck:      Thyroid: No thyromegaly. Vascular: No carotid bruit. Cardiovascular:      Rate and Rhythm: Normal rate and regular rhythm. Heart sounds: Normal heart sounds. No murmur heard. Pulmonary:      Effort: Pulmonary effort is normal.      Breath sounds: Normal breath sounds. No wheezing, rhonchi or rales. Abdominal:      Comments: NO hepatospenomegaly   Musculoskeletal:         General: Normal range of motion. Cervical back: Normal range of motion and neck supple. Right lower leg: No edema. Left lower leg: No edema. Comments: Normal range of motion of foot. +2 dorsalis pedis and posterior tibialis pulses. Patient with mild tenderness to palpation on plantar surface of calcaneus at the heel.    Lymphadenopathy:      Cervical: No cervical adenopathy. Skin:     Findings: No rash. Comments: NO RASHES   Neurological:      General: No focal deficit present. Mental Status: She is alert and oriented to person, place, and time. Cranial Nerves: No cranial nerve deficit. Psychiatric:         Mood and Affect: Mood normal.         Behavior: Behavior normal.         Thought Content:  Thought content normal.         Judgment: Judgment normal.         Pertinent Laboratory/Diagnostic Studies:  Lab Results   Component Value Date    GLUCOSE 105 02/21/2015    BUN 24 10/30/2023    CREATININE 0.87 10/30/2023    CALCIUM 9.4 10/30/2023     02/21/2015    K 4.4 10/30/2023    CO2 27 10/30/2023     10/30/2023     Lab Results   Component Value Date    ALT 13 10/30/2023    AST 21 10/30/2023    ALKPHOS 57 10/30/2023    BILITOT 0.47 02/21/2015       Lab Results   Component Value Date    WBC 6.50 10/30/2023    HGB 12.4 10/30/2023    HCT 39.4 10/30/2023    MCV 91 10/30/2023     10/30/2023       No results found for: "TSH"    Lab Results   Component Value Date    CHOL 178 02/21/2015     Lab Results   Component Value Date    TRIG 137 10/30/2023     Lab Results   Component Value Date    HDL 46 (L) 10/30/2023     Lab Results   Component Value Date    LDLCALC 119 (H) 10/30/2023     Lab Results   Component Value Date    HGBA1C 5.8 (H) 03/05/2021       Results for orders placed or performed in visit on 10/30/23   Comprehensive metabolic panel   Result Value Ref Range    Sodium 139 135 - 147 mmol/L    Potassium 4.4 3.5 - 5.3 mmol/L    Chloride 107 96 - 108 mmol/L    CO2 27 21 - 32 mmol/L    ANION GAP 5 mmol/L    BUN 24 5 - 25 mg/dL    Creatinine 0.87 0.60 - 1.30 mg/dL    Glucose, Fasting 99 65 - 99 mg/dL    Calcium 9.4 8.4 - 10.2 mg/dL    AST 21 13 - 39 U/L    ALT 13 7 - 52 U/L    Alkaline Phosphatase 57 34 - 104 U/L    Total Protein 7.0 6.4 - 8.4 g/dL    Albumin 4.1 3.5 - 5.0 g/dL    Total Bilirubin 0.78 0.20 - 1.00 mg/dL    eGFR 63 ml/min/1.73sq m CBC and differential   Result Value Ref Range    WBC 6.50 4.31 - 10.16 Thousand/uL    RBC 4.35 3.81 - 5.12 Million/uL    Hemoglobin 12.4 11.5 - 15.4 g/dL    Hematocrit 39.4 34.8 - 46.1 %    MCV 91 82 - 98 fL    MCH 28.5 26.8 - 34.3 pg    MCHC 31.5 31.4 - 37.4 g/dL    RDW 13.7 11.6 - 15.1 %    MPV 10.4 8.9 - 12.7 fL    Platelets 962 536 - 568 Thousands/uL    nRBC 0 /100 WBCs    Neutrophils Relative 73 43 - 75 %    Immat GRANS % 0 0 - 2 %    Lymphocytes Relative 14 14 - 44 %    Monocytes Relative 9 4 - 12 %    Eosinophils Relative 3 0 - 6 %    Basophils Relative 1 0 - 1 %    Neutrophils Absolute 4.75 1.85 - 7.62 Thousands/µL    Immature Grans Absolute 0.01 0.00 - 0.20 Thousand/uL    Lymphocytes Absolute 0.92 0.60 - 4.47 Thousands/µL    Monocytes Absolute 0.58 0.17 - 1.22 Thousand/µL    Eosinophils Absolute 0.19 0.00 - 0.61 Thousand/µL    Basophils Absolute 0.05 0.00 - 0.10 Thousands/µL   Lipid panel   Result Value Ref Range    Cholesterol 192 See Comment mg/dL    Triglycerides 137 See Comment mg/dL    HDL, Direct 46 (L) >=50 mg/dL    LDL Calculated 119 (H) 0 - 100 mg/dL    Non-HDL-Chol (CHOL-HDL) 146 mg/dl   LDL cholesterol, direct   Result Value Ref Range    LDL Direct 121 (H) 0 - 100 mg/dl   TSH, 3rd generation with Free T4 reflex   Result Value Ref Range    TSH 3RD GENERATON 2.013 0.450 - 4.500 uIU/mL   Magnesium   Result Value Ref Range    Magnesium 1.9 1.9 - 2.7 mg/dL       No orders of the defined types were placed in this encounter. ALLERGIES:  Allergies   Allergen Reactions    Seasonal Ic [Cholestatin] Sneezing       Current Medications     Current Outpatient Medications   Medication Sig Dispense Refill    apixaban (ELIQUIS) 2.5 mg Take 2.5 mg by mouth 2 (two) times a day      calcium carbonate-vitamin D (OSCAL-D) 500 mg-200 units per tablet take 1 tablet by mouth once daily WITH BREAKFAST.  30 tablet 0    cholecalciferol (VITAMIN D3) 1,000 units tablet Take 1,000 Units by mouth daily      Co Q-10 50 MG Take 50 mg by mouth      Entresto 24-26 MG TABS take 1 tablet by mouth twice a day (Patient taking differently: Pt taking 1/2 tab in the morning, 1 full tab at night) 60 tablet 3    levothyroxine 50 mcg tablet take 1 tablet by mouth once daily 90 tablet 1    metoprolol succinate (TOPROL-XL) 50 mg 24 hr tablet take 1 tablet by mouth every morning 90 tablet 3    mometasone (ELOCON) 0.1 % cream       Omega-3 Fatty Acids (FISH OIL) 1,000 mg Take 1,000 mg by mouth daily      spironolactone (ALDACTONE) 25 mg tablet Take 12.5 mg by mouth every other day       No current facility-administered medications for this visit.          Health Maintenance     Health Maintenance   Topic Date Due    SLP PLAN OF CARE  Never done    COVID-19 Vaccine (4 - Pfizer series) 01/01/2022    BMI: Followup Plan  10/21/2022    Fall Risk  05/24/2024    Urinary Incontinence Screening  05/24/2024    Medicare Annual Wellness Visit (AWV)  05/24/2024    Depression Screening  11/24/2024    BMI: Adult  11/24/2024    Breast Cancer Screening: Mammogram  06/19/2025    Osteoporosis Screening  Completed    Pneumococcal Vaccine: 65+ Years  Completed    Influenza Vaccine  Completed    HIB Vaccine  Aged Out    IPV Vaccine  Aged Out    Hepatitis A Vaccine  Aged Out    Meningococcal ACWY Vaccine  Aged Out    HPV Vaccine  Aged Out     Immunization History   Administered Date(s) Administered    COVID-19 PFIZER VACCINE 0.3 ML IM 01/25/2021, 02/14/2021, 11/06/2021    INFLUENZA 09/03/2014, 09/28/2015, 10/09/2016, 09/13/2017, 11/08/2018    Influenza Split 01/04/2021    Influenza Split High Dose Preservative Free IM 10/09/2016    Influenza, high dose seasonal 0.7 mL 10/11/2019, 09/29/2020, 10/21/2021, 10/24/2022, 10/30/2023    Influenza, seasonal, injectable 10/30/2003, 11/13/2006, 11/24/2008, 10/07/2011, 10/26/2012, 10/15/2013, 10/01/2014    Pneumococcal Conjugate 13-Valent 02/22/2016    Pneumococcal Polysaccharide PPV23 01/02/2018    Tdap 43/46/9410       Etheleen Reasons, MD    I spent 30 minutes with this patient of which greater than 50% was spent counseling or reviewing chart

## 2023-11-24 NOTE — ASSESSMENT & PLAN NOTE
Planter fasciitis. Patient with suspected fasciitis causing heel pain. She will start to wear her insoles for her various shoe wear.   If symptoms persist without improvement over the next 2 weeks she will call the office to consider physical therapy consultation for this condition

## 2023-12-07 ENCOUNTER — TELEPHONE (OUTPATIENT)
Dept: CARDIOLOGY CLINIC | Facility: CLINIC | Age: 78
End: 2023-12-07

## 2023-12-07 DIAGNOSIS — I48.0 PAROXYSMAL A-FIB (HCC): Primary | ICD-10-CM

## 2023-12-07 NOTE — TELEPHONE ENCOUNTER
I provided 2 months of Xarelto 15mg per Dr. Tracy Curiel. Also included a 30-day free card and placed a script for 30 days no refills. Also included information on [employer] for Xarelto assistance.

## 2023-12-07 NOTE — TELEPHONE ENCOUNTER
----- Message from Rosibel Guadarrama RN sent at 12/6/2023  3:22 PM EST -----  Regarding: Possible AF episode  Received voicemail from Pt's daughter (see below). Pt may have had AF episode on Sunday. Hi, this is [employer]. I'm the daughter of Lena De Dios date of birth September 27th, 1945. I'm a physician and I went to Doctor Swetha Gallegos or my mom's appointment with Doctor Post Overall the last visit My mom. I talked to her today and she's describing that she had another run of maybe a tip on Sunday. She said her blood pressure kind of went down and she felt symptomatic. I'm not sure if that got picked up on her monitor or not, so I just wanted to check in with that. And then we also had a question about she's trying to find an option for obtaining the Eliquis at a little at lower cost So I didn't know if you had any information about that. I know that might be two different departments or whatever. So my phone number is [phone]. Again, it's Britt Rivas. I have talked to Doctor Swetha Gallegos directly about your mom's care and went to the last appointment. So I'm just trying to help her kind of navigate. She said she didn't call anyone about it and no one's called her yet. So I just wanted to make sure you were aware.

## 2024-01-25 ENCOUNTER — OFFICE VISIT (OUTPATIENT)
Dept: CARDIOLOGY CLINIC | Facility: CLINIC | Age: 79
End: 2024-01-25
Payer: COMMERCIAL

## 2024-01-25 VITALS
WEIGHT: 137 LBS | HEART RATE: 62 BPM | BODY MASS INDEX: 25.86 KG/M2 | SYSTOLIC BLOOD PRESSURE: 100 MMHG | HEIGHT: 61 IN | DIASTOLIC BLOOD PRESSURE: 52 MMHG | OXYGEN SATURATION: 97 %

## 2024-01-25 DIAGNOSIS — I10 PRIMARY HYPERTENSION: ICD-10-CM

## 2024-01-25 DIAGNOSIS — I50.20 HFREF (HEART FAILURE WITH REDUCED EJECTION FRACTION) (HCC): ICD-10-CM

## 2024-01-25 DIAGNOSIS — I34.0 NONRHEUMATIC MITRAL VALVE REGURGITATION: ICD-10-CM

## 2024-01-25 DIAGNOSIS — I42.0 DILATED CARDIOMYOPATHY (HCC): ICD-10-CM

## 2024-01-25 DIAGNOSIS — E78.2 MIXED HYPERLIPIDEMIA: ICD-10-CM

## 2024-01-25 DIAGNOSIS — I95.2 HYPOTENSION DUE TO DRUGS: Primary | ICD-10-CM

## 2024-01-25 DIAGNOSIS — I48.0 PAROXYSMAL ATRIAL FIBRILLATION (HCC): ICD-10-CM

## 2024-01-25 DIAGNOSIS — I47.29 NSVT (NONSUSTAINED VENTRICULAR TACHYCARDIA) (HCC): ICD-10-CM

## 2024-01-25 DIAGNOSIS — R89.8 ABNORMAL GENETIC TEST: ICD-10-CM

## 2024-01-25 PROCEDURE — 99215 OFFICE O/P EST HI 40 MIN: CPT | Performed by: INTERNAL MEDICINE

## 2024-01-25 RX ORDER — SACUBITRIL AND VALSARTAN 24; 26 MG/1; MG/1
0.5 TABLET, FILM COATED ORAL 2 TIMES DAILY
Qty: 90 TABLET | Refills: 1 | Status: SHIPPED | OUTPATIENT
Start: 2024-01-25

## 2024-01-25 NOTE — PROGRESS NOTES
Cardiology Clinic Note    Nati Villanueva 78 y.o. female   MRN: [id number]  Encounter: [id number]        Assessment / Plan:    #  Non-ischemic Cardiomyopathy  Etiology:  non-ischemic by [facility]  Familial.  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 This gene has a known association with HCM and dilated cardiomyopathy.  Family members will need to be screened clinically and possibly with genetic testing depending on preference.    #  Systolic Heart Failure - chronic  Class and Stage:  NYHA class:   II  ACC/AHA stage: C    Volume Management:  Volume:   Euvolemic on exam.  Diuretic:  not requiring a loop diuretic.  K+ supplementation and lab monitoring:   repeat BMP next month    Neurohormonal Blockade / GDMT:  Beta blocker:  toprol XL 25mg BID  ARNI:  entresto 24-26 - half in AM, full pill in PM --> reduce to 1/2 pill BID  MRA:  david 12.5mg QOD  SGLT2i:  Consider (she wants to avoid any new meds at this time and also I would be concerned about hypovolemia)    Sudden cardiac death risk reduction:  S/p dual chamber ICD 2021  (had syncope and NSVT on tele)  Non-specific IVCB.  Dr. Don has reviewed EKG.  Not recommending CRT at this time.    Other heart failure considerations:  Mitral clip, IV iron, clinical trial, cardiac rehab, palliative care:  not at this time    # NSVT  Had syncope 2021.  Found to have NSVT.  ICD placed 2021.  continue BB.    # paroxysmal afib  Parosxysmal on device checks.  Follows with Dr Diaz, who feels if she has any other episodes it would be reasonable to start dofetilide.  Anticoag:   eliquis 2.5 BID  Rate:  toprol  Rhythm:  None. See plan per EP above.    # Hx PACs, PVCs  Continue BB    # Mild AI  Serial exam and echo follow up    # Moderate MR  Serial exam and echo follow up    # HTN  See GDMT above - now low BP and making adjustments    # HLD  last .  Elevated ASCVD risk score  Could consider starting statin.  Pt declines again today.    # Hypothyroidism  On synthroid  Most  recent TSH has been normal.    # Hx of breast cancer  Mastectomy 2017. Tx with anastrazole x 5 years.      Today's Plan Summary:  See above assessment/plan for full details of today's plan.  Briefly,     Due to intermittent hypotension will lower Entresto to half pill twice daily.  Sounds like she is not drinking much fluids so encouraged a bit more hydration to see if this helps with the soft blood blood pressures.  We will check a BMP in 1 month to monitor heart failure medications.    CC - Dr. Erich Stevens              Reason For Visit / Chief Complaint:  F/u cardiomyopathy    HPI:   Nati Villanueva is a 78 y.o.  female with history as noted in the problem list and further detailed in the above assessment and plan.    Initial:  2023  Referral from Dr. Stevens.  Longstanding hx of systolic dysfunction.  Now with declining EF on serial echo.  40% --> 35% --> 30%.   Dr. Stevens requested heart failure consult for second opinion.  Patient overall feels pretty well.  She walks 3 miles almost everyday.   Likes to do gardening when the weather is nicer.   Lives by herself (  5 years ago).  Has two daughters.  One daughter is a nephrologist (Director of Kidney transplant at [employer]).    Interval:  Last visit -->   Genetic testing had resulted: pathogenic variant in MYH7 gene.   Had afib on device check.  Started on eliquis.   Still doing gardening and cleaning.     Plan last visit --> Discussed the genetic testing results.  We discussed all family members should be screened clinically and be considered for genetic testing if they are interested.    1-10-24 - Had echo at [facility]  EF 30%.  Mild AI  Moderate MR    Had a low BP this weekend.  Systolic in the 80s.  Skipped a dose of metoprolol.  Next day systolic in 90s.  Next day systolic in the 100's.      Today, lot of fatigue.  Gets tired easily.  Getting over bad URI (COVID negative).   No CP.  No SOB at rest.  Mild SHARMA / tightness with walking.  No edema.  No  orthopnea.  Drinks about 36 oz of fluid per day lately.          Cardiac Imaging personally reviewed:  EKG Nonspecific IVCB, QRS around 142ms.    Holter or event monitor    Echo 1-11-23  Mild left ventricular hypertrophy with moderately severe LV dysfunction, EF ~30% with moderate diastolic dysfunction  Mild left atrial enlargement  Mild aortic sclerosis with mild aortic insufficiency  Mildly thickened mitral valve with early annular calcification and moderate mitral regurgitation  Compared to prior report of January 27, 2022, LV EF is less and mitral regurgitation has increased.    2022  Mild-mod asymmetric hypertrophy.  EF 35%.  Mild MR.  Mild aortic sclerosis, trace AI.    1-10-24 ([facility])  EF 30%.  Mild AI  Moderate MR           [facility]    Cardiac MRI 3-14-23  LVIDd 5.7cm.  EF 30%.  Mild upper septal hypertrophy, up to 1.3cm.  Mildly dilated RV with mod reduced function.  LGE - epicardial in anterior and anterolateral.  LGE - transmural inf-septum, mid to distal.       Stress testing Stress echo - 2/2021  No ischemia   Coronary CTA or [facility] Cardiac catheterization March 5, 2021: Normal coronaries.    [facility]    CPET              Patient Active Problem List    Diagnosis Date Noted   • Paroxysmal atrial fibrillation (HCC) 11/13/2023   • Gastroesophageal reflux disease without esophagitis 10/24/2022   • Exertional angina 04/21/2022   • NSVT (nonsustained ventricular tachycardia) ([facility]) 10/29/2020   • Acute pain of right knee 10/11/2019   • Acquired trigger finger 06/01/2018   • Carpal tunnel syndrome 06/01/2018   • Numbness of hand 06/01/2018   • Malignant neoplasm of upper-inner 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 breast (HCC) 01/26/2017   • Hypertrophic cardiomyopathy (HCC) 11/09/2016   • Hypothyroidism 03/01/2016   • Plantar fasciitis 02/22/2016   • Hypertension 04/18/2014   • Ischemic cardiomyopathy 12/23/2013   • Osteopenia 06/18/2013       Past Medical History:   Diagnosis Date   • Hypertrophic cardiomyopathy (HCC)    •  Hypothyroidism    • Ischemic cardiomyopathy    • Malignant neoplasm of left female breast (HCC) 02/09/2017    stage IIA   • Osteopenia    • Trigger finger of all digits of right hand        Allergies   Allergen Reactions   • Seasonal Ic [Cholestatin] Sneezing       Current Outpatient Medications   Medication Instructions   • apixaban (ELIQUIS) 2.5 mg, Oral, 2 times daily   • calcium carbonate-vitamin D (OSCAL-D) 500 mg-200 units per tablet take 1 tablet by mouth once daily WITH BREAKFAST.   • cholecalciferol (VITAMIN D3) 1,000 Units, Oral, Daily   • Co Q-10 50 mg, Oral   • fish oil 1,000 mg, Oral, Daily   • levothyroxine 50 mcg tablet take 1 tablet by mouth once daily   • metoprolol succinate (TOPROL-XL) 50 mg 24 hr tablet take 1 tablet by mouth every morning   • mometasone (ELOCON) 0.1 % cream No dose, route, or frequency recorded.   • sacubitril-valsartan (Entresto) 24-26 MG TABS 0.5 tablets, Oral, 2 times daily   • spironolactone (ALDACTONE) 12.5 mg, Oral, Every other day       Social History     Socioeconomic History   • Marital status:      Spouse name: Not on file   • Number of children: Not on file   • Years of education: Not on file   • Highest education level: Not on file   Occupational History   • Not on file   Tobacco Use   • Smoking status: Never   • Smokeless tobacco: Never   Vaping Use   • Vaping status: Never Used   Substance and Sexual Activity   • Alcohol use: No     Alcohol/week: 1.0 standard drink of alcohol     Types: 1 Glasses of wine per week   • Drug use: No   • Sexual activity: Not Currently   Other Topics Concern   • Not on file   Social History Narrative     since 2018. Was  for 50 years.    Lives alone.  Has 2 daughters.  One in [place] who is a nephrologist.  The other in [place].    Enjoys gardening and being outside.       Social Determinants of Health     Financial Resource Strain: Low Risk  (5/24/2023)    Overall Financial Resource Strain (CARDIA)    • Difficulty of  "Paying Living Expenses: Not hard at all   Food Insecurity: Not on file   Transportation Needs: No Transportation Needs (5/24/2023)    PRAPARE - Transportation    • Lack of Transportation (Medical): No    • Lack of Transportation (Non-Medical): No   Physical Activity: Not on file   Stress: Not on file   Social Connections: Not on file   Intimate Partner Violence: Not on file   Housing Stability: Not on file       Family History   Problem Relation Age of Onset   • Heart disease Mother         had angina   • Heart disease Father    • Brain cancer Sister    • Heart disease Brother         had CABG   • No Known Problems Brother    • No Known Problems Maternal Grandmother    • No Known Problems Maternal Grandfather    • No Known Problems Paternal Grandmother    • No Known Problems Paternal Grandfather    • No Known Problems Daughter    • No Known Problems Daughter    • No Known Problems Paternal Aunt    • No Known Problems Paternal Aunt    • No Known Problems Paternal Aunt    • No Known Problems Paternal Aunt    • No Known Problems Paternal Aunt        Physical Exam:  Blood pressure 100/52, pulse 62, height 5' 1\" (1.549 m), weight 62.1 kg (137 lb), SpO2 97%.  Body mass index is 25.89 kg/m².  Wt Readings from Last 3 Encounters:   01/25/24 62.1 kg (137 lb)   11/24/23 63 kg (139 lb)   11/16/23 63.6 kg (140 lb 3.2 oz)     Physical Exam  Vitals reviewed.   Constitutional:       General: She is not in acute distress.     Appearance: She is not toxic-appearing.   Neck:      Comments: No JVD  Cardiovascular:      Rate and Rhythm: Normal rate and regular rhythm.      Heart sounds: Murmur (very soft HSM at apex) heard.      No friction rub. No gallop.   Pulmonary:      Breath sounds: Normal breath sounds. No wheezing, rhonchi or rales.   Abdominal:      General: There is no distension.      Palpations: Abdomen is soft.      Tenderness: There is no abdominal tenderness. There is no guarding.   Musculoskeletal:      Comments: Trace " LE edema bilaterally   Neurological:      Mental Status: She is alert.         Labs & Results:  Lab Results   Component Value Date    SODIUM 139 10/30/2023    K 4.4 10/30/2023     10/30/2023    CO2 27 10/30/2023    BUN 24 10/30/2023    CREATININE 0.87 10/30/2023    GLUC 136 02/20/2023    CALCIUM 9.4 10/30/2023       Thank you for the opportunity to participate in the care of this patient.    Sha Freeman MD, [facility]  Advanced Heart Failure and Transplant Cardiologist  Director of Cardio-Oncology  [facility]

## 2024-02-28 ENCOUNTER — TELEPHONE (OUTPATIENT)
Dept: CARDIOLOGY CLINIC | Facility: CLINIC | Age: 79
End: 2024-02-28

## 2024-02-28 NOTE — TELEPHONE ENCOUNTER
Per Dr. Diaz, patient to be started on dofetilide 125mcg bid after conversation with Dr. Stevens.    I left message for patient to discuss and schedule a follow-up EKG as well.   [Dear  ___] : Dear  [unfilled], [Consult Letter:] : I had the pleasure of evaluating your patient, [unfilled]. [Please see my note below.] : Please see my note below. [Consult Closing:] : Thank you very much for allowing me to participate in the care of this patient.  If you have any questions, please do not hesitate to contact me. [Sincerely,] : Sincerely, [FreeTextEntry1] : Personal history of colon polyp 5 years ago.  Ascending colon sessile serrated adenoma.  Normal.\par Personal history of left-sided ulcerative colitis in 2019.  Symptoms controlled with medication ever since.  Currently only on salicylates at high dose Apriso.  Asymptomatic.  Colonoscopy to assess for activity of disease.  Blood work to include inflammatory markers and fecal calprotectin.  Arrangements made.  Results to follow. [FreeTextEntry3] : Guillermo Parra MD FACG\par Diplomate American Board of Internal Medicine and Gastroenterolgy\par [facility]\par

## 2024-03-01 NOTE — TELEPHONE ENCOUNTER
I did speak with patient and explain the rationale for starting dofetilide.  However, patient states she is feeling well on the digoxin which was started by Dr. Stevens.  She wants to wait to discuss with you further at her office visit with you next Tuesday.

## 2024-03-05 ENCOUNTER — OFFICE VISIT (OUTPATIENT)
Dept: CARDIOLOGY CLINIC | Facility: CLINIC | Age: 79
End: 2024-03-05
Payer: COMMERCIAL

## 2024-03-05 VITALS
WEIGHT: 143.1 LBS | DIASTOLIC BLOOD PRESSURE: 70 MMHG | HEART RATE: 62 BPM | SYSTOLIC BLOOD PRESSURE: 118 MMHG | HEIGHT: 61 IN | BODY MASS INDEX: 27.02 KG/M2

## 2024-03-05 DIAGNOSIS — I48.91 ATRIAL FIBRILLATION, UNSPECIFIED TYPE (HCC): Primary | ICD-10-CM

## 2024-03-05 DIAGNOSIS — I10 HYPERTENSION, UNSPECIFIED TYPE: ICD-10-CM

## 2024-03-05 DIAGNOSIS — I20.89 EXERTIONAL ANGINA: ICD-10-CM

## 2024-03-05 DIAGNOSIS — I48.0 PAROXYSMAL ATRIAL FIBRILLATION (HCC): ICD-10-CM

## 2024-03-05 DIAGNOSIS — I47.29 NSVT (NONSUSTAINED VENTRICULAR TACHYCARDIA) (HCC): ICD-10-CM

## 2024-03-05 DIAGNOSIS — I42.2 HYPERTROPHIC CARDIOMYOPATHY (HCC): ICD-10-CM

## 2024-03-05 DIAGNOSIS — I25.5 ISCHEMIC CARDIOMYOPATHY: ICD-10-CM

## 2024-03-05 PROCEDURE — 93000 ELECTROCARDIOGRAM COMPLETE: CPT | Performed by: INTERNAL MEDICINE

## 2024-03-05 PROCEDURE — 99214 OFFICE O/P EST MOD 30 MIN: CPT | Performed by: INTERNAL MEDICINE

## 2024-03-05 RX ORDER — DIGOXIN 125 MCG
0.12 TABLET ORAL DAILY
COMMUNITY
Start: 2024-02-26 | End: 2025-02-25

## 2024-03-05 RX ORDER — DOFETILIDE 0.12 MG/1
125 CAPSULE ORAL 2 TIMES DAILY
Qty: 180 CAPSULE | Refills: 3 | Status: SHIPPED | OUTPATIENT
Start: 2024-03-05

## 2024-03-05 NOTE — PROGRESS NOTES
EPS Consultation/New Patient Evaluation - Nati Villanueva 78 y.o. female MRN: [id number]           ASSESSMENT:  1. Atrial fibrillation, unspecified type (HCC)  POCT ECG    dofetilide (TIKOSYN) 125 mcg capsule      2. Hypertrophic cardiomyopathy (HCC)  dofetilide (TIKOSYN) 125 mcg capsule      3. Hypertension, unspecified type  dofetilide (TIKOSYN) 125 mcg capsule      4. Ischemic cardiomyopathy        5. NSVT (nonsustained ventricular tachycardia) (HCC)        6. Exertional angina        7. Paroxysmal atrial fibrillation (HCC)                PLAN:  Start dofetilide 125 mcg twice daily stop digoxin EKG 4 days after starting dofetilide.  I did explain to her that she may ultimately require an ablation particularly given her predisposition for atrial flutter.  I explained that digoxin will slow her heart rate down with the atrial fibrillation but the dofetilide will hopefully prevent it from happening.  She understands this.    Her ICD is working appropriately    She is being managed by our advanced heart failure team for her cardiomyopathy    She has been started on Eliquis 2.5 mg twice daily which is very reasonable    Brief episodes of nonsustained VT stored on the device     I will follow-up with her in 3 months    CC/HPI:     She had another episode of atrial fibrillation during which she had chest pressure heart rates were elevated with maximum heart rate 158 bpm some of the cardiacs suggest atrial flutter while some are clearly atrial fibrillation.  The episode occurred February 26 than she had approximately 4 hours and 31 minutes of the arrhythmia.  She was started on digoxin.  I suggested that she start dofetilide as an outpatient but she wanted to discuss in the office with me.    She otherwise offers no complaints     Per prior outpatient consultation: Outpatient consultation for new onset paroxysmal atrial fibrillation.  She has a history of nonischemic cardiomyopathy she is followed by Dr. Erich Stevens and  "by our advanced heart failure service on October 23 she had an episode of fatigue weakness and chest tightness.  It occurred after she was working outside she states maybe she overdid it that day she is not sure personal review of her ICD today demonstrated atrial fibrillation with rapid ventricular response she had approximately a 3-1/2-hour episode.  I performed and interrogate all and this was the only episode that she had stored on the device.            ROS   Occasional shortness of breath and palpitations all other 12 point review of systems negative for complaints today    Objective:     Vitals: Blood pressure 118/70, pulse 62, height 5' 1\" (1.549 m), weight 64.9 kg (143 lb 1.6 oz)., Body mass index is 27.04 kg/m².,        Physical Exam:    GEN: Nati Villanueva appears well, alert and oriented x 3, pleasant and cooperative   HEENT: pupils equal, round, and reactive to light; extraocular muscles intact  NECK: supple, no carotid bruits   HEART: regular rhythm, normal S1 and S2, no murmurs, clicks, gallops or rubs   LUNGS: clear to auscultation bilaterally; no wheezes, rales, or rhonchi   ABDOMEN: normal bowel sounds, soft, no tenderness, no distention  EXTREMITIES: peripheral pulses normal; no clubbing, cyanosis, or edema  NEURO: no focal findings   SKIN: normal without suspicious lesions on exposed skin    Medications:      Current Outpatient Medications:     apixaban (Eliquis) 2.5 mg, Take 2.5 mg by mouth 2 (two) times a day, Disp: , Rfl:     calcium carbonate-vitamin D (OSCAL-D) 500 mg-200 units per tablet, take 1 tablet by mouth once daily WITH BREAKFAST., Disp: 30 tablet, Rfl: 0    cholecalciferol (VITAMIN D3) 1,000 units tablet, Take 1,000 Units by mouth daily, Disp: , Rfl:     Co Q-10 50 MG, Take 50 mg by mouth, Disp: , Rfl:     digoxin (LANOXIN) 0.125 mg tablet, Take 0.125 mg by mouth daily, Disp: , Rfl:     dofetilide (TIKOSYN) 125 mcg capsule, Take 1 capsule (125 mcg total) by mouth 2 (two) times a " day, Disp: 180 capsule, Rfl: 3    levothyroxine 50 mcg tablet, take 1 tablet by mouth once daily, Disp: 90 tablet, Rfl: 1    metoprolol succinate (TOPROL-XL) 50 mg 24 hr tablet, take 1 tablet by mouth every morning, Disp: 90 tablet, Rfl: 3    mometasone (ELOCON) 0.1 % cream, , Disp: , Rfl:     Omega-3 Fatty Acids (FISH OIL) 1,000 mg, Take 1,000 mg by mouth daily, Disp: , Rfl:     sacubitril-valsartan (Entresto) 24-26 MG TABS, Take 0.5 tablets by mouth 2 (two) times a day, Disp: 90 tablet, Rfl: 1    spironolactone (ALDACTONE) 25 mg tablet, Take 12.5 mg by mouth every other day, Disp: , Rfl:      Family History   Problem Relation Age of Onset    Heart disease Mother         had angina    Heart disease Father     Brain cancer Sister     Heart disease Brother         had CABG    No Known Problems Brother     No Known Problems Maternal Grandmother     No Known Problems Maternal Grandfather     No Known Problems Paternal Grandmother     No Known Problems Paternal Grandfather     No Known Problems Daughter     No Known Problems Daughter     No Known Problems Paternal Aunt     No Known Problems Paternal Aunt     No Known Problems Paternal Aunt     No Known Problems Paternal Aunt     No Known Problems Paternal Aunt      Social History     Socioeconomic History    Marital status:      Spouse name: Not on file    Number of children: Not on file    Years of education: Not on file    Highest education level: Not on file   Occupational History    Not on file   Tobacco Use    Smoking status: Never    Smokeless tobacco: Never   Vaping Use    Vaping status: Never Used   Substance and Sexual Activity    Alcohol use: No     Alcohol/week: 1.0 standard drink of alcohol     Types: 1 Glasses of wine per week    Drug use: No    Sexual activity: Not Currently   Other Topics Concern    Not on file   Social History Narrative     since 2018. Was  for 50 years.    Lives alone.  Has 2 daughters.  One in [place] who is a  nephrologist.  The other in [place].    Enjoys gardening and being outside.       Social Determinants of Health     Financial Resource Strain: Low Risk  (5/24/2023)    Overall Financial Resource Strain (CARDIA)     Difficulty of Paying Living Expenses: Not hard at all   Food Insecurity: Not on file   Transportation Needs: No Transportation Needs (5/24/2023)    PRAPARE - Transportation     Lack of Transportation (Medical): No     Lack of Transportation (Non-Medical): No   Physical Activity: Not on file   Stress: Not on file   Social Connections: Not on file   Intimate Partner Violence: Not on file   Housing Stability: Not on file     Social History     Tobacco Use   Smoking Status Never   Smokeless Tobacco Never     Social History     Substance and Sexual Activity   Alcohol Use No    Alcohol/week: 1.0 standard drink of alcohol    Types: 1 Glasses of wine per week       Labs & Results:  Below is the patient's most recent value for Albumin, ALT, AST, BUN, Calcium, Chloride, Cholesterol, CO2, Creatinine, GFR, Glucose, HDL, Hematocrit, Hemoglobin, Hemoglobin A1C, LDL, Magnesium, Phosphorus, Platelets, Potassium, PSA, Sodium, Triglycerides, and WBC.   Lab Results   Component Value Date    ALT 13 10/30/2023    AST 21 10/30/2023    BUN 24 10/30/2023    CALCIUM 9.4 10/30/2023     10/30/2023    CHOL 178 02/21/2015    CO2 27 10/30/2023    CREATININE 0.87 10/30/2023    HDL 46 (L) 10/30/2023    HCT 39.4 10/30/2023    HGB 12.4 10/30/2023    HGBA1C 5.8 (H) 03/05/2021    MG 1.9 10/30/2023     10/30/2023    K 4.4 10/30/2023     02/21/2015    TRIG 137 10/30/2023    WBC 6.50 10/30/2023     Note: for a comprehensive list of the patient's lab results, access the Results Review activity.            Cardiac testing:   Results for orders placed in visit on 02/24/16    Echo complete with contrast if indicated    Narrative  4/18/2011    No results found for this or any previous visit.    No results found for this or any  previous visit.    No results found for this or any previous visit.

## 2024-03-05 NOTE — LETTER
March 5, 2024     Erich Stevens MD  [address] [place] [address]    Patient: Nati Villanueva   YOB: 1945   Date of Visit: 3/5/2024       Dear Dr. Stevens:    Thank you for referring Nati Villanueva to me for evaluation. Below are my notes for this consultation.    If you have questions, please do not hesitate to call me. I look forward to following your patient along with you.         Sincerely,        Hira Diaz DO        CC: No Recipients    Hira Diaz DO  3/5/2024  8:14 AM  Sign when Signing Visit  EPS Consultation/New Patient Evaluation - Nati Villanueva 78 y.o. female MRN: [id number]           ASSESSMENT:  1. Atrial fibrillation, unspecified type (HCC)  POCT ECG    dofetilide (TIKOSYN) 125 mcg capsule      2. Hypertrophic cardiomyopathy (HCC)  dofetilide (TIKOSYN) 125 mcg capsule      3. Hypertension, unspecified type  dofetilide (TIKOSYN) 125 mcg capsule      4. Ischemic cardiomyopathy        5. NSVT (nonsustained ventricular tachycardia) (HCC)        6. Exertional angina        7. Paroxysmal atrial fibrillation (HCC)                PLAN:  Start dofetilide 125 mcg twice daily stop digoxin EKG 4 days after starting dofetilide.  I did explain to her that she may ultimately require an ablation particularly given her predisposition for atrial flutter.  I explained that digoxin will slow her heart rate down with the atrial fibrillation but the dofetilide will hopefully prevent it from happening.  She understands this.    Her ICD is working appropriately    She is being managed by our advanced heart failure team for her cardiomyopathy    She has been started on Eliquis 2.5 mg twice daily which is very reasonable    Brief episodes of nonsustained VT stored on the device     I will follow-up with her in 3 months    CC/HPI:     She had another episode of atrial fibrillation during which she had chest pressure heart rates were elevated with maximum heart rate 158 bpm some of the cardiacs  "suggest atrial flutter while some are clearly atrial fibrillation.  The episode occurred February 26 than she had approximately 4 hours and 31 minutes of the arrhythmia.  She was started on digoxin.  I suggested that she start dofetilide as an outpatient but she wanted to discuss in the office with me.    She otherwise offers no complaints     Per prior outpatient consultation: Outpatient consultation for new onset paroxysmal atrial fibrillation.  She has a history of nonischemic cardiomyopathy she is followed by Dr. Erich Stevens and by our advanced heart failure service on October 23 she had an episode of fatigue weakness and chest tightness.  It occurred after she was working outside she states maybe she overdid it that day she is not sure personal review of her ICD today demonstrated atrial fibrillation with rapid ventricular response she had approximately a 3-1/2-hour episode.  I performed and interrogate all and this was the only episode that she had stored on the device.            ROS   Occasional shortness of breath and palpitations all other 12 point review of systems negative for complaints today    Objective:     Vitals: Blood pressure 118/70, pulse 62, height 5' 1\" (1.549 m), weight 64.9 kg (143 lb 1.6 oz)., Body mass index is 27.04 kg/m².,        Physical Exam:    GEN: Nati Villanueva appears well, alert and oriented x 3, pleasant and cooperative   HEENT: pupils equal, round, and reactive to light; extraocular muscles intact  NECK: supple, no carotid bruits   HEART: regular rhythm, normal S1 and S2, no murmurs, clicks, gallops or rubs   LUNGS: clear to auscultation bilaterally; no wheezes, rales, or rhonchi   ABDOMEN: normal bowel sounds, soft, no tenderness, no distention  EXTREMITIES: peripheral pulses normal; no clubbing, cyanosis, or edema  NEURO: no focal findings   SKIN: normal without suspicious lesions on exposed skin    Medications:      Current Outpatient Medications:   •  apixaban (Eliquis) 2.5 " mg, Take 2.5 mg by mouth 2 (two) times a day, Disp: , Rfl:   •  calcium carbonate-vitamin D (OSCAL-D) 500 mg-200 units per tablet, take 1 tablet by mouth once daily WITH BREAKFAST., Disp: 30 tablet, Rfl: 0  •  cholecalciferol (VITAMIN D3) 1,000 units tablet, Take 1,000 Units by mouth daily, Disp: , Rfl:   •  Co Q-10 50 MG, Take 50 mg by mouth, Disp: , Rfl:   •  digoxin (LANOXIN) 0.125 mg tablet, Take 0.125 mg by mouth daily, Disp: , Rfl:   •  dofetilide (TIKOSYN) 125 mcg capsule, Take 1 capsule (125 mcg total) by mouth 2 (two) times a day, Disp: 180 capsule, Rfl: 3  •  levothyroxine 50 mcg tablet, take 1 tablet by mouth once daily, Disp: 90 tablet, Rfl: 1  •  metoprolol succinate (TOPROL-XL) 50 mg 24 hr tablet, take 1 tablet by mouth every morning, Disp: 90 tablet, Rfl: 3  •  mometasone (ELOCON) 0.1 % cream, , Disp: , Rfl:   •  Omega-3 Fatty Acids (FISH OIL) 1,000 mg, Take 1,000 mg by mouth daily, Disp: , Rfl:   •  sacubitril-valsartan (Entresto) 24-26 MG TABS, Take 0.5 tablets by mouth 2 (two) times a day, Disp: 90 tablet, Rfl: 1  •  spironolactone (ALDACTONE) 25 mg tablet, Take 12.5 mg by mouth every other day, Disp: , Rfl:      Family History   Problem Relation Age of Onset   • Heart disease Mother         had angina   • Heart disease Father    • Brain cancer Sister    • Heart disease Brother         had CABG   • No Known Problems Brother    • No Known Problems Maternal Grandmother    • No Known Problems Maternal Grandfather    • No Known Problems Paternal Grandmother    • No Known Problems Paternal Grandfather    • No Known Problems Daughter    • No Known Problems Daughter    • No Known Problems Paternal Aunt    • No Known Problems Paternal Aunt    • No Known Problems Paternal Aunt    • No Known Problems Paternal Aunt    • No Known Problems Paternal Aunt      Social History     Socioeconomic History   • Marital status:      Spouse name: Not on file   • Number of children: Not on file   • Years of  education: Not on file   • Highest education level: Not on file   Occupational History   • Not on file   Tobacco Use   • Smoking status: Never   • Smokeless tobacco: Never   Vaping Use   • Vaping status: Never Used   Substance and Sexual Activity   • Alcohol use: No     Alcohol/week: 1.0 standard drink of alcohol     Types: 1 Glasses of wine per week   • Drug use: No   • Sexual activity: Not Currently   Other Topics Concern   • Not on file   Social History Narrative     since 2018. Was  for 50 years.    Lives alone.  Has 2 daughters.  One in [place] who is a nephrologist.  The other in [place].    Enjoys gardening and being outside.       Social Determinants of Health     Financial Resource Strain: Low Risk  (5/24/2023)    Overall Financial Resource Strain (CARDIA)    • Difficulty of Paying Living Expenses: Not hard at all   Food Insecurity: Not on file   Transportation Needs: No Transportation Needs (5/24/2023)    PRAPARE - Transportation    • Lack of Transportation (Medical): No    • Lack of Transportation (Non-Medical): No   Physical Activity: Not on file   Stress: Not on file   Social Connections: Not on file   Intimate Partner Violence: Not on file   Housing Stability: Not on file     Social History     Tobacco Use   Smoking Status Never   Smokeless Tobacco Never     Social History     Substance and Sexual Activity   Alcohol Use No   • Alcohol/week: 1.0 standard drink of alcohol   • Types: 1 Glasses of wine per week       Labs & Results:  Below is the patient's most recent value for Albumin, ALT, AST, BUN, Calcium, Chloride, Cholesterol, CO2, Creatinine, GFR, Glucose, HDL, Hematocrit, Hemoglobin, Hemoglobin A1C, LDL, Magnesium, Phosphorus, Platelets, Potassium, PSA, Sodium, Triglycerides, and WBC.   Lab Results   Component Value Date    ALT 13 10/30/2023    AST 21 10/30/2023    BUN 24 10/30/2023    CALCIUM 9.4 10/30/2023     10/30/2023    CHOL 178 02/21/2015    CO2 27 10/30/2023    CREATININE  0.87 10/30/2023    HDL 46 (L) 10/30/2023    HCT 39.4 10/30/2023    HGB 12.4 10/30/2023    HGBA1C 5.8 (H) 03/05/2021    MG 1.9 10/30/2023     10/30/2023    K 4.4 10/30/2023     02/21/2015    TRIG 137 10/30/2023    WBC 6.50 10/30/2023     Note: for a comprehensive list of the patient's lab results, access the Results Review activity.            Cardiac testing:   Results for orders placed in visit on 02/24/16    Echo complete with contrast if indicated    Narrative  4/18/2011    No results found for this or any previous visit.    No results found for this or any previous visit.    No results found for this or any previous visit.

## 2024-03-05 NOTE — PATIENT INSTRUCTIONS
Starting Monday 3/11/24  stop digoxin and start dofetilide 125 mcg twice daily.    Ecg Thursday 3/14/24

## 2024-03-14 ENCOUNTER — CLINICAL SUPPORT (OUTPATIENT)
Dept: CARDIOLOGY CLINIC | Facility: CLINIC | Age: 79
End: 2024-03-14
Payer: COMMERCIAL

## 2024-03-14 DIAGNOSIS — R07.89 OTHER CHEST PAIN: ICD-10-CM

## 2024-03-14 DIAGNOSIS — I47.29 NSVT (NONSUSTAINED VENTRICULAR TACHYCARDIA) (HCC): Primary | ICD-10-CM

## 2024-03-14 DIAGNOSIS — I25.5 ISCHEMIC CARDIOMYOPATHY: ICD-10-CM

## 2024-03-14 DIAGNOSIS — I48.91 ATRIAL FIBRILLATION, UNSPECIFIED TYPE (HCC): Primary | ICD-10-CM

## 2024-03-14 PROCEDURE — RECHECK: Performed by: PHYSICIAN ASSISTANT

## 2024-03-14 PROCEDURE — 93000 ELECTROCARDIOGRAM COMPLETE: CPT

## 2024-03-14 NOTE — PROGRESS NOTES
Pt presents to the office under the direction of Dr. Diaz for a 1 week EKG post Tikosyn start. Pt was initiated on Tikosyn 125mcg BID on 03/05/24. She c/o chest pain/pressure with exertion.    BP - 110/66  HR - 62    EKG was reviewed by Pooja Victoria PA-C. Pt will continue with current meds. A nuclear stress test was ordered and is scheduled for 03/20/24.

## 2024-03-20 ENCOUNTER — HOSPITAL ENCOUNTER (OUTPATIENT)
Dept: NON INVASIVE DIAGNOSTICS | Facility: CLINIC | Age: 79
Discharge: HOME | End: 2024-03-20
Payer: COMMERCIAL

## 2024-03-20 VITALS — HEART RATE: 61 BPM | SYSTOLIC BLOOD PRESSURE: 124 MMHG | DIASTOLIC BLOOD PRESSURE: 80 MMHG | OXYGEN SATURATION: 98 %

## 2024-03-20 DIAGNOSIS — I25.5 ISCHEMIC CARDIOMYOPATHY: ICD-10-CM

## 2024-03-20 DIAGNOSIS — R07.89 OTHER CHEST PAIN: ICD-10-CM

## 2024-03-20 DIAGNOSIS — I47.29 NSVT (NONSUSTAINED VENTRICULAR TACHYCARDIA) (HCC): ICD-10-CM

## 2024-03-20 LAB
CHEST PAIN STATEMENT: NORMAL
MAX DIASTOLIC BP: 80 MMHG
MAX PREDICTED HEART RATE: 140 BPM
NUC STRESS EJECTION FRACTION: 35 %
PROTOCOL NAME: NORMAL
RATE PRESSURE PRODUCT: 8470
REASON FOR TERMINATION: NORMAL
SL CV REST NUCLEAR ISOTOPE DOSE: 10.48 MCI
SL CV STRESS NUCLEAR ISOTOPE DOSE: 32.2 MCI
SL CV STRESS RECOVERY BP: NORMAL MMHG
SL CV STRESS RECOVERY HR: 71 BPM
SL CV STRESS RECOVERY O2 SAT: 96 %
STRESS ANGINA INDEX: 0
STRESS BASELINE BP: NORMAL MMHG
STRESS BASELINE HR: 61 BPM
STRESS O2 SAT REST: 98 %
STRESS PEAK HR: 77 BPM
STRESS POST EXERCISE DUR MIN: 3 MIN
STRESS POST EXERCISE DUR SEC: 0 SEC
STRESS POST O2 SAT PEAK: 98 %
STRESS POST PEAK BP: 110 MMHG
STRESS POST PEAK HR: 77 BPM
STRESS POST PEAK SYSTOLIC BP: 124 MMHG
STRESS/REST PERFUSION RATIO: 1.06
TARGET HR FORMULA: NORMAL
TEST INDICATION: NORMAL

## 2024-03-20 PROCEDURE — 93017 CV STRESS TEST TRACING ONLY: CPT

## 2024-03-20 PROCEDURE — 93016 CV STRESS TEST SUPVJ ONLY: CPT | Performed by: INTERNAL MEDICINE

## 2024-03-20 PROCEDURE — 78452 HT MUSCLE IMAGE SPECT MULT: CPT | Performed by: INTERNAL MEDICINE

## 2024-03-20 PROCEDURE — A9502 TC99M TETROFOSMIN: HCPCS

## 2024-03-20 PROCEDURE — 93018 CV STRESS TEST I&R ONLY: CPT | Performed by: INTERNAL MEDICINE

## 2024-03-20 PROCEDURE — 78452 HT MUSCLE IMAGE SPECT MULT: CPT

## 2024-03-20 RX ORDER — REGADENOSON 0.08 MG/ML
0.4 INJECTION, SOLUTION INTRAVENOUS ONCE
Status: COMPLETED | OUTPATIENT
Start: 2024-03-20 | End: 2024-03-20

## 2024-03-20 RX ADMIN — REGADENOSON 0.4 MG: 0.08 INJECTION, SOLUTION INTRAVENOUS at 08:56

## 2024-03-27 DIAGNOSIS — I48.91 ATRIAL FIBRILLATION, UNSPECIFIED TYPE (HCC): Primary | ICD-10-CM

## 2024-03-27 NOTE — TELEPHONE ENCOUNTER
Samples of this drug were given to the patient, quantity 42, Lot Number [id number] (2 boxes) and [id number] (1 box). The following was discussed with the patient as indicated: administration, storage, potential interactions, side effects.

## 2024-03-29 ENCOUNTER — TELEPHONE (OUTPATIENT)
Dept: CARDIOLOGY CLINIC | Facility: CLINIC | Age: 79
End: 2024-03-29

## 2024-03-29 DIAGNOSIS — I48.91 ATRIAL FIBRILLATION, UNSPECIFIED TYPE (HCC): ICD-10-CM

## 2024-03-29 DIAGNOSIS — I42.2 HYPERTROPHIC CARDIOMYOPATHY (HCC): ICD-10-CM

## 2024-03-29 DIAGNOSIS — I10 HYPERTENSION, UNSPECIFIED TYPE: ICD-10-CM

## 2024-03-29 RX ORDER — DOFETILIDE 0.12 MG/1
125 CAPSULE ORAL 2 TIMES DAILY
Qty: 180 CAPSULE | Refills: 3 | Status: SHIPPED | OUTPATIENT
Start: 2024-03-29

## 2024-03-29 NOTE — TELEPHONE ENCOUNTER
Spoke to pt. Notified her of price through [facility] pharmacy and she confirmed that the price was ok and Rx could be sent to them.    Rx sent to [facility] pharmacy.

## 2024-03-29 NOTE — TELEPHONE ENCOUNTER
Requested medication(s) are due for refill today: Yes  Patient has already received a courtesy refill: No  Other reason request has been forwarded to provider: Unable to authorize per protocol. Pt would like Rx sent to [facility] pharmacy b/c of cost.    Thanks

## 2024-03-29 NOTE — TELEPHONE ENCOUNTER
----- Message from Nati Angeles sent at 3/29/2024  3:20 PM EDT -----  I was on medical leave at this time. I am no longer price checking       ----- Message -----  From: Gifty Wasserman  Sent: 3/8/2024   1:58 PM EDT  To: Nati Angeles; Rebekah Diaz DO; #    Nati, can I just send directly to [facility] through our E-scribe?      ----- Message -----  From: Rebekah Diaz DO  Sent: 3/7/2024   3:49 PM EST  To: Nati Angeles; Cardiology Ep Clincal    Can we please look into finding Nati cheaper dofetilide? Maybe she could do financial aid through [employer] or someone could teach her how to enroll in [facility]? I think it is 18$ at [facility].  Any help would be greatly appreciated.  She is supposed to take 125 mcg twice daily and then get an ecg 4 days after starting.  Thank you. rebekah

## 2024-03-29 NOTE — TELEPHONE ENCOUNTER
Called [facility] for med pricing. Spoke to Tracie. She transferred me to the Medicare Part D team for further assistance. Spoke to Natasha. She then transferred me to another department w/o telling me. Spoke to Arlette.    Per insurance this is only an estimate.    Dofetilide 125 mcg, #180 - Copay:  $275.00 ([facility] mail order pharmacy)    Call reference # [id number]    Gibbons checked through [facility] pharmacy --    Dofetilide 125 mcg, #60 - Copay:  $12.80 ([facility] pharmacy)

## 2024-04-05 ENCOUNTER — TELEPHONE (OUTPATIENT)
Dept: CARDIOLOGY CLINIC | Facility: CLINIC | Age: 79
End: 2024-04-05

## 2024-04-05 NOTE — TELEPHONE ENCOUNTER
----- Message from Rebekah Diaz DO sent at 4/5/2024  1:27 PM EDT -----  Can you please let patient know that stress test showed no blockage. Thanks. rebekah  ----- Message -----  From: Pooja Victoria PA-C  Sent: 4/4/2024   8:03 AM EDT  To: Rebekah Diaz, DO    Can you please review recent stress?    She came in a few weeks ago for post tikosyn ECG check and reported exertional chest pain so I ordered a stress test. It looks like just fixed defects, she had a prior cath in 2021 that was negative.     Just wanted to make sure there wasn't anything more to do, since you know her better. Thanks    ----- Message -----  From: Donaldo Zhou MD  Sent: 3/20/2024  11:32 AM EDT  To: Pooja Victoria PA-C

## 2024-04-05 NOTE — TELEPHONE ENCOUNTER
Spoke to patient about test results and she verbalized understanding. She has not received her dofetilide because she had not set up her [facility] account. She did state that she currently would not be able to do that herself but would request assistance from her daughter to get that set up and ordered to her home. She only has 5 days of the med left but stated that she will ask her pharmacy to dispense at least a week more if necessary and pay out of pocket to make sure she is not without her medication while she gets [employer] set up.   No further questions at this time.

## 2024-04-09 ENCOUNTER — TELEPHONE (OUTPATIENT)
Dept: CARDIOLOGY CLINIC | Facility: CLINIC | Age: 79
End: 2024-04-09

## 2024-04-09 NOTE — TELEPHONE ENCOUNTER
Spoke to pt regarding pending blood work. Pt states she will go for labs in the next couple of days. Pt aware of upcoming appt with Dr SEARS next week.

## 2024-04-10 ENCOUNTER — LAB (OUTPATIENT)
Dept: LAB | Facility: CLINIC | Age: 79
End: 2024-04-10
Payer: COMMERCIAL

## 2024-04-10 DIAGNOSIS — E03.9 HYPOTHYROIDISM, UNSPECIFIED TYPE: ICD-10-CM

## 2024-04-10 DIAGNOSIS — I42.0 DILATED CARDIOMYOPATHY (HCC): ICD-10-CM

## 2024-04-10 DIAGNOSIS — I50.20 HFREF (HEART FAILURE WITH REDUCED EJECTION FRACTION) (HCC): ICD-10-CM

## 2024-04-10 LAB
ANION GAP SERPL CALCULATED.3IONS-SCNC: 10 MMOL/L (ref 4–13)
BUN SERPL-MCNC: 32 MG/DL (ref 5–25)
CALCIUM SERPL-MCNC: 9.4 MG/DL (ref 8.4–10.2)
CHLORIDE SERPL-SCNC: 105 MMOL/L (ref 96–108)
CO2 SERPL-SCNC: 25 MMOL/L (ref 21–32)
CREAT SERPL-MCNC: 0.99 MG/DL (ref 0.6–1.3)
GFR SERPL CREATININE-BSD FRML MDRD: 53 ML/MIN/1.73SQ M
GLUCOSE SERPL-MCNC: 125 MG/DL (ref 65–140)
POTASSIUM SERPL-SCNC: 4.3 MMOL/L (ref 3.5–5.3)
SODIUM SERPL-SCNC: 140 MMOL/L (ref 135–147)

## 2024-04-10 PROCEDURE — 36415 COLL VENOUS BLD VENIPUNCTURE: CPT

## 2024-04-10 PROCEDURE — 80048 BASIC METABOLIC PNL TOTAL CA: CPT

## 2024-04-10 RX ORDER — LEVOTHYROXINE SODIUM 0.05 MG/1
TABLET ORAL
Qty: 90 TABLET | Refills: 1 | Status: SHIPPED | OUTPATIENT
Start: 2024-04-10

## 2024-04-13 NOTE — PROGRESS NOTES
Cardiology Clinic Note    Nati Villanueva 78 y.o. female   MRN: [id number]  Encounter: [id number]        Assessment / Plan:    #  Non-ischemic Cardiomyopathy  Etiology:  non-ischemic by [facility]  Familial.  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 This gene has a known association with HCM and dilated cardiomyopathy.  Family members need to be screened clinically and possibly with genetic testing depending on preference.    #  Systolic Heart Failure - chronic  Class and Stage:  NYHA class:   II  ACC/AHA stage: C    Volume Management:  Volume:   Euvolemic on exam.  Diuretic:  not requiring a loop diuretic.  Lab monitoring:  recent BMP stable    Neurohormonal Blockade / GDMT:  Beta blocker:  toprol XL 25mg BID  ARNI:  entresto 24-26 - 1/2 pill BID  MRA:  david 12.5mg QOD  SGLT2i:  Consider (she wants to avoid any new meds at this time and also I would be concerned about hypovolemia)    Sudden cardiac death risk reduction:  S/p dual chamber ICD 2021  (had syncope and NSVT on tele)  Non-specific IVCB.  Dr. Don has reviewed EKG.  Not recommending CRT at this time.    Other heart failure considerations:  Mitral clip, IV iron, clinical trial, cardiac rehab, palliative care:  not at this time    # NSVT  Had syncope 2021.  Found to have NSVT.  ICD placed 2021.  continue BB.    # paroxysmal afib  Parosxysmal on device checks.    Follows with Dr Diaz  Anticoag:   eliquis 2.5 BID  Rate:  toprol  Rhythm:   tikosyn    # Hx PACs, PVCs  Continue BB    # Mild AI  Serial exam and echo follow up    # Moderate MR  Serial exam and echo follow up    # HTN  See GDMT above    # HLD  last .  Elevated ASCVD risk score  Could consider starting statin.  Pt declines again today.    # Hypothyroidism  On synthroid    # Hx of breast cancer  Mastectomy 2017. Tx with anastrazole x 5 years.      Today's Plan Summary:  See above assessment/plan for full details of today's plan.  Briefly,     Recent BMP stable.   No med changes.    CC -  Dr. Erich Stevens              Reason For Visit / Chief Complaint:  F/u cardiomyopathy    HPI:   Nati Villanueva is a 78 y.o.  female with history as noted in the problem list and further detailed in the above assessment and plan.    Initial:  2023  Referral from Dr. Stevens.  Longstanding hx of systolic dysfunction.  Now with declining EF on serial echo.  40% --> 35% --> 30%.   Dr. Stevens requested heart failure consult for second opinion.  Patient overall feels pretty well.  She walks 3 miles almost everyday.   Likes to do gardening when the weather is nicer.   Lives by herself (  5 years ago).  Has two daughters.  One daughter is a nephrologist (Director of Kidney transplant at [employer]).    Interval:  Last visit -->  fatigue.  Gets tired easily.  Getting over bad URI (COVID negative).  A few low Bps.   Drinks about 36 oz of fluid per day lately.    Plan last visit -->   Due to intermittent hypotension will lower Entresto to half pill twice daily.  Sounds like she is not drinking much fluids so encouraged a bit more hydration to see if this helps with the soft BP's.  We will check a BMP in 1 month to monitor heart failure medications.    EP - stopped digoxin.  Started tikosyn.    EP did a stress test - showed possible scar, no ischemia.    BMP - 4-10-24 - stable     Today - feeling ok.  Still fatigue.  Going on a walk every day - 3 miles.  SHARMA is improved.   No leg edema.   No syncope.        Cardiac Imaging personally reviewed:  EKG Nonspecific IVCB, QRS around 142ms.    Holter or event monitor    Echo 23  Mild left ventricular hypertrophy with moderately severe LV dysfunction, EF ~30% with moderate diastolic dysfunction  Mild left atrial enlargement  Mild aortic sclerosis with mild aortic insufficiency  Mildly thickened mitral valve with early annular calcification and moderate mitral regurgitation  Compared to prior report of 2022, LV EF is less and mitral regurgitation has  increased.    2022  Mild-mod asymmetric hypertrophy.  EF 35%.  Mild MR.  Mild aortic sclerosis, trace AI.    1-10-24 ([facility])  EF 30%.  Mild AI  Moderate MR           [facility]    Cardiac MRI 3-14-23  LVIDd 5.7cm.  EF 30%.  Mild upper septal hypertrophy, up to 1.3cm.  Mildly dilated RV with mod reduced function.  LGE - epicardial in anterior and anterolateral.  LGE - transmural inf-septum, mid to distal.       Stress testing Stress echo - 2/2021  No ischemia    Stress nuclear - 3/2024  Reported scar but no ischemia.       Coronary CTA or [facility] Cardiac catheterization March 5, 2021: Normal coronaries.    [facility]    CPET              Patient Active Problem List    Diagnosis Date Noted   • Paroxysmal atrial fibrillation ([facility]) 11/13/2023   • Gastroesophageal reflux disease without esophagitis 10/24/2022   • Exertional angina 04/21/2022   • NSVT (nonsustained ventricular tachycardia) ([facility]) 10/29/2020   • Acute pain of right knee 10/11/2019   • Acquired trigger finger 06/01/2018   • Carpal tunnel syndrome 06/01/2018   • Numbness of hand 06/01/2018   • Malignant neoplasm of upper-inner 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 breast (HCC) 01/26/2017   • Hypertrophic cardiomyopathy ([facility]) 11/09/2016   • Hypothyroidism 03/01/2016   • Plantar fasciitis 02/22/2016   • Hypertension 04/18/2014   • Ischemic cardiomyopathy 12/23/2013   • Osteopenia 06/18/2013       Past Medical History:   Diagnosis Date   • Hypertrophic cardiomyopathy (HCC)    • Hypothyroidism    • Ischemic cardiomyopathy    • Malignant neoplasm of left female breast (HCC) 02/09/2017    stage IIA   • Osteopenia    • Trigger finger of all digits of right hand        Allergies   Allergen Reactions   • Seasonal Ic [Cholestatin] Sneezing       Current Outpatient Medications   Medication Instructions   • apixaban (ELIQUIS) 2.5 mg, Oral, 2 times daily   • calcium carbonate-vitamin D (OSCAL-D) 500 mg-200 units per tablet take 1 tablet by mouth once daily WITH BREAKFAST.   • cholecalciferol 1,000 Units,  Oral, Daily   • Co Q-10 50 mg, Oral   • dofetilide (TIKOSYN) 125 mcg, Oral, 2 times daily   • fish oil 1,000 mg, Daily   • levothyroxine 50 mcg tablet take 1 tablet by mouth once daily   • metoprolol succinate (TOPROL-XL) 50 mg 24 hr tablet take 1 tablet by mouth every morning   • mometasone (ELOCON) 0.1 % cream No dose, route, or frequency recorded.   • sacubitril-valsartan (Entresto) 24-26 MG TABS 0.5 tablets, Oral, 2 times daily   • spironolactone (ALDACTONE) 12.5 mg, Oral, Every other day       Social History     Socioeconomic History   • Marital status:      Spouse name: Not on file   • Number of children: Not on file   • Years of education: Not on file   • Highest education level: Not on file   Occupational History   • Not on file   Tobacco Use   • Smoking status: Never   • Smokeless tobacco: Never   Vaping Use   • Vaping status: Never Used   Substance and Sexual Activity   • Alcohol use: No     Alcohol/week: 1.0 standard drink of alcohol     Types: 1 Glasses of wine per week   • Drug use: No   • Sexual activity: Not Currently   Other Topics Concern   • Not on file   Social History Narrative     since 2018. Was  for 50 years.    Lives alone.  Has 2 daughters.  One in [place] who is a nephrologist.  The other in [place].    Enjoys gardening and being outside.       Social Determinants of Health     Financial Resource Strain: Low Risk  (5/24/2023)    Overall Financial Resource Strain (CARDIA)    • Difficulty of Paying Living Expenses: Not hard at all   Food Insecurity: Not on file   Transportation Needs: No Transportation Needs (5/24/2023)    PRAPARE - Transportation    • Lack of Transportation (Medical): No    • Lack of Transportation (Non-Medical): No   Physical Activity: Not on file   Stress: Not on file   Social Connections: Not on file   Intimate Partner Violence: Not on file   Housing Stability: Not on file       Family History   Problem Relation Age of Onset   • Heart disease Mother      "    had angina   • Heart disease Father    • Brain cancer Sister    • Heart disease Brother         had CABG   • No Known Problems Brother    • No Known Problems Maternal Grandmother    • No Known Problems Maternal Grandfather    • No Known Problems Paternal Grandmother    • No Known Problems Paternal Grandfather    • No Known Problems Daughter    • No Known Problems Daughter    • No Known Problems Paternal Aunt    • No Known Problems Paternal Aunt    • No Known Problems Paternal Aunt    • No Known Problems Paternal Aunt    • No Known Problems Paternal Aunt        Physical Exam:  Blood pressure 108/60, pulse 64, height 5' 1\" (1.549 m), weight 63.8 kg (140 lb 11.2 oz), SpO2 97%.  Body mass index is 26.59 kg/m².  Wt Readings from Last 3 Encounters:   04/15/24 63.8 kg (140 lb 11.2 oz)   03/05/24 64.9 kg (143 lb 1.6 oz)   01/25/24 62.1 kg (137 lb)     Physical Exam  Vitals reviewed.   Constitutional:       General: She is not in acute distress.     Appearance: She is not toxic-appearing.   Neck:      Comments: No JVD  Cardiovascular:      Rate and Rhythm: Normal rate and regular rhythm.      Heart sounds: Murmur (very soft HSM at apex) heard.      No friction rub. No gallop.   Pulmonary:      Breath sounds: Normal breath sounds. No wheezing, rhonchi or rales.   Abdominal:      General: There is no distension.      Palpations: Abdomen is soft.      Tenderness: There is no abdominal tenderness. There is no guarding.   Musculoskeletal:      Comments: No leg edema   Neurological:      Mental Status: She is alert.         Labs & Results:  Lab Results   Component Value Date    SODIUM 140 04/10/2024    K 4.3 04/10/2024     04/10/2024    CO2 25 04/10/2024    BUN 32 (H) 04/10/2024    CREATININE 0.99 04/10/2024    GLUC 125 04/10/2024    CALCIUM 9.4 04/10/2024       Thank you for the opportunity to participate in the care of this patient.    Sha Freeman MD, [facility]  Advanced Heart Failure and Transplant " Cardiologist  Director of Cardio-Oncology  [facility]

## 2024-04-15 ENCOUNTER — OFFICE VISIT (OUTPATIENT)
Dept: CARDIOLOGY CLINIC | Facility: CLINIC | Age: 79
End: 2024-04-15
Payer: COMMERCIAL

## 2024-04-15 VITALS
SYSTOLIC BLOOD PRESSURE: 108 MMHG | HEART RATE: 64 BPM | HEIGHT: 61 IN | WEIGHT: 140.7 LBS | BODY MASS INDEX: 26.56 KG/M2 | OXYGEN SATURATION: 97 % | DIASTOLIC BLOOD PRESSURE: 60 MMHG

## 2024-04-15 DIAGNOSIS — I10 PRIMARY HYPERTENSION: ICD-10-CM

## 2024-04-15 DIAGNOSIS — I42.0 DILATED CARDIOMYOPATHY (HCC): ICD-10-CM

## 2024-04-15 DIAGNOSIS — I47.29 NSVT (NONSUSTAINED VENTRICULAR TACHYCARDIA) (HCC): ICD-10-CM

## 2024-04-15 DIAGNOSIS — I48.0 PAROXYSMAL A-FIB (HCC): Primary | ICD-10-CM

## 2024-04-15 DIAGNOSIS — I50.20 HFREF (HEART FAILURE WITH REDUCED EJECTION FRACTION) (HCC): ICD-10-CM

## 2024-04-15 DIAGNOSIS — E78.2 MIXED HYPERLIPIDEMIA: ICD-10-CM

## 2024-04-15 PROCEDURE — 99214 OFFICE O/P EST MOD 30 MIN: CPT | Performed by: INTERNAL MEDICINE

## 2024-05-08 ENCOUNTER — OFFICE VISIT (OUTPATIENT)
Dept: SURGICAL ONCOLOGY | Facility: CLINIC | Age: 79
End: 2024-05-08
Payer: COMMERCIAL

## 2024-05-08 VITALS
RESPIRATION RATE: 18 BRPM | WEIGHT: 139 LBS | SYSTOLIC BLOOD PRESSURE: 114 MMHG | DIASTOLIC BLOOD PRESSURE: 80 MMHG | HEART RATE: 65 BPM | HEIGHT: 60 IN | TEMPERATURE: 97.3 F | OXYGEN SATURATION: 96 % | BODY MASS INDEX: 27.29 KG/M2

## 2024-05-08 DIAGNOSIS — Z12.31 VISIT FOR SCREENING MAMMOGRAM: ICD-10-CM

## 2024-05-08 DIAGNOSIS — Z17.0 MALIGNANT NEOPLASM OF UPPER-INNER QUADRANT OF LEFT BREAST IN FEMALE, ESTROGEN RECEPTOR POSITIVE (HCC): Primary | ICD-10-CM

## 2024-05-08 DIAGNOSIS — C50.212 MALIGNANT NEOPLASM OF UPPER-INNER QUADRANT OF LEFT BREAST IN FEMALE, ESTROGEN RECEPTOR POSITIVE (HCC): Primary | ICD-10-CM

## 2024-05-08 PROCEDURE — 99213 OFFICE O/P EST LOW 20 MIN: CPT

## 2024-05-08 NOTE — PROGRESS NOTES
Surgical Oncology Follow Up       [address] [place] [address] SURGICAL ONCOLOGY [place]  [address] [facility] [address]  [place] [address]    Nati Villanueva  1945  [id number]  [address] [place] [address] SURGICAL ONCOLOGY [place]  [address] [facility]  [place] [address]    Chief Complaint   Patient presents with   • Follow-up       Assessment/Plan:  1. Malignant neoplasm of upper-inner quadrant of left breast in female, estrogen receptor positive (HCC)  - 1 year f/u    2. Visit for screening mammogram  - Mammo screening right w 3d & cad; Future      Discussion/Summary: Patient is an 78-year-old female presenting today for 1 year follow-up for left breast cancer diagnosed in February 2017. Pathology revealed invasive lobular carcinoma ER 90%, IL/HER2 negative. She underwent a left mastectomy with sentinel node biopsy with Dr. Lanlgey and had immediate reconstruction with Dr. Paniagua. She completed a 5 year course of anastrozole hormone therapy in March 2022. She had a screening mammogram of the right breast on 6/19/2023 which was BIRADS 2 category 3 density. There were no concerns on her cbe. I will see the patient back in 1 year or sooner should the need arise. She was instructed to call with any questions or concerns prior to this time. All questions were answered today.       History of Present Illness:     Oncology History   Malignant neoplasm of upper-inner 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 breast (HCC)   2/9/2017 Surgery    Left mastectomy with SLN biopsy  Invasive lobular carcinoma  Grade 1  3.9 cm  0/1 lymph nodes  ER 90  IL <1  Her 2 negative  Stage IIA    Immediate reconstruction with Dr. Paniagua     2/9/2017 -  Cancer Staged    Staging form: Breast, AJCC 7th Edition  - Pathologic stage from 2/9/2017: Stage IIA (T2, N0, cM0) - Signed by JONATHAN Coleman on 5/8/2024  Specimen type: Excision  Laterality: Left  Tumor size (mm): 39  Method of lymph node assessment: [place]  lymph node biopsy  Histologic grade (G): G1  Estrogen receptor status: Positive  Percentage of positive estrogen receptors (%): 90  Progesterone receptor status: Negative  Percentage of positive progesterone receptors (%): 0  HER2 status: Positive  Method of assessment of HER2 status: IHC       3/3/2017 Genomic Testing    Mammaprint low risk     3/2017 -  Hormone Therapy    Anastrozole 1 mg daily  Dr. Palacios          -Interval History: Patient is an 78-year-old female presenting today for 1 year follow-up for left breast cancer diagnosed in February 2017. She is on obs. She had a screening mammogram of the right breast on 6/19/2023 which was BIRADS 2 category 3 density. Patient denies changes on her breast exam. She denies persistent headaches, bone pain, back pain, shortness of breath, or abdominal pain.      Review of Systems:  Review of Systems   Constitutional:  Negative for activity change, appetite change, fatigue and unexpected weight change.   Respiratory:  Negative for cough and shortness of breath.    Cardiovascular:  Negative for chest pain.   Gastrointestinal:  Negative for abdominal pain, diarrhea, nausea and vomiting.   Endocrine: Negative for heat intolerance.   Musculoskeletal:  Negative for arthralgias, back pain and myalgias.   Skin:  Negative for rash.   Neurological:  Negative for weakness and headaches.   Hematological:  Negative for adenopathy.       Patient Active Problem List   Diagnosis   • Malignant neoplasm of upper-inner 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 breast (HCC)   • Hypertension   • Hypertrophic cardiomyopathy (HCC)   • Hypothyroidism   • Ischemic cardiomyopathy   • Osteopenia   • Plantar fasciitis   • Acquired trigger finger   • Carpal tunnel syndrome   • Numbness of hand   • Acute pain of right knee   • NSVT (nonsustained ventricular tachycardia) ([facility])   • Exertional angina   • Gastroesophageal reflux disease without esophagitis   • Paroxysmal atrial fibrillation (HCC)     Past Medical  History:   Diagnosis Date   • Hypertrophic cardiomyopathy (HCC)    • Hypothyroidism    • Ischemic cardiomyopathy    • Malignant neoplasm of left female breast (HCC) 02/09/2017    stage IIA   • Osteopenia    • Trigger finger of all digits of right hand      Past Surgical History:   Procedure Laterality Date   • BREAST BIOPSY Right     years ago benign   • BREAST RECONSTRUCTION Left 2/9/2017    Procedure: BREAST RECONSTRUCTION WITH TISSUE EXPANDERS AND ALLODERM ;  Surgeon: Cuong Paniagua MD;  [facility];  Service:    • COLONOSCOPY  2009   • KNEE ARTHROSCOPY Bilateral    • MASTECTOMY Left 02/09/2017   • PA BX/EXC LYMPH NODE OPEN SUPERFICIAL Left 2/9/2017    Procedure: LYMPHOSCINTIGRAPHY; INTRAOPERATIVE LYMPHATIC MAPPING; SENTINEL LYMPH NODE BIOPSY (INJECT AT 0700 BY DR LANGLEY);  Surgeon: Stas Langley MD;  [facility];  Service: Surgical Oncology   • PA INSJ/RPLCMT BREAST IMPLANT SEP DAY MASTECTOMY Left 5/18/2017    Procedure: BREAST EXPANDER/IMPLANT EXCHANGE; CAPSULECTOMY ;  Surgeon: Cuong Paniagua MD;  [facility];  Service: Plastics   • PA MASTECTOMY SIMPLE COMPLETE Left 2/9/2017    Procedure: BREAST MASTECTOMY; FROZEN SECTION ;  Surgeon: Stas Langley MD;  [facility];  Service: Surgical Oncology   • REFRACTIVE SURGERY Bilateral    • US GUIDANCE BREAST BIOPSY LEFT EACH ADDITIONAL Left 12/1/2016   • US GUIDED BREAST BIOPSY LEFT COMPLETE Left 12/1/2016     Family History   Problem Relation Age of Onset   • Heart disease Mother         had angina   • Heart disease Father    • Brain cancer Sister    • Heart disease Brother         had CABG   • No Known Problems Brother    • No Known Problems Maternal Grandmother    • No Known Problems Maternal Grandfather    • No Known Problems Paternal Grandmother    • No Known Problems Paternal Grandfather    • No Known Problems Daughter    • No Known Problems Daughter    • No Known Problems Paternal Aunt    • No Known Problems Paternal Aunt    • No Known  Problems Paternal Aunt    • No Known Problems Paternal Aunt    • No Known Problems Paternal Aunt      Social History     Socioeconomic History   • Marital status:      Spouse name: Not on file   • Number of children: Not on file   • Years of education: Not on file   • Highest education level: Not on file   Occupational History   • Not on file   Tobacco Use   • Smoking status: Never   • Smokeless tobacco: Never   Vaping Use   • Vaping status: Never Used   Substance and Sexual Activity   • Alcohol use: No     Alcohol/week: 1.0 standard drink of alcohol     Types: 1 Glasses of wine per week   • Drug use: No   • Sexual activity: Not Currently   Other Topics Concern   • Not on file   Social History Narrative     since 2018. Was  for 50 years.    Lives alone.  Has 2 daughters.  One in [place] who is a nephrologist.  The other in [place].    Enjoys gardening and being outside.       Social Determinants of Health     Financial Resource Strain: Low Risk  (5/24/2023)    Overall Financial Resource Strain (CARDIA)    • Difficulty of Paying Living Expenses: Not hard at all   Food Insecurity: Not on file   Transportation Needs: No Transportation Needs (5/24/2023)    PRAPARE - Transportation    • Lack of Transportation (Medical): No    • Lack of Transportation (Non-Medical): No   Physical Activity: Not on file   Stress: Not on file   Social Connections: Not on file   Intimate Partner Violence: Not on file   Housing Stability: Not on file       Current Outpatient Medications:   •  apixaban (Eliquis) 2.5 mg, Take 1 tablet (2.5 mg total) by mouth 2 (two) times a day, Disp: 42 tablet, Rfl: 0  •  calcium carbonate-vitamin D (OSCAL-D) 500 mg-200 units per tablet, take 1 tablet by mouth once daily WITH BREAKFAST., Disp: 30 tablet, Rfl: 0  •  cholecalciferol (VITAMIN D3) 1,000 units tablet, Take 1,000 Units by mouth daily, Disp: , Rfl:   •  Co Q-10 50 MG, Take 50 mg by mouth, Disp: , Rfl:   •  dofetilide (TIKOSYN) 125  mcg capsule, Take 1 capsule (125 mcg total) by mouth 2 (two) times a day, Disp: 180 capsule, Rfl: 3  •  levothyroxine 50 mcg tablet, take 1 tablet by mouth once daily, Disp: 90 tablet, Rfl: 1  •  metoprolol succinate (TOPROL-XL) 50 mg 24 hr tablet, take 1 tablet by mouth every morning, Disp: 90 tablet, Rfl: 3  •  mometasone (ELOCON) 0.1 % cream, , Disp: , Rfl:   •  sacubitril-valsartan (Entresto) 24-26 MG TABS, Take 0.5 tablets by mouth 2 (two) times a day, Disp: 90 tablet, Rfl: 1  •  spironolactone (ALDACTONE) 25 mg tablet, Take 12.5 mg by mouth every other day, Disp: , Rfl:   •  Omega-3 Fatty Acids (FISH OIL) 1,000 mg, Take 1,000 mg by mouth daily (Patient not taking: Reported on 4/15/2024), Disp: , Rfl:   Allergies   Allergen Reactions   • Seasonal Ic [Cholestatin] Sneezing     Vitals:    05/08/24 1050   BP: 114/80   Pulse: 65   Resp: 18   Temp: (!) 97.3 °F (36.3 °C)   SpO2: 96%       Physical Exam  Constitutional:       General: She is not in acute distress.     Appearance: Normal appearance.   Cardiovascular:      Rate and Rhythm: Normal rate and regular rhythm.      Pulses: Normal pulses.      Heart sounds: Normal heart sounds.   Pulmonary:      Effort: Pulmonary effort is normal.      Breath sounds: Normal breath sounds.   Chest:      Chest wall: No mass.   Breasts:     Right: No swelling, bleeding, inverted nipple, mass, nipple discharge, skin change or tenderness.      Left: No swelling, bleeding, mass, skin change or tenderness.          Comments: Left breast mastectomy scar with implant present  Abdominal:      General: Abdomen is flat.      Palpations: Abdomen is soft.   Lymphadenopathy:      Upper Body:      Right upper body: No supraclavicular, axillary or pectoral adenopathy.      Left upper body: No supraclavicular, axillary or pectoral adenopathy.   Skin:     General: Skin is warm.   Neurological:      General: No focal deficit present.      Mental Status: She is alert and oriented to person, place,  and time.   Psychiatric:         Mood and Affect: Mood normal.         Behavior: Behavior normal.           Results:    Imaging  No results found.    I reviewed the above imaging data.      Advance Care Planning/Advance Directives:  Discussed disease status, cancer treatment plans and/or cancer treatment goals with the patient.

## 2024-05-18 NOTE — ASSESSMENT & PLAN NOTE
Cardiology Daily Progress Note      Assessment & Plan:  Patient seen.  Asymptomatic hypotension, blood pressure 99 mmHg, MAP was 65 mmHg.  Patient will receive IV albumin x 1 and vasopressors if necessary.  Because of recent lung surgery avoid overhydration or IV fluid bolus.  No shortness of breath, up in chair chest tubes on the right side in place, no subcutaneous emphysema.    S/p R thoracotomy, decortication, eLOA, removal of pleural fibrin 5/16.    Postop:  Overall doing well  Maintaining sinus rhythm  Chest tubes are   in   No dyspnea or orthopnea  Vital signs stable  Home meds reviewed, due to hypotension discontinue calcium channel blocker i.e. Procardia XL and decreased the dose of metoprolol, switch over to long-acting with some parameters as mentioned below.  Patient has history of PSVT off beta-blocker, symptomatic  2/1/21: CABG: LIMA, SVG to OM1,OM2,PDA (sequential ) tolerated surgery.  Will check postop EKG.    Lexiscan 2022   Impressions:  Abnormal Lexiscan Cardiolite stress test with ECG and  perfusion findings of prior predominantly lateral wall infarct without  significant per-infarct ischemia. Preserved SPECT LVEF.        Echo 4/2/24  1. Left ventricle: The cavity size is normal. Wall thickness is mildly     increased. Systolic function is vigorous. The ejection fraction was     measured by single plane method of disks. The ejection fraction is 59%.  2. Right ventricle: The cavity size is normal. Systolic function is normal.     Dyslipidemia : Triglyceride 272, LDL 87 3/9/24  Keep LDL below 55.  Triglycerides they do so due to..  Most likely elevation of triglycerides secondary to uncontrolled diabetes.     IDDM : A1C 3/9/24 was 10.7  follows with endocrinology   Noncompliance with diet and medication is a big issue.     PAF : Transient postop after bypass no recurrences.  Metoprolol resumed    Hx of renal transplant  2014   On immunosuppressant.           Subjective:   No complaints  Overall  Hypothyroidism    Patient will check TSH blood work and continue with current dose of levothyroxine feeling better  Chest tubes in place  Pain controlled      Physical Exam:   Temp:  [97.8 °F (36.6 °C)-98.8 °F (37.1 °C)] 98.1 °F (36.7 °C)  Heart Rate:  [78-95] 82  Resp:  [0-24] 12  BP: ()/(55-78) 94/59     Telemetry reviewed: Sinus rhythm    Gen: Alert,  sitting in  comfortably in no acute distress No orthopnea in supine position.  Rt thoracic.CT tubes are in place,   Eyes: No scleral icterus  HEENT: Normocephalic, atraumatic  Neck: Trachea midline  CV: Regular rate normal sinus rhythm with occasional isolated monomorphic PVCs, S1-S2 normal, midline sternotomy incision is dry.  Resp: Right middle and lower lobe air entry is diminished.  Chest tubes are in place no subcutaneous emphysema  Chest: Posterior R upper chest w/ wound vac in place, holding suction.  No erythema, non-tender.    GI: Abdomen is soft, non-distended previous surgical scar for kidney transplant  MSK/Extremities: Moves all 4 extremities, patient has bilateral lower extremity chronic venous stasis, uses walker to ambulate, patient has peripheral neuropathy secondary to diabetes.  Neuro: No focal deficits, peripheral neuropathy.  Psych: Cooperative, somewhat depressed  Skin: Warm, dry     Medications:  Inpatient medications reviewed      Laboratory:  Recent Results (from the past 24 hour(s))   Electrocardiogram 12-Lead    Collection Time: 05/17/24  2:20 PM   Result Value Ref Range    Ventricular Rate EKG/Min (BPM) 79     Atrial Rate (BPM) 79     ID-Interval (MSEC) 176     QRS-Interval (MSEC) 92     QT-Interval (MSEC) 394     QTc 452     P Axis (Degrees) 34     R Axis (Degrees) -30     T Axis (Degrees) 56     REPORT TEXT       Normal sinus rhythm  Left axis deviation  Cannot rule out  Anterior infarct  (cited on or before  19-APR-2022)  Abnormal ECG  When compared with ECG of  19-APR-2022 07:44,  ID interval  has decreased  T wave amplitude has decreased in  Inferior leads  Confirmed by DORCAS PEREIRA MD ([id number]) on 5/17/2024 2:26:08 PM      GLUCOSE, BEDSIDE - POINT OF CARE    Collection Time: 05/17/24  4:11 PM   Result Value Ref Range    GLUCOSE, BEDSIDE - POINT OF CARE 157 (H) 70 - 99 mg/dL   GLUCOSE, BEDSIDE - POINT OF CARE    Collection Time: 05/17/24  8:46 PM   Result Value Ref Range    GLUCOSE, BEDSIDE - POINT OF CARE 160 (H) 70 - 99 mg/dL   Comprehensive Metabolic Panel    Collection Time: 05/18/24  5:53 AM   Result Value Ref Range    Fasting Status 10 0 - 999 Hours    Sodium 137 135 - 145 mmol/L    Potassium 4.3 3.4 - 5.1 mmol/L    Chloride 109 97 - 110 mmol/L    Carbon Dioxide 24 21 - 32 mmol/L    Anion Gap 8 7 - 19 mmol/L    Glucose 109 (H) 70 - 99 mg/dL    BUN 28 (H) 6 - 20 mg/dL    Creatinine 1.61 (H) 0.67 - 1.17 mg/dL    Glomerular Filtration Rate 51 (L) >=60    BUN/Cr 17 7 - 25    Calcium 8.9 8.4 - 10.2 mg/dL    Bilirubin, Total 0.7 0.2 - 1.0 mg/dL    GOT/AST 15 <=37 Units/L    GPT/ALT 8 <64 Units/L    Alkaline Phosphatase 68 45 - 117 Units/L    Albumin 2.0 (L) 3.6 - 5.1 g/dL    Protein, Total 7.0 6.4 - 8.2 g/dL    Globulin 5.0 (H) 2.0 - 4.0 g/dL    A/G Ratio 0.4 (L) 1.0 - 2.4   Magnesium    Collection Time: 05/18/24  5:53 AM   Result Value Ref Range    Magnesium 1.8 1.7 - 2.4 mg/dL   Phosphorus    Collection Time: 05/18/24  5:53 AM   Result Value Ref Range    Phosphorus 3.6 2.4 - 4.7 mg/dL   Tacrolimus    Collection Time: 05/18/24  5:53 AM   Result Value Ref Range    Tacrolimus 6.4 5.0 - 20.0 ng/mL   CBC with Automated Differential (performable only)    Collection Time: 05/18/24  5:53 AM   Result Value Ref Range    WBC 15.0 (H) 4.2 - 11.0 K/mcL    RBC 3.46 (L) 4.50 - 5.90 mil/mcL    HGB 8.8 (L) 13.0 - 17.0 g/dL    HCT 29.0 (L) 39.0 - 51.0 %    MCV 83.8 78.0 - 100.0 fl    MCH 25.4 (L) 26.0 - 34.0 pg    MCHC 30.3 (L) 32.0 - 36.5 g/dL    RDW-CV 18.8 (H) 11.0 - 15.0 %    RDW-SD 56.5 (H) 39.0 - 50.0 fL     140 - 450 K/mcL    NRBC 0 <=0 /100 WBC    Neutrophil, Percent 74 %    Lymphocytes, Percent 12 %    Mono, Percent 11 %     Eosinophils, Percent 2 %    Basophils, Percent 0 %    Immature Granulocytes 1 %    Absolute Neutrophils 11.1 (H) 1.8 - 7.7 K/mcL    Absolute Lymphocytes 1.8 1.0 - 4.0 K/mcL    Absolute Monocytes 1.7 (H) 0.3 - 0.9 K/mcL    Absolute Eosinophils  0.2 0.0 - 0.5 K/mcL    Absolute Basophils 0.0 0.0 - 0.3 K/mcL    Absolute Immature Granulocytes 0.1 0.0 - 0.2 K/mcL   GLUCOSE, BEDSIDE - POINT OF CARE    Collection Time: 05/18/24  7:58 AM   Result Value Ref Range    GLUCOSE, BEDSIDE - POINT OF CARE 114 (H) 70 - 99 mg/dL   GLUCOSE, BEDSIDE - POINT OF CARE    Collection Time: 05/18/24 12:01 PM   Result Value Ref Range    GLUCOSE, BEDSIDE - POINT OF CARE 115 (H) 70 - 99 mg/dL           Radiology:  Imaging reviewed:     Procedures:  Procedures reviewed:            Laci Barber MD   5/18/2024

## 2024-06-03 ENCOUNTER — OFFICE VISIT (OUTPATIENT)
Dept: FAMILY MEDICINE CLINIC | Facility: CLINIC | Age: 79
End: 2024-06-03
Payer: COMMERCIAL

## 2024-06-03 VITALS
WEIGHT: 137.31 LBS | RESPIRATION RATE: 18 BRPM | OXYGEN SATURATION: 97 % | HEART RATE: 83 BPM | HEIGHT: 60 IN | DIASTOLIC BLOOD PRESSURE: 60 MMHG | TEMPERATURE: 97.5 F | BODY MASS INDEX: 26.96 KG/M2 | SYSTOLIC BLOOD PRESSURE: 90 MMHG

## 2024-06-03 DIAGNOSIS — C50.212 MALIGNANT NEOPLASM OF UPPER-INNER QUADRANT OF LEFT FEMALE BREAST, UNSPECIFIED ESTROGEN RECEPTOR STATUS (HCC): ICD-10-CM

## 2024-06-03 DIAGNOSIS — Z00.00 MEDICARE ANNUAL WELLNESS VISIT, SUBSEQUENT: ICD-10-CM

## 2024-06-03 DIAGNOSIS — I10 PRIMARY HYPERTENSION: Primary | ICD-10-CM

## 2024-06-03 DIAGNOSIS — E03.9 HYPOTHYROIDISM, UNSPECIFIED TYPE: ICD-10-CM

## 2024-06-03 PROCEDURE — G0439 PPPS, SUBSEQ VISIT: HCPCS | Performed by: FAMILY MEDICINE

## 2024-06-03 PROCEDURE — 99213 OFFICE O/P EST LOW 20 MIN: CPT | Performed by: FAMILY MEDICINE

## 2024-06-03 NOTE — PROGRESS NOTES
Ambulatory Visit  Name: Nati Villanueva      : 1945      MRN: [id number]  Encounter Provider: Mckinley Guerrier MD  Encounter Date: 6/3/2024   Encounter department: [facility]    Assessment & Plan   1. Primary hypertension  Assessment & Plan:  Hypertension.  The patient's blood pressure is stable at this time and he will continue current regimen of medications  2. Malignant neoplasm of upper-inner 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 breast, unspecified estrogen receptor status (HCC)  Assessment & Plan:  History of breast cancer.  Patient continues to see her surgical oncologist at least on a yearly basis for surveillance of her history of breast cancer  3. Hypothyroidism, unspecified type  Assessment & Plan:  Hypothyroidism.  TSH is stable on current dose of levothyroxine  4. Medicare annual wellness visit, subsequent       Preventive health issues were discussed with patient, and age appropriate screening tests were ordered as noted in patient's After Visit Summary. Personalized health advice and appropriate referrals for health education or preventive services given if needed, as noted in patient's After Visit Summary.    History of Present Illness     Patient in the office to review chronic medical conditions.  Patient had recent stress test that was unremarkable other than her history of cardiomyopathy and decreased ejection fraction.  Patient walks several days a week for 3 miles which takes her a little bit over an hour.  She denies any chest pain but does at times feel winded if she works too aggressively in her yard.  She was also seen by her surgical oncologist who felt she was in stable health in regards to her history of breast cancer.       Patient Care Team:  Mckinley Guerrier MD as PCP - General  MD Randolph Yanez MD Minh Q Nguyen, MD as Cardiologist (Cardiology)    Review of Systems   Constitutional: Negative.    HENT: Negative.     Eyes: Negative.    Respiratory: Negative.      Cardiovascular: Negative.    Gastrointestinal: Negative.    Genitourinary: Negative.    Musculoskeletal: Negative.    Skin: Negative.    Neurological: Negative.    Psychiatric/Behavioral: Negative.       Medical History Reviewed by provider this encounter:  [facility]       Annual Wellness Visit Questionnaire   Nati is here for her Subsequent Wellness visit. Last Medicare Wellness visit information reviewed, patient interviewed and updates made to the record today.      Health Risk Assessment:   Patient rates overall health as good. Patient feels that their physical health rating is same. Patient is satisfied with their life. Eyesight was rated as same. Hearing was rated as same. Patient feels that their emotional and mental health rating is same. Patients states they are never, rarely angry. Patient states they are never, rarely unusually tired/fatigued. Pain experienced in the last 7 days has been none. Patient states that she has experienced no weight loss or gain in last 6 months.     Depression Screening:   PHQ-2 Score: 0      Fall Risk Screening:   In the past year, patient has experienced: no history of falling in past year      Urinary Incontinence Screening:   Patient has not leaked urine accidently in the last six months.     Home Safety:  Patient does not have trouble with stairs inside or outside of their home. Patient has working smoke alarms and has working carbon monoxide detector. Home safety hazards include: none.     Nutrition:   Current diet is Regular.     Medications:   Patient is currently taking over-the-counter supplements. OTC medications include: see medication list. Patient is able to manage medications.     Activities of Daily Living (ADLs)/Instrumental Activities of Daily Living (IADLs):   Walk and transfer into and out of bed and chair?: Yes  Dress and groom yourself?: Yes    Bathe or shower yourself?: Yes    Feed yourself? Yes  Do your laundry/housekeeping?: Yes  Manage your money, pay  your bills and track your expenses?: Yes  Make your own meals?: Yes    Do your own shopping?: Yes    Advance Care Planning:   Living will: No      PREVENTIVE SCREENINGS      Cardiovascular Screening:    General: History Lipid Disorder and Screening Current      Diabetes Screening:     General: Screening Current      Colorectal Cancer Screening:     General: Screening Not Indicated      Breast Cancer Screening:     General: History Breast Cancer      Cervical Cancer Screening:    General: Screening Not Indicated      Lung Cancer Screening:     General: Screening Not Indicated      Preventive Screening Comments: Patient will inquire with her insurance to see if shingles vaccination is covered under her plan    Screening, Brief Intervention, and Referral to Treatment (SBIRT)    Screening    Typical number of drinks in a week: 0    Single Item Drug Screening:  How often have you used an illegal drug (including marijuana) or a prescription medication for non-medical reasons in the past year? never    Single Item Drug Screen Score: 0  Interpretation: Negative screen for possible drug use disorder    Social Determinants of Health     Financial Resource Strain: Low Risk  (5/24/2023)    Overall Financial Resource Strain (CARDIA)     Difficulty of Paying Living Expenses: Not hard at all   Transportation Needs: No Transportation Needs (5/24/2023)    PRAPARE - Transportation     Lack of Transportation (Medical): No     Lack of Transportation (Non-Medical): No     No results found.    Objective     BP 90/60   Pulse 83   Temp 97.5 °F (36.4 °C)   Resp 18   Ht 5' (1.524 m)   Wt 62.3 kg (137 lb 5 oz)   SpO2 97%   BMI 26.82 kg/m²     Physical Exam  Vitals and nursing note reviewed.   Constitutional:       General: She is not in acute distress.     Appearance: Normal appearance. She is well-developed. She is not ill-appearing.   HENT:      Head: Normocephalic and atraumatic.   Eyes:      General:         Right eye: No  discharge.         Left eye: No discharge.      Extraocular Movements: Extraocular movements intact.      Conjunctiva/sclera: Conjunctivae normal.      Pupils: Pupils are equal, round, and reactive to light.   Neck:      Vascular: No carotid bruit.   Cardiovascular:      Rate and Rhythm: Normal rate and regular rhythm.      Heart sounds: No murmur heard.  Pulmonary:      Effort: Pulmonary effort is normal. No respiratory distress.      Breath sounds: Normal breath sounds.   Abdominal:      General: Abdomen is flat. Bowel sounds are normal.      Palpations: Abdomen is soft.      Tenderness: There is no abdominal tenderness. There is no guarding or rebound.   Musculoskeletal:      Right lower leg: No edema.      Left lower leg: No edema.   Lymphadenopathy:      Cervical: No cervical adenopathy.   Skin:     General: Skin is warm and dry.      Capillary Refill: Capillary refill takes less than 2 seconds.   Neurological:      Mental Status: She is alert. Mental status is at baseline.   Psychiatric:         Mood and Affect: Mood normal.         Behavior: Behavior normal.         Thought Content: Thought content normal.         Judgment: Judgment normal.       Administrative Statements

## 2024-06-03 NOTE — ASSESSMENT & PLAN NOTE
History of breast cancer.  Patient continues to see her surgical oncologist at least on a yearly basis for surveillance of her history of breast cancer

## 2024-06-03 NOTE — PATIENT INSTRUCTIONS
Medicare Preventive Visit Patient Instructions  Thank you for completing your Welcome to Medicare Visit or Medicare Annual Wellness Visit today. Your next wellness visit will be due in one year (6/4/2025).  The screening/preventive services that you may require over the next 5-10 years are detailed below. Some tests may not apply to you based off risk factors and/or age. Screening tests ordered at today's visit but not completed yet may show as past due. Also, please note that scanned in results may not display below.  Preventive Screenings:  Service Recommendations Previous Testing/Comments   Colorectal Cancer Screening  * Colonoscopy    * Fecal Occult Blood Test (FOBT)/Fecal Immunochemical Test (FIT)  * Fecal DNA/Cologuard Test  * Flexible Sigmoidoscopy Age: 45-75 years old   Colonoscopy: every 10 years (may be performed more frequently if at higher risk)  OR  FOBT/FIT: every 1 year  OR  Cologuard: every 3 years  OR  Sigmoidoscopy: every 5 years  Screening may be recommended earlier than age 45 if at higher risk for colorectal cancer. Also, an individualized decision between you and your healthcare provider will decide whether screening between the ages of 76-85 would be appropriate. Colonoscopy: 09/09/2019  FOBT/FIT: Not on file  Cologuard: Not on file  Sigmoidoscopy: Not on file    Screening Not Indicated     Breast Cancer Screening Age: 40+ years old  Frequency: every 1-2 years  Not required if history of left and right mastectomy Mammogram: 06/19/2023    History Breast Cancer   Cervical Cancer Screening Between the ages of 21-29, pap smear recommended once every 3 years.   Between the ages of 30-65, can perform pap smear with HPV co-testing every 5 years.   Recommendations may differ for women with a history of total hysterectomy, cervical cancer, or abnormal pap smears in past. Pap Smear: Not on file    Screening Not Indicated   Hepatitis C Screening Once for adults born between 1945 and 1965  More frequently  in patients at high risk for Hepatitis C Hep C Antibody: Not on file        Diabetes Screening 1-2 times per year if you're at risk for diabetes or have pre-diabetes Fasting glucose: 99 mg/dL (10/30/2023)  A1C: 5.8 % (3/5/2021)  Screening Current   Cholesterol Screening Once every 5 years if you don't have a lipid disorder. May order more often based on risk factors. Lipid panel: 10/30/2023    Screening Not Indicated  History Lipid Disorder     Other Preventive Screenings Covered by Medicare:  Abdominal Aortic Aneurysm (AAA) Screening: covered once if your at risk. You're considered to be at risk if you have a family history of AAA.  Lung Cancer Screening: covers low dose CT scan once per year if you meet all of the following conditions: (1) Age 55-77; (2) No signs or symptoms of lung cancer; (3) Current smoker or have quit smoking within the last 15 years; (4) You have a tobacco smoking history of at least 20 pack years (packs per day multiplied by number of years you smoked); (5) You get a written order from a healthcare provider.  Glaucoma Screening: covered annually if you're considered high risk: (1) You have diabetes OR (2) Family history of glaucoma OR (3)  aged 50 and older OR (4)  American aged 65 and older  Osteoporosis Screening: covered every 2 years if you meet one of the following conditions: (1) You're estrogen deficient and at risk for osteoporosis based off medical history and other findings; (2) Have a vertebral abnormality; (3) On glucocorticoid therapy for more than 3 months; (4) Have primary hyperparathyroidism; (5) On osteoporosis medications and need to assess response to drug therapy.   Last bone density test (DXA Scan): 04/19/2017.  HIV Screening: covered annually if you're between the age of 15-65. Also covered annually if you are younger than 15 and older than 65 with risk factors for HIV infection. For pregnant patients, it is covered up to 3 times per  pregnancy.    Immunizations:  Immunization Recommendations   Influenza Vaccine Annual influenza vaccination during flu season is recommended for all persons aged >= 6 months who do not have contraindications   Pneumococcal Vaccine   * Pneumococcal conjugate vaccine = PCV13 (Prevnar 13), PCV15 (Vaxneuvance), PCV20 (Prevnar 20)  * Pneumococcal polysaccharide vaccine = PPSV23 (Pneumovax) Adults 19-63 yo with certain risk factors or if 65+ yo  If never received any pneumonia vaccine: recommend Prevnar 20 (PCV20)  Give PCV20 if previously received 1 dose of PCV13 or PPSV23   Hepatitis B Vaccine 3 dose series if at intermediate or high risk (ex: diabetes, end stage renal disease, liver disease)   Respiratory syncytial virus (RSV) Vaccine - COVERED BY MEDICARE PART D  * RSVPreF3 (Arexvy) CDC recommends that adults 60 years of age and older may receive a single dose of RSV vaccine using shared clinical decision-making (SCDM)   Tetanus (Td) Vaccine - COST NOT COVERED BY MEDICARE PART B Following completion of primary series, a booster dose should be given every 10 years to maintain immunity against tetanus. Td may also be given as tetanus wound prophylaxis.   Tdap Vaccine - COST NOT COVERED BY MEDICARE PART B Recommended at least once for all adults. For pregnant patients, recommended with each pregnancy.   Shingles Vaccine (Shingrix) - COST NOT COVERED BY MEDICARE PART B  2 shot series recommended in those 19 years and older who have or will have weakened immune systems or those 50 years and older     Health Maintenance Due:      Topic Date Due   • Breast Cancer Screening: Mammogram  06/19/2025     Immunizations Due:      Topic Date Due   • COVID-19 Vaccine (4 - 2023-24 season) 09/01/2023     Advance Directives   What are advance directives?  Advance directives are legal documents that state your wishes and plans for medical care. These plans are made ahead of time in case you lose your ability to make decisions for  yourself. Advance directives can apply to any medical decision, such as the treatments you want, and if you want to donate organs.   What are the types of advance directives?  There are many types of advance directives, and each state has rules about how to use them. You may choose a combination of any of the following:  Living will:  This is a written record of the treatment you want. You can also choose which treatments you do not want, which to limit, and which to stop at a certain time. This includes surgery, medicine, IV fluid, and tube feedings.   Durable power of  for healthcare (DPAHC):  This is a written record that states who you want to make healthcare choices for you when you are unable to make them for yourself. This person, called a proxy, is usually a family member or a friend. You may choose more than 1 proxy.  Do not resuscitate (DNR) order:  A DNR order is used in case your heart stops beating or you stop breathing. It is a request not to have certain forms of treatment, such as CPR. A DNR order may be included in other types of advance directives.  Medical directive:  This covers the care that you want if you are in a coma, near death, or unable to make decisions for yourself. You can list the treatments you want for each condition. Treatment may include pain medicine, surgery, blood transfusions, dialysis, IV or tube feedings, and a ventilator (breathing machine).  Values history:  This document has questions about your views, beliefs, and how you feel and think about life. This information can help others choose the care that you would choose.  Why are advance directives important?  An advance directive helps you control your care. Although spoken wishes may be used, it is better to have your wishes written down. Spoken wishes can be misunderstood, or not followed. Treatments may be given even if you do not want them. An advance directive may make it easier for your family to make  difficult choices about your care.   Weight Management   Why it is important to manage your weight:  Being overweight increases your risk of health conditions such as heart disease, high blood pressure, type 2 diabetes, and certain types of cancer. It can also increase your risk for osteoarthritis, sleep apnea, and other respiratory problems. Aim for a slow, steady weight loss. Even a small amount of weight loss can lower your risk of health problems.  How to lose weight safely:  A safe and healthy way to lose weight is to eat fewer calories and get regular exercise. You can lose up about 1 pound a week by decreasing the number of calories you eat by 500 calories each day.   Healthy meal plan for weight management:  A healthy meal plan includes a variety of foods, contains fewer calories, and helps you stay healthy. A healthy meal plan includes the following:  Eat whole-grain foods more often.  A healthy meal plan should contain fiber. Fiber is the part of grains, fruits, and vegetables that is not broken down by your body. Whole-grain foods are healthy and provide extra fiber in your diet. Some examples of whole-grain foods are whole-wheat breads and pastas, oatmeal, brown rice, and bulgur.  Eat a variety of vegetables every day.  Include dark, leafy greens such as spinach, kale, evan greens, and mustard greens. Eat yellow and orange vegetables such as carrots, sweet potatoes, and winter squash.   Eat a variety of fruits every day.  Choose fresh or canned fruit (canned in its own juice or light syrup) instead of juice. Fruit juice has very little or no fiber.  Eat low-fat dairy foods.  Drink fat-free (skim) milk or 1% milk. Eat fat-free yogurt and low-fat cottage cheese. Try low-fat cheeses such as mozzarella and other reduced-fat cheeses.  Choose meat and other protein foods that are low in fat.  Choose beans or other legumes such as split peas or lentils. Choose fish, skinless poultry (chicken or turkey), or  lean cuts of red meat (beef or pork). Before you cook meat or poultry, cut off any visible fat.   Use less fat and oil.  Try baking foods instead of frying them. Add less fat, such as margarine, sour cream, regular salad dressing and mayonnaise to foods. Eat fewer high-fat foods. Some examples of high-fat foods include french fries, doughnuts, ice cream, and cakes.  Eat fewer sweets.  Limit foods and drinks that are high in sugar. This includes candy, cookies, regular soda, and sweetened drinks.  Exercise:  Exercise at least 30 minutes per day on most days of the week. Some examples of exercise include walking, biking, dancing, and swimming. You can also fit in more physical activity by taking the stairs instead of the elevator or parking farther away from stores. Ask your healthcare provider about the best exercise plan for you.      © Copyright [employer] 2018 Information is for End User's use only and may not be sold, redistributed or otherwise used for commercial purposes. All illustrations and images included in CareNotes® are the copyrighted property of A.D.A.M., Inc. or [employer]

## 2024-06-10 ENCOUNTER — TELEPHONE (OUTPATIENT)
Dept: CARDIOLOGY CLINIC | Facility: CLINIC | Age: 79
End: 2024-06-10

## 2024-06-10 NOTE — TELEPHONE ENCOUNTER
Caller: Nati Villanueva     Reason for call: Returning call of Chelsey, cory best call back Number as listed in chart    Call back#: [phone]

## 2024-06-10 NOTE — TELEPHONE ENCOUNTER
----- Message from Franny H sent at 6/10/2024  7:25 AM EDT -----  Regarding: opti-vol crossed  Pts opti-vol crossed 6/10 and is ongoing. Pt takes aldactone, tikosyn, eliquis, entresto, metoprolol succ. Ef: 35% (nuc st tx 3/20/24).  Thanks,  ~Franny  NON-BILLABLE CARELINK TRANSMISSION: BATTERY VOLTAGE ADEQUATE (8.3 YRS). AP 77.1%.  <0.1% (MVP-ON). ALL AVAILABLE LEAD PARAMETERS WITHIN NORMAL LIMITS. NO SIGNIFICANT HIGH RATE EPISODES. OPTI-VOL FLUID THRESHOLD CROSSED 6/10. PT TAKES ALDACTONE, TIKOSYN, ELIQUIS, ENTRESTO, METOPROLOL SUCC. EF: 35% (NUC ST TX 3/20/24). TASK TO HF TEAM. NORMAL DEVICE FUNCTION. CH

## 2024-06-11 NOTE — TELEPHONE ENCOUNTER
Returned call to patient who stated she is feeling well & outside working.    Denied chest pain or discomfort. Denied SOB. Stated occasionally her feet swell on the sides if it is humid out or she has been on her feet a lot. Stated the puffiness goes away when she elevates her feet.    Stated she gets tired when working outside.    Taking all her cardiac medications.    Advised when to call office with S&S.

## 2024-06-20 ENCOUNTER — HOSPITAL ENCOUNTER (OUTPATIENT)
Dept: RADIOLOGY | Age: 79
Discharge: HOME | End: 2024-06-20
Payer: COMMERCIAL

## 2024-06-20 VITALS — BODY MASS INDEX: 26.5 KG/M2 | WEIGHT: 135 LBS | HEIGHT: 60 IN

## 2024-06-20 DIAGNOSIS — Z12.31 VISIT FOR SCREENING MAMMOGRAM: ICD-10-CM

## 2024-06-20 PROCEDURE — 77067 SCR MAMMO BI INCL CAD: CPT

## 2024-06-20 PROCEDURE — 77063 BREAST TOMOSYNTHESIS BI: CPT

## 2024-07-11 ENCOUNTER — IN-CLINIC DEVICE VISIT (OUTPATIENT)
Dept: CARDIOLOGY CLINIC | Facility: CLINIC | Age: 79
End: 2024-07-11
Payer: COMMERCIAL

## 2024-07-11 ENCOUNTER — OFFICE VISIT (OUTPATIENT)
Dept: CARDIOLOGY CLINIC | Facility: CLINIC | Age: 79
End: 2024-07-11
Payer: COMMERCIAL

## 2024-07-11 VITALS
HEIGHT: 60 IN | WEIGHT: 132.5 LBS | SYSTOLIC BLOOD PRESSURE: 106 MMHG | BODY MASS INDEX: 26.01 KG/M2 | HEART RATE: 61 BPM | DIASTOLIC BLOOD PRESSURE: 64 MMHG

## 2024-07-11 DIAGNOSIS — Z95.810 AICD (AUTOMATIC CARDIOVERTER/DEFIBRILLATOR) PRESENT: Primary | ICD-10-CM

## 2024-07-11 DIAGNOSIS — I10 PRIMARY HYPERTENSION: ICD-10-CM

## 2024-07-11 DIAGNOSIS — I42.2 HYPERTROPHIC CARDIOMYOPATHY (HCC): ICD-10-CM

## 2024-07-11 DIAGNOSIS — I48.91 ATRIAL FIBRILLATION, UNSPECIFIED TYPE (HCC): Primary | ICD-10-CM

## 2024-07-11 DIAGNOSIS — I47.29 NSVT (NONSUSTAINED VENTRICULAR TACHYCARDIA) (HCC): ICD-10-CM

## 2024-07-11 PROCEDURE — 93283 PRGRMG EVAL IMPLANTABLE DFB: CPT | Performed by: INTERNAL MEDICINE

## 2024-07-11 PROCEDURE — 99214 OFFICE O/P EST MOD 30 MIN: CPT | Performed by: INTERNAL MEDICINE

## 2024-07-11 PROCEDURE — 93000 ELECTROCARDIOGRAM COMPLETE: CPT | Performed by: INTERNAL MEDICINE

## 2024-07-11 RX ORDER — SPIRONOLACTONE 25 MG/1
12.5 TABLET ORAL EVERY OTHER DAY
Qty: 12 TABLET | Refills: 0 | Status: SHIPPED | OUTPATIENT
Start: 2024-07-11

## 2024-07-11 NOTE — PROGRESS NOTES
tElectrophysiology Follow Up  [facility]  [facility] [address], [place] [address]    Name: Nati Villanueva  : 1945  MRN: [id number]    ASSESSMENT/PLAN:  Paroxysmal atrial fibrillation  [id number] 5 - maintained on Eliquis 2.5mg BID   Rhythm control with dofetilide 125mcg BID   Rate control with metoprolol succinate 50mg daily  0% afib per latest 06/10 device interrogation   NSVT  Hx of prior NSVT w/ associated syncope  S/p ICD implantation in   NM stress (2024) - EF 35% w/ fixed perfusion defects   2021 LHC shows normal coronary angiography   0 NSVT episodes noted on latest device interrogation   Medtronic dual chamber ICD in situ  Implanted 2021 given NSVT episode with associated syncope and EF 35%   Per latest interrogation:  8.3y until RRT  MVPR 60bpm   <0.1%  AP 77.1%  HCM   EF 30-35% on prior ECHOs  Was 35% 2023  S/p ICD implantation 2021  Follows w/ [facility] advanced heart failure team  HTN  BP in office today 106/64       Discussion/Plan:    Patient is currently maintained on dofetilide 125mcg and metoprolol    Latest device interrogation shows 0% afib     Patient states she is feeling well and keeping active in her garden    QTC in office today appears stable      Continue metoprolol, dofetilide, and eliquis at current dosing    She states she has run out of her spironolactone. Will provide around a one-month supply. Otherwise she sees her heart failure team on  who should manage this medication long-term and can provide further refills.     Patient has been instructed to follow up in our EP office in 6 months or as needed. She will call our office with any questions or concerns in the meantime.    Rhythm History:   Atrial fibrillation:      Atrial flutter:      SVT:      VT/VF/PVC:     Device history:   Pacemaker:     Defibrillator:     BIV PPM:     BIV ICD:     ILR:    Interim History/HPI:   Interim history: Nati  JORDY Villanueva is a 78 y.o. female with a PMH of PAF, NSVT, cardiomyopathy s/p ICD implantation, and HTN.    She presents today for routine outpatient follow up given her PAF, NSVT, and ICD history. Since being started on dofetilide during her prior EP appointment she has been feeling well and is without acute complaint in the office today.     EKG: A-paced rhythm w/ nonspecific intraventricular block, QTCb 444, with ventriculoar rate 61bpm    Review of Systems   Constitutional:  Negative for appetite change, chills, fatigue, fever and unexpected weight change.   Respiratory:  Negative for chest tightness and shortness of breath.    Cardiovascular:  Negative for chest pain, palpitations and leg swelling.   Neurological:  Negative for dizziness, syncope, weakness and light-headedness.         OBJECTIVE:   Vitals:   There were no vitals taken for this visit.  There is no height or weight on file to calculate BMI.        Physical Exam:   Physical Exam  Constitutional:       General: She is not in acute distress.     Appearance: Normal appearance. She is not ill-appearing.   HENT:      Head: Normocephalic and atraumatic.      Nose: Nose normal.   Eyes:      General:         Right eye: No discharge.         Left eye: No discharge.   Neck:      Vascular: No carotid bruit.   Cardiovascular:      Rate and Rhythm: Normal rate and regular rhythm.      Pulses: Normal pulses.      Heart sounds: Normal heart sounds.   Pulmonary:      Effort: Pulmonary effort is normal.      Breath sounds: Normal breath sounds.   Musculoskeletal:      Right lower leg: No edema.      Left lower leg: No edema.   Skin:     General: Skin is warm and dry.      Capillary Refill: Capillary refill takes less than 2 seconds.   Neurological:      Mental Status: She is alert.            Medications:      Current Outpatient Medications:     apixaban (Eliquis) 2.5 mg, Take 1 tablet (2.5 mg total) by mouth 2 (two) times a day, Disp: 42 tablet, Rfl: 0    calcium  carbonate-vitamin D (OSCAL-D) 500 mg-200 units per tablet, take 1 tablet by mouth once daily WITH BREAKFAST., Disp: 30 tablet, Rfl: 0    cholecalciferol (VITAMIN D3) 1,000 units tablet, Take 1,000 Units by mouth daily, Disp: , Rfl:     Co Q-10 50 MG, Take 50 mg by mouth, Disp: , Rfl:     dofetilide (TIKOSYN) 125 mcg capsule, Take 1 capsule (125 mcg total) by mouth 2 (two) times a day, Disp: 180 capsule, Rfl: 3    levothyroxine 50 mcg tablet, take 1 tablet by mouth once daily, Disp: 90 tablet, Rfl: 1    metoprolol succinate (TOPROL-XL) 50 mg 24 hr tablet, take 1 tablet by mouth every morning, Disp: 90 tablet, Rfl: 3    mometasone (ELOCON) 0.1 % cream, , Disp: , Rfl:     Omega-3 Fatty Acids (FISH OIL) 1,000 mg, Take 1,000 mg by mouth daily (Patient not taking: Reported on 4/15/2024), Disp: , Rfl:     sacubitril-valsartan (Entresto) 24-26 MG TABS, Take 0.5 tablets by mouth 2 (two) times a day, Disp: 90 tablet, Rfl: 1    spironolactone (ALDACTONE) 25 mg tablet, Take 12.5 mg by mouth every other day, Disp: , Rfl:        Historical Information   Past Medical History:   Diagnosis Date    Hypertrophic cardiomyopathy (HCC)     Hypothyroidism     Ischemic cardiomyopathy     Malignant neoplasm of left female breast (HCC) 02/09/2017    stage IIA    Osteopenia     Trigger finger of all digits of right hand        Past Surgical History:   Procedure Laterality Date    BREAST BIOPSY Right     years ago benign    BREAST RECONSTRUCTION Left 2/9/2017    Procedure: BREAST RECONSTRUCTION WITH TISSUE EXPANDERS AND ALLODERM ;  Surgeon: Cuong Paniagua MD;  [facility];  Service:     COLONOSCOPY  2009    KNEE ARTHROSCOPY Bilateral     MASTECTOMY Left 02/09/2017    TN BX/EXC LYMPH NODE OPEN SUPERFICIAL Left 2/9/2017    Procedure: LYMPHOSCINTIGRAPHY; INTRAOPERATIVE LYMPHATIC MAPPING; SENTINEL LYMPH NODE BIOPSY (INJECT AT 0700 BY DR LANGLEY);  Surgeon: Stas Langley MD;  [facility];  Service: Surgical Oncology    TN INSJ/RPLCMT  BREAST IMPLANT SEP DAY MASTECTOMY Left 5/18/2017    Procedure: BREAST EXPANDER/IMPLANT EXCHANGE; CAPSULECTOMY ;  Surgeon: Cuong Paniagua MD;  [facility]: [facility];  Service: Plastics    AZ MASTECTOMY SIMPLE COMPLETE Left 2/9/2017    Procedure: BREAST MASTECTOMY; FROZEN SECTION ;  Surgeon: Stas Langley MD;  [facility];  Service: Surgical Oncology    REFRACTIVE SURGERY Bilateral     US GUIDANCE BREAST BIOPSY LEFT EACH ADDITIONAL Left 12/1/2016    US GUIDED BREAST BIOPSY LEFT COMPLETE Left 12/1/2016       Social History     Substance and Sexual Activity   Alcohol Use No    Alcohol/week: 1.0 standard drink of alcohol    Types: 1 Glasses of wine per week     Social History     Substance and Sexual Activity   Drug Use No     Social History     Tobacco Use   Smoking Status Never   Smokeless Tobacco Never       Family History   Problem Relation Age of Onset    Heart disease Mother         had angina    Heart disease Father     Brain cancer Sister     Heart disease Brother         had CABG    No Known Problems Brother     No Known Problems Maternal Grandmother     No Known Problems Maternal Grandfather     No Known Problems Paternal Grandmother     No Known Problems Paternal Grandfather     No Known Problems Daughter     No Known Problems Daughter     No Known Problems Paternal Aunt     No Known Problems Paternal Aunt     No Known Problems Paternal Aunt     No Known Problems Paternal Aunt     No Known Problems Paternal Aunt          Labs & Results:  Below is the patient's most recent value for Albumin, ALT, AST, BUN, Calcium, Chloride, Cholesterol, CO2, Creatinine, GFR, Glucose, HDL, Hematocrit, Hemoglobin, Hemoglobin A1C, LDL, Magnesium, Phosphorus, Platelets, Potassium, PSA, Sodium, Triglycerides, and WBC.   Lab Results   Component Value Date    ALT 13 10/30/2023    AST 21 10/30/2023    BUN 32 (H) 04/10/2024    CALCIUM 9.4 04/10/2024     04/10/2024    CHOL 178 02/21/2015    CO2 25 04/10/2024    CREATININE 0.99  04/10/2024    HDL 46 (L) 10/30/2023    HCT 39.4 10/30/2023    HGB 12.4 10/30/2023    HGBA1C 5.8 (H) 03/05/2021    MG 1.9 10/30/2023     10/30/2023    K 4.3 04/10/2024     02/21/2015    TRIG 137 10/30/2023    WBC 6.50 10/30/2023     Note: for a comprehensive list of the patient's lab results, access the Results Review activity.

## 2024-07-11 NOTE — PROGRESS NOTES
Results for orders placed or performed in visit on 07/11/24   Cardiac EP device report    Narrative    MDT DUAL ICD/ ACTIVE SYSTEM IS MRI CONDITIONAL  DEVICE INTERROGATED IN THE [place] OFFICE. PT NEW TO DEVICE CLINIC (TRANSFER FROM DR. ARROYO). BATTERY VOLTAGE ADEQUATE (8.2 YRS). AP-89%, <0.1%. ALL LEAD PARAMETERS WITHIN NORMAL LIMITS. NO NEW SIGNIFICANT HIGH RATE EPISODES. OPTI-VOL FLUID THRESHOLD CROSSED ON 6/10/24 & TRENDING TO WNL. PT DENIED ANY EDEMA OR WT GAIN; NORMAL SOB ONLY W/ EXERTION. PT ON SPIRONOLACTONE BUT RAN OUT OF SUPPLY 5 DAYS AGO. WILL OBTAIN REFILL. WAVELET TEMPLATE COLLECTED. PT TO SEE DR. SRIVASTAVA. NORMAL DEVICE FUNCTION. GV

## 2024-07-19 NOTE — PROGRESS NOTES
Cardiology Clinic Note    Nati Villanueva 78 y.o. female   MRN: [id number]  Encounter: [id number]        Assessment / Plan:    #  Non-ischemic Cardiomyopathy  Etiology:  non-ischemic by [facility]  Familial.  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 This gene has a known association with HCM and dilated cardiomyopathy.  Family members need to be screened clinically and possibly with genetic testing depending on preference.    #  Systolic Heart Failure - chronic  Class and Stage:  NYHA class:   II  ACC/AHA stage: C    Volume Management:  Volume:   Euvolemic on exam.  Diuretic:  not requiring a loop diuretic.  Lab monitoring:  check BMP    Neurohormonal Blockade / GDMT:  Beta blocker:  toprol XL 25mg BID  ARNI:  entresto 24-26 - 1/2 pill BID  MRA:  david 12.5mg QOD  SGLT2i:  Consider (she wants to avoid any new meds at this time and also I would be concerned about hypovolemia)    Sudden cardiac death risk reduction:  S/p dual chamber ICD 2021  (had syncope and NSVT on tele)  Non-specific IVCB.  Dr. Don has reviewed EKG.  Not recommending CRT.    Other heart failure considerations:  Mitral clip, IV iron, clinical trial, cardiac rehab, palliative care:  not at this time    # NSVT  Had syncope 2021.  Found to have NSVT.  ICD placed 2021.  continue BB.    # paroxysmal afib  Parosxysmal on device checks.    Follows with Dr Diaz  Anticoag:   eliquis 2.5 BID  Rate:  toprol  Rhythm:   tikosyn    # Hx PACs, PVCs  Continue BB    # Mild AI  Serial exam and echo follow up    # Moderate MR  Serial exam and echo follow up    # HTN  See GDMT above    # HLD  last .  Elevated ASCVD risk score, declines statin    # Hypothyroidism  On synthroid    # Hx of breast cancer  Mastectomy 2017. Tx with anastrazole x 5 years.      Today's Plan Summary:  See above assessment/plan for full details of today's plan.  Briefly,     Check BMP  Check next visit if any entresto samples (had asked about it today but there were none  available)                Reason For Visit / Chief Complaint:  F/u cardiomyopathy    HPI:   Nati Villanueva is a 78 y.o.  female with history as noted in the problem list and further detailed in the above assessment and plan.    Initial:  2023  Referral from Dr. Stevens.  Longstanding hx of systolic dysfunction.  Now with declining EF on serial echo.  40% --> 35% --> 30%.   Dr. Stevens requested heart failure consult for second opinion.  Patient overall feels pretty well.  She walks 3 miles almost everyday.   Likes to do gardening when the weather is nicer.   Lives by herself (  5 years ago).  Has two daughters.  One daughter is a nephrologist (Director of Kidney transplant at [facility] [employer]).    Interval:  Last visit -->  walking 3 miles per day.  SHARMA improved.     Plan last visit -->   Recent BMP stable.   No med changes.    Today -  still walking 3 miles per day.  No chest pain.  No SOB.   No leg swelling.   No orthopnea.                Cardiac Imaging personally reviewed:  EKG Nonspecific IVCB, QRS around 142ms.    Holter or event monitor    Echo 23  Mild left ventricular hypertrophy with moderately severe LV dysfunction, EF ~30% with moderate diastolic dysfunction  Mild left atrial enlargement  Mild aortic sclerosis with mild aortic insufficiency  Mildly thickened mitral valve with early annular calcification and moderate mitral regurgitation  Compared to prior report of 2022, LV EF is less and mitral regurgitation has increased.      Mild-mod asymmetric hypertrophy.  EF 35%.  Mild MR.  Mild aortic sclerosis, trace AI.    1-10-24 ([facility])  EF 30%.  Mild AI  Moderate MR           [facility]ONA    Cardiac MRI 3-14-23  LVIDd 5.7cm.  EF 30%.  Mild upper septal hypertrophy, up to 1.3cm.  Mildly dilated RV with mod reduced function.  LGE - epicardial in anterior and anterolateral.  LGE - transmural inf-septum, mid to distal.       Stress testing Stress echo - 2021  No ischemia    Stress nuclear -  3/2024  Reported scar but no ischemia.       Coronary CTA or [facility] Cardiac catheterization March 5, 2021: Normal coronaries.    [facility]    CPET              Patient Active Problem List    Diagnosis Date Noted   • HFrEF (heart failure with reduced ejection fraction) ([facility]) 07/22/2024   • Dilated cardiomyopathy ([facility]) 07/22/2024   • Paroxysmal A-fib ([facility]) 11/13/2023   • Gastroesophageal reflux disease without esophagitis 10/24/2022   • Exertional angina 04/21/2022   • NSVT (nonsustained ventricular tachycardia) ([facility]) 10/29/2020   • Acute pain of right knee 10/11/2019   • Acquired trigger finger 06/01/2018   • Carpal tunnel syndrome 06/01/2018   • Numbness of hand 06/01/2018   • Malignant neoplasm of upper-inner 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 breast ([facility]) 01/26/2017   • Hypertrophic cardiomyopathy ([facility]) 11/09/2016   • Hypothyroidism 03/01/2016   • Plantar fasciitis 02/22/2016   • Hypertension 04/18/2014   • Ischemic cardiomyopathy 12/23/2013   • Osteopenia 06/18/2013       Past Medical History:   Diagnosis Date   • Hypertrophic cardiomyopathy ([facility])    • Hypothyroidism    • Ischemic cardiomyopathy    • Malignant neoplasm of left female breast ([facility]) 02/09/2017    stage IIA   • Osteopenia    • Trigger finger of all digits of right hand        Allergies   Allergen Reactions   • Seasonal Ic [Cholestatin] Sneezing       Current Outpatient Medications   Medication Instructions   • apixaban (ELIQUIS) 2.5 mg, Oral, 2 times daily   • calcium carbonate-vitamin D (OSCAL-D) 500 mg-200 units per tablet take 1 tablet by mouth once daily WITH BREAKFAST.   • cholecalciferol (VITAMIN D3) 1,000 Units, Oral, Daily   • Co Q-10 50 mg   • dofetilide (TIKOSYN) 125 mcg, Oral, 2 times daily   • levothyroxine 50 mcg tablet take 1 tablet by mouth once daily   • metoprolol succinate (TOPROL-XL) 50 mg 24 hr tablet take 1 tablet by mouth every morning   • mometasone (ELOCON) 0.1 % cream No dose, route, or frequency recorded.   • sacubitril-valsartan (Entresto)  24-26 MG TABS 0.5 tablets, Oral, 2 times daily   • spironolactone (ALDACTONE) 12.5 mg, Oral, Every other day       Social History     Socioeconomic History   • Marital status:      Spouse name: Not on file   • Number of children: Not on file   • Years of education: Not on file   • Highest education level: Not on file   Occupational History   • Not on file   Tobacco Use   • Smoking status: Never   • Smokeless tobacco: Never   Vaping Use   • Vaping status: Never Used   Substance and Sexual Activity   • Alcohol use: No     Alcohol/week: 1.0 standard drink of alcohol     Types: 1 Glasses of wine per week   • Drug use: No   • Sexual activity: Not Currently   Other Topics Concern   • Not on file   Social History Narrative     since 2018. Was  for 50 years.    Lives alone.  Has 2 daughters.  One in [place] who is a nephrologist.  The other in [place].    Enjoys gardening and being outside.       Social Determinants of Health     Financial Resource Strain: Low Risk  (5/24/2023)    Overall Financial Resource Strain (CARDIA)    • Difficulty of Paying Living Expenses: Not hard at all   Food Insecurity: Not on file   Transportation Needs: No Transportation Needs (5/24/2023)    PRAPARE - Transportation    • Lack of Transportation (Medical): No    • Lack of Transportation (Non-Medical): No   Physical Activity: Not on file   Stress: Not on file   Social Connections: Not on file   Intimate Partner Violence: Not on file   Housing Stability: Not on file       Family History   Problem Relation Age of Onset   • Heart disease Mother         had angina   • Heart disease Father    • Brain cancer Sister    • Heart disease Brother         had CABG   • No Known Problems Brother    • No Known Problems Maternal Grandmother    • No Known Problems Maternal Grandfather    • No Known Problems Paternal Grandmother    • No Known Problems Paternal Grandfather    • No Known Problems Daughter    • No Known Problems Daughter    • No  Known Problems Paternal Aunt    • No Known Problems Paternal Aunt    • No Known Problems Paternal Aunt    • No Known Problems Paternal Aunt    • No Known Problems Paternal Aunt        Physical Exam:  Blood pressure 112/62, pulse 67, height 5' (1.524 m), weight 60.1 kg (132 lb 9.6 oz), SpO2 99%.  Body mass index is 25.9 kg/m².  Wt Readings from Last 3 Encounters:   07/22/24 60.1 kg (132 lb 9.6 oz)   07/11/24 60.1 kg (132 lb 8 oz)   06/20/24 61.2 kg (135 lb)     Physical Exam  Vitals reviewed.   Constitutional:       General: She is not in acute distress.     Appearance: She is not toxic-appearing.   Neck:      Comments: No JVD  Cardiovascular:      Rate and Rhythm: Normal rate and regular rhythm.      Heart sounds: Murmur (very soft HSM at apex) heard.      No friction rub. No gallop.   Pulmonary:      Breath sounds: Normal breath sounds. No wheezing, rhonchi or rales.   Abdominal:      General: There is no distension.      Palpations: Abdomen is soft.      Tenderness: There is no abdominal tenderness. There is no guarding.   Musculoskeletal:      Comments: Very trace LE edema   Neurological:      Mental Status: She is alert.         Labs & Results:  Lab Results   Component Value Date    SODIUM 140 04/10/2024    K 4.3 04/10/2024     04/10/2024    CO2 25 04/10/2024    BUN 32 (H) 04/10/2024    CREATININE 0.99 04/10/2024    GLUC 125 04/10/2024    CALCIUM 9.4 04/10/2024         Thank you for the opportunity to participate in the care of this patient.    Sha Freeman MD, [facility]  Advanced Heart Failure and Transplant Cardiologist  Director of Cardio-Oncology  [facility]

## 2024-07-22 ENCOUNTER — OFFICE VISIT (OUTPATIENT)
Dept: CARDIOLOGY CLINIC | Facility: CLINIC | Age: 79
End: 2024-07-22
Payer: COMMERCIAL

## 2024-07-22 VITALS
DIASTOLIC BLOOD PRESSURE: 62 MMHG | OXYGEN SATURATION: 99 % | HEIGHT: 60 IN | WEIGHT: 132.6 LBS | SYSTOLIC BLOOD PRESSURE: 112 MMHG | HEART RATE: 67 BPM | BODY MASS INDEX: 26.03 KG/M2

## 2024-07-22 DIAGNOSIS — I50.20 HFREF (HEART FAILURE WITH REDUCED EJECTION FRACTION) (HCC): Primary | ICD-10-CM

## 2024-07-22 DIAGNOSIS — I48.91 ATRIAL FIBRILLATION, UNSPECIFIED TYPE (HCC): ICD-10-CM

## 2024-07-22 DIAGNOSIS — I48.0 PAROXYSMAL A-FIB (HCC): ICD-10-CM

## 2024-07-22 DIAGNOSIS — I10 PRIMARY HYPERTENSION: ICD-10-CM

## 2024-07-22 DIAGNOSIS — E78.2 MIXED HYPERLIPIDEMIA: ICD-10-CM

## 2024-07-22 DIAGNOSIS — I42.0 DILATED CARDIOMYOPATHY (HCC): ICD-10-CM

## 2024-07-22 DIAGNOSIS — I47.29 NSVT (NONSUSTAINED VENTRICULAR TACHYCARDIA) (HCC): ICD-10-CM

## 2024-07-22 PROCEDURE — G2211 COMPLEX E/M VISIT ADD ON: HCPCS | Performed by: INTERNAL MEDICINE

## 2024-07-22 PROCEDURE — 99214 OFFICE O/P EST MOD 30 MIN: CPT | Performed by: INTERNAL MEDICINE

## 2024-07-22 RX ORDER — SPIRONOLACTONE 25 MG/1
12.5 TABLET ORAL EVERY OTHER DAY
Qty: 25 TABLET | Refills: 1 | Status: SHIPPED | OUTPATIENT
Start: 2024-07-22

## 2024-08-13 ENCOUNTER — LAB (OUTPATIENT)
Dept: LAB | Facility: CLINIC | Age: 79
End: 2024-08-13
Payer: COMMERCIAL

## 2024-08-13 DIAGNOSIS — I50.20 HFREF (HEART FAILURE WITH REDUCED EJECTION FRACTION) (HCC): ICD-10-CM

## 2024-08-13 DIAGNOSIS — I42.0 DILATED CARDIOMYOPATHY (HCC): ICD-10-CM

## 2024-08-13 LAB
ANION GAP SERPL CALCULATED.3IONS-SCNC: 11 MMOL/L (ref 4–13)
BUN SERPL-MCNC: 22 MG/DL (ref 5–25)
CALCIUM SERPL-MCNC: 9.6 MG/DL (ref 8.4–10.2)
CHLORIDE SERPL-SCNC: 105 MMOL/L (ref 96–108)
CO2 SERPL-SCNC: 25 MMOL/L (ref 21–32)
CREAT SERPL-MCNC: 0.91 MG/DL (ref 0.6–1.3)
GLUCOSE P FAST SERPL-MCNC: 108 MG/DL (ref 65–99)
POTASSIUM SERPL-SCNC: 4.2 MMOL/L (ref 3.5–5.3)
SODIUM SERPL-SCNC: 141 MMOL/L (ref 135–147)

## 2024-08-13 PROCEDURE — 80048 BASIC METABOLIC PNL TOTAL CA: CPT

## 2024-08-13 PROCEDURE — 36415 COLL VENOUS BLD VENIPUNCTURE: CPT

## 2024-08-15 ENCOUNTER — TELEPHONE (OUTPATIENT)
Age: 79
End: 2024-08-15

## 2024-08-15 NOTE — TELEPHONE ENCOUNTER
----- Message from Sha Freeman MD sent at 8/13/2024  4:11 PM EDT -----    BMP -  latrice Bello - Can you let the patient know BMP looks good.

## 2024-09-03 DIAGNOSIS — I48.91 ATRIAL FIBRILLATION, UNSPECIFIED TYPE (HCC): ICD-10-CM

## 2024-09-03 NOTE — TELEPHONE ENCOUNTER
Patient is completely out of medication. She has been only take one a day because she was was running low and it was the weekend.    Reason for call:   [x] Refill   [] Prior Auth  [] Other:     Office:   [] PCP/Provider -   [x] Specialty/Provider - [facility] / Hira Diaz, DO     Medication:       Does the patient have enough for 3 days?   [] Yes   [x] No - Send as HP to POD

## 2024-09-03 NOTE — TELEPHONE ENCOUNTER
Patient called the RX Refill Line. Message is being forwarded to the office.     Patient is requesting this script be [facility] to [facility] instead, [facility] is out of stock. Pt is out of medication and hopes this can be sent again

## 2024-10-09 DIAGNOSIS — I50.20 HFREF (HEART FAILURE WITH REDUCED EJECTION FRACTION) (HCC): ICD-10-CM

## 2024-10-09 DIAGNOSIS — E03.9 HYPOTHYROIDISM, UNSPECIFIED TYPE: ICD-10-CM

## 2024-10-09 DIAGNOSIS — I42.0 DILATED CARDIOMYOPATHY (HCC): ICD-10-CM

## 2024-10-09 RX ORDER — SACUBITRIL AND VALSARTAN 24; 26 MG/1; MG/1
0.5 TABLET, FILM COATED ORAL 2 TIMES DAILY
Qty: 90 TABLET | Refills: 1 | Status: SHIPPED | OUTPATIENT
Start: 2024-10-09

## 2024-10-10 RX ORDER — LEVOTHYROXINE SODIUM 50 UG/1
TABLET ORAL
Qty: 90 TABLET | Refills: 1 | Status: SHIPPED | OUTPATIENT
Start: 2024-10-10

## 2024-10-15 ENCOUNTER — REMOTE DEVICE CLINIC VISIT (OUTPATIENT)
Dept: CARDIOLOGY CLINIC | Facility: CLINIC | Age: 79
End: 2024-10-15
Payer: COMMERCIAL

## 2024-10-15 DIAGNOSIS — Z95.810 AICD (AUTOMATIC CARDIOVERTER/DEFIBRILLATOR) PRESENT: Primary | ICD-10-CM

## 2024-10-15 PROCEDURE — 93295 DEV INTERROG REMOTE 1/2/MLT: CPT | Performed by: INTERNAL MEDICINE

## 2024-10-15 PROCEDURE — 93296 REM INTERROG EVL PM/IDS: CPT | Performed by: INTERNAL MEDICINE

## 2024-10-18 NOTE — PROGRESS NOTES
"Results for orders placed or performed in visit on 10/15/24   Cardiac EP device report    Narrative    MDT DUAL ICD/ ACTIVE SYSTEM IS MRI CONDITIONAL  CARELINK TRANSMISSION: BATTERY STATUS \"7 YRS.\" AP 93%  0%. ALL AVAILABLE LEAD PARAMETERS WITHIN NORMAL LIMITS. 1 AT/AF NOTED; 0% BURDEN; 2.20 MINS LONG. PT ON ELIQUIS & TIKOSYN. AVAIL EGRAM PRESENTS AS PAF VS PAT. OPTI-VOL WITHIN NORMAL LIMITS. NORMAL DEVICE FUNCTION. NC         "

## 2024-11-08 DIAGNOSIS — I47.29 NSVT (NONSUSTAINED VENTRICULAR TACHYCARDIA) (HCC): ICD-10-CM

## 2024-11-08 RX ORDER — METOPROLOL SUCCINATE 50 MG/1
TABLET, EXTENDED RELEASE ORAL
Qty: 90 TABLET | Refills: 1 | Status: SHIPPED | OUTPATIENT
Start: 2024-11-08

## 2024-11-25 ENCOUNTER — OFFICE VISIT (OUTPATIENT)
Dept: FAMILY MEDICINE CLINIC | Facility: CLINIC | Age: 79
End: 2024-11-25
Payer: COMMERCIAL

## 2024-11-25 VITALS
HEART RATE: 68 BPM | RESPIRATION RATE: 18 BRPM | HEIGHT: 60 IN | TEMPERATURE: 97.3 F | SYSTOLIC BLOOD PRESSURE: 120 MMHG | OXYGEN SATURATION: 97 % | DIASTOLIC BLOOD PRESSURE: 80 MMHG | BODY MASS INDEX: 26.31 KG/M2 | WEIGHT: 134 LBS

## 2024-11-25 DIAGNOSIS — J06.9 UPPER RESPIRATORY TRACT INFECTION, UNSPECIFIED TYPE: Primary | ICD-10-CM

## 2024-11-25 PROCEDURE — G2211 COMPLEX E/M VISIT ADD ON: HCPCS | Performed by: FAMILY MEDICINE

## 2024-11-25 PROCEDURE — 99213 OFFICE O/P EST LOW 20 MIN: CPT | Performed by: FAMILY MEDICINE

## 2024-11-25 RX ORDER — KETOROLAC TROMETHAMINE 5 MG/ML
SOLUTION OPHTHALMIC
COMMUNITY
Start: 2024-11-14

## 2024-11-25 RX ORDER — AMOXICILLIN 875 MG/1
875 TABLET, COATED ORAL 2 TIMES DAILY
Qty: 14 TABLET | Refills: 0 | Status: SHIPPED | OUTPATIENT
Start: 2024-11-25 | End: 2024-12-02 | Stop reason: ALTCHOICE

## 2024-11-25 RX ORDER — PREDNISOLONE ACETATE 10 MG/ML
SUSPENSION/ DROPS OPHTHALMIC
COMMUNITY
Start: 2024-11-14

## 2024-11-25 NOTE — PROGRESS NOTES
Name: Nati Villanueva      : 1945      MRN: [id number]  Encounter Provider: Radha Javier DO  Encounter Date: 2024   Encounter department: [facility]  :  Assessment & Plan  Upper respiratory tract infection, unspecified type  Symptoms worsening for 2 weeks, at this point would recommend antibiotic and can continue symptom directed treatment and supportive care otherwise. Follow up if not improved.  Orders:    amoxicillin (AMOXIL) 875 mg tablet; Take 1 tablet (875 mg total) by mouth 2 (two) times a day for 7 days           History of Present Illness     HPI  Two weeks of cough and congestion, worsening. No fever, chills, or myalgias, no sore throat. Some nausea maybe from the cough but no vomiting. Taking Mucinex DM. Fatigues very easily. Lives alone, no sick contacts. Feels like she can't do her normal activities. A lot of chest congestion with activities.   Review of Systems       Objective   /80   Pulse 68   Temp (!) 97.3 °F (36.3 °C) (Temporal)   Resp 18   Ht 5' (1.524 m)   Wt 60.8 kg (134 lb)   SpO2 97%   BMI 26.17 kg/m²      Physical Exam  Vitals reviewed.   Constitutional:       Appearance: Normal appearance.   HENT:      Head: Normocephalic and atraumatic.      Right Ear: Tympanic membrane, ear canal and external ear normal.      Left Ear: Tympanic membrane, ear canal and external ear normal.      Nose: Congestion present.      Mouth/Throat:      Mouth: Mucous membranes are moist.      Pharynx: Posterior oropharyngeal erythema present.   Eyes:      Extraocular Movements: Extraocular movements intact.      Conjunctiva/sclera: Conjunctivae normal.   Cardiovascular:      Rate and Rhythm: Normal rate and regular rhythm.      Heart sounds: Normal heart sounds.   Pulmonary:      Effort: Pulmonary effort is normal.      Breath sounds: Normal breath sounds.   Abdominal:      General: Abdomen is flat.   Skin:     General: Skin is warm and dry.   Neurological:       Mental Status: She is alert.   Psychiatric:         Mood and Affect: Mood normal.         Behavior: Behavior normal.

## 2024-11-30 NOTE — PROGRESS NOTES
Cardiology Clinic Note    Nati Villanueva 79 y.o. female   MRN: [id number]  Encounter: [id number]        Assessment / Plan:    #  Non-ischemic Cardiomyopathy  Etiology:  non-ischemic by [facility]  Familial.  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 This gene has a known association with HCM and dilated cardiomyopathy.  Family members need to be screened clinically and possibly with genetic testing depending on preference.    #  Systolic Heart Failure - chronic  Class and Stage:  NYHA class:   II  ACC/AHA stage: C    Volume Management:  Volume:   Euvolemic on exam.  Diuretic:  not requiring a loop diuretic.  Lab monitoring:  check BMP, BNP    Neurohormonal Blockade / GDMT:  Beta blocker:  toprol XL 25mg BID  ARNI:  entresto 24-26 - 1/2 pill BID  MRA:  david 12.5mg QOD  SGLT2i:  Consider (she wants to avoid any new meds at this time and also I would be concerned about hypovolemia)    Sudden cardiac death risk reduction:  S/p dual chamber ICD 2021  (had syncope and NSVT on tele)  Non-specific IVCB.  Dr. Don has reviewed EKG.  Not recommending CRT.    Other heart failure considerations:  Mitral clip, IV iron, clinical trial, cardiac rehab, palliative care:  not at this time    # NSVT  Had syncope 2021.  Found to have NSVT.  ICD placed 2021.  continue BB.    # paroxysmal afib  Parosxysmal on device checks.    Follows with Dr Diaz  Anticoag:   eliquis 2.5 BID  Rate:  toprol  Rhythm:   tikosyn    # Hx PACs, PVCs  Continue BB    # Mild AI  Serial exam and echo follow up    # Moderate MR  Serial exam and echo follow up  Anticipate ordering echo next visit    # HTN  See GDMT above    # HLD  last .  Elevated ASCVD risk score, declines statin    # Hypothyroidism  On synthroid    # Hx of breast cancer  Mastectomy 2017. Tx with anastrazole x 5 years.      Today's Plan Summary:  See above assessment/plan for full details of today's plan.  Briefly,     Check BMP to follow-up on heart failure medications  Check BNP  given SOB (suspected due to URI)  Anticipate ordering echo next visit                  Reason For Visit / Chief Complaint:  F/u cardiomyopathy    HPI:   Nati Villanueva is a 79 y.o.  female with history as noted in the problem list and further detailed in the above assessment and plan.    Initial:  2023  Referral from Dr. Stevens.  Longstanding hx of systolic dysfunction.  Now with declining EF on serial echo.  40% --> 35% --> 30%.   Dr. Stevens requested heart failure consult for second opinion.  Patient overall feels pretty well.  She walks 3 miles almost everyday.   Likes to do gardening when the weather is nicer.   Lives by herself (  5 years ago).  Has two daughters.  One daughter is a nephrologist (Director of Kidney transplant at [employer]).    Interval:  Last visit -->  still walking 3 miles per day.  No chest pain.  No SOB.   No leg swelling.     Plan last visit -->   Check BMP     - BMP stable    Device check in October reviewed.  There was a 2-minute long episode of AT/AF.    Today  -  reports recent URI.   Took antibiotics, finished yesterday.  Still coughing.   Increased SOB with this URI.   No leg edema.  No chest pain.            Cardiac Imaging personally reviewed:  EKG Nonspecific IVCB, QRS around 142ms.    Holter or event monitor    Echo 23  Mild left ventricular hypertrophy with moderately severe LV dysfunction, EF ~30% with moderate diastolic dysfunction  Mild left atrial enlargement  Mild aortic sclerosis with mild aortic insufficiency  Mildly thickened mitral valve with early annular calcification and moderate mitral regurgitation  Compared to prior report of 2022, LV EF is less and mitral regurgitation has increased.      Mild-mod asymmetric hypertrophy.  EF 35%.  Mild MR.  Mild aortic sclerosis, trace AI.    1-10-24 ([facility])  EF 30%.  Mild AI  Moderate MR           [facility]    Cardiac MRI 3-14-23  LVIDd 5.7cm.  EF 30%.  Mild upper septal hypertrophy, up to  1.3cm.  Mildly dilated RV with mod reduced function.  LGE - epicardial in anterior and anterolateral.  LGE - transmural inf-septum, mid to distal.       Stress testing Stress echo - 2/2021  No ischemia    Stress nuclear - 3/2024  Reported scar but no ischemia.       Coronary CTA or [facility] Cardiac catheterization March 5, 2021: Normal coronaries.    [facility]    CPET              Patient Active Problem List    Diagnosis Date Noted    HFrEF (heart failure with reduced ejection fraction) ([facility]) 07/22/2024    Dilated cardiomyopathy ([facility]) 07/22/2024    Paroxysmal A-fib ([facility]) 11/13/2023    Gastroesophageal reflux disease without esophagitis 10/24/2022    Exertional angina ([facility]) 04/21/2022    NSVT (nonsustained ventricular tachycardia) ([facility]) 10/29/2020    Acute pain of right knee 10/11/2019    Acquired trigger finger 06/01/2018    Carpal tunnel syndrome 06/01/2018    Numbness of hand 06/01/2018    Malignant neoplasm of upper-inner 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 breast ([facility]) 01/26/2017    Hypertrophic cardiomyopathy ([facility]) 11/09/2016    Hypothyroidism 03/01/2016    Plantar fasciitis 02/22/2016    Hypertension 04/18/2014    Ischemic cardiomyopathy 12/23/2013    Osteopenia 06/18/2013       Past Medical History:   Diagnosis Date    Hypertrophic cardiomyopathy ([facility])     Hypothyroidism     Ischemic cardiomyopathy     Malignant neoplasm of left female breast ([facility]) 02/09/2017    stage IIA    Osteopenia     Trigger finger of all digits of right hand        Allergies   Allergen Reactions    Seasonal Ic [Cholestatin] Sneezing       Current Outpatient Medications   Medication Instructions    apixaban (ELIQUIS) 2.5 mg, Oral, 2 times daily    calcium carbonate-vitamin D (OSCAL-D) 500 mg-200 units per tablet take 1 tablet by mouth once daily WITH BREAKFAST.    cholecalciferol (VITAMIN D3) 1,000 Units, Daily    Co Q-10 50 mg    dofetilide (TIKOSYN) 125 mcg, Oral, 2 times daily    Entresto 24-26 MG TABS 0.5 tablets, Oral, 2 times daily    ketorolac (ACULAR)  0.5 % ophthalmic solution instill 1 drop into right eye four times a day    levothyroxine 50 mcg tablet take 1 tablet by mouth once daily    metoprolol succinate (TOPROL-XL) 50 mg 24 hr tablet take 1 tablet by mouth every morning    mometasone (ELOCON) 0.1 % cream No dose, route, or frequency recorded.    prednisoLONE acetate (PRED FORTE) 1 % ophthalmic suspension instill 1 drop into right eye four times a day    spironolactone (ALDACTONE) 12.5 mg, Oral, Every other day       Social History     Socioeconomic History    Marital status:      Spouse name: Not on file    Number of children: Not on file    Years of education: Not on file    Highest education level: Not on file   Occupational History    Not on file   Tobacco Use    Smoking status: Never    Smokeless tobacco: Never   Vaping Use    Vaping status: Never Used   Substance and Sexual Activity    Alcohol use: No     Alcohol/week: 1.0 standard drink of alcohol     Types: 1 Glasses of wine per week    Drug use: No    Sexual activity: Not Currently   Other Topics Concern    Not on file   Social History Narrative     since 2018. Was  for 50 years.    Lives alone.  Has 2 daughters.  One in [place] who is a nephrologist.  The other in [place].    Enjoys gardening and being outside.       Social Drivers of Health     Financial Resource Strain: Low Risk  (5/24/2023)    Overall Financial Resource Strain (CARDIA)     Difficulty of Paying Living Expenses: Not hard at all   Food Insecurity: Not on file   Transportation Needs: No Transportation Needs (5/24/2023)    PRAPARE - Transportation     Lack of Transportation (Medical): No     Lack of Transportation (Non-Medical): No   Physical Activity: Not on file   Stress: Not on file   Social Connections: Not on file   Intimate Partner Violence: Not on file   Housing Stability: Not on file       Family History   Problem Relation Age of Onset    Heart disease Mother         had angina    Heart disease Father      Brain cancer Sister     Heart disease Brother         had CABG    No Known Problems Brother     No Known Problems Maternal Grandmother     No Known Problems Maternal Grandfather     No Known Problems Paternal Grandmother     No Known Problems Paternal Grandfather     No Known Problems Daughter     No Known Problems Daughter     No Known Problems Paternal Aunt     No Known Problems Paternal Aunt     No Known Problems Paternal Aunt     No Known Problems Paternal Aunt     No Known Problems Paternal Aunt        Physical Exam:  Blood pressure 126/70, pulse 67, height 5' (1.524 m), weight 61.3 kg (135 lb 3.2 oz), SpO2 97%.  Body mass index is 26.4 kg/m².  Wt Readings from Last 3 Encounters:   12/02/24 61.3 kg (135 lb 3.2 oz)   11/25/24 60.8 kg (134 lb)   07/22/24 60.1 kg (132 lb 9.6 oz)     Physical Exam  Vitals reviewed.   Constitutional:       General: She is not in acute distress.     Appearance: She is not toxic-appearing.   Neck:      Comments: JVP is not elevated  Cardiovascular:      Rate and Rhythm: Normal rate and regular rhythm.      Heart sounds: Murmur (very soft HSM at apex) heard.      No friction rub. No gallop.   Pulmonary:      Breath sounds: Normal breath sounds. No wheezing, rhonchi or rales.   Abdominal:      General: There is no distension.      Palpations: Abdomen is soft.      Tenderness: There is no abdominal tenderness. There is no guarding.   Musculoskeletal:      Comments: Very trace LE edema   Neurological:      Mental Status: She is alert.         Labs & Results:  Lab Results   Component Value Date    SODIUM 141 08/13/2024    K 4.2 08/13/2024     08/13/2024    CO2 25 08/13/2024    BUN 22 08/13/2024    CREATININE 0.91 08/13/2024    GLUC 125 04/10/2024    CALCIUM 9.6 08/13/2024         Thank you for the opportunity to participate in the care of this patient.    Sha Freeman MD, [facility]  Advanced Heart Failure and Transplant Cardiologist  Director of Cardio-Oncology  [facility]  [facility]

## 2024-12-02 ENCOUNTER — OFFICE VISIT (OUTPATIENT)
Dept: CARDIOLOGY CLINIC | Facility: CLINIC | Age: 79
End: 2024-12-02
Payer: COMMERCIAL

## 2024-12-02 VITALS
DIASTOLIC BLOOD PRESSURE: 70 MMHG | HEIGHT: 60 IN | SYSTOLIC BLOOD PRESSURE: 126 MMHG | WEIGHT: 135.2 LBS | OXYGEN SATURATION: 97 % | HEART RATE: 67 BPM | BODY MASS INDEX: 26.55 KG/M2

## 2024-12-02 DIAGNOSIS — I48.91 ATRIAL FIBRILLATION, UNSPECIFIED TYPE (HCC): ICD-10-CM

## 2024-12-02 DIAGNOSIS — I48.0 PAROXYSMAL A-FIB (HCC): ICD-10-CM

## 2024-12-02 DIAGNOSIS — R06.09 DOE (DYSPNEA ON EXERTION): ICD-10-CM

## 2024-12-02 DIAGNOSIS — E78.2 MIXED HYPERLIPIDEMIA: ICD-10-CM

## 2024-12-02 DIAGNOSIS — I34.0 NONRHEUMATIC MITRAL VALVE REGURGITATION: ICD-10-CM

## 2024-12-02 DIAGNOSIS — I47.29 NSVT (NONSUSTAINED VENTRICULAR TACHYCARDIA) (HCC): ICD-10-CM

## 2024-12-02 DIAGNOSIS — I50.20 HFREF (HEART FAILURE WITH REDUCED EJECTION FRACTION) (HCC): Primary | ICD-10-CM

## 2024-12-02 DIAGNOSIS — I42.0 DILATED CARDIOMYOPATHY (HCC): ICD-10-CM

## 2024-12-02 DIAGNOSIS — I10 PRIMARY HYPERTENSION: ICD-10-CM

## 2024-12-02 PROCEDURE — G2211 COMPLEX E/M VISIT ADD ON: HCPCS | Performed by: INTERNAL MEDICINE

## 2024-12-02 PROCEDURE — 99214 OFFICE O/P EST MOD 30 MIN: CPT | Performed by: INTERNAL MEDICINE

## 2024-12-03 ENCOUNTER — TELEPHONE (OUTPATIENT)
Dept: FAMILY MEDICINE CLINIC | Facility: CLINIC | Age: 79
End: 2024-12-03

## 2024-12-03 RX ORDER — APIXABAN 2.5 MG/1
2.5 TABLET, FILM COATED ORAL 2 TIMES DAILY
Qty: 60 TABLET | Refills: 2 | Status: SHIPPED | OUTPATIENT
Start: 2024-12-03

## 2024-12-03 NOTE — TELEPHONE ENCOUNTER
Called and left a message for patient to call back for re evaluation and to schedule with one of our providers

## 2024-12-03 NOTE — TELEPHONE ENCOUNTER
Patient came still not feeling well and having trouble breathing would like to be checked for pertussis saw dr leonard on 11/25/24. Please advise.

## 2024-12-03 NOTE — TELEPHONE ENCOUNTER
Scheduled patient for tomorrow at 9:00 am with Dr. Quinonez. Renetta call her if you get any cancellations.  Thank you!!

## 2024-12-03 NOTE — TELEPHONE ENCOUNTER
I did not see patient on last office visit.  If she is still having trouble breathing she would need to be reevaluated before I could recommend any other treatment.

## 2024-12-03 NOTE — TELEPHONE ENCOUNTER
Refill must be reviewed and completed by the office or provider. The refill is unable to be approved or denied by the medication management team.    Eliquis - Patient need cbc . Please review to see if the refill is appropriate.

## 2024-12-04 ENCOUNTER — OFFICE VISIT (OUTPATIENT)
Dept: FAMILY MEDICINE CLINIC | Facility: CLINIC | Age: 79
End: 2024-12-04
Payer: COMMERCIAL

## 2024-12-04 VITALS
DIASTOLIC BLOOD PRESSURE: 80 MMHG | OXYGEN SATURATION: 95 % | HEART RATE: 75 BPM | RESPIRATION RATE: 17 BRPM | WEIGHT: 134 LBS | BODY MASS INDEX: 26.31 KG/M2 | TEMPERATURE: 97.1 F | HEIGHT: 60 IN | SYSTOLIC BLOOD PRESSURE: 130 MMHG

## 2024-12-04 DIAGNOSIS — J06.9 UPPER RESPIRATORY TRACT INFECTION, UNSPECIFIED TYPE: Primary | ICD-10-CM

## 2024-12-04 PROCEDURE — 99213 OFFICE O/P EST LOW 20 MIN: CPT | Performed by: FAMILY MEDICINE

## 2024-12-04 PROCEDURE — G2211 COMPLEX E/M VISIT ADD ON: HCPCS | Performed by: FAMILY MEDICINE

## 2024-12-04 RX ORDER — BENZONATATE 100 MG/1
100 CAPSULE ORAL 3 TIMES DAILY PRN
Qty: 20 CAPSULE | Refills: 0 | Status: SHIPPED | OUTPATIENT
Start: 2024-12-04

## 2024-12-04 RX ORDER — PREDNISONE 10 MG/1
TABLET ORAL
Qty: 20 TABLET | Refills: 0 | Status: SHIPPED | OUTPATIENT
Start: 2024-12-04 | End: 2024-12-12

## 2024-12-04 RX ORDER — ALBUTEROL SULFATE 90 UG/1
2 INHALANT RESPIRATORY (INHALATION) EVERY 6 HOURS PRN
Qty: 6.7 G | Refills: 5 | Status: SHIPPED | OUTPATIENT
Start: 2024-12-04

## 2024-12-04 NOTE — PROGRESS NOTES
Name: Nati Villanueva      : 1945      MRN: [id number]  Encounter Provider: Radha Javier DO  Encounter Date: 2024   Encounter department: [facility]  :  Assessment & Plan  Upper respiratory tract infection, unspecified type  Completed a course of Amoxicillin a few days ago. Still with persistent cough and perceived difficulty breathing. Start Prednisone taper, Tessalon and can use Albuterol for dyspnea. Follow up in 1-2 weeks if not improved or worsening of symptoms.  Orders:    benzonatate (TESSALON PERLES) 100 mg capsule; Take 1 capsule (100 mg total) by mouth 3 (three) times a day as needed for cough    albuterol (Proventil HFA) 90 mcg/act inhaler; Inhale 2 puffs every 6 (six) hours as needed for wheezing    predniSONE 10 mg tablet; Take 4 tablets (40 mg total) by mouth daily for 2 days, THEN 3 tablets (30 mg total) daily for 2 days, THEN 2 tablets (20 mg total) daily for 2 days, THEN 1 tablet (10 mg total) daily for 2 days.           History of Present Illness     Cough        Here for URI symptoms, cough is persisting. Her daughter is a doctor suggested she be tested for pertussis. She did complete a course of Amoxicillin  a couple of weeks ago. Having trouble with her breathing. Feels like she needs to sit down for a few minutes after putting her shoes on.       Review of Systems   Respiratory:  Positive for cough.           Objective   /80 (Patient Position: Sitting, Cuff Size: Adult)   Pulse 75   Temp (!) 97.1 °F (36.2 °C) (Temporal)   Resp 17   Ht 5' (1.524 m)   Wt 60.8 kg (134 lb)   SpO2 95%   BMI 26.17 kg/m²      Physical Exam  Vitals reviewed.   Constitutional:       Appearance: Normal appearance.   HENT:      Right Ear: External ear normal.      Left Ear: External ear normal.      Nose: Congestion present.      Mouth/Throat:      Mouth: Mucous membranes are moist.      Pharynx: Oropharynx is clear.   Eyes:      Extraocular Movements: Extraocular  movements intact.      Conjunctiva/sclera: Conjunctivae normal.   Cardiovascular:      Rate and Rhythm: Normal rate and regular rhythm.      Heart sounds: Normal heart sounds.   Pulmonary:      Effort: Pulmonary effort is normal.      Breath sounds: Normal breath sounds.   Abdominal:      General: Abdomen is flat.   Skin:     General: Skin is warm and dry.      Capillary Refill: Capillary refill takes less than 2 seconds.   Neurological:      General: No focal deficit present.      Mental Status: She is alert and oriented to person, place, and time.   Psychiatric:         Mood and Affect: Mood normal.         Behavior: Behavior normal.

## 2025-01-14 ENCOUNTER — REMOTE DEVICE CLINIC VISIT (OUTPATIENT)
Dept: CARDIOLOGY CLINIC | Facility: CLINIC | Age: 80
End: 2025-01-14
Payer: COMMERCIAL

## 2025-01-14 DIAGNOSIS — Z95.810 AICD (AUTOMATIC CARDIOVERTER/DEFIBRILLATOR) PRESENT: Primary | ICD-10-CM

## 2025-01-14 PROCEDURE — 93295 DEV INTERROG REMOTE 1/2/MLT: CPT | Performed by: INTERNAL MEDICINE

## 2025-01-14 PROCEDURE — 93296 REM INTERROG EVL PM/IDS: CPT | Performed by: INTERNAL MEDICINE

## 2025-01-14 NOTE — PROGRESS NOTES
Results for orders placed or performed in visit on 01/14/25   Cardiac EP device report    Narrative    MDT DUAL ICD/ ACTIVE SYSTEM IS MRI CONDITIONAL  CARELINK TRANSMISSION: BATTERY VOLTAGE ADEQUATE (7.5 YRS). AP-88%, <0.1%. ALL AVAILABLE LEAD PARAMETERS WITHIN NORMAL LIMITS. NO SIGNIFICANT HIGH RATE EPISODES. OPTI-VOL WITHIN NORMAL LIMITS. NORMAL DEVICE FUNCTION. GV

## 2025-01-15 ENCOUNTER — LAB (OUTPATIENT)
Dept: LAB | Facility: CLINIC | Age: 80
End: 2025-01-15
Payer: COMMERCIAL

## 2025-01-15 DIAGNOSIS — I42.0 DILATED CARDIOMYOPATHY (HCC): ICD-10-CM

## 2025-01-15 DIAGNOSIS — R06.09 DOE (DYSPNEA ON EXERTION): ICD-10-CM

## 2025-01-15 DIAGNOSIS — I48.0 PAROXYSMAL A-FIB (HCC): ICD-10-CM

## 2025-01-15 DIAGNOSIS — I50.20 HFREF (HEART FAILURE WITH REDUCED EJECTION FRACTION) (HCC): ICD-10-CM

## 2025-01-15 LAB
ANION GAP SERPL CALCULATED.3IONS-SCNC: 7 MMOL/L (ref 4–13)
BNP SERPL-MCNC: 1007 PG/ML (ref 0–100)
BUN SERPL-MCNC: 21 MG/DL (ref 5–25)
CALCIUM SERPL-MCNC: 9.2 MG/DL (ref 8.4–10.2)
CHLORIDE SERPL-SCNC: 104 MMOL/L (ref 96–108)
CO2 SERPL-SCNC: 26 MMOL/L (ref 21–32)
CREAT SERPL-MCNC: 0.83 MG/DL (ref 0.6–1.3)
GFR SERPL CREATININE-BSD FRML MDRD: 66 ML/MIN/1.73SQ M
GLUCOSE SERPL-MCNC: 116 MG/DL (ref 65–140)
POTASSIUM SERPL-SCNC: 4.2 MMOL/L (ref 3.5–5.3)
SODIUM SERPL-SCNC: 137 MMOL/L (ref 135–147)

## 2025-01-15 PROCEDURE — 83880 ASSAY OF NATRIURETIC PEPTIDE: CPT

## 2025-01-15 PROCEDURE — 36415 COLL VENOUS BLD VENIPUNCTURE: CPT

## 2025-01-15 PROCEDURE — 80048 BASIC METABOLIC PNL TOTAL CA: CPT

## 2025-01-16 ENCOUNTER — OFFICE VISIT (OUTPATIENT)
Dept: CARDIOLOGY CLINIC | Facility: CLINIC | Age: 80
End: 2025-01-16
Payer: COMMERCIAL

## 2025-01-16 VITALS
OXYGEN SATURATION: 96 % | HEART RATE: 76 BPM | WEIGHT: 133.4 LBS | DIASTOLIC BLOOD PRESSURE: 56 MMHG | BODY MASS INDEX: 26.19 KG/M2 | SYSTOLIC BLOOD PRESSURE: 108 MMHG | HEIGHT: 60 IN

## 2025-01-16 DIAGNOSIS — I50.20 HFREF (HEART FAILURE WITH REDUCED EJECTION FRACTION) (HCC): Primary | ICD-10-CM

## 2025-01-16 DIAGNOSIS — I48.0 PAROXYSMAL A-FIB (HCC): ICD-10-CM

## 2025-01-16 DIAGNOSIS — I34.0 NONRHEUMATIC MITRAL VALVE REGURGITATION: ICD-10-CM

## 2025-01-16 DIAGNOSIS — I42.8 NICM (NONISCHEMIC CARDIOMYOPATHY) (HCC): ICD-10-CM

## 2025-01-16 DIAGNOSIS — I42.0 DILATED CARDIOMYOPATHY (HCC): ICD-10-CM

## 2025-01-16 DIAGNOSIS — E78.2 MIXED HYPERLIPIDEMIA: ICD-10-CM

## 2025-01-16 DIAGNOSIS — I47.29 NSVT (NONSUSTAINED VENTRICULAR TACHYCARDIA) (HCC): ICD-10-CM

## 2025-01-16 DIAGNOSIS — I10 PRIMARY HYPERTENSION: ICD-10-CM

## 2025-01-16 PROCEDURE — 99214 OFFICE O/P EST MOD 30 MIN: CPT | Performed by: INTERNAL MEDICINE

## 2025-01-16 RX ORDER — METOPROLOL SUCCINATE 50 MG/1
25 TABLET, EXTENDED RELEASE ORAL 2 TIMES DAILY
Start: 2025-01-16

## 2025-01-16 NOTE — PROGRESS NOTES
Cardiology Clinic Note    Nati Villanueva 79 y.o. female   MRN: [id number]  Encounter: [id number]        Assessment / Plan:    #  Non-ischemic Cardiomyopathy  Etiology:  non-ischemic by [facility]  Familial.  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 This gene has a known association with HCM and dilated cardiomyopathy.  Family members need to be screened clinically and possibly with genetic testing depending on preference.    #  Systolic Heart Failure - chronic  Class and Stage:  NYHA class:   II  ACC/AHA stage: C    Volume Management:  Volume:   Euvolemic on exam.  BNP:    1007 - elevated  Diuretic:  has not been requiring a loop diuretic.  Lab monitoring:  recent BMP stable    Neurohormonal Blockade / GDMT:  Beta blocker:  toprol XL 25mg BID  ARNI:  entresto 24-26 - 1/2 pill BID  MRA:  david 12.5mg QOD  SGLT2i:  recommend but she declines    Sudden cardiac death risk reduction:  S/p dual chamber ICD 2021  (had syncope and NSVT on tele)  Non-specific IVCB.  Dr. Don has reviewed EKG.  Not recommending CRT.    Other heart failure considerations:  Mitral clip, IV iron, clinical trial, cardiac rehab, palliative care:  not at this time    # NSVT  Had syncope 2021.  Found to have NSVT.  ICD placed 2021.  continue BB.    # paroxysmal afib  Parosxysmal on device checks.    Follows with Dr Diaz  Anticoag:   eliquis 2.5 BID  Rate:  toprol  Rhythm:   tikosyn    # Hx PACs, PVCs  Continue BB    # Mild AI  Serial exam and echo follow up    # Moderate MR  Serial exam and echo follow up  Check echo    # HTN  See GDMT above    # HLD  last .  Elevated ASCVD risk score, declines statin    # Hx of breast cancer  Mastectomy 2017. Tx with anastrazole x 5 years.      Today's Plan Summary:  See above assessment/plan for full details of today's plan.  Briefly,     The patient was brought in because she has been having more cough and shortness of breath lately (felt viral URI) and her BNP was elevated at 1007.  No recent prior  BNP values to compare.  Because of the BNP I brought her in today to do an exam I thought she might need a diuretic.  She actually reports that the shortness of breath and cough have resolved and she is back to baseline.  There is no obvious volume overload on exam.  I recommend SGLT2 but she declines.  I therefore recommend checking an echocardiogram to update EF, mitral regurgitation severity, and IVC size to help with volume status assessment.                  Reason For Visit / Chief Complaint:  F/u cardiomyopathy    HPI:   Nati Villanueva is a 79 y.o.  female with history as noted in the problem list and further detailed in the above assessment and plan.    Initial:  2023  Referral from Dr. Stevens.  Longstanding hx of systolic dysfunction.  Now with declining EF on serial echo.  40% --> 35% --> 30%.   Dr. Stevens requested heart failure consult for second opinion.  Patient overall feels pretty well.  She walks 3 miles almost everyday.   Likes to do gardening when the weather is nicer.   Lives by herself (  5 years ago).  Has two daughters.  One daughter is a nephrologist (Director of Kidney transplant at [employer]).    Interval:  Last visit -->    reports recent URI.   Took antibiotics, finished yesterday.  Still coughing.   Increased SOB with this URI.      Plan last visit -->   Check BMP to follow-up on heart failure medications  Check BNP given SOB (suspected due to URI)  Anticipate ordering echo next visit    Device check 2 days ago - N O SIGNIFICANT HIGH RATE EPISODES. OPTI-VOL WITHIN NORMAL LIMITS.     Labs yesterday -  BMP normal.   BNP 1007.    Today -  the patient has been having recent issues with cough and shortness of breath felt possibly due to a viral infection.  Given the elevated BNP test, brought back to clinic to have a exam to make sure she does not need a diuretic.    The cough and SOB have resolved - reports back to baseline.    Back to walking 1 hour per day  - feels ok.   Shoveled  snow - did ok with this.   No edema.   No orthopnea.    Weight stable.            Cardiac Imaging personally reviewed:  EKG Nonspecific IVCB, QRS around 142ms.    Holter or event monitor    Echo 1-11-23  Mild left ventricular hypertrophy with moderately severe LV dysfunction, EF ~30% with moderate diastolic dysfunction  Mild left atrial enlargement  Mild aortic sclerosis with mild aortic insufficiency  Mildly thickened mitral valve with early annular calcification and moderate mitral regurgitation  Compared to prior report of January 27, 2022, LV EF is less and mitral regurgitation has increased.    2022  Mild-mod asymmetric hypertrophy.  EF 35%.  Mild MR.  Mild aortic sclerosis, trace AI.    1-10-24 ([facility])  EF 30%.  Mild AI  Moderate MR           [facility]    Cardiac MRI 3-14-23  LVIDd 5.7cm.  EF 30%.  Mild upper septal hypertrophy, up to 1.3cm.  Mildly dilated RV with mod reduced function.  LGE - epicardial in anterior and anterolateral.  LGE - transmural inf-septum, mid to distal.       Stress testing Stress echo - 2/2021  No ischemia    Stress nuclear - 3/2024  Reported scar but no ischemia.       Coronary CTA or [facility] Cardiac catheterization March 5, 2021: Normal coronaries.    [facility]    CPET              Patient Active Problem List    Diagnosis Date Noted   • Nonrheumatic mitral valve regurgitation 12/02/2024   • Mixed hyperlipidemia 12/02/2024   • HFrEF (heart failure with reduced ejection fraction) ([facility]) 07/22/2024   • Dilated cardiomyopathy ([facility]) 07/22/2024   • Paroxysmal A-fib ([facility]) 11/13/2023   • Gastroesophageal reflux disease without esophagitis 10/24/2022   • NSVT (nonsustained ventricular tachycardia) ([facility]) 10/29/2020   • Acute pain of right knee 10/11/2019   • Acquired trigger finger 06/01/2018   • Carpal tunnel syndrome 06/01/2018   • Numbness of hand 06/01/2018   • Malignant neoplasm of upper-inner 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 breast ([facility]) 01/26/2017   • NICM (nonischemic cardiomyopathy) ([facility]) 11/09/2016   •  Hypothyroidism 03/01/2016   • Plantar fasciitis 02/22/2016   • Hypertension 04/18/2014   • Ischemic cardiomyopathy 12/23/2013   • Osteopenia 06/18/2013       Past Medical History:   Diagnosis Date   • Hypertrophic cardiomyopathy (HCC)    • Hypothyroidism    • Ischemic cardiomyopathy    • Malignant neoplasm of left female breast (HCC) 02/09/2017    stage IIA   • Osteopenia    • Trigger finger of all digits of right hand        Allergies   Allergen Reactions   • Seasonal Ic [Cholestatin] Sneezing       Current Outpatient Medications   Medication Instructions   • albuterol (Proventil HFA) 90 mcg/act inhaler 2 puffs, Inhalation, Every 6 hours PRN   • calcium carbonate-vitamin D (OSCAL-D) 500 mg-200 units per tablet take 1 tablet by mouth once daily WITH BREAKFAST.   • cholecalciferol (VITAMIN D3) 1,000 Units, Daily   • Co Q-10 50 mg   • dofetilide (TIKOSYN) 125 mcg, Oral, 2 times daily   • Eliquis 2.5 mg, Oral, 2 times daily   • Entresto 24-26 MG TABS 0.5 tablets, Oral, 2 times daily   • ketorolac (ACULAR) 0.5 % ophthalmic solution instill 1 drop into right eye four times a day   • levothyroxine 50 mcg tablet take 1 tablet by mouth once daily   • metoprolol succinate (TOPROL-XL) 25 mg, Oral, 2 times daily   • mometasone (ELOCON) 0.1 % cream No dose, route, or frequency recorded.   • prednisoLONE acetate (PRED FORTE) 1 % ophthalmic suspension instill 1 drop into right eye four times a day   • spironolactone (ALDACTONE) 12.5 mg, Oral, Every other day       Social History     Socioeconomic History   • Marital status:      Spouse name: Not on file   • Number of children: Not on file   • Years of education: Not on file   • Highest education level: Not on file   Occupational History   • Not on file   Tobacco Use   • Smoking status: Never   • Smokeless tobacco: Never   Vaping Use   • Vaping status: Never Used   Substance and Sexual Activity   • Alcohol use: No     Alcohol/week: 1.0 standard drink of alcohol     Types: 1  Glasses of wine per week   • Drug use: No   • Sexual activity: Not Currently   Other Topics Concern   • Not on file   Social History Narrative     since 2018. Was  for 50 years.    Lives alone.  Has 2 daughters.  One in [place] who is a nephrologist.  The other in [place].    Enjoys gardening and being outside.       Social Drivers of Health     Financial Resource Strain: Low Risk  (5/24/2023)    Overall Financial Resource Strain (CARDIA)    • Difficulty of Paying Living Expenses: Not hard at all   Food Insecurity: Not on file   Transportation Needs: No Transportation Needs (5/24/2023)    PRAPARE - Transportation    • Lack of Transportation (Medical): No    • Lack of Transportation (Non-Medical): No   Physical Activity: Not on file   Stress: Not on file   Social Connections: Not on file   Intimate Partner Violence: Not on file   Housing Stability: Not on file       Family History   Problem Relation Age of Onset   • Heart disease Mother         had angina   • Heart disease Father    • Brain cancer Sister    • Heart disease Brother         had CABG   • No Known Problems Brother    • No Known Problems Maternal Grandmother    • No Known Problems Maternal Grandfather    • No Known Problems Paternal Grandmother    • No Known Problems Paternal Grandfather    • No Known Problems Daughter    • No Known Problems Daughter    • No Known Problems Paternal Aunt    • No Known Problems Paternal Aunt    • No Known Problems Paternal Aunt    • No Known Problems Paternal Aunt    • No Known Problems Paternal Aunt        Physical Exam:  Blood pressure 108/56, pulse 76, height 5' (1.524 m), weight 60.5 kg (133 lb 6.4 oz), SpO2 96%.  Body mass index is 26.05 kg/m².  Wt Readings from Last 3 Encounters:   01/16/25 60.5 kg (133 lb 6.4 oz)   12/04/24 60.8 kg (134 lb)   12/02/24 61.3 kg (135 lb 3.2 oz)     Physical Exam  Vitals reviewed.   Constitutional:       General: She is not in acute distress.     Appearance: She is not  toxic-appearing.   Neck:      Comments: JVP does not appear significantly elevated  Cardiovascular:      Rate and Rhythm: Normal rate and regular rhythm.      Heart sounds: Murmur (very soft HSM at apex) heard.      No friction rub. No gallop.   Pulmonary:      Breath sounds: Normal breath sounds. No wheezing, rhonchi or rales.   Abdominal:      General: There is no distension.      Palpations: Abdomen is soft.      Tenderness: There is no abdominal tenderness. There is no guarding.   Musculoskeletal:      Comments: trace LE edema   Neurological:      Mental Status: She is alert.         Labs & Results:  Lab Results   Component Value Date    SODIUM 137 01/15/2025    K 4.2 01/15/2025     01/15/2025    CO2 26 01/15/2025    BUN 21 01/15/2025    CREATININE 0.83 01/15/2025    GLUC 116 01/15/2025    CALCIUM 9.2 01/15/2025         Thank you for the opportunity to participate in the care of this patient.    Sha Freeman MD, [facility]  Advanced Heart Failure and Transplant Cardiologist  Director of Cardio-Oncology  [facility]

## 2025-01-19 ENCOUNTER — RESULTS FOLLOW-UP (OUTPATIENT)
Dept: CARDIOLOGY CLINIC | Facility: CLINIC | Age: 80
End: 2025-01-19

## 2025-01-23 ENCOUNTER — OFFICE VISIT (OUTPATIENT)
Dept: CARDIOLOGY CLINIC | Facility: CLINIC | Age: 80
End: 2025-01-23
Payer: COMMERCIAL

## 2025-01-23 VITALS
HEART RATE: 61 BPM | DIASTOLIC BLOOD PRESSURE: 56 MMHG | BODY MASS INDEX: 26.17 KG/M2 | WEIGHT: 133.3 LBS | HEIGHT: 60 IN | SYSTOLIC BLOOD PRESSURE: 108 MMHG

## 2025-01-23 DIAGNOSIS — I34.0 NONRHEUMATIC MITRAL VALVE REGURGITATION: ICD-10-CM

## 2025-01-23 DIAGNOSIS — I47.29 NSVT (NONSUSTAINED VENTRICULAR TACHYCARDIA) (HCC): Primary | ICD-10-CM

## 2025-01-23 DIAGNOSIS — I42.0 DILATED CARDIOMYOPATHY (HCC): ICD-10-CM

## 2025-01-23 DIAGNOSIS — I10 HYPERTENSION, UNSPECIFIED TYPE: ICD-10-CM

## 2025-01-23 DIAGNOSIS — I48.0 PAROXYSMAL A-FIB (HCC): ICD-10-CM

## 2025-01-23 DIAGNOSIS — I25.5 ISCHEMIC CARDIOMYOPATHY: ICD-10-CM

## 2025-01-23 DIAGNOSIS — I50.20 HFREF (HEART FAILURE WITH REDUCED EJECTION FRACTION) (HCC): ICD-10-CM

## 2025-01-23 DIAGNOSIS — I48.91 ATRIAL FIBRILLATION, UNSPECIFIED TYPE (HCC): ICD-10-CM

## 2025-01-23 PROCEDURE — 93000 ELECTROCARDIOGRAM COMPLETE: CPT | Performed by: INTERNAL MEDICINE

## 2025-01-23 PROCEDURE — 99214 OFFICE O/P EST MOD 30 MIN: CPT | Performed by: INTERNAL MEDICINE

## 2025-01-23 NOTE — PROGRESS NOTES
EPS Progress Note - Nati Villanueva 79 y.o. female MRN: [id number]           ASSESSMENT:  1. NSVT (nonsustained ventricular tachycardia) ([facility])        2. Dilated cardiomyopathy ([facility])        3. HFrEF (heart failure with reduced ejection fraction) ([facility])        4. Paroxysmal A-fib ([facility])        5. Nonrheumatic mitral valve regurgitation        6. Ischemic cardiomyopathy        7. Hypertension, unspecified type          MDT DUAL ICD/ ACTIVE SYSTEM IS MRI CONDITIONAL   CARELINK TRANSMISSION: BATTERY VOLTAGE ADEQUATE (7.5 YRS). AP-88%, <0.1%. ALL AVAILABLE LEAD PARAMETERS WITHIN NORMAL LIMITS. NO SIGNIFICANT HIGH RATE EPISODES. OPTI-VOL WITHIN NORMAL LIMITS. NORMAL DEVICE FUNCTION.   1/14/25      PLAN:  #1.  Paroxysmal atrial fibrillation highly symptomatic continue dofetilide at current dose she is not having any atrial fibrillation    2.  Anticoagulation continue Eliquis 2.5 mg twice daily I gave her samples of 5 mg today and she knows to cut them in half    3.  Dual-chamber ICD working appropriately    4.  Cardiomyopathy she is seeing our heart failure specialist I counseled her that the SGLT2 inhibitors do improve morbidity and mortality and heart failure symptoms and she should definitely strongly consider taking this she can also discuss with her daughter who is a nephrologist    All questions answered we will follow-up in 8 to 9 months    HPI:   Interim history she had bronchitis for a few weeks but has recovered.  She is a bit tired but she states that she continues to walk an hour every day and does quite a bit around the house.  Her BNP was noted to be thousand recently she was recommended to start an SGLT2 inhibitor but she is reluctant to do so because she is already on so many medications.  She states that she feels quite well and wants to wait for her echocardiogram results          Review of Systems   Constitutional: Negative for chills and fever.   HENT:  Negative for congestion and sore throat.    Eyes:   Negative for blurred vision and double vision.   Cardiovascular:  Negative for chest pain, claudication, dyspnea on exertion, leg swelling, near-syncope, orthopnea, palpitations, paroxysmal nocturnal dyspnea and syncope.   Respiratory:  Negative for cough, shortness of breath and sputum production.    Hematologic/Lymphatic: Negative for bleeding problem. Does not bruise/bleed easily.   Skin:  Negative for itching and rash.   Musculoskeletal:  Negative for arthritis and joint pain.   Gastrointestinal:  Negative for abdominal pain, nausea and vomiting.   Genitourinary:  Negative for hematuria.   Neurological:  Negative for dizziness, focal weakness, headaches, light-headedness and weakness.   Psychiatric/Behavioral:  Negative for depression. The patient is not nervous/anxious.         Objective:     Vitals: Blood pressure 108/56, pulse 61, height 5' (1.524 m), weight 60.5 kg (133 lb 4.8 oz)., Body mass index is 26.03 kg/m².,        Physical Exam:    GEN: Nati Villanueva appears well, alert and oriented x 3, pleasant and cooperative   HEENT: pupils equal, round, and reactive to light; extraocular muscles intact  NECK: supple, no carotid bruits   HEART: regular rhythm, normal S1 and S2, no murmurs, clicks, gallops or rubs   LUNGS: clear to auscultation bilaterally; no wheezes, rales, or rhonchi   ABDOMEN: normal bowel sounds, soft, no tenderness, no distention  EXTREMITIES: peripheral pulses normal; no clubbing, cyanosis, or edema  NEURO: no focal findings   SKIN: normal without suspicious lesions on exposed skin    Medications:      Current Outpatient Medications:     albuterol (Proventil HFA) 90 mcg/act inhaler, Inhale 2 puffs every 6 (six) hours as needed for wheezing, Disp: 6.7 g, Rfl: 5    calcium carbonate-vitamin D (OSCAL-D) 500 mg-200 units per tablet, take 1 tablet by mouth once daily WITH BREAKFAST., Disp: 30 tablet, Rfl: 0    cholecalciferol (VITAMIN D3) 1,000 units tablet, Take 1,000 Units by mouth daily,  Disp: , Rfl:     Co Q-10 50 MG, Take 50 mg by mouth, Disp: , Rfl:     dofetilide (TIKOSYN) 125 mcg capsule, Take 1 capsule (125 mcg total) by mouth 2 (two) times a day, Disp: 180 capsule, Rfl: 3    Eliquis 2.5 MG, TAKE 1 TABLET BY MOUTH TWICE A DAY, Disp: 60 tablet, Rfl: 2    Entresto 24-26 MG TABS, take 1/2 tablet by mouth twice a day, Disp: 90 tablet, Rfl: 1    ketorolac (ACULAR) 0.5 % ophthalmic solution, instill 1 drop into right eye four times a day, Disp: , Rfl:     levothyroxine 50 mcg tablet, take 1 tablet by mouth once daily, Disp: 90 tablet, Rfl: 1    metoprolol succinate (TOPROL-XL) 50 mg 24 hr tablet, Take 0.5 tablets (25 mg total) by mouth 2 (two) times a day, Disp: , Rfl:     mometasone (ELOCON) 0.1 % cream, , Disp: , Rfl:     prednisoLONE acetate (PRED FORTE) 1 % ophthalmic suspension, instill 1 drop into right eye four times a day, Disp: , Rfl:     spironolactone (ALDACTONE) 25 mg tablet, Take 0.5 tablets (12.5 mg total) by mouth every other day, Disp: 25 tablet, Rfl: 1     Family History   Problem Relation Age of Onset    Heart disease Mother         had angina    Heart disease Father     Brain cancer Sister     Heart disease Brother         had CABG    No Known Problems Brother     No Known Problems Maternal Grandmother     No Known Problems Maternal Grandfather     No Known Problems Paternal Grandmother     No Known Problems Paternal Grandfather     No Known Problems Daughter     No Known Problems Daughter     No Known Problems Paternal Aunt     No Known Problems Paternal Aunt     No Known Problems Paternal Aunt     No Known Problems Paternal Aunt     No Known Problems Paternal Aunt      Social History     Socioeconomic History    Marital status:      Spouse name: Not on file    Number of children: Not on file    Years of education: Not on file    Highest education level: Not on file   Occupational History    Not on file   Tobacco Use    Smoking status: Never    Smokeless tobacco: Never    Vaping Use    Vaping status: Never Used   Substance and Sexual Activity    Alcohol use: No     Alcohol/week: 1.0 standard drink of alcohol     Types: 1 Glasses of wine per week    Drug use: No    Sexual activity: Not Currently   Other Topics Concern    Not on file   Social History Narrative     since 2018. Was  for 50 years.    Lives alone.  Has 2 daughters.  One in [place] who is a nephrologist.  The other in [place].    Enjoys gardening and being outside.       Social Drivers of Health     Financial Resource Strain: Low Risk  (5/24/2023)    Overall Financial Resource Strain (CARDIA)     Difficulty of Paying Living Expenses: Not hard at all   Food Insecurity: Not on file   Transportation Needs: No Transportation Needs (5/24/2023)    PRAPARE - Transportation     Lack of Transportation (Medical): No     Lack of Transportation (Non-Medical): No   Physical Activity: Not on file   Stress: Not on file   Social Connections: Not on file   Intimate Partner Violence: Not on file   Housing Stability: Not on file     Social History     Tobacco Use   Smoking Status Never   Smokeless Tobacco Never     Social History     Substance and Sexual Activity   Alcohol Use No    Alcohol/week: 1.0 standard drink of alcohol    Types: 1 Glasses of wine per week       Labs & Results:  Below is the patient's most recent value for Albumin, ALT, AST, BUN, Calcium, Chloride, Cholesterol, CO2, Creatinine, GFR, Glucose, HDL, Hematocrit, Hemoglobin, Hemoglobin A1C, LDL, Magnesium, Phosphorus, Platelets, Potassium, PSA, Sodium, Triglycerides, and WBC.   Lab Results   Component Value Date    ALT 13 10/30/2023    AST 21 10/30/2023    BUN 21 01/15/2025    CALCIUM 9.2 01/15/2025     01/15/2025    CHOL 178 02/21/2015    CO2 26 01/15/2025    CREATININE 0.83 01/15/2025    HDL 46 (L) 10/30/2023    HCT 39.4 10/30/2023    HGB 12.4 10/30/2023    HGBA1C 5.8 (H) 03/05/2021    MG 1.9 10/30/2023     10/30/2023    K 4.2 01/15/2025    NA  141 02/21/2015    TRIG 137 10/30/2023    WBC 6.50 10/30/2023     Note: for a comprehensive list of the patient's lab results, access the Results Review activity.            Cardiac testing:   Results for orders placed in visit on 02/24/16    Echo complete with contrast if indicated    Narrative  4/18/2011    No results found for this or any previous visit.    No results found for this or any previous visit.    No results found for this or any previous visit.

## 2025-01-23 NOTE — TELEPHONE ENCOUNTER
Samples of this drug were given to the patient, quantity 56, Lot Number [id number]. The following was discussed with the patient as indicated: administration, storage, potential interactions, side effects.

## 2025-02-03 ENCOUNTER — RESULTS FOLLOW-UP (OUTPATIENT)
Dept: CARDIOLOGY CLINIC | Facility: CLINIC | Age: 80
End: 2025-02-03

## 2025-02-03 ENCOUNTER — HOSPITAL ENCOUNTER (OUTPATIENT)
Dept: NON INVASIVE DIAGNOSTICS | Facility: CLINIC | Age: 80
Discharge: HOME | End: 2025-02-03
Payer: COMMERCIAL

## 2025-02-03 VITALS
WEIGHT: 133 LBS | SYSTOLIC BLOOD PRESSURE: 108 MMHG | HEIGHT: 60 IN | BODY MASS INDEX: 26.11 KG/M2 | HEART RATE: 61 BPM | DIASTOLIC BLOOD PRESSURE: 56 MMHG

## 2025-02-03 DIAGNOSIS — I34.0 NONRHEUMATIC MITRAL VALVE REGURGITATION: ICD-10-CM

## 2025-02-03 DIAGNOSIS — I42.0 DILATED CARDIOMYOPATHY (HCC): ICD-10-CM

## 2025-02-03 DIAGNOSIS — I50.20 HFREF (HEART FAILURE WITH REDUCED EJECTION FRACTION) (HCC): ICD-10-CM

## 2025-02-03 LAB
AORTIC ROOT: 2.6 CM
ASCENDING AORTA: 3.6 CM
AV REGURGITATION PRESSURE HALF TIME: 660 MS
BSA FOR ECHO PROCEDURE: 1.57 M2
DOP CALC MV VTI: 26.96 CM
E WAVE DECELERATION TIME: 182 MS
E/A RATIO: 1.81
FRACTIONAL SHORTENING: 17 (ref 28–44)
INTERVENTRICULAR SEPTUM IN DIASTOLE (PARASTERNAL SHORT AXIS VIEW): 1.2 CM
INTERVENTRICULAR SEPTUM: 1.2 CM (ref 0.6–1.1)
LAAS-AP2: 30.2 CM2
LAAS-AP4: 27.2 CM2
LEFT ATRIUM SIZE: 5 CM
LEFT ATRIUM VOLUME (MOD BIPLANE): 105 ML
LEFT ATRIUM VOLUME INDEX (MOD BIPLANE): 66.9 ML/M2
LEFT INTERNAL DIMENSION IN SYSTOLE: 5 CM (ref 2.1–4)
LEFT VENTRICLE DIASTOLIC VOLUME (MOD BIPLANE): 97 ML
LEFT VENTRICLE DIASTOLIC VOLUME INDEX (MOD BIPLANE): 61.8 ML/M2
LEFT VENTRICLE SYSTOLIC VOLUME (MOD BIPLANE): 63 ML
LEFT VENTRICLE SYSTOLIC VOLUME INDEX (MOD BIPLANE): 40.1 ML/M2
LEFT VENTRICULAR INTERNAL DIMENSION IN DIASTOLE: 6 CM (ref 3.5–6)
LEFT VENTRICULAR POSTERIOR WALL IN END DIASTOLE: 1 CM
LEFT VENTRICULAR STROKE VOLUME: 57 ML
LV EF BIPLANE MOD: 35 %
LV EF US.2D.A4C+ESTIMATED: 41 %
LVSV (TEICH): 57 ML
MV E'TISSUE VEL-LAT: 4 CM/S
MV E'TISSUE VEL-SEP: 2 CM/S
MV MEAN GRADIENT: 1 MMHG
MV PEAK A VEL: 0.48 M/S
MV PEAK E VEL: 87 CM/S
MV PEAK GRADIENT: 3 MMHG
MV STENOSIS PRESSURE HALF TIME: 53 MS
MV VALVE AREA P 1/2 METHOD: 4.15
RA PRESSURE ESTIMATED: 3 MMHG
RIGHT ATRIAL 2D VOLUME: 50 ML
RIGHT ATRIUM AREA SYSTOLE A4C: 19.3 CM2
RIGHT VENTRICLE ID DIMENSION: 3.8 CM
RV PSP: 34 MMHG
SL CV AV DECELERATION TIME RETROGRADE: 2274 MS
SL CV AV PEAK GRADIENT RETROGRADE: 42 MMHG
SL CV LEFT ATRIUM LENGTH A2C: 7.2 CM
SL CV LV EF: 35
SL CV PED ECHO LEFT VENTRICLE DIASTOLIC VOLUME (MOD BIPLANE) 2D: 177 ML
SL CV PED ECHO LEFT VENTRICLE SYSTOLIC VOLUME (MOD BIPLANE) 2D: 120 ML
TR MAX PG: 31 MMHG
TR PEAK VELOCITY: 2.8 M/S
TRICUSPID ANNULAR PLANE SYSTOLIC EXCURSION: 2 CM
TRICUSPID VALVE PEAK REGURGITATION VELOCITY: 2.78 M/S

## 2025-02-03 PROCEDURE — 93306 TTE W/DOPPLER COMPLETE: CPT | Performed by: STUDENT IN AN ORGANIZED HEALTH CARE EDUCATION/TRAINING PROGRAM

## 2025-02-03 PROCEDURE — C8929 TTE W OR WO FOL WCON,DOPPLER: HCPCS

## 2025-02-03 RX ADMIN — PERFLUTREN 0.4 ML/MIN: 6.52 INJECTION, SUSPENSION INTRAVENOUS at 14:44

## 2025-02-03 NOTE — RESULT ENCOUNTER NOTE
Echo - 02/03/25   LVIDd 6cm.  Borderline LVH.   EF 35%.   Pseudonormal relaxation filling pattern (grade 2 DD)  Normal RV size and function  Mild to mod MR  Mild TR  IVC is not dilated    Linda - Doesn't have mychart.  Can you let the patient know I reviewed the echo and the heart pump function is 35% compared to prior which was 30%.  Very slight improvement --  but overall no significant changes.

## 2025-02-04 NOTE — TELEPHONE ENCOUNTER
I spoke with the patient regarding slight improvement on echo results. She verbalized understanding.

## 2025-02-27 ENCOUNTER — TELEPHONE (OUTPATIENT)
Age: 80
End: 2025-02-27

## 2025-02-27 NOTE — TELEPHONE ENCOUNTER
Pt reports cough and congestion for 1 week now. Pt is taking otc counter cough medicine that doesn't seem to help. Pt states she can not sleep due to cough and congestion. No avail appts until 3/3. Pt would like to come in, if not she is requesting something for cough and congestion called into her [facility]. Pt also states she is on her second bottle of Robotussin

## 2025-02-28 ENCOUNTER — OFFICE VISIT (OUTPATIENT)
Dept: FAMILY MEDICINE CLINIC | Facility: CLINIC | Age: 80
End: 2025-02-28
Payer: COMMERCIAL

## 2025-02-28 VITALS
RESPIRATION RATE: 18 BRPM | WEIGHT: 136.2 LBS | HEART RATE: 75 BPM | DIASTOLIC BLOOD PRESSURE: 86 MMHG | SYSTOLIC BLOOD PRESSURE: 136 MMHG | OXYGEN SATURATION: 97 % | TEMPERATURE: 97.1 F | HEIGHT: 60 IN | BODY MASS INDEX: 26.74 KG/M2

## 2025-02-28 DIAGNOSIS — J40 BRONCHITIS: Primary | ICD-10-CM

## 2025-02-28 PROCEDURE — G2211 COMPLEX E/M VISIT ADD ON: HCPCS | Performed by: FAMILY MEDICINE

## 2025-02-28 PROCEDURE — 99214 OFFICE O/P EST MOD 30 MIN: CPT | Performed by: FAMILY MEDICINE

## 2025-02-28 RX ORDER — PREDNISONE 20 MG/1
TABLET ORAL
Qty: 11 TABLET | Refills: 0 | Status: SHIPPED | OUTPATIENT
Start: 2025-02-28

## 2025-02-28 RX ORDER — CEFUROXIME AXETIL 500 MG/1
500 TABLET ORAL 2 TIMES DAILY
Qty: 20 TABLET | Refills: 0 | Status: SHIPPED | OUTPATIENT
Start: 2025-02-28 | End: 2025-03-10

## 2025-02-28 NOTE — ASSESSMENT & PLAN NOTE
Bronchitis.  Patient was given prescription for Ceftin 500 mg to use twice daily for 10 days.  She was also given prednisone tapering dose consisting of 40 mg / 3 days, 20 mg / 3 days, 10 mg x 4 days.  Patient will call if symptoms persist after medication completed

## 2025-02-28 NOTE — PROGRESS NOTES
FAMILY PRACTICE OFFICE VISIT       NAME: Nati Villanueva  AGE: 79 y.o. SEX: female       : 1945        MRN: [id number]    DATE: 2025  TIME: 5:07 PM    Assessment and Plan     Problem List Items Addressed This Visit       Bronchitis - Primary    Bronchitis.  Patient was given prescription for Ceftin 500 mg to use twice daily for 10 days.  She was also given prednisone tapering dose consisting of 40 mg / 3 days, 20 mg / 3 days, 10 mg x 4 days.  Patient will call if symptoms persist after medication completed         Relevant Medications    predniSONE 20 mg tablet    cefuroxime (CEFTIN) 500 mg tablet           Chief Complaint     Chief Complaint   Patient presents with    Cold Like Symptoms     X 1 week        History of Present Illness     Patient in the office with 7 to 8-day history of current symptoms.  She has been having fatigue, coughing, and some mild diarrhea.  Symptoms are worse at night and patient has trouble sleeping.  She has been using over-the-counter Robitussin DM as well as albuterol inhaler.  She denies any documented fevers.  She is a non-smoker.        Review of Systems   Review of Systems   Constitutional:  Positive for fatigue. Negative for fever.   HENT:  Positive for congestion.    Respiratory:  Positive for cough and wheezing.    Cardiovascular: Negative.    Gastrointestinal:  Positive for diarrhea.   Musculoskeletal:  Positive for myalgias.       Active Problem List     Patient Active Problem List   Diagnosis    Malignant neoplasm of upper-inner 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 breast (Formerly [facility])    Hypertension    NICM (nonischemic cardiomyopathy) (Formerly [facility])    Hypothyroidism    Ischemic cardiomyopathy    Osteopenia    Plantar fasciitis    Acquired trigger finger    Carpal tunnel syndrome    Numbness of hand    Acute pain of right knee    NSVT (nonsustained ventricular tachycardia) ([facility])    Gastroesophageal reflux disease without esophagitis    Paroxysmal A-fib ([facility])    HFrEF (heart failure with  reduced ejection fraction) (HCC)    Dilated cardiomyopathy (HCC)    Nonrheumatic mitral valve regurgitation    Mixed hyperlipidemia    Bronchitis       Past Medical History:  Past Medical History:   Diagnosis Date    Hypertrophic cardiomyopathy (HCC)     Hypothyroidism     Ischemic cardiomyopathy     Malignant neoplasm of left female breast (HCC) 02/09/2017    stage IIA    Osteopenia     Trigger finger of all digits of right hand        Past Surgical History:  Past Surgical History:   Procedure Laterality Date    BREAST BIOPSY Right     years ago benign    BREAST RECONSTRUCTION Left 2/9/2017    Procedure: BREAST RECONSTRUCTION WITH TISSUE EXPANDERS AND ALLODERM ;  Surgeon: Cuong Painagua MD;  [facility];  Service:     COLONOSCOPY  2009    KNEE ARTHROSCOPY Bilateral     MASTECTOMY Left 02/09/2017    NE BX/EXC LYMPH NODE OPEN SUPERFICIAL Left 2/9/2017    Procedure: LYMPHOSCINTIGRAPHY; INTRAOPERATIVE LYMPHATIC MAPPING; SENTINEL LYMPH NODE BIOPSY (INJECT AT 0700 BY DR LANGLEY);  Surgeon: Stas Langley MD;  Location[facility];  Service: Surgical Oncology    NE INSJ/RPLCMT BREAST IMPLANT SEP DAY MASTECTOMY Left 5/18/2017    Procedure: BREAST EXPANDER/IMPLANT EXCHANGE; CAPSULECTOMY ;  Surgeon: Cuong Paniagua MD;  Location[facility];  Service: Plastics    NE MASTECTOMY SIMPLE COMPLETE Left 2/9/2017    Procedure: BREAST MASTECTOMY; FROZEN SECTION ;  Surgeon: Stas Langley MD;  [facility];  Service: Surgical Oncology    REFRACTIVE SURGERY Bilateral     US GUIDANCE BREAST BIOPSY LEFT EACH ADDITIONAL Left 12/1/2016    US GUIDED BREAST BIOPSY LEFT COMPLETE Left 12/1/2016       Family History:  Family History   Problem Relation Age of Onset    Heart disease Mother         had angina    Heart disease Father     Brain cancer Sister     Heart disease Brother         had CABG    No Known Problems Brother     No Known Problems Maternal Grandmother     No Known Problems Maternal Grandfather     No Known Problems Paternal  Grandmother     No Known Problems Paternal Grandfather     No Known Problems Daughter     No Known Problems Daughter     No Known Problems Paternal Aunt     No Known Problems Paternal Aunt     No Known Problems Paternal Aunt     No Known Problems Paternal Aunt     No Known Problems Paternal Aunt        Social History:  Social History     Socioeconomic History    Marital status:      Spouse name: Not on file    Number of children: Not on file    Years of education: Not on file    Highest education level: Not on file   Occupational History    Not on file   Tobacco Use    Smoking status: Never    Smokeless tobacco: Never   Vaping Use    Vaping status: Never Used   Substance and Sexual Activity    Alcohol use: No     Alcohol/week: 1.0 standard drink of alcohol     Types: 1 Glasses of wine per week    Drug use: No    Sexual activity: Not Currently   Other Topics Concern    Not on file   Social History Narrative     since 2018. Was  for 50 years.    Lives alone.  Has 2 daughters.  One in [place] who is a nephrologist.  The other in [place].    Enjoys gardening and being outside.       Social Drivers of Health     Financial Resource Strain: Low Risk  (5/24/2023)    Overall Financial Resource Strain (CARDIA)     Difficulty of Paying Living Expenses: Not hard at all   Food Insecurity: Not on file   Transportation Needs: No Transportation Needs (5/24/2023)    PRAPARE - Transportation     Lack of Transportation (Medical): No     Lack of Transportation (Non-Medical): No   Physical Activity: Not on file   Stress: Not on file   Social Connections: Not on file   Intimate Partner Violence: Not on file   Housing Stability: Not on file       Objective     Vitals:    02/28/25 1604   BP: 136/86   Pulse: 75   Resp: 18   Temp: (!) 97.1 °F (36.2 °C)   SpO2: 97%     Wt Readings from Last 3 Encounters:   02/28/25 61.8 kg (136 lb 3.2 oz)   02/03/25 60.3 kg (133 lb)   01/23/25 60.5 kg (133 lb 4.8 oz)       Physical  "Exam  Constitutional:       General: She is not in acute distress.     Appearance: Normal appearance. She is not ill-appearing.   Eyes:      General:         Right eye: No discharge.         Left eye: No discharge.      Extraocular Movements: Extraocular movements intact.      Conjunctiva/sclera: Conjunctivae normal.      Pupils: Pupils are equal, round, and reactive to light.   Cardiovascular:      Rate and Rhythm: Normal rate and regular rhythm.      Heart sounds: Normal heart sounds. No murmur heard.  Pulmonary:      Effort: Pulmonary effort is normal. No respiratory distress.      Breath sounds: Wheezing and rhonchi present.      Comments: Patient with scattered rhonchi and wheezes bilaterally.  Musculoskeletal:      Right lower leg: No edema.      Left lower leg: No edema.   Neurological:      Mental Status: She is alert. Mental status is at baseline.   Psychiatric:         Mood and Affect: Mood normal.         Behavior: Behavior normal.         Thought Content: Thought content normal.         Judgment: Judgment normal.         Pertinent Laboratory/Diagnostic Studies:  Lab Results   Component Value Date    GLUCOSE 105 02/21/2015    BUN 21 01/15/2025    CREATININE 0.83 01/15/2025    CALCIUM 9.2 01/15/2025     02/21/2015    K 4.2 01/15/2025    CO2 26 01/15/2025     01/15/2025     Lab Results   Component Value Date    ALT 13 10/30/2023    AST 21 10/30/2023    ALKPHOS 57 10/30/2023    BILITOT 0.47 02/21/2015       Lab Results   Component Value Date    WBC 6.50 10/30/2023    HGB 12.4 10/30/2023    HCT 39.4 10/30/2023    MCV 91 10/30/2023     10/30/2023       No results found for: \"TSH\"    Lab Results   Component Value Date    CHOL 178 02/21/2015     Lab Results   Component Value Date    TRIG 137 10/30/2023     Lab Results   Component Value Date    HDL 46 (L) 10/30/2023     Lab Results   Component Value Date    LDLCALC 119 (H) 10/30/2023     Lab Results   Component Value Date    HGBA1C 5.8 (H) " 03/05/2021       Results for orders placed or performed during the hospital encounter of 02/03/25   Echo complete w/ contrast if indicated   Result Value Ref Range    BSA 1.57 m2    A4C EF 41 %    LV Diastolic Volume (BP) 97 mL    LV Systolic Volume (BP) 63 mL    EF 35 %    LVIDd 6.00 cm    LVIDS 5.00 cm    IVSd 1.20 cm    LVPWd 1.00 cm    FS 17 28 - 44    MV E' Tissue Velocity Septal 2 cm/s    MV E' Tissue Velocity Lateral 4 cm/s    LA Volume Index (BP) 66.9 mL/m2    E/A ratio 1.81     E wave deceleration time 182 ms    MV Peak E Tan 87 cm/s    MV Peak A Tna 0.48 m/s    RVID d 3.8 cm    Tricuspid annular plane systolic excursion 2.00 cm    LA size 5 cm    LA length (A2C) 7.20 cm    LA volume (BP) 105 mL    RA 2D Volume 50.0 mL    RAA A4C 19.3 cm2    AV Deceleration Time 2,274 ms    AV regurgitation pressure 1/2 time 660 ms    MV mean gradient antegrade 1 mmHg    MV peak gradient antegrade 3 mmHg    MV VTI 26.96 cm    MV stenosis pressure 1/2 time 53 ms    MV valve area p 1/2 method 4.15     TR Peak Tan 2.8 m/s    Triscuspid Valve Regurgitation Peak Gradient 31.0 mmHg    Ao root 2.60 cm    Asc Ao 3.6 cm    AV peak gradient 42 mmHg    Tricuspid valve peak regurgitation velocity 2.78 m/s    Left ventricular stroke volume (2D) 57.00 mL    IVS 1.2 cm    LEFT VENTRICLE SYSTOLIC VOLUME (MOD BIPLANE) 2D 120 mL    LV DIASTOLIC VOLUME (MOD BIPLANE) 2D 177 mL    Left Atrium Area-systolic Four Chamber 27.2 cm2    Left Atrium Area-systolic Apical Two Chamber 30.2 cm2    LVSV, 2D 57 mL    LV Diastolic Volume Index (BP) 61.8 mL/m2    LV Systolic Volume Index (BP) 40.1 mL/m2    Est. RA pres 3.0 mmHg    Right Ventricular Peak Systolic Pressure 34.00 mmHg    LV EF 35        No orders of the defined types were placed in this encounter.      ALLERGIES:  Allergies   Allergen Reactions    Seasonal Ic [Cholestatin] Sneezing       Current Medications     Current Outpatient Medications   Medication Sig Dispense Refill    albuterol  (Proventil HFA) 90 mcg/act inhaler Inhale 2 puffs every 6 (six) hours as needed for wheezing 6.7 g 5    apixaban (Eliquis) 2.5 mg Take 2 tablets (5 mg total) by mouth 2 (two) times a day 56 tablet 0    calcium carbonate-vitamin D (OSCAL-D) 500 mg-200 units per tablet take 1 tablet by mouth once daily WITH BREAKFAST. 30 tablet 0    cefuroxime (CEFTIN) 500 mg tablet Take 1 tablet (500 mg total) by mouth 2 (two) times a day for 10 days 20 tablet 0    cholecalciferol (VITAMIN D3) 1,000 units tablet Take 1,000 Units by mouth daily      Co Q-10 50 MG Take 50 mg by mouth      dofetilide (TIKOSYN) 125 mcg capsule Take 1 capsule (125 mcg total) by mouth 2 (two) times a day 180 capsule 3    Entresto 24-26 MG TABS take 1/2 tablet by mouth twice a day 90 tablet 1    ketorolac (ACULAR) 0.5 % ophthalmic solution instill 1 drop into right eye four times a day      levothyroxine 50 mcg tablet take 1 tablet by mouth once daily 90 tablet 1    metoprolol succinate (TOPROL-XL) 50 mg 24 hr tablet Take 0.5 tablets (25 mg total) by mouth 2 (two) times a day      mometasone (ELOCON) 0.1 % cream       prednisoLONE acetate (PRED FORTE) 1 % ophthalmic suspension instill 1 drop into right eye four times a day      predniSONE 20 mg tablet 2 tabs X 3 days, 1 tab X 3 days, 1/2 tab X 4 days 11 tablet 0    spironolactone (ALDACTONE) 25 mg tablet Take 0.5 tablets (12.5 mg total) by mouth every other day 25 tablet 1     No current facility-administered medications for this visit.         Health Maintenance     Health Maintenance   Topic Date Due    SLP PLAN OF CARE  Never done    Zoster Vaccine (1 of 2) Never done    RSV Vaccine for Pregnant Patients and Patients Age 60+ Years (1 - 1-dose 75+ series) Never done    Fall Risk  06/03/2025    Urinary Incontinence Screening  06/03/2025    Medicare Annual Wellness Visit (AWV)  06/03/2025    Depression Screening  12/04/2025    Breast Cancer Screening: Mammogram  06/20/2026    Osteoporosis Screening   Completed    Pneumococcal Vaccine: 65+ Years  Completed    Influenza Vaccine  Completed    COVID-19 Vaccine  Completed    Meningococcal B Vaccine  Aged Out    RSV Vaccine age 0-20 Months  Aged Out    HIB Vaccine  Aged Out    IPV Vaccine  Aged Out    Hepatitis A Vaccine  Aged Out    Meningococcal ACWY Vaccine  Aged Out    HPV Vaccine  Aged Out     Immunization History   Administered Date(s) Administered    COVID-19 PFIZER VACCINE 0.3 ML IM 01/25/2021, 02/14/2021, 11/06/2021    COVID-19 Pfizer mRNA vacc PF franc-sucrose 12 yr and older (Comirnaty) 11/04/2024    INFLUENZA 09/03/2014, 09/28/2015, 10/09/2016, 09/13/2017, 11/08/2018    Influenza Split 01/04/2021    Influenza Split High Dose Preservative Free IM 10/09/2016, 11/04/2024    Influenza, high dose seasonal 0.7 mL 10/11/2019, 09/29/2020, 10/21/2021, 10/24/2022, 10/30/2023    Influenza, seasonal, injectable 10/30/2003, 11/13/2006, 11/24/2008, 10/07/2011, 10/26/2012, 10/15/2013, 10/01/2014    Pneumococcal Conjugate 13-Valent 02/22/2016    Pneumococcal Polysaccharide PPV23 01/02/2018    Tdap 02/22/2016       Mckinley Guerrier MD      I spent 30 minutes with this patient of which greater than 50% was spent counseling or reviewing chart

## 2025-03-05 NOTE — PROGRESS NOTES
Cardiology Clinic Note    Nati Villanueva 79 y.o. female   MRN: [id number]  Encounter: [id number]        Assessment / Plan:    #  Non-ischemic Cardiomyopathy  Etiology:  non-ischemic by [facility]  Familial.  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 This gene has a known association with HCM and dilated cardiomyopathy.  Family members need to be screened clinically and possibly with genetic testing depending on preference (patient and daughter who is a physician are aware, no need to keep asking about this).    #  Systolic Heart Failure - chronic  Class and Stage:  NYHA class:   II  ACC/AHA stage: C    Volume Management:  Volume:   Euvolemic on exam.  BNP:    1007 - elevated  Diuretic:  has not been requiring a loop diuretic.  Lab monitoring:  recent BMP stable    Neurohormonal Blockade / GDMT:  Beta blocker:  toprol XL 25mg BID  ARNI:  entresto 24-26 - 1/2 pill BID  MRA:  david 12.5mg QOD  SGLT2i:  recommend but she continues to decline    Sudden cardiac death risk reduction:  S/p dual chamber ICD 2021  (had syncope and NSVT on tele)  Non-specific IVCB.  EP not recommending CRT.    Other heart failure considerations:  Mitral clip, IV iron, clinical trial, cardiac rehab, palliative care:  not at this time    # NSVT  Had syncope 2021.  Found to have NSVT.  ICD placed 2021.  continue BB.    # paroxysmal afib  Parosxysmal on device checks.    Follows with Dr Diaz  Anticoag:   eliquis 2.5 BID  Rate:  toprol  Rhythm:   tikosyn    # Hx PACs, PVCs  Continue BB    # Moderate MR  Serial exam and echo follow up  Recent echo reviewed, stable    # HTN  See GDMT above    # HLD  last .  Elevated ASCVD risk score, declines statin    # Hx of breast cancer  Mastectomy 2017. Tx with anastrazole x 5 years.      Today's Plan Summary:  See above assessment/plan for full details of today's plan.  Briefly,     The patient is feeling worse again due to a recurrent URI.  She is on antibiotics and steroids.  She is perhaps mildly  hypervolemic on exam.  BNP was elevated.  Echo shows grade 2 diastolic dysfunction but normal IVC size.  Overall I suspect she may be mildly hypervolemic.  Recommend SGLT2 for GDMT effects and mild diuretic effects.  The patient does not want to do this.  I did give her some samples of Farxiga if she changes her mind.  I also am ordering a BMP to check labs if she takes the Farxiga.              Reason For Visit / Chief Complaint:  F/u cardiomyopathy    HPI:   Nati Villanueva is a 79 y.o.  female with history as noted in the problem list and further detailed in the above assessment and plan.    Initial:  2023  Referral from Dr. Stevens.  Longstanding hx of systolic dysfunction.  Now with declining EF on serial echo.  40% --> 35% --> 30%.   Dr. Stevens requested heart failure consult for second opinion.  Patient overall feels pretty well.  She walks 3 miles almost everyday.   Likes to do gardening when the weather is nicer.   Lives by herself (  5 years ago).  Has two daughters.  One daughter is a nephrologist (Director of Kidney transplant at [employer] [facility]).    Interval:  Last visit -->   felt well    Plan last visit -->   The patient was brought in because she has been having more cough and shortness of breath lately (felt viral URI) and her BNP was elevated at 1007.  No recent prior BNP values to compare.  Because of the BNP I brought her in today to do an exam I thought she might need a diuretic.  She actually reports that the shortness of breath and cough have resolved and she is back to baseline.  There is no obvious volume overload on exam.  I recommend SGLT2 but she declines.  I therefore recommend checking an echocardiogram to update EF, mitral regurgitation severity, and IVC size to help with volume status assessment.    Echo - 25   LVIDd 6cm.   EF 35%.   Pseudonormal relaxation filling pattern (grade 2 DD)  Mild to mod MR  IVC is not dilated    Saw Dr Diaz - no med changes.    Today -   recent  issues with another URI.   Saw PCP.  On abx.  Nasal discharge. Still coughing.    No CP.  Some SHARMA.   No leg edema.               Cardiac Imaging personally reviewed:  EKG Nonspecific IVCB, QRS around 142ms.    Holter or event monitor    Echo 1-11-23  Mild left ventricular hypertrophy with moderately severe LV dysfunction, EF ~30% with moderate diastolic dysfunction  Mild left atrial enlargement  Mild aortic sclerosis with mild aortic insufficiency  Mildly thickened mitral valve with early annular calcification and moderate mitral regurgitation  Compared to prior report of January 27, 2022, LV EF is less and mitral regurgitation has increased.    2022  Mild-mod asymmetric hypertrophy.  EF 35%.  Mild MR.  Mild aortic sclerosis, trace AI.    1-10-24 ([facility])  EF 30%.  Mild AI  Moderate MR    Echo - 02/03/25   LVIDd 6cm.  Borderline LVH.   EF 35%.   Pseudonormal relaxation filling pattern (grade 2 DD)  Normal RV size and function  Mild to mod MR  Mild TR  IVC is not dilated           [facility]    Cardiac MRI 3-14-23  LVIDd 5.7cm.  EF 30%.  Mild upper septal hypertrophy, up to 1.3cm.  Mildly dilated RV with mod reduced function.  LGE - epicardial in anterior and anterolateral.  LGE - transmural inf-septum, mid to distal.       Stress testing Stress echo - 2/2021  No ischemia    Stress nuclear - 3/2024  Reported scar but no ischemia.       Coronary CTA or [facility] Cardiac catheterization March 5, 2021: Normal coronaries.    [facility]    CPET              Patient Active Problem List    Diagnosis Date Noted   • Bronchitis 02/28/2025   • Nonrheumatic mitral valve regurgitation 12/02/2024   • Mixed hyperlipidemia 12/02/2024   • HFrEF (heart failure with reduced ejection fraction) ([facility]) 07/22/2024   • Dilated cardiomyopathy ([facility]) 07/22/2024   • Paroxysmal A-fib ([facility]) 11/13/2023   • Gastroesophageal reflux disease without esophagitis 10/24/2022   • NSVT (nonsustained ventricular tachycardia) ([facility]) 10/29/2020   • Acute pain of right knee  10/11/2019   • Acquired trigger finger 06/01/2018   • Carpal tunnel syndrome 06/01/2018   • Numbness of hand 06/01/2018   • Malignant neoplasm of upper-inner 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 breast ([facility]) 01/26/2017   • NICM (nonischemic cardiomyopathy) ([facility]) 11/09/2016   • Hypothyroidism 03/01/2016   • Plantar fasciitis 02/22/2016   • Hypertension 04/18/2014   • Ischemic cardiomyopathy 12/23/2013   • Osteopenia 06/18/2013       Past Medical History:   Diagnosis Date   • Hypertrophic cardiomyopathy ([facility])    • Hypothyroidism    • Ischemic cardiomyopathy    • Malignant neoplasm of left female breast ([facility]) 02/09/2017    stage IIA   • Osteopenia    • Trigger finger of all digits of right hand        Allergies   Allergen Reactions   • Seasonal Ic [Cholestatin] Sneezing       Current Outpatient Medications   Medication Instructions   • albuterol (Proventil HFA) 90 mcg/act inhaler 2 puffs, Inhalation, Every 6 hours PRN   • apixaban (ELIQUIS) 2.5 mg, Oral, 2 times daily   • calcium carbonate-vitamin D (OSCAL-D) 500 mg-200 units per tablet take 1 tablet by mouth once daily WITH BREAKFAST.   • cefuroxime (CEFTIN) 500 mg, Oral, 2 times daily   • cholecalciferol (VITAMIN D3) 1,000 Units, Daily   • Co Q-10 50 mg   • dofetilide (TIKOSYN) 125 mcg, Oral, 2 times daily   • Entresto 24-26 MG TABS 0.5 tablets, Oral, 2 times daily   • ketorolac (ACULAR) 0.5 % ophthalmic solution instill 1 drop into right eye four times a day   • levothyroxine 50 mcg tablet take 1 tablet by mouth once daily   • metoprolol succinate (TOPROL-XL) 25 mg, Oral, 2 times daily   • mometasone (ELOCON) 0.1 % cream No dose, route, or frequency recorded.   • prednisoLONE acetate (PRED FORTE) 1 % ophthalmic suspension instill 1 drop into right eye four times a day   • predniSONE 20 mg tablet 2 tabs X 3 days, 1 tab X 3 days, 1/2 tab X 4 days   • spironolactone (ALDACTONE) 12.5 mg, Oral, Every other day       Social History     Socioeconomic History   • Marital status:       Spouse name: Not on file   • Number of children: Not on file   • Years of education: Not on file   • Highest education level: Not on file   Occupational History   • Not on file   Tobacco Use   • Smoking status: Never   • Smokeless tobacco: Never   Vaping Use   • Vaping status: Never Used   Substance and Sexual Activity   • Alcohol use: No     Alcohol/week: 1.0 standard drink of alcohol     Types: 1 Glasses of wine per week   • Drug use: No   • Sexual activity: Not Currently   Other Topics Concern   • Not on file   Social History Narrative     since 2018. Was  for 50 years.    Lives alone.  Has 2 daughters.  One in [place] who is a nephrologist.  The other in [place].    Enjoys gardening and being outside.       Social Drivers of Health     Financial Resource Strain: Low Risk  (5/24/2023)    Overall Financial Resource Strain (CARDIA)    • Difficulty of Paying Living Expenses: Not hard at all   Food Insecurity: Not on file   Transportation Needs: No Transportation Needs (5/24/2023)    PRAPARE - Transportation    • Lack of Transportation (Medical): No    • Lack of Transportation (Non-Medical): No   Physical Activity: Not on file   Stress: Not on file   Social Connections: Not on file   Intimate Partner Violence: Not on file   Housing Stability: Not on file       Family History   Problem Relation Age of Onset   • Heart disease Mother         had angina   • Heart disease Father    • Brain cancer Sister    • Heart disease Brother         had CABG   • No Known Problems Brother    • No Known Problems Maternal Grandmother    • No Known Problems Maternal Grandfather    • No Known Problems Paternal Grandmother    • No Known Problems Paternal Grandfather    • No Known Problems Daughter    • No Known Problems Daughter    • No Known Problems Paternal Aunt    • No Known Problems Paternal Aunt    • No Known Problems Paternal Aunt    • No Known Problems Paternal Aunt    • No Known Problems Paternal Aunt         Physical Exam:  Blood pressure 126/70, pulse 74, height 5' (1.524 m), weight 62.1 kg (136 lb 12.8 oz), SpO2 97%.  Body mass index is 26.72 kg/m².  Wt Readings from Last 3 Encounters:   03/06/25 62.1 kg (136 lb 12.8 oz)   02/28/25 61.8 kg (136 lb 3.2 oz)   02/03/25 60.3 kg (133 lb)     Physical Exam  Vitals reviewed.   Constitutional:       General: She is not in acute distress.     Appearance: She is not toxic-appearing.   Neck:      Comments: JVP borderline, does not appear significantly elevated  Cardiovascular:      Rate and Rhythm: Normal rate and regular rhythm.      Heart sounds: Murmur (very soft HSM at apex) heard.      No friction rub. No gallop.   Pulmonary:      Breath sounds: Normal breath sounds. No wheezing, rhonchi or rales.   Abdominal:      General: There is no distension.      Palpations: Abdomen is soft.      Tenderness: There is no abdominal tenderness. There is no guarding.   Musculoskeletal:      Comments: trace LE edema   Neurological:      Mental Status: She is alert.         Labs & Results:  Lab Results   Component Value Date    SODIUM 137 01/15/2025    K 4.2 01/15/2025     01/15/2025    CO2 26 01/15/2025    BUN 21 01/15/2025    CREATININE 0.83 01/15/2025    GLUC 116 01/15/2025    CALCIUM 9.2 01/15/2025         Thank you for the opportunity to participate in the care of this patient.    Sha Freeman MD, [facility]  Advanced Heart Failure and Transplant Cardiologist  Director of Cardio-Oncology  [facility]

## 2025-03-06 ENCOUNTER — OFFICE VISIT (OUTPATIENT)
Dept: CARDIOLOGY CLINIC | Facility: CLINIC | Age: 80
End: 2025-03-06
Payer: COMMERCIAL

## 2025-03-06 VITALS
HEART RATE: 74 BPM | SYSTOLIC BLOOD PRESSURE: 126 MMHG | OXYGEN SATURATION: 97 % | WEIGHT: 136.8 LBS | HEIGHT: 60 IN | DIASTOLIC BLOOD PRESSURE: 70 MMHG | BODY MASS INDEX: 26.86 KG/M2

## 2025-03-06 DIAGNOSIS — I42.0 DILATED CARDIOMYOPATHY (HCC): ICD-10-CM

## 2025-03-06 DIAGNOSIS — E78.2 MIXED HYPERLIPIDEMIA: ICD-10-CM

## 2025-03-06 DIAGNOSIS — I47.29 NSVT (NONSUSTAINED VENTRICULAR TACHYCARDIA) (HCC): ICD-10-CM

## 2025-03-06 DIAGNOSIS — I48.91 ATRIAL FIBRILLATION, UNSPECIFIED TYPE (HCC): ICD-10-CM

## 2025-03-06 DIAGNOSIS — I34.0 NONRHEUMATIC MITRAL VALVE REGURGITATION: ICD-10-CM

## 2025-03-06 DIAGNOSIS — I10 PRIMARY HYPERTENSION: ICD-10-CM

## 2025-03-06 DIAGNOSIS — I48.0 PAROXYSMAL A-FIB (HCC): ICD-10-CM

## 2025-03-06 DIAGNOSIS — I50.20 HFREF (HEART FAILURE WITH REDUCED EJECTION FRACTION) (HCC): Primary | ICD-10-CM

## 2025-03-06 PROCEDURE — 99214 OFFICE O/P EST MOD 30 MIN: CPT | Performed by: INTERNAL MEDICINE

## 2025-03-06 PROCEDURE — G2211 COMPLEX E/M VISIT ADD ON: HCPCS | Performed by: INTERNAL MEDICINE

## 2025-03-31 DIAGNOSIS — I48.91 ATRIAL FIBRILLATION, UNSPECIFIED TYPE (HCC): Primary | ICD-10-CM

## 2025-03-31 DIAGNOSIS — I48.91 ATRIAL FIBRILLATION, UNSPECIFIED TYPE (HCC): ICD-10-CM

## 2025-03-31 DIAGNOSIS — I50.20 HFREF (HEART FAILURE WITH REDUCED EJECTION FRACTION) (HCC): ICD-10-CM

## 2025-03-31 NOTE — TELEPHONE ENCOUNTER
THIS IS NOT A DUPLICATE REQUEST    Reason for call: Patient states pharmacy never received the prescription. Patient is out of medication.    [x] Refill   [] Prior Auth  [] Other:     Office:   [] PCP/Provider -   [x] Specialty/Provider -  Sha Freeman MD [facility]     Medication: Apixaban    Dose/Frequency: 2.5mg One, Two times a day    Quantity: 60    Pharmacy: [id number]   Does the patient have enough for 3 days?   [] Yes   [x] No - Send as HP to POD    Mail Away Pharmacy   Does the patient have enough for 10 days?   [] Yes   [] No - Send as HP to POD

## 2025-04-01 ENCOUNTER — TELEPHONE (OUTPATIENT)
Dept: CARDIOLOGY CLINIC | Facility: CLINIC | Age: 80
End: 2025-04-01

## 2025-04-01 NOTE — TELEPHONE ENCOUNTER
----- Message from Sha Freeman MD sent at 3/31/2025 11:29 AM EDT -----      Clark, can you let the patient know the med refill department is requesting a CBC to continue refilling the anticoagulation.  I put an order in.    Thanks,  Carlitos

## 2025-04-02 ENCOUNTER — APPOINTMENT (OUTPATIENT)
Dept: LAB | Facility: CLINIC | Age: 80
End: 2025-04-02
Payer: COMMERCIAL

## 2025-04-02 DIAGNOSIS — I48.91 ATRIAL FIBRILLATION, UNSPECIFIED TYPE (HCC): ICD-10-CM

## 2025-04-02 DIAGNOSIS — I50.20 HFREF (HEART FAILURE WITH REDUCED EJECTION FRACTION) (HCC): ICD-10-CM

## 2025-04-02 LAB
ANION GAP SERPL CALCULATED.3IONS-SCNC: 8 MMOL/L (ref 4–13)
BUN SERPL-MCNC: 25 MG/DL (ref 5–25)
CALCIUM SERPL-MCNC: 9.3 MG/DL (ref 8.4–10.2)
CHLORIDE SERPL-SCNC: 105 MMOL/L (ref 96–108)
CO2 SERPL-SCNC: 25 MMOL/L (ref 21–32)
CREAT SERPL-MCNC: 0.84 MG/DL (ref 0.6–1.3)
ERYTHROCYTE [DISTWIDTH] IN BLOOD BY AUTOMATED COUNT: 14 % (ref 11.6–15.1)
GFR SERPL CREATININE-BSD FRML MDRD: 65 ML/MIN/1.73SQ M
GLUCOSE SERPL-MCNC: 127 MG/DL (ref 65–140)
HCT VFR BLD AUTO: 37.3 % (ref 34.8–46.1)
HGB BLD-MCNC: 11.9 G/DL (ref 11.5–15.4)
MCH RBC QN AUTO: 28.7 PG (ref 26.8–34.3)
MCHC RBC AUTO-ENTMCNC: 31.9 G/DL (ref 31.4–37.4)
MCV RBC AUTO: 90 FL (ref 82–98)
PLATELET # BLD AUTO: 180 THOUSANDS/UL (ref 149–390)
PMV BLD AUTO: 10.8 FL (ref 8.9–12.7)
POTASSIUM SERPL-SCNC: 4.1 MMOL/L (ref 3.5–5.3)
RBC # BLD AUTO: 4.14 MILLION/UL (ref 3.81–5.12)
SODIUM SERPL-SCNC: 138 MMOL/L (ref 135–147)
WBC # BLD AUTO: 5.35 THOUSAND/UL (ref 4.31–10.16)

## 2025-04-02 PROCEDURE — 36415 COLL VENOUS BLD VENIPUNCTURE: CPT

## 2025-04-02 PROCEDURE — 85027 COMPLETE CBC AUTOMATED: CPT

## 2025-04-02 PROCEDURE — 80048 BASIC METABOLIC PNL TOTAL CA: CPT

## 2025-04-03 ENCOUNTER — RESULTS FOLLOW-UP (OUTPATIENT)
Dept: CARDIOLOGY CLINIC | Facility: CLINIC | Age: 80
End: 2025-04-03

## 2025-04-03 NOTE — RESULT ENCOUNTER NOTE
CBC -  normal  BMP -  normal    Clark -  No mychart.  Can you let the patient know labs look good.

## 2025-04-04 NOTE — TELEPHONE ENCOUNTER
----- Message from Sha Freeman MD sent at 4/3/2025  8:20 AM EDT -----    CBC -  normal  BMP -  normal    Clark -  No mychart.  Can you let the patient know labs look good.

## 2025-04-05 DIAGNOSIS — E03.9 HYPOTHYROIDISM, UNSPECIFIED TYPE: ICD-10-CM

## 2025-04-07 RX ORDER — LEVOTHYROXINE SODIUM 50 UG/1
50 TABLET ORAL DAILY
Qty: 30 TABLET | Refills: 0 | Status: SHIPPED | OUTPATIENT
Start: 2025-04-07

## 2025-04-15 ENCOUNTER — REMOTE DEVICE CLINIC VISIT (OUTPATIENT)
Dept: CARDIOLOGY CLINIC | Facility: CLINIC | Age: 80
End: 2025-04-15

## 2025-04-15 DIAGNOSIS — Z95.810 AICD (AUTOMATIC CARDIOVERTER/DEFIBRILLATOR) PRESENT: Primary | ICD-10-CM

## 2025-04-15 DIAGNOSIS — E03.9 HYPOTHYROIDISM, UNSPECIFIED TYPE: ICD-10-CM

## 2025-04-15 NOTE — PROGRESS NOTES
"Results for orders placed or performed in visit on 04/15/25   Cardiac EP device report    Narrative    MDT DUAL ICD/ ACTIVE SYSTEM IS MRI CONDITIONAL  CARELINK TRANSMISSION: PRESENTING EGRAM AP VS@ 60 BPM. BATTERY STATUS \"7 YRS.\" AP 90%  0%. ALL AVAILABLE LEAD PARAMETERS WITHIN NORMAL LIMITS. 1 AT/AF NOTED; 0% BURDEN; < 1 MIN. PT ON ELIQUIS. EGRAM SHOWING PAF. OPTI-VOL WITHIN NORMAL LIMITS. NORMAL DEVICE FUNCTION. NC         "

## 2025-04-17 RX ORDER — LEVOTHYROXINE SODIUM 50 UG/1
50 TABLET ORAL DAILY
Qty: 90 TABLET | OUTPATIENT
Start: 2025-04-17

## 2025-04-26 ENCOUNTER — RESULTS FOLLOW-UP (OUTPATIENT)
Dept: CARDIOLOGY CLINIC | Facility: CLINIC | Age: 80
End: 2025-04-26

## 2025-04-30 DIAGNOSIS — I10 HYPERTENSION, UNSPECIFIED TYPE: ICD-10-CM

## 2025-04-30 DIAGNOSIS — I42.2 HYPERTROPHIC CARDIOMYOPATHY (HCC): ICD-10-CM

## 2025-04-30 DIAGNOSIS — I48.91 ATRIAL FIBRILLATION, UNSPECIFIED TYPE (HCC): ICD-10-CM

## 2025-04-30 NOTE — TELEPHONE ENCOUNTER
Reason for call:   [x] Refill   [] Prior Auth  [] Other:     Office:   [] PCP/Provider -   [x] Specialty/Provider - Cardio    Medication: - Dofetilide    Dose/Frequency: 125 mcg    Quantity: 180 capsules    [facility] [place] [facility] [address]      Local Pharmacy   Does the patient have enough for 3 days?   [] Yes   [] No - Send as HP to POD    Mail Away Pharmacy   Does the patient have enough for 10 days?   [] Yes   [] No - Send as HP to POD

## 2025-05-01 RX ORDER — DOFETILIDE 0.12 MG/1
125 CAPSULE ORAL 2 TIMES DAILY
Qty: 180 CAPSULE | Refills: 3 | Status: SHIPPED | OUTPATIENT
Start: 2025-05-01

## 2025-05-02 DIAGNOSIS — I42.2 HYPERTROPHIC CARDIOMYOPATHY (HCC): ICD-10-CM

## 2025-05-02 DIAGNOSIS — I48.91 ATRIAL FIBRILLATION, UNSPECIFIED TYPE (HCC): ICD-10-CM

## 2025-05-02 DIAGNOSIS — I10 HYPERTENSION, UNSPECIFIED TYPE: ICD-10-CM

## 2025-05-02 NOTE — TELEPHONE ENCOUNTER
Reason for call:   [x] Refill   [] Prior Auth  [x] Other: Not a duplicate. Medication had gone to the wrong Pharmacy     Office:   [] PCP/Provider -   [x] Specialty/Provider - [facility]     Medication: dofetilide (TIKOSYN) 125 mcg capsule     Dose/Frequency: 125 mcg, 2 times daily     Quantity: 180    Pharmacy: [facility]    Local Pharmacy   Does the patient have enough for 3 days?   [x] Yes   [] No - Send as HP to POD    Mail Away Pharmacy   Does the patient have enough for 10 days?   [] Yes   [x] No - Send as HP to POD

## 2025-05-05 RX ORDER — DOFETILIDE 0.12 MG/1
125 CAPSULE ORAL 2 TIMES DAILY
Qty: 180 CAPSULE | Refills: 0 | Status: SHIPPED | OUTPATIENT
Start: 2025-05-05

## 2025-05-07 DIAGNOSIS — I47.29 NSVT (NONSUSTAINED VENTRICULAR TACHYCARDIA) (HCC): ICD-10-CM

## 2025-05-08 RX ORDER — METOPROLOL SUCCINATE 50 MG/1
50 TABLET, EXTENDED RELEASE ORAL EVERY MORNING
Qty: 90 TABLET | Refills: 1 | Status: SHIPPED | OUTPATIENT
Start: 2025-05-08

## 2025-05-12 ENCOUNTER — OFFICE VISIT (OUTPATIENT)
Dept: SURGICAL ONCOLOGY | Facility: CLINIC | Age: 80
End: 2025-05-12
Payer: COMMERCIAL

## 2025-05-12 VITALS
WEIGHT: 134 LBS | HEIGHT: 60 IN | RESPIRATION RATE: 16 BRPM | SYSTOLIC BLOOD PRESSURE: 122 MMHG | BODY MASS INDEX: 26.31 KG/M2 | HEART RATE: 74 BPM | DIASTOLIC BLOOD PRESSURE: 74 MMHG | OXYGEN SATURATION: 96 % | TEMPERATURE: 97.9 F

## 2025-05-12 DIAGNOSIS — Z08 ENCOUNTER FOR FOLLOW-UP EXAMINATION AFTER COMPLETED TREATMENT FOR MALIGNANT NEOPLASM: Primary | ICD-10-CM

## 2025-05-12 DIAGNOSIS — Z17.0 MALIGNANT NEOPLASM OF UPPER-INNER QUADRANT OF LEFT BREAST IN FEMALE, ESTROGEN RECEPTOR POSITIVE (HCC): ICD-10-CM

## 2025-05-12 DIAGNOSIS — C50.212 MALIGNANT NEOPLASM OF UPPER-INNER QUADRANT OF LEFT BREAST IN FEMALE, ESTROGEN RECEPTOR POSITIVE (HCC): ICD-10-CM

## 2025-05-12 PROCEDURE — 99214 OFFICE O/P EST MOD 30 MIN: CPT

## 2025-05-12 NOTE — ASSESSMENT & PLAN NOTE
Patient is an 79-year-old female presenting today for 1 year follow-up for left breast cancer diagnosed in February 2017. Pathology revealed invasive lobular carcinoma, ER 90%, DC/HER2 negative. She underwent a left mastectomy with sentinel node biopsy with Dr. Langley and had immediate reconstruction with Dr. Paniagua. She completed a 5 year course of anastrozole hormone therapy in March 2022. She has been on observation. She had a screening mammogram of the right breast on 6/20/2024 which was BIRADS 1 category 3 density. Mammo this year is scheduled. There were no concerns on her cbe. Patient denies changes on her breast exam. She denies persistent headaches, bone pain, back pain, shortness of breath, or abdominal pain. I will see the patient back in 1 year or sooner should the need arise. She was instructed to call with any questions or concerns prior to this time. All questions were answered today.

## 2025-05-12 NOTE — PROGRESS NOTES
Name: Nati Villanueva      : 1945      MRN: [id number]  Encounter Provider: JONATHAN Coleman  Encounter Date: 2025   Encounter department: [facility]  :  Assessment & Plan  Encounter for follow-up examination after completed treatment for malignant neoplasm  - 1 year f/u  Malignant neoplasm of upper-inner quadrant of left breast in female, estrogen receptor positive (HCC)  Patient is an 79-year-old female presenting today for 1 year follow-up for left breast cancer diagnosed in 2017. Pathology revealed invasive lobular carcinoma, ER 90%, AL/HER2 negative. She underwent a left mastectomy with sentinel node biopsy with Dr. Langley and had immediate reconstruction with Dr. Paniagua. She completed a 5 year course of anastrozole hormone therapy in 2022. She has been on observation. She had a screening mammogram of the right breast on 2024 which was BIRADS 1 category 3 density. Mammo this year is scheduled. There were no concerns on her cbe. Patient denies changes on her breast exam. She denies persistent headaches, bone pain, back pain, shortness of breath, or abdominal pain. I will see the patient back in 1 year or sooner should the need arise. She was instructed to call with any questions or concerns prior to this time. All questions were answered today.     Oncology History   Cancer Staging   Malignant neoplasm of upper-inner 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 breast (HCC)  Staging form: Breast, AJCC 7th Edition  - Pathologic stage from 2017: Stage IIA (T2, N0, cM0) - Signed by JONATHAN Coleman on 2024  Specimen type: Excision  Laterality: Left  Tumor size (mm): 39  Method of lymph node assessment: [place] lymph node biopsy  Histologic grade (G): G1  Estrogen receptor status: Positive  Percentage of positive estrogen receptors (%): 90  Progesterone receptor status: Negative  Percentage of positive progesterone receptors (%):  0  HER2 status: Positive  Method of assessment of HER2 status: IHC  Oncology History   Malignant neoplasm of upper-inner 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 breast (HCC)   2/9/2017 Surgery    Left mastectomy with SLN biopsy  Invasive lobular carcinoma  Grade 1  3.9 cm  0/1 lymph nodes  ER 90  NE <1  Her 2 negative  Stage IIA    Immediate reconstruction with Dr. Paniagua     2/9/2017 -  Cancer Staged    Staging form: Breast, AJCC 7th Edition  - Pathologic stage from 2/9/2017: Stage IIA (T2, N0, cM0) - Signed by JONATHAN Coleman on 5/8/2024  Specimen type: Excision  Laterality: Left  Tumor size (mm): 39  Method of lymph node assessment: [place] lymph node biopsy  Histologic grade (G): G1  Estrogen receptor status: Positive  Percentage of positive estrogen receptors (%): 90  Progesterone receptor status: Negative  Percentage of positive progesterone receptors (%): 0  HER2 status: Positive  Method of assessment of HER2 status: IHC       3/3/2017 Genomic Testing    Mammaprint low risk     3/2017 -  Hormone Therapy    Anastrozole 1 mg daily  Dr. Palacios        Review of Systems   Constitutional:  Negative for activity change, appetite change, fatigue and unexpected weight change.   Respiratory:  Negative for cough and shortness of breath.    Cardiovascular:  Negative for chest pain.   Gastrointestinal:  Negative for abdominal pain, diarrhea, nausea and vomiting.   Endocrine: Negative for heat intolerance.   Musculoskeletal:  Negative for arthralgias, back pain and myalgias.   Skin:  Negative for rash.   Neurological:  Negative for weakness and headaches.   Hematological:  Negative for adenopathy.    A complete review of systems is negative other than that noted above in the HPI.    Objective   /74 (Patient Position: Sitting, Cuff Size: Standard)   Pulse 74   Temp 97.9 °F (36.6 °C) (Temporal)   Resp 16   Ht 5' (1.524 m)   Wt 60.8 kg (134 lb)   SpO2 96%   BMI 26.17 kg/m²     Pain Screening:     ECOG    Physical  Exam  Constitutional:       General: She is not in acute distress.     Appearance: Normal appearance.   Cardiovascular:      Rate and Rhythm: Normal rate and regular rhythm.      Pulses: Normal pulses.      Heart sounds: Normal heart sounds.   Pulmonary:      Effort: Pulmonary effort is normal.      Breath sounds: Normal breath sounds.   Chest:      Chest wall: No mass.   Breasts:     Right: No swelling, bleeding, inverted nipple, mass, nipple discharge, skin change or tenderness.      Left: No swelling, bleeding, inverted nipple, mass, nipple discharge, skin change or tenderness.   Abdominal:      General: Abdomen is flat.      Palpations: Abdomen is soft.   Lymphadenopathy:      Upper Body:      Right upper body: No supraclavicular, axillary or pectoral adenopathy.      Left upper body: No supraclavicular, axillary or pectoral adenopathy.   Skin:     General: Skin is warm.   Neurological:      General: No focal deficit present.      Mental Status: She is alert and oriented to person, place, and time.   Psychiatric:         Mood and Affect: Mood normal.         Behavior: Behavior normal.

## 2025-05-13 DIAGNOSIS — E03.9 HYPOTHYROIDISM, UNSPECIFIED TYPE: ICD-10-CM

## 2025-05-14 RX ORDER — LEVOTHYROXINE SODIUM 50 UG/1
50 TABLET ORAL DAILY
Qty: 30 TABLET | Refills: 5 | Status: SHIPPED | OUTPATIENT
Start: 2025-05-14

## 2025-06-03 DIAGNOSIS — I42.0 DILATED CARDIOMYOPATHY (HCC): ICD-10-CM

## 2025-06-03 DIAGNOSIS — I50.20 HFREF (HEART FAILURE WITH REDUCED EJECTION FRACTION) (HCC): ICD-10-CM

## 2025-06-03 RX ORDER — SACUBITRIL AND VALSARTAN 24; 26 MG/1; MG/1
0.5 TABLET, FILM COATED ORAL 2 TIMES DAILY
Qty: 30 TABLET | Refills: 11 | Status: SHIPPED | OUTPATIENT
Start: 2025-06-03

## 2025-06-03 RX ORDER — SACUBITRIL AND VALSARTAN 24; 26 MG/1; MG/1
0.5 TABLET, FILM COATED ORAL 2 TIMES DAILY
Qty: 30 TABLET | Refills: 0 | Status: SHIPPED | OUTPATIENT
Start: 2025-06-03 | End: 2025-06-03 | Stop reason: SDUPTHER

## 2025-06-04 ENCOUNTER — OFFICE VISIT (OUTPATIENT)
Dept: FAMILY MEDICINE CLINIC | Facility: CLINIC | Age: 80
End: 2025-06-04
Payer: COMMERCIAL

## 2025-06-04 VITALS
OXYGEN SATURATION: 98 % | TEMPERATURE: 97.6 F | SYSTOLIC BLOOD PRESSURE: 110 MMHG | HEIGHT: 60 IN | WEIGHT: 134.5 LBS | DIASTOLIC BLOOD PRESSURE: 60 MMHG | HEART RATE: 78 BPM | BODY MASS INDEX: 26.41 KG/M2 | RESPIRATION RATE: 17 BRPM

## 2025-06-04 DIAGNOSIS — I50.20 HFREF (HEART FAILURE WITH REDUCED EJECTION FRACTION) (HCC): ICD-10-CM

## 2025-06-04 DIAGNOSIS — E03.9 ACQUIRED HYPOTHYROIDISM: Primary | ICD-10-CM

## 2025-06-04 DIAGNOSIS — M81.8 OTHER OSTEOPOROSIS WITHOUT CURRENT PATHOLOGICAL FRACTURE: ICD-10-CM

## 2025-06-04 DIAGNOSIS — I10 PRIMARY HYPERTENSION: ICD-10-CM

## 2025-06-04 PROBLEM — M25.561 ACUTE PAIN OF RIGHT KNEE: Status: RESOLVED | Noted: 2019-10-11 | Resolved: 2025-06-04

## 2025-06-04 PROBLEM — J40 BRONCHITIS: Status: RESOLVED | Noted: 2025-02-28 | Resolved: 2025-06-04

## 2025-06-04 PROCEDURE — 99213 OFFICE O/P EST LOW 20 MIN: CPT | Performed by: FAMILY MEDICINE

## 2025-06-04 PROCEDURE — G0439 PPPS, SUBSEQ VISIT: HCPCS | Performed by: FAMILY MEDICINE

## 2025-06-04 PROCEDURE — G2211 COMPLEX E/M VISIT ADD ON: HCPCS | Performed by: FAMILY MEDICINE

## 2025-06-04 NOTE — ASSESSMENT & PLAN NOTE
Wt Readings from Last 3 Encounters:   06/04/25 61 kg (134 lb 8 oz)   05/12/25 60.8 kg (134 lb)   03/06/25 62.1 kg (136 lb 12.8 oz)       Heart failure.  Patient continues to have follow-up with cardiologist to monitor chronic condition.

## 2025-06-04 NOTE — ASSESSMENT & PLAN NOTE
Hypertension.  The patient's blood pressure is stable at this time and he will continue current regimen of medications    Orders:    TSH, 3rd generation; Future    Lipid panel; Future    Comprehensive metabolic panel; Future

## 2025-06-04 NOTE — ASSESSMENT & PLAN NOTE
Hypothyroidism.  Patient will check TSH blood work and continue current dose of levothyroxine    Orders:    TSH, 3rd generation; Future    Lipid panel; Future    Comprehensive metabolic panel; Future

## 2025-06-04 NOTE — PROGRESS NOTES
Name: Nati Villanueva      : 1945      MRN: [id number]  Encounter Provider: Mckinley Guerrier MD  Encounter Date: 2025   Encounter department: [facility]  :  Assessment & Plan  Acquired hypothyroidism  Hypothyroidism.  Patient will check TSH blood work and continue current dose of levothyroxine    Orders:    TSH, 3rd generation; Future    Lipid panel; Future    Comprehensive metabolic panel; Future    Primary hypertension  Hypertension.  The patient's blood pressure is stable at this time and he will continue current regimen of medications    Orders:    TSH, 3rd generation; Future    Lipid panel; Future    Comprehensive metabolic panel; Future    Other osteoporosis without current pathological fracture    Orders:    DXA bone density spine hip and pelvis; Future    HFrEF (heart failure with reduced ejection fraction) ([facility])  Wt Readings from Last 3 Encounters:   25 61 kg (134 lb 8 oz)   25 60.8 kg (134 lb)   25 62.1 kg (136 lb 12.8 oz)       Heart failure.  Patient continues to have follow-up with cardiologist to monitor chronic condition.                Preventive health issues were discussed with patient, and age appropriate screening tests were ordered as noted in patient's After Visit Summary. Personalized health advice and appropriate referrals for health education or preventive services given if needed, as noted in patient's After Visit Summary.    History of Present Illness     Patient in the office to review chronic medical condition.  She states she sees her cardiologist at least every 6 months.  She denies any recent illness since viral infection in 2025.  Patient has restarted to walk on a daily basis with her girlfriends.  She denies any chest pain but does feel like she has been having to slow down to catch her breath as she gets older.  She still very active around her house and does a lot of her yard work herself.       Patient Care Team:  Mckinley  MD Chey as PCP - General  Erich Stevens MD as Cardiologist (Cardiology)  JONATHAN Coleman as Nurse Practitioner (Surgical Oncology)    Review of Systems   Constitutional: Negative.    HENT: Negative.     Eyes: Negative.    Respiratory:          As per HPI   Cardiovascular: Negative.    Gastrointestinal: Negative.    Genitourinary: Negative.    Musculoskeletal: Negative.    Skin: Negative.    Neurological: Negative.    Psychiatric/Behavioral: Negative.       Medical History Reviewed by provider this encounter:  [facility]       Annual Wellness Visit Questionnaire   Nati is here for her Subsequent Wellness visit. Last Medicare Wellness visit information reviewed, patient interviewed and updates made to the record today.      Health Risk Assessment:   Patient rates overall health as good. Patient feels that their physical health rating is same. Patient is satisfied with their life. Eyesight was rated as same. Hearing was rated as same. Patient feels that their emotional and mental health rating is same. Patients states they are never, rarely angry. Patient states they are never, rarely unusually tired/fatigued. Pain experienced in the last 7 days has been none. Patient states that she has experienced no weight loss or gain in last 6 months.     Depression Screening:   PHQ-2 Score: 0      Fall Risk Screening:   In the past year, patient has experienced: no history of falling in past year      Urinary Incontinence Screening:   Patient has not leaked urine accidently in the last six months.     Home Safety:  Patient does not have trouble with stairs inside or outside of their home. Patient has working smoke alarms and has working carbon monoxide detector. Home safety hazards include: none.     Nutrition:   Current diet is Regular.     Medications:   Patient is currently taking over-the-counter supplements. OTC medications include: see medication list. Patient is able to manage medications.     Activities of  Daily Living (ADLs)/Instrumental Activities of Daily Living (IADLs):   Walk and transfer into and out of bed and chair?: Yes  Dress and groom yourself?: Yes    Bathe or shower yourself?: Yes    Feed yourself? Yes  Do your laundry/housekeeping?: Yes  Manage your money, pay your bills and track your expenses?: Yes  Make your own meals?: Yes    Do your own shopping?: Yes    Advance Care Planning:   Living will: No      Preventive Screenings      Cardiovascular Screening:    General: Screening Not Indicated, History Lipid Disorder and Risks and Benefits Discussed    Due for: Lipid Panel      Diabetes Screening:     General: Screening Current      Colorectal Cancer Screening:     General: Screening Not Indicated      Breast Cancer Screening:     General: History Breast Cancer      Cervical Cancer Screening:    General: Screening Not Indicated      Osteoporosis Screening:    General: Screening Not Indicated and History Osteoporosis      Lung Cancer Screening:     General: Screening Not Indicated      Preventive Screening Comments: Patient will inquire with her insurance to see if shingles vaccination is covered under her plan    Immunizations:  - Immunizations due: Zoster (Shingrix)    Screening, Brief Intervention, and Referral to Treatment (SBIRT)     Screening    Typical number of drinks in a week: 0    Single Item Drug Screening:  How often have you used an illegal drug (including marijuana) or a prescription medication for non-medical reasons in the past year? never    Single Item Drug Screen Score: 0  Interpretation: Negative screen for possible drug use disorder    Social Drivers of Health     Financial Resource Strain: Low Risk  (5/24/2023)    Overall Financial Resource Strain (CARDIA)     Difficulty of Paying Living Expenses: Not hard at all   Transportation Needs: No Transportation Needs (5/24/2023)    PRAPARE - Transportation     Lack of Transportation (Medical): No     Lack of Transportation (Non-Medical): No      No results found.    Objective   /60 (Patient Position: Sitting, Cuff Size: Adult)   Pulse 78   Temp 97.6 °F (36.4 °C) (Temporal)   Resp 17   Ht 5' (1.524 m)   Wt 61 kg (134 lb 8 oz)   SpO2 98%   BMI 26.27 kg/m²     Physical Exam  Vitals and nursing note reviewed.   Constitutional:       General: She is not in acute distress.     Appearance: Normal appearance. She is well-developed. She is not ill-appearing.   HENT:      Head: Normocephalic and atraumatic.     Eyes:      General:         Right eye: No discharge.         Left eye: No discharge.      Extraocular Movements: Extraocular movements intact.      Conjunctiva/sclera: Conjunctivae normal.      Pupils: Pupils are equal, round, and reactive to light.     Neck:      Vascular: No carotid bruit.     Cardiovascular:      Rate and Rhythm: Normal rate and regular rhythm.      Heart sounds: No murmur heard.  Pulmonary:      Effort: Pulmonary effort is normal. No respiratory distress.      Breath sounds: Normal breath sounds.   Abdominal:      General: Abdomen is flat. Bowel sounds are normal.      Palpations: Abdomen is soft.      Tenderness: There is no abdominal tenderness. There is no guarding or rebound.     Musculoskeletal:      Right lower leg: No edema.      Left lower leg: No edema.   Lymphadenopathy:      Cervical: No cervical adenopathy.     Skin:     General: Skin is warm and dry.      Capillary Refill: Capillary refill takes less than 2 seconds.     Neurological:      Mental Status: She is alert. Mental status is at baseline.     Psychiatric:         Mood and Affect: Mood normal.         Behavior: Behavior normal.         Thought Content: Thought content normal.         Judgment: Judgment normal.

## 2025-06-05 ENCOUNTER — APPOINTMENT (OUTPATIENT)
Dept: LAB | Facility: CLINIC | Age: 80
End: 2025-06-05
Attending: FAMILY MEDICINE
Payer: COMMERCIAL

## 2025-06-05 DIAGNOSIS — E03.9 ACQUIRED HYPOTHYROIDISM: ICD-10-CM

## 2025-06-05 DIAGNOSIS — I10 PRIMARY HYPERTENSION: ICD-10-CM

## 2025-06-05 LAB
ALBUMIN SERPL BCG-MCNC: 4 G/DL (ref 3.5–5)
ALP SERPL-CCNC: 85 U/L (ref 34–104)
ALT SERPL W P-5'-P-CCNC: 27 U/L (ref 7–52)
ANION GAP SERPL CALCULATED.3IONS-SCNC: 7 MMOL/L (ref 4–13)
AST SERPL W P-5'-P-CCNC: 30 U/L (ref 13–39)
BILIRUB SERPL-MCNC: 1.13 MG/DL (ref 0.2–1)
BUN SERPL-MCNC: 26 MG/DL (ref 5–25)
CALCIUM SERPL-MCNC: 8.9 MG/DL (ref 8.4–10.2)
CHLORIDE SERPL-SCNC: 105 MMOL/L (ref 96–108)
CHOLEST SERPL-MCNC: 163 MG/DL (ref ?–200)
CO2 SERPL-SCNC: 26 MMOL/L (ref 21–32)
CREAT SERPL-MCNC: 0.8 MG/DL (ref 0.6–1.3)
GFR SERPL CREATININE-BSD FRML MDRD: 69 ML/MIN/1.73SQ M
GLUCOSE P FAST SERPL-MCNC: 81 MG/DL (ref 65–99)
HDLC SERPL-MCNC: 46 MG/DL
LDLC SERPL CALC-MCNC: 92 MG/DL (ref 0–100)
NONHDLC SERPL-MCNC: 117 MG/DL
POTASSIUM SERPL-SCNC: 4.8 MMOL/L (ref 3.5–5.3)
PROT SERPL-MCNC: 6.5 G/DL (ref 6.4–8.4)
SODIUM SERPL-SCNC: 138 MMOL/L (ref 135–147)
TRIGL SERPL-MCNC: 126 MG/DL (ref ?–150)
TSH SERPL DL<=0.05 MIU/L-ACNC: 2.74 UIU/ML (ref 0.45–4.5)

## 2025-06-05 PROCEDURE — 36415 COLL VENOUS BLD VENIPUNCTURE: CPT

## 2025-06-05 PROCEDURE — 80061 LIPID PANEL: CPT

## 2025-06-05 PROCEDURE — 80053 COMPREHEN METABOLIC PANEL: CPT

## 2025-06-05 PROCEDURE — 84443 ASSAY THYROID STIM HORMONE: CPT

## 2025-06-06 ENCOUNTER — RESULTS FOLLOW-UP (OUTPATIENT)
Dept: FAMILY MEDICINE CLINIC | Facility: CLINIC | Age: 80
End: 2025-06-06

## 2025-06-19 ENCOUNTER — HOSPITAL ENCOUNTER (OUTPATIENT)
Dept: RADIOLOGY | Age: 80
Discharge: HOME | End: 2025-06-19
Attending: FAMILY MEDICINE
Payer: COMMERCIAL

## 2025-06-19 VITALS — BODY MASS INDEX: 27.01 KG/M2 | HEIGHT: 59 IN | WEIGHT: 134 LBS

## 2025-06-19 DIAGNOSIS — M81.8 OTHER OSTEOPOROSIS WITHOUT CURRENT PATHOLOGICAL FRACTURE: ICD-10-CM

## 2025-06-19 PROCEDURE — 77080 DXA BONE DENSITY AXIAL: CPT

## 2025-06-26 ENCOUNTER — HOSPITAL ENCOUNTER (OUTPATIENT)
Dept: RADIOLOGY | Age: 80
Discharge: HOME | End: 2025-06-26
Payer: COMMERCIAL

## 2025-06-26 DIAGNOSIS — Z12.31 VISIT FOR SCREENING MAMMOGRAM: ICD-10-CM

## 2025-06-26 PROCEDURE — 77067 SCR MAMMO BI INCL CAD: CPT

## 2025-06-26 PROCEDURE — 77063 BREAST TOMOSYNTHESIS BI: CPT

## 2025-07-16 NOTE — PROGRESS NOTES
Cardiology Clinic Note    Nati Villanueva 79 y.o. female   MRN: [id number]  Encounter: [id number]        Assessment / Plan:    #  Non-ischemic Cardiomyopathy  Etiology:  non-ischemic by [facility]  Familial.  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 This gene has a known association with HCM and dilated cardiomyopathy.  Family members need to be screened clinically and possibly with genetic testing depending on preference (patient and daughter who is a physician are aware, NO NEED to keep asking about this).    #  Systolic Heart Failure - chronic  Class and Stage:  NYHA class:   II  ACC/AHA stage: C    Volume Management:  Volume:   Euvolemic on exam.  BNP:    1007 - elevated  Diuretic:  has not been requiring a loop diuretic.  Lab monitoring:  recent BMP stable    Neurohormonal Blockade / GDMT:  BB:  toprol XL 25mg BID  ARNI:  entresto 24-26 - 1/2 pill BID  MRA:  david 12.5mg QOD  SGLT2i:  declines     Sudden cardiac death risk reduction:  S/p dual chamber ICD 2021  (had syncope and NSVT on tele)  Non-specific IVCB.  EP not recommending CRT.    Other heart failure considerations:  Mitral clip, IV iron, clinical trial, cardiac rehab, palliative care:  not at this time    # NSVT  Had syncope 2021.  Found to have NSVT.  ICD placed 2021.  continue BB.    # paroxysmal afib  Parosxysmal on device checks.    Follows with Dr Emily Barrientos:   eliquis 2.5 BID  Rate:  toprol  Rhythm:   tikosyn    # Hx PACs, PVCs  Continue BB    # Moderate MR  Serial exam and echo follow up  Recent echo reviewed, stable    # HTN  See GDMT above    # HLD  last  --> 92  Elevated ASCVD risk score, declines statin    # Hx of breast cancer  Mastectomy 2017. Tx with anastrazole x 5 years.      Today's Plan Summary:  See above assessment/plan for full details of today's plan.  Briefly,     Clinically stable.  Continues to decline SGLT2.  No medication changes.                Reason For Visit / Chief Complaint:  F/u cardiomyopathy    HPI:    Nati Villanueva is a 79 y.o.  female with history as noted in the problem list and further detailed in the above assessment and plan.    Initial:  2023  Referral from Dr. Stevens.  Longstanding hx of systolic dysfunction.  Now with declining EF on serial echo.  40% --> 35% --> 30%.   Dr. Stevens requested heart failure consult for second opinion.  Patient overall feels pretty well.  She walks 3 miles almost everyday.   Likes to do gardening when the weather is nicer.   Lives by herself (  5 years ago).  Has two daughters.  One daughter is a nephrologist (Director of Kidney transplant at [employer]).    Interval:  Last visit -->   reviewed echo (stable EF).  recent issues with another URI.   Saw PCP.  On abx.  Nasal discharge. Still coughing.     Plan last visit -->   The patient is feeling worse again due to a recurrent URI.  She is on antibiotics and steroids.  She is perhaps mildly hypervolemic on exam.  BNP was elevated.  Echo shows grade 2 diastolic dysfunction but normal IVC size.  Overall I suspect she may be mildly hypervolemic.  Recommend SGLT2 for GDMT effects and mild diuretic effects.  The patient does not want to do this.  I did give her some samples of Farxiga if she changes her mind.  I also am ordering a BMP to check labs if she takes the Farxiga.    CBC -  normal  BMP -  normal    Device check  -    brief AT/AF noted, burden < 1%.   Optivol normal.     Labs  reviewed.  Sodium normal, potassium normal, creatinine stable.  LDL 92.      Today  - does NOT want to take farixga.   Feeling ok.   Recovered from all the URI's.   Walking every day - 3 miles.   No CP or SOB.   Mild fatigue with long walks.  No dizziness or syncope.            Cardiac Imaging personally reviewed:  EKG Nonspecific IVCB, QRS around 142ms.    Holter or event monitor    Echo 23  Mild left ventricular hypertrophy with moderately severe LV dysfunction, EF ~30% with moderate diastolic dysfunction  Mild left  atrial enlargement  Mild aortic sclerosis with mild aortic insufficiency  Mildly thickened mitral valve with early annular calcification and moderate mitral regurgitation  Compared to prior report of January 27, 2022, LV EF is less and mitral regurgitation has increased.    2022  Mild-mod asymmetric hypertrophy.  EF 35%.  Mild MR.  Mild aortic sclerosis, trace AI.    1-10-24 ([facility])  EF 30%.  Mild AI  Moderate MR    Echo - 02/03/25   LVIDd 6cm.  Borderline LVH.   EF 35%.   Pseudonormal relaxation filling pattern (grade 2 DD)  Normal RV size and function  Mild to mod MR  Mild TR  IVC is not dilated           [facility]    Cardiac MRI 3-14-23  LVIDd 5.7cm.  EF 30%.  Mild upper septal hypertrophy, up to 1.3cm.  Mildly dilated RV with mod reduced function.  LGE - epicardial in anterior and anterolateral.  LGE - transmural inf-septum, mid to distal.       Stress testing Stress echo - 2/2021  No ischemia    Stress nuclear - 3/2024  Reported scar but no ischemia.       Coronary CTA or [facility] Cardiac catheterization March 5, 2021: Normal coronaries.    [facility]    CPET              Patient Active Problem List    Diagnosis Date Noted   • Nonrheumatic mitral valve regurgitation 12/02/2024   • Mixed hyperlipidemia 12/02/2024   • HFrEF (heart failure with reduced ejection fraction) ([facility]) 07/22/2024   • Dilated cardiomyopathy ([facility]) 07/22/2024   • Paroxysmal A-fib ([facility]) 11/13/2023   • Gastroesophageal reflux disease without esophagitis 10/24/2022   • NSVT (nonsustained ventricular tachycardia) ([facility]) 10/29/2020   • Acquired trigger finger 06/01/2018   • Carpal tunnel syndrome 06/01/2018   • Numbness of hand 06/01/2018   • Malignant neoplasm of upper-inner 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 breast ([facility]) 01/26/2017   • NICM (nonischemic cardiomyopathy) ([facility]) 11/09/2016   • Hypothyroidism 03/01/2016   • Plantar fasciitis 02/22/2016   • Hypertension 04/18/2014   • Ischemic cardiomyopathy 12/23/2013   • Osteopenia 06/18/2013       Past Medical History:    Diagnosis Date   • Hypertrophic cardiomyopathy (HCC)    • Hypothyroidism    • Ischemic cardiomyopathy    • Malignant neoplasm of left female breast (HCC) 02/09/2017    stage IIA   • Osteopenia    • Trigger finger of all digits of right hand        Allergies   Allergen Reactions   • Seasonal Ic [Cholestatin] Sneezing       Current Outpatient Medications   Medication Instructions   • albuterol (Proventil HFA) 90 mcg/act inhaler 2 puffs, Inhalation, Every 6 hours PRN   • apixaban (ELIQUIS) 2.5 mg, Oral, 2 times daily   • calcium carbonate-vitamin D (OSCAL-D) 500 mg-200 units per tablet take 1 tablet by mouth once daily WITH BREAKFAST.   • cholecalciferol (VITAMIN D3) 1,000 Units, Daily   • Co Q-10 50 mg   • dofetilide (TIKOSYN) 125 mcg, Oral, 2 times daily   • ketorolac (ACULAR) 0.5 % ophthalmic solution instill 1 drop into right eye four times a day   • levothyroxine 50 mcg, Oral, Daily   • metoprolol succinate (TOPROL-XL) 50 mg, Oral, Every morning   • mometasone (ELOCON) 0.1 % cream No dose, route, or frequency recorded.   • prednisoLONE acetate (PRED FORTE) 1 % ophthalmic suspension instill 1 drop into right eye four times a day   • sacubitril-valsartan (Entresto) 24-26 MG TABS 0.5 tablets, Oral, 2 times daily   • spironolactone (ALDACTONE) 12.5 mg, Oral, Every other day       Social History     Socioeconomic History   • Marital status:      Spouse name: Not on file   • Number of children: Not on file   • Years of education: Not on file   • Highest education level: Not on file   Occupational History   • Not on file   Tobacco Use   • Smoking status: Never   • Smokeless tobacco: Never   Vaping Use   • Vaping status: Never Used   Substance and Sexual Activity   • Alcohol use: No     Alcohol/week: 1.0 standard drink of alcohol     Types: 1 Glasses of wine per week   • Drug use: No   • Sexual activity: Not Currently   Other Topics Concern   • Not on file   Social History Narrative     since 2018. Was  " for 50 years.    Lives alone.  Has 2 daughters.  One in [place] who is a nephrologist.  The other in [place].    Enjoys gardening and being outside.       Social Drivers of Health     Financial Resource Strain: Low Risk  (5/24/2023)    Overall Financial Resource Strain (CARDIA)    • Difficulty of Paying Living Expenses: Not hard at all   Food Insecurity: Not on file   Transportation Needs: No Transportation Needs (5/24/2023)    PRAPARE - Transportation    • Lack of Transportation (Medical): No    • Lack of Transportation (Non-Medical): No   Physical Activity: Not on file   Stress: Not on file   Social Connections: Not on file   Intimate Partner Violence: Not on file   Housing Stability: Not on file       Family History   Problem Relation Name Age of Onset   • Heart disease Mother          had angina   • Heart disease Father     • Brain cancer Sister     • Heart disease Brother          had CABG   • No Known Problems Brother     • No Known Problems Maternal Grandmother     • No Known Problems Maternal Grandfather     • No Known Problems Paternal Grandmother     • No Known Problems Paternal Grandfather     • No Known Problems Daughter marcos    • No Known Problems Daughter aakash    • No Known Problems Paternal Aunt     • No Known Problems Paternal Aunt     • No Known Problems Paternal Aunt     • No Known Problems Paternal Aunt     • No Known Problems Paternal Aunt         Physical Exam:  Blood pressure 112/64, pulse 72, height 4' 11\" (1.499 m), weight 59.4 kg (131 lb), SpO2 97%.  Body mass index is 26.46 kg/m².  Wt Readings from Last 3 Encounters:   07/17/25 59.4 kg (131 lb)   06/19/25 60.8 kg (134 lb)   06/04/25 61 kg (134 lb 8 oz)     Physical Exam  Vitals reviewed.   Constitutional:       General: She is not in acute distress.     Appearance: She is not toxic-appearing.   Neck:      Comments: No JVD  Cardiovascular:      Rate and Rhythm: Normal rate and regular rhythm.      Heart sounds: Murmur (very soft " HSM at apex) heard.      No friction rub. No gallop.   Pulmonary:      Breath sounds: Normal breath sounds. No wheezing, rhonchi or rales.   Abdominal:      General: There is no distension.      Palpations: Abdomen is soft.      Tenderness: There is no abdominal tenderness. There is no guarding.     Musculoskeletal:      Comments: trace LE edema     Neurological:      Mental Status: She is alert.         Labs & Results:  Lab Results   Component Value Date    SODIUM 138 06/05/2025    K 4.8 06/05/2025     06/05/2025    CO2 26 06/05/2025    BUN 26 (H) 06/05/2025    CREATININE 0.80 06/05/2025    GLUC 127 04/02/2025    CALCIUM 8.9 06/05/2025         Thank you for the opportunity to participate in the care of this patient.    Sha Freeman MD, [facility]  Advanced Heart Failure and Transplant Cardiologist  Director of Cardio-Oncology  [facility]

## 2025-07-17 ENCOUNTER — OFFICE VISIT (OUTPATIENT)
Dept: CARDIOLOGY CLINIC | Facility: CLINIC | Age: 80
End: 2025-07-17
Payer: COMMERCIAL

## 2025-07-17 ENCOUNTER — IN-CLINIC DEVICE VISIT (OUTPATIENT)
Dept: CARDIOLOGY CLINIC | Facility: CLINIC | Age: 80
End: 2025-07-17
Payer: COMMERCIAL

## 2025-07-17 VITALS
SYSTOLIC BLOOD PRESSURE: 112 MMHG | HEIGHT: 59 IN | WEIGHT: 131 LBS | HEART RATE: 72 BPM | OXYGEN SATURATION: 97 % | DIASTOLIC BLOOD PRESSURE: 64 MMHG | BODY MASS INDEX: 26.41 KG/M2

## 2025-07-17 DIAGNOSIS — I48.0 PAROXYSMAL A-FIB (HCC): ICD-10-CM

## 2025-07-17 DIAGNOSIS — I42.8 NICM (NONISCHEMIC CARDIOMYOPATHY) (HCC): ICD-10-CM

## 2025-07-17 DIAGNOSIS — E78.2 MIXED HYPERLIPIDEMIA: ICD-10-CM

## 2025-07-17 DIAGNOSIS — I47.29 NSVT (NONSUSTAINED VENTRICULAR TACHYCARDIA) (HCC): ICD-10-CM

## 2025-07-17 DIAGNOSIS — I10 PRIMARY HYPERTENSION: ICD-10-CM

## 2025-07-17 DIAGNOSIS — Z95.810 PRESENCE OF IMPLANTABLE CARDIOVERTER-DEFIBRILLATOR (ICD): ICD-10-CM

## 2025-07-17 DIAGNOSIS — I50.20 HFREF (HEART FAILURE WITH REDUCED EJECTION FRACTION) (HCC): Primary | ICD-10-CM

## 2025-07-17 DIAGNOSIS — I34.0 NONRHEUMATIC MITRAL VALVE REGURGITATION: ICD-10-CM

## 2025-07-17 DIAGNOSIS — I25.5 ISCHEMIC CARDIOMYOPATHY: Primary | ICD-10-CM

## 2025-07-17 PROCEDURE — 93283 PRGRMG EVAL IMPLANTABLE DFB: CPT | Performed by: INTERNAL MEDICINE

## 2025-07-17 PROCEDURE — 99214 OFFICE O/P EST MOD 30 MIN: CPT | Performed by: INTERNAL MEDICINE

## 2025-07-17 PROCEDURE — G2211 COMPLEX E/M VISIT ADD ON: HCPCS | Performed by: INTERNAL MEDICINE

## 2025-07-17 NOTE — PROGRESS NOTES
Results for orders placed or performed in visit on 07/17/25   Cardiac EP device report    Narrative    MDT DUAL ICD/ ACTIVE SYSTEM IS MRI CONDITIONAL  DEVICE INTERROGATED IN THE [employer] OFFICE.  BATTERY VOLTAGE ADEQUATE (6.8 YRS).  AP 88.4%    <0.1%.  ALL LEAD PARAMETERS WITHIN NORMAL LIMITS.  NO SIGNIFICANT HIGH RATE EPISODES.  10 AT/AF EPISODES, MOST RECENT LASTING 11 SECS.  PT TAKES ELIQUIS, METOPROLOL SUCC, DOFETILIDE, ENTRESTO.  EF 35% (ECHO 2/3/2025). PVC COUNT 25.9/HR (SINGLES).  TIME IN AT/AF <0.1 HR/D.  OPTI-VOL WITHIN NORMAL LIMITS.  NO PROGRAMMING CHANGES MADE TO DEVICE PARAMETERS.  NORMAL DEVICE FUNCTION. PAS      OV WITH DR FARR TODAY.

## 2025-08-07 DIAGNOSIS — I50.20 HFREF (HEART FAILURE WITH REDUCED EJECTION FRACTION) (HCC): ICD-10-CM

## 2025-08-07 DIAGNOSIS — I42.0 DILATED CARDIOMYOPATHY (HCC): ICD-10-CM

## 2025-08-08 ENCOUNTER — TELEPHONE (OUTPATIENT)
Dept: CARDIOLOGY CLINIC | Facility: CLINIC | Age: 80
End: 2025-08-08

## 2025-08-08 RX ORDER — LOSARTAN POTASSIUM 25 MG/1
TABLET ORAL
Refills: 0 | OUTPATIENT
Start: 2025-08-08

## (undated) DEVICE — BOWL: 16OZ PEELPOUCH 75/CS 16/PLT: Brand: MEDEGEN MEDICAL PRODUCTS, LLC

## (undated) DEVICE — INTENDED FOR TISSUE SEPARATION, AND OTHER PROCEDURES THAT REQUIRE A SHARP SURGICAL BLADE TO PUNCTURE OR CUT.: Brand: BARD-PARKER ® CARBON RIB-BACK BLADES

## (undated) DEVICE — DRESSING BIOPATCH ANTIMICROBIAL 1 IN DISC

## (undated) DEVICE — STERILE UNIVERSAL BREAST PACK: Brand: CARDINAL HEALTH

## (undated) DEVICE — STERISTRIP

## (undated) DEVICE — SMOKE EVACUATION TUBING WITH 8 IN INTEGRAL WAND AND SPONGE GUARD: Brand: BUFFALO FILTER

## (undated) DEVICE — SUT VICRYL 2-0 REEL 54 IN J286G

## (undated) DEVICE — VIAL DECANTER

## (undated) DEVICE — 3000CC GUARDIAN II: Brand: GUARDIAN

## (undated) DEVICE — MAYO STAND COVER: Brand: CONVERTORS

## (undated) DEVICE — STRL UNIVERSAL MINOR GENERAL: Brand: CARDINAL HEALTH

## (undated) DEVICE — 3M™ STERI-STRIP™ COMPOUND BENZOIN TINCTURE 40 BAGS/CARTON 4 CARTONS/CASE C1544: Brand: 3M™ STERI-STRIP™

## (undated) DEVICE — BAG DECANTER

## (undated) DEVICE — SCD SEQUENTIAL COMPRESSION COMFORT SLEEVE MEDIUM KNEE LENGTH: Brand: KENDALL SCD

## (undated) DEVICE — GLOVE INDICATOR PI UNDERGLOVE SZ 7.5 BLUE

## (undated) DEVICE — JP CHAN DRN SIL HUBLESS 15FR W/TRO: Brand: CARDINAL HEALTH

## (undated) DEVICE — 3M™ STERI-STRIP™ REINFORCED ADHESIVE SKIN CLOSURES, R1547, 1/2 IN X 4 IN (12 MM X 100 MM), 6 STRIPS/ENVELOPE: Brand: 3M™ STERI-STRIP™

## (undated) DEVICE — SUT VICRYL 2-0 SH 27 IN UNDYED J417H

## (undated) DEVICE — SUT ETHILON 3-0 PS-1 18 IN 1663G

## (undated) DEVICE — CHEST/BREAST DRAPE: Brand: CONVERTORS

## (undated) DEVICE — SUT MONOCRYL 4-0 PS-2 18 IN Y496G

## (undated) DEVICE — JACKSON-PRATT 100CC BULB RESERVOIR: Brand: CARDINAL HEALTH

## (undated) DEVICE — ELECTROSURGICAL ELECTRODE EXTENDER: Brand: CONMED

## (undated) DEVICE — CHLORAPREP HI-LITE 26ML ORANGE

## (undated) DEVICE — SYRINGE 20ML LL

## (undated) DEVICE — SKIN MARKER DUAL TIP WITH RULER CAP, FLEXIBLE RULER AND LABELS: Brand: DEVON

## (undated) DEVICE — IV SET INJECTION CARESITE VALVE

## (undated) DEVICE — BULB SYRINGE,IRRIGATION WITH PROTECTIVE CAP: Brand: DOVER

## (undated) DEVICE — KERLIX BANDAGE ROLL: Brand: KERLIX

## (undated) DEVICE — TUBING SUCTION 5MM X 12 FT

## (undated) DEVICE — UTILITY MARKER,BLACK WITH LABELS: Brand: DEVON

## (undated) DEVICE — INSULATED BLADE ELECTRODE;CAUTION: FOR MANUFACTURING, PROCESSING, OR REPACKING.: Brand: EDGE

## (undated) DEVICE — PLUMEPEN PRO 10FT

## (undated) DEVICE — CONMED ACCESSORY ELECTRODE, FLAT BLADE WITH EXTENDED INSULATION: Brand: CONMED

## (undated) DEVICE — MEDI-VAC YANKAUER SUCTION HANDLE W/STRAIGHT TIP & CONTROL VENT: Brand: CARDINAL HEALTH

## (undated) DEVICE — LIGHT HANDLE COVER SLEEVE DISP BLUE STELLAR

## (undated) DEVICE — FUNNEL E KELLER 2 DELIVERY DEVICE

## (undated) DEVICE — SAFETY PINS KIT: Brand: CARDINAL HEALTH

## (undated) DEVICE — PREP PAD BNS: Brand: CONVERTORS

## (undated) DEVICE — NEEDLE 25G X 1 1/2

## (undated) DEVICE — INTENDED FOR TISSUE SEPARATION, AND OTHER PROCEDURES THAT REQUIRE A SHARP SURGICAL BLADE TO PUNCTURE OR CUT.: Brand: BARD-PARKER SAFETY BLADES SIZE 15, STERILE

## (undated) DEVICE — GAUZE SPONGES,USP TYPE VII GAUZE, 12 PLY: Brand: CURITY

## (undated) DEVICE — REM POLYHESIVE ADULT PATIENT RETURN ELECTRODE: Brand: VALLEYLAB

## (undated) DEVICE — DRAPE PROBE NEO-PROBE/ULTRASOUND

## (undated) DEVICE — GLOVE SRG BIOGEL 7.5

## (undated) DEVICE — SUT SILK 3-0 SH 30 IN K832H

## (undated) DEVICE — SUT PDS II 2-0 CT-1 27 IN Z339H

## (undated) DEVICE — ADHESIVE SKN CLSR HISTOACRYL FLEX 0.5ML LF

## (undated) DEVICE — SUT SILK 2-0 SH 30 IN K833H

## (undated) DEVICE — INSULATED BLADE ELECTRODE: Brand: EDGE

## (undated) DEVICE — GLOVE SRG BIOGEL 7

## (undated) DEVICE — TRAY FOLEY 16FR URIMETER SURESTEP

## (undated) DEVICE — DRAPE SHEET THREE QUARTER

## (undated) DEVICE — LOW HEIGHT, MODERATE PLUS PROFILE STERILE, LM+ 415CC GEL SIZER: Brand: MENTOR MEMORYSHAPE LM PLUS RESTERILIZABLE GEL SIZER

## (undated) DEVICE — KIT EXPANDER BRST UNIVERSAL FILL